# Patient Record
Sex: MALE | Race: ASIAN | NOT HISPANIC OR LATINO | Employment: OTHER | URBAN - METROPOLITAN AREA
[De-identification: names, ages, dates, MRNs, and addresses within clinical notes are randomized per-mention and may not be internally consistent; named-entity substitution may affect disease eponyms.]

---

## 2017-01-18 DIAGNOSIS — E78.2 MIXED HYPERLIPIDEMIA: ICD-10-CM

## 2017-01-18 DIAGNOSIS — I10 ESSENTIAL HYPERTENSION: ICD-10-CM

## 2017-01-18 RX ORDER — PRAVASTATIN SODIUM 10 MG/1
TABLET ORAL
Qty: 90 TABLET | Refills: 0 | Status: SHIPPED | OUTPATIENT
Start: 2017-01-18 | End: 2017-05-25 | Stop reason: SDUPTHER

## 2017-01-18 RX ORDER — AMLODIPINE BESYLATE 5 MG/1
TABLET ORAL
Qty: 90 TABLET | Refills: 0 | Status: SHIPPED | OUTPATIENT
Start: 2017-01-18 | End: 2017-05-25 | Stop reason: SDUPTHER

## 2017-02-03 DIAGNOSIS — I10 ESSENTIAL HYPERTENSION: ICD-10-CM

## 2017-02-03 DIAGNOSIS — E78.5 HYPERLIPIDEMIA, UNSPECIFIED HYPERLIPIDEMIA TYPE: ICD-10-CM

## 2017-02-03 RX ORDER — METOPROLOL SUCCINATE 100 MG/1
100 TABLET, EXTENDED RELEASE ORAL DAILY
Qty: 30 TABLET | Refills: 0 | Status: SHIPPED | OUTPATIENT
Start: 2017-02-03 | End: 2017-05-25 | Stop reason: SDUPTHER

## 2017-02-03 RX ORDER — OMEGA-3-ACID ETHYL ESTERS 1 G/1
2 CAPSULE, LIQUID FILLED ORAL 2 TIMES DAILY
Qty: 120 CAPSULE | Refills: 0 | Status: SHIPPED | OUTPATIENT
Start: 2017-02-03 | End: 2017-03-31 | Stop reason: SDUPTHER

## 2017-02-03 RX ORDER — FUROSEMIDE 20 MG/1
20 TABLET ORAL DAILY
Qty: 30 TABLET | Refills: 0 | Status: SHIPPED | OUTPATIENT
Start: 2017-02-03 | End: 2017-05-25 | Stop reason: SDUPTHER

## 2017-02-03 NOTE — TELEPHONE ENCOUNTER
----- Message from Bereket Sapp sent at 2/3/2017  9:54 AM CST -----  Contact: [facility]  Refill:    omega-3 acid ethyl esters (LOVAZA) 1 gram capsule  furosemide (LASIX) 20 MG tablet   metoprolol succinate (TOPROL-XL) 100 MG 24 hr tablet     Please send to [facility]

## 2017-03-22 ENCOUNTER — TELEPHONE (OUTPATIENT)
Dept: FAMILY MEDICINE | Facility: CLINIC | Age: 82
End: 2017-03-22

## 2017-03-22 DIAGNOSIS — E78.5 HYPERLIPIDEMIA, UNSPECIFIED HYPERLIPIDEMIA TYPE: ICD-10-CM

## 2017-03-22 NOTE — TELEPHONE ENCOUNTER
----- Message from Kerry Barkley sent at 3/22/2017  2:26 PM CDT -----  [facility] calling to request a refill omega-3 acid ethyl esters (LOVAZA) 1 gram capsule. Thank you!

## 2017-03-22 NOTE — TELEPHONE ENCOUNTER
Spoke with Ying at [facility]. Advised patient due in for office visit.     Refills for Lasix and Metoprolol approved by nephrologist. She will contact Nephrologist for approval of Lovaza

## 2017-03-31 DIAGNOSIS — E78.5 HYPERLIPIDEMIA, UNSPECIFIED HYPERLIPIDEMIA TYPE: ICD-10-CM

## 2017-03-31 RX ORDER — OMEGA-3-ACID ETHYL ESTERS 1 G/1
2 CAPSULE, LIQUID FILLED ORAL 2 TIMES DAILY
Qty: 360 CAPSULE | Refills: 3 | Status: SHIPPED | OUTPATIENT
Start: 2017-03-31 | End: 2017-12-21 | Stop reason: SDUPTHER

## 2017-03-31 NOTE — TELEPHONE ENCOUNTER
Spoke with Aleta with [facility] [phone] re: patient brought in empty medication bottle for Lovaza    She would like to know if patient needs to continue this medication and if so, can refill for 1 year be sent to [facility]

## 2017-03-31 NOTE — TELEPHONE ENCOUNTER
----- Message from Rubi Nicolas sent at 3/31/2017 10:32 AM CDT -----  Contact: self  [facility] requesting [facility] call at [phone] to discuss medication.    Thanks!

## 2017-05-25 ENCOUNTER — OFFICE VISIT (OUTPATIENT)
Dept: FAMILY MEDICINE | Facility: CLINIC | Age: 82
End: 2017-05-25
Payer: MEDICARE

## 2017-05-25 VITALS
DIASTOLIC BLOOD PRESSURE: 60 MMHG | RESPIRATION RATE: 17 BRPM | HEIGHT: 65 IN | HEART RATE: 62 BPM | WEIGHT: 149.5 LBS | SYSTOLIC BLOOD PRESSURE: 130 MMHG | TEMPERATURE: 98 F | BODY MASS INDEX: 24.91 KG/M2 | OXYGEN SATURATION: 96 %

## 2017-05-25 DIAGNOSIS — N18.6 ESRD (END STAGE RENAL DISEASE): Primary | ICD-10-CM

## 2017-05-25 DIAGNOSIS — Z99.2 END STAGE RENAL DISEASE ON DIALYSIS: ICD-10-CM

## 2017-05-25 DIAGNOSIS — N18.6 END STAGE RENAL DISEASE ON DIALYSIS: ICD-10-CM

## 2017-05-25 DIAGNOSIS — I10 ESSENTIAL HYPERTENSION: ICD-10-CM

## 2017-05-25 DIAGNOSIS — E78.2 MIXED HYPERLIPIDEMIA: ICD-10-CM

## 2017-05-25 PROCEDURE — 99999 PR PBB SHADOW E&M-EST. PATIENT-LVL III: CPT | Mod: PBBFAC,,, | Performed by: INTERNAL MEDICINE

## 2017-05-25 PROCEDURE — 1126F AMNT PAIN NOTED NONE PRSNT: CPT | Mod: S$GLB,,, | Performed by: INTERNAL MEDICINE

## 2017-05-25 PROCEDURE — 99214 OFFICE O/P EST MOD 30 MIN: CPT | Mod: S$GLB,,, | Performed by: INTERNAL MEDICINE

## 2017-05-25 PROCEDURE — 1159F MED LIST DOCD IN RCRD: CPT | Mod: S$GLB,,, | Performed by: INTERNAL MEDICINE

## 2017-05-25 PROCEDURE — 99499 UNLISTED E&M SERVICE: CPT | Mod: S$PBB,,, | Performed by: INTERNAL MEDICINE

## 2017-05-25 RX ORDER — FUROSEMIDE 20 MG/1
20 TABLET ORAL DAILY
Qty: 30 TABLET | Refills: 2 | Status: SHIPPED | OUTPATIENT
Start: 2017-05-25 | End: 2017-08-10 | Stop reason: SDUPTHER

## 2017-05-25 RX ORDER — PRAVASTATIN SODIUM 10 MG/1
TABLET ORAL
Qty: 90 TABLET | Refills: 1 | Status: SHIPPED | OUTPATIENT
Start: 2017-05-25 | End: 2017-10-12 | Stop reason: SDUPTHER

## 2017-05-25 RX ORDER — METOPROLOL SUCCINATE 100 MG/1
100 TABLET, EXTENDED RELEASE ORAL DAILY
Qty: 30 TABLET | Refills: 2 | Status: SHIPPED | OUTPATIENT
Start: 2017-05-25 | End: 2017-08-10 | Stop reason: SDUPTHER

## 2017-05-25 RX ORDER — AMLODIPINE BESYLATE 5 MG/1
TABLET ORAL
Qty: 90 TABLET | Refills: 1 | Status: SHIPPED | OUTPATIENT
Start: 2017-05-25 | End: 2017-10-12 | Stop reason: SDUPTHER

## 2017-05-25 NOTE — PROGRESS NOTES
Subjective:       Patient ID: Mandeep Willams is a 81 y.o. male.    Chief Complaint: Hypertension and Follow-up    He presents this granddaughter today for follow-up.  His only complaint is cramps in his lower legs after dialysis.  He has declined blood work today.  She reports that he lives alone and she will be leaving in 2 days.  He will need transportation to dialysis.      Review of Systems   Musculoskeletal: Positive for myalgias.       Objective:      Physical Exam   Constitutional: He is oriented to person, place, and time. He appears well-developed and well-nourished. No distress.   HENT:   Head: Normocephalic and atraumatic.   Right Ear: External ear normal.   Left Ear: External ear normal.   Eyes: Conjunctivae are normal. No scleral icterus.   Cardiovascular: Normal rate and regular rhythm.  Exam reveals no gallop and no friction rub.    Murmur heard.  Pulmonary/Chest: Effort normal and breath sounds normal. No respiratory distress. He has no wheezes. He has no rales.   Abdominal: Soft. Bowel sounds are normal. He exhibits no distension and no mass. There is no tenderness. There is no rebound and no guarding.   Musculoskeletal: He exhibits edema. He exhibits no tenderness.   trace   Neurological: He is alert and oriented to person, place, and time. No cranial nerve deficit.   Skin: Skin is warm and dry. No rash noted.   Psychiatric: He has a normal mood and affect.   Vitals reviewed.      Assessment:       1. ESRD (end stage renal disease)    2. End stage renal disease on dialysis    3. Essential hypertension    4. Mixed hyperlipidemia        Plan:       Mandeep was seen today for hypertension and follow-up.    Diagnoses and all orders for this visit:    ESRD (end stage renal disease)    End stage renal disease on dialysis - Pt lives alone and will need transportation to dialysis.  [facility] navigator has arranged transportation through July.  She has also referred the patient to social work given his living situation  and language barrier.    Essential hypertension - controlled  -     amlodipine (NORVASC) 5 MG tablet; TAKE 1 BY MOUTH DAILY  -     metoprolol succinate (TOPROL XL) 100 MG 24 hr tablet; Take 1 tablet (100 mg total) by mouth once daily.  -     furosemide (LASIX) 20 MG tablet; Take 1 tablet (20 mg total) by mouth once daily.    Mixed hyperlipidemia - He has refused labs today  -     pravastatin (PRAVACHOL) 10 MG tablet; TAKE 1 BY MOUTH DAILY       F/u 6 months and prn

## 2017-08-10 DIAGNOSIS — I10 ESSENTIAL HYPERTENSION: ICD-10-CM

## 2017-08-11 RX ORDER — FUROSEMIDE 20 MG/1
TABLET ORAL
Qty: 30 TABLET | Refills: 5 | Status: SHIPPED | OUTPATIENT
Start: 2017-08-11 | End: 2018-10-25 | Stop reason: SDUPTHER

## 2017-08-11 RX ORDER — METOPROLOL SUCCINATE 100 MG/1
TABLET, EXTENDED RELEASE ORAL
Qty: 30 TABLET | Refills: 5 | Status: SHIPPED | OUTPATIENT
Start: 2017-08-11 | End: 2018-02-08 | Stop reason: SDUPTHER

## 2017-10-12 DIAGNOSIS — E78.2 MIXED HYPERLIPIDEMIA: ICD-10-CM

## 2017-10-12 DIAGNOSIS — I10 ESSENTIAL HYPERTENSION: ICD-10-CM

## 2017-10-13 RX ORDER — AMLODIPINE BESYLATE 5 MG/1
TABLET ORAL
Qty: 90 TABLET | Refills: 0 | Status: SHIPPED | OUTPATIENT
Start: 2017-10-13 | End: 2018-10-30

## 2017-12-21 DIAGNOSIS — E78.5 HYPERLIPIDEMIA, UNSPECIFIED HYPERLIPIDEMIA TYPE: ICD-10-CM

## 2017-12-22 RX ORDER — OMEGA-3-ACID ETHYL ESTERS 1 G/1
CAPSULE, LIQUID FILLED ORAL
Qty: 360 CAPSULE | Refills: 2 | Status: SHIPPED | OUTPATIENT
Start: 2017-12-22 | End: 2019-03-19 | Stop reason: SDUPTHER

## 2018-02-08 DIAGNOSIS — I10 ESSENTIAL HYPERTENSION: ICD-10-CM

## 2018-02-08 RX ORDER — METOPROLOL SUCCINATE 100 MG/1
100 TABLET, EXTENDED RELEASE ORAL DAILY
Qty: 30 TABLET | Refills: 0 | Status: SHIPPED | OUTPATIENT
Start: 2018-02-08 | End: 2018-10-23 | Stop reason: SDUPTHER

## 2018-02-08 NOTE — TELEPHONE ENCOUNTER
Patient had not been seen in 7 months, should also schedule an appt. Will need a new PCP here as Dr. Bell no longer here

## 2018-03-06 DIAGNOSIS — I10 ESSENTIAL HYPERTENSION: ICD-10-CM

## 2018-03-06 DIAGNOSIS — E78.2 MIXED HYPERLIPIDEMIA: ICD-10-CM

## 2018-03-06 RX ORDER — PRAVASTATIN SODIUM 10 MG/1
TABLET ORAL
Qty: 90 TABLET | Refills: 0 | OUTPATIENT
Start: 2018-03-06

## 2018-03-06 RX ORDER — AMLODIPINE BESYLATE 5 MG/1
TABLET ORAL
Qty: 90 TABLET | Refills: 0 | OUTPATIENT
Start: 2018-03-06

## 2018-03-06 RX ORDER — FUROSEMIDE 20 MG/1
TABLET ORAL
Qty: 30 TABLET | Refills: 5 | OUTPATIENT
Start: 2018-03-06

## 2018-06-27 ENCOUNTER — TELEPHONE (OUTPATIENT)
Dept: FAMILY MEDICINE | Facility: CLINIC | Age: 83
End: 2018-06-27

## 2018-06-27 NOTE — TELEPHONE ENCOUNTER
Spoke with Connie had question re: DM status of pt. and was informed that no notes found to indicate pt has DM. She states she saw this on hospital records. No further concerns noted

## 2018-06-27 NOTE — TELEPHONE ENCOUNTER
----- Message from Nicholas Pandey sent at 6/27/2018 10:40 AM CDT -----  Contact: KATHIE-Connie  Called to verify if pt has diabetes.  Connie can be reached @ [phone]

## 2018-06-28 ENCOUNTER — TELEPHONE (OUTPATIENT)
Dept: FAMILY MEDICINE | Facility: CLINIC | Age: 83
End: 2018-06-28

## 2018-06-28 NOTE — TELEPHONE ENCOUNTER
----- Message from Shanika Boucher sent at 6/28/2018  1:06 PM CDT -----  Contact: Connie with [facility] / [phone]  Connie calling to speak to nurse again about pt's health. Please call to advise. Thank you.

## 2018-06-28 NOTE — TELEPHONE ENCOUNTER
Spoke with Connie home health nurse and she is concern and requesting clarifications on patient's medications and johnson care. Johnson was inserted on 6/20/18 in the hospital. She informed me that patient was discharged from [facility] with johnson cath but no order for care. On dose for Metoprolol patient has the 100mg and 50 mg titrate in the home. She said that since his most resent is the 50 mg; this is what you put out for patient to take. Also patient in on 2 new medications that is not listed on our medication profile: Fosamax 0.4mg and  Fevelamer 800 mg TID. Spoke with nurse and informed her that Dr Anderson has not seen this patient before and his last office visit was with Dr Bell 5/2017. Patient would have to be seen by provider to address these concerns. Patient has office visit with provider on 7/10/18.

## 2018-07-10 ENCOUNTER — LAB VISIT (OUTPATIENT)
Dept: LAB | Facility: HOSPITAL | Age: 83
End: 2018-07-10
Attending: FAMILY MEDICINE
Payer: MEDICARE

## 2018-07-10 ENCOUNTER — OFFICE VISIT (OUTPATIENT)
Dept: FAMILY MEDICINE | Facility: CLINIC | Age: 83
End: 2018-07-10
Payer: MEDICARE

## 2018-07-10 VITALS
RESPIRATION RATE: 20 BRPM | HEIGHT: 65 IN | OXYGEN SATURATION: 100 % | BODY MASS INDEX: 21.64 KG/M2 | SYSTOLIC BLOOD PRESSURE: 106 MMHG | DIASTOLIC BLOOD PRESSURE: 63 MMHG | HEART RATE: 80 BPM | WEIGHT: 129.88 LBS | TEMPERATURE: 98 F

## 2018-07-10 DIAGNOSIS — Z99.2 DEPENDENCE ON HEMODIALYSIS: ICD-10-CM

## 2018-07-10 DIAGNOSIS — I10 ESSENTIAL HYPERTENSION: ICD-10-CM

## 2018-07-10 DIAGNOSIS — Z74.09 IMPAIRED MOBILITY: ICD-10-CM

## 2018-07-10 DIAGNOSIS — N18.6 ESRD (END STAGE RENAL DISEASE): ICD-10-CM

## 2018-07-10 DIAGNOSIS — L85.3 XEROSIS OF SKIN: ICD-10-CM

## 2018-07-10 DIAGNOSIS — Z97.8 FOLEY CATHETER IN PLACE: ICD-10-CM

## 2018-07-10 DIAGNOSIS — N18.6 ESRD (END STAGE RENAL DISEASE): Primary | ICD-10-CM

## 2018-07-10 DIAGNOSIS — R01.1 CARDIAC MURMUR: ICD-10-CM

## 2018-07-10 LAB
ALBUMIN SERPL BCP-MCNC: 3.4 G/DL
ALP SERPL-CCNC: 123 U/L
ALT SERPL W/O P-5'-P-CCNC: 34 U/L
ANION GAP SERPL CALC-SCNC: 15 MMOL/L
AST SERPL-CCNC: 42 U/L
BASOPHILS # BLD AUTO: 0.01 K/UL
BASOPHILS NFR BLD: 0.3 %
BILIRUB SERPL-MCNC: 0.6 MG/DL
BUN SERPL-MCNC: 59 MG/DL
CALCIUM SERPL-MCNC: 9.4 MG/DL
CHLORIDE SERPL-SCNC: 96 MMOL/L
CO2 SERPL-SCNC: 26 MMOL/L
CREAT SERPL-MCNC: 5.7 MG/DL
DIFFERENTIAL METHOD: ABNORMAL
EOSINOPHIL # BLD AUTO: 0.1 K/UL
EOSINOPHIL NFR BLD: 3 %
ERYTHROCYTE [DISTWIDTH] IN BLOOD BY AUTOMATED COUNT: 18.7 %
EST. GFR  (AFRICAN AMERICAN): 10 ML/MIN/1.73 M^2
EST. GFR  (NON AFRICAN AMERICAN): 9 ML/MIN/1.73 M^2
GLUCOSE SERPL-MCNC: 97 MG/DL
HCT VFR BLD AUTO: 33.3 %
HGB BLD-MCNC: 10.9 G/DL
LYMPHOCYTES # BLD AUTO: 2.1 K/UL
LYMPHOCYTES NFR BLD: 52.9 %
MCH RBC QN AUTO: 31.4 PG
MCHC RBC AUTO-ENTMCNC: 32.7 G/DL
MCV RBC AUTO: 96 FL
MONOCYTES # BLD AUTO: 0.4 K/UL
MONOCYTES NFR BLD: 10.9 %
NEUTROPHILS # BLD AUTO: 1.3 K/UL
NEUTROPHILS NFR BLD: 32.9 %
PLATELET # BLD AUTO: 138 K/UL
PMV BLD AUTO: 9.8 FL
POTASSIUM SERPL-SCNC: 3.7 MMOL/L
PROT SERPL-MCNC: 8.8 G/DL
RBC # BLD AUTO: 3.47 M/UL
SODIUM SERPL-SCNC: 137 MMOL/L
WBC # BLD AUTO: 3.95 K/UL

## 2018-07-10 PROCEDURE — 82306 VITAMIN D 25 HYDROXY: CPT

## 2018-07-10 PROCEDURE — 80053 COMPREHEN METABOLIC PANEL: CPT

## 2018-07-10 PROCEDURE — 83970 ASSAY OF PARATHORMONE: CPT

## 2018-07-10 PROCEDURE — 99499 UNLISTED E&M SERVICE: CPT | Mod: S$GLB,,, | Performed by: FAMILY MEDICINE

## 2018-07-10 PROCEDURE — 99214 OFFICE O/P EST MOD 30 MIN: CPT | Mod: S$GLB,,, | Performed by: FAMILY MEDICINE

## 2018-07-10 PROCEDURE — 99999 PR PBB SHADOW E&M-EST. PATIENT-LVL III: CPT | Mod: PBBFAC,,, | Performed by: FAMILY MEDICINE

## 2018-07-10 PROCEDURE — 85025 COMPLETE CBC W/AUTO DIFF WBC: CPT

## 2018-07-10 PROCEDURE — 36415 COLL VENOUS BLD VENIPUNCTURE: CPT | Mod: PN

## 2018-07-10 RX ORDER — VALINE, LYSINE, HISTIDINE, ISOLEUCINE, LEUCINE, PHENYLALANINE, THREONINE, METHIONINE, TRYPTOPHAN, ALANINE, GLYCINE, ARGININE, PROLINE, GLUTAMIC ACID, SERINE, ASPARTIC ACID AND TYROSINE 1.44; 1.35; 1.18; 1.08; 1.08; 1; 980; 760; 320; 2.76; 2.06; 1.96; 1.34; 1.02; 1.02; 600; 5 G/100ML; G/100ML; G/100ML; G/100ML; G/100ML; G/100ML; MG/100ML; MG/100ML; MG/100ML; G/100ML; G/100ML; G/100ML; G/100ML; G/100ML; G/100ML; MG/100ML; MG/100ML
INJECTION, SOLUTION INTRAVENOUS
Status: ON HOLD | COMMUNITY
Start: 2018-06-22 | End: 2021-01-16 | Stop reason: HOSPADM

## 2018-07-10 RX ORDER — I.V. FAT EMULSION 20 G/100ML
EMULSION INTRAVENOUS
Status: ON HOLD | COMMUNITY
Start: 2018-07-06 | End: 2021-01-16 | Stop reason: HOSPADM

## 2018-07-10 RX ORDER — AMMONIUM LACTATE 12 G/100G
LOTION TOPICAL
Qty: 500 G | Refills: 5 | Status: SHIPPED | OUTPATIENT
Start: 2018-07-10 | End: 2018-07-10 | Stop reason: SDUPTHER

## 2018-07-10 RX ORDER — AMMONIUM LACTATE 12 G/100G
LOTION TOPICAL DAILY PRN
Qty: 500 G | Refills: 5 | Status: SHIPPED | OUTPATIENT
Start: 2018-07-10 | End: 2019-04-02 | Stop reason: SDUPTHER

## 2018-07-11 LAB
25(OH)D3+25(OH)D2 SERPL-MCNC: 39 NG/ML
PTH-INTACT SERPL-MCNC: 291.6 PG/ML

## 2018-07-11 NOTE — PROGRESS NOTES
Routine Office Visit    Patient Name: Mandeep Willams    : 1935  MRN: [id number]    Subjective:  Mandeep is a 82 y.o. male who presents today for:   Chief Complaint   Patient presents with    Hospital Follow Up     release from [facility]       82-year-old male with end-stage renal disease on dialysis 3 days a week, hypertension, and dyslipidemia comes in to establish care with a new primary care provider.  He states that he was recently discharged from [facility], because he could no longer afford staying there.  Neither he nor his son was present speak angulation and translation is done via  program, Martha.   Patient reports that he is taking all his medications as prescribed, however he does not know his medications and did not bring a list nor his medications with him.   The patient reports that he has a very difficult time ambulating.  He is currently using a walker that he borrowed.  The he would like a prescription for a walker and a wheelchair.  He would like both because he would like to use a walker while at home and a wheelchair when he goes out with his family.  Without the use of assistive devices, the patient is unable to ambulate.  The patient reports that he has a Matute.  It was ordered by his nephrologist.  The he states that it is changed once monthly.  He empties the bags himself daily.  He reports no current symptoms of a urinary tract infection.  The he denies any fevers or chills.  He denies any foul smell in the urine.    Past Medical History  Past Medical History:   Diagnosis Date    ESRD (end stage renal disease)     HTN (hypertension)     Hyperlipidemia        Past Surgical History  Past Surgical History:   Procedure Laterality Date    BACK SURGERY      DIALYSIS FISTULA CREATION  2012    left arm        Family History  Family History   Problem Relation Age of Onset    Diabetes Sister     Hypertension Sister     Arthritis Sister     Cancer Neg Hx     Heart  disease Neg Hx     Stroke Neg Hx     Amblyopia Neg Hx     Blindness Neg Hx     Cataracts Neg Hx     Glaucoma Neg Hx     Macular degeneration Neg Hx     Retinal detachment Neg Hx     Strabismus Neg Hx     Thyroid disease Neg Hx        Social History  Social History     Social History    Marital status:      Spouse name: N/A    Number of children: N/A    Years of education: N/A     Occupational History     [employer]     Social History Main Topics    Smoking status: Never Smoker    Smokeless tobacco: Not on file    Alcohol use No    Drug use: No    Sexual activity: Not Currently     Other Topics Concern    Not on file     Social History Narrative    No narrative on file       Current Medications  Current Outpatient Prescriptions on File Prior to Visit   Medication Sig Dispense Refill    LASIX 20 mg tablet TAKE 1 BY MOUTH DAILY 30 tablet 5    LOVAZA 1 gram capsule TAKE 2 BY MOUTH TWO TIMES ADAY 360 capsule 2    metoprolol succinate (TOPROL XL) 100 MG 24 hr tablet Take 1 tablet (100 mg total) by mouth once daily. 30 tablet 0    NE[facility]RO-[facility] RX 1- mg-mg-mcg tablet TAKE 1 BY MOUTH ONCE A DAY                            REPLACES VOL-CARE RX 90 tablet 0    NORVASC 5 mg tablet TAKE 1 BY MOUTH DAILY 90 tablet 0    polyethylene glycol (GLYCOLAX) 17 gram PwPk Take 17 g by mouth daily as needed. 30 each 2    PRAVACHOL 10 mg tablet TAKE 1 BY MOUTH DAILY 90 tablet 0    SENNOSIDES (SENOKOT ORAL) Take by mouth.       No current facility-administered medications on file prior to visit.        Allergies   Review of patient's allergies indicates:  No Known Allergies    Review of Systems   Constitutional: Positive for fatigue. Negative for chills and fever.   Respiratory: Negative for cough, shortness of breath and wheezing.    Cardiovascular: Negative for chest pain and palpitations.   Gastrointestinal: Negative for abdominal pain, blood in stool, constipation, diarrhea and  "nausea.   Musculoskeletal: Positive for arthralgias, back pain, gait problem and myalgias.   Skin: Negative for rash.   Neurological: Negative for seizures and headaches.   Hematological: Negative for adenopathy. Does not bruise/bleed easily.   Psychiatric/Behavioral: Negative for sleep disturbance.       /63 (BP Location: Right arm, Patient Position: Sitting, BP Method: Medium (Automatic))   Pulse 80   Temp 97.6 °F (36.4 °C) (Oral)   Resp 20   Ht 5' 5" (1.651 m)   Wt 58.9 kg (129 lb 13.6 oz)   SpO2 100%   BMI 21.61 kg/m²     Physical Exam   Constitutional: He appears well-developed and well-nourished. He is cooperative. No distress.   Patient using a rolling walker, which she states he borrowed from a friend.  Patient has Matute catheter bag attached to right leg   HENT:   Head: Normocephalic.   Right Ear: External ear normal.   Left Ear: External ear normal.   Nose: Nose normal.   Mouth/Throat: No oropharyngeal exudate.   Eyes: Conjunctivae and EOM are normal. Pupils are equal, round, and reactive to light.   Neck: Normal range of motion. Neck supple. No tracheal deviation present.   Cardiovascular: Normal rate, regular rhythm and intact distal pulses.    Murmur heard.  Pulses:       Radial pulses are 2+ on the right side, and 2+ on the left side.   Pulmonary/Chest: Effort normal and breath sounds normal. He has no wheezes. He has no rales.   Abdominal: Soft. Bowel sounds are normal. He exhibits no mass. There is no tenderness.   Musculoskeletal: He exhibits no edema.   Lymphadenopathy:     He has no cervical adenopathy.   Neurological: He is alert.   Skin: He is not diaphoretic.   Dry scaling in lower extremities.   Vitals reviewed.      Assessment/Plan:  Mandeep was seen today for hospital follow up.    Diagnoses and all orders for this visit:    ESRD (end stage renal disease)  -     Comprehensive metabolic panel; Future  -     CBC auto differential; Future  -     Vitamin D; Future  -     PTH, intact; " Future  Will check labs.  Management as per Nephrology.    Essential hypertension  Well controlled.  Patient to bring in all his medications next week.    Xerosis of skin  -     ammonium lactate (LAC-HYDRIN) 12 % lotion; Apply topically daily as needed for Dry Skin.  Prescribed Lac-Hydrin to help with dryness.    Impaired mobility  -     WALKER FOR HOME USE  -     WHEELCHAIR FOR HOME USE  Will request walker and wheelchair as requested by the patient.    Cardiac murmur  Patient declines further workup of this at present..    Dependence on hemodialysis  Management as per Nephrology    Matute catheter in place  No complications noted at present.    This office note has been dictated.

## 2018-07-11 NOTE — PROGRESS NOTES
Patient, Mandeep Willams (MRN #[id number]), presented with a recent Platelet count less than 150 K/uL consistent with the definition of thrombocytopenia (ICD10 - D69.6).    Platelets   Date Value Ref Range Status   07/10/2018 138 (L) 150 - 350 K/uL Final     The patient's thrombocytopenia was monitored, evaluated, addressed and/or treated. This addendum to the medical record is made on 07/10/2018.

## 2018-07-12 ENCOUNTER — TELEPHONE (OUTPATIENT)
Dept: FAMILY MEDICINE | Facility: CLINIC | Age: 83
End: 2018-07-12

## 2018-07-12 NOTE — TELEPHONE ENCOUNTER
----- Message from Kelsey Lou sent at 7/11/2018  2:58 PM CDT -----  Contact: Jacob Clemente calling to speak to a nurse regarding health. [phone]      NORVASC 5 mg tablet  NEPHRO-KAUSHIK RX 1- mg-mg-mcg tablet  pravastatin (PRAVACHOL) 10 MG tablet (Discontinued)

## 2018-07-13 ENCOUNTER — TELEPHONE (OUTPATIENT)
Dept: FAMILY MEDICINE | Facility: CLINIC | Age: 83
End: 2018-07-13

## 2018-07-13 NOTE — TELEPHONE ENCOUNTER
Chyna informed of below; states she did speak to teri this morning and they will get in touch with pt

## 2018-07-13 NOTE — TELEPHONE ENCOUNTER
Guy Mcclelland Jr., MD routed this conversation to Stas Tovar Staff    Guy Mcclelland Jr., MD          9:35 AM   Note      Please let  know that patient nor son new what medicines the patient was one when they came in and did not bring any the day of the visit.   As such I wouldn't know what dosages he is on. He was told to bring in his medications to his visit next week.  Also they stated that meds had been being managed by the nephrologist      Lizzy Dean LPN   to Guy Mcclelland Jr., MD           9:25 AM    Patient has been scheduled for follow up on 07/17,  had questions about attached medicines refills and/or continuance asking that you address at visit.     Lizzy

## 2018-07-13 NOTE — TELEPHONE ENCOUNTER
Please let  know that patient nor son new what medicines the patient was one when they came in and did not bring any the day of the visit.   As such I wouldn't know what dosages he is on. He was told to bring in his medications to his visit next week.  Also they stated that meds had been being managed by the nephrologist

## 2018-07-19 ENCOUNTER — OFFICE VISIT (OUTPATIENT)
Dept: FAMILY MEDICINE | Facility: CLINIC | Age: 83
End: 2018-07-19
Payer: MEDICARE

## 2018-07-19 VITALS
HEIGHT: 65 IN | BODY MASS INDEX: 21.34 KG/M2 | RESPIRATION RATE: 14 BRPM | TEMPERATURE: 98 F | DIASTOLIC BLOOD PRESSURE: 55 MMHG | SYSTOLIC BLOOD PRESSURE: 130 MMHG | WEIGHT: 128.06 LBS | HEART RATE: 72 BPM | OXYGEN SATURATION: 99 %

## 2018-07-19 DIAGNOSIS — E78.2 MIXED HYPERLIPIDEMIA: ICD-10-CM

## 2018-07-19 DIAGNOSIS — N18.6 ESRD (END STAGE RENAL DISEASE): ICD-10-CM

## 2018-07-19 DIAGNOSIS — Z74.09 IMPAIRED MOBILITY: ICD-10-CM

## 2018-07-19 DIAGNOSIS — N25.81 SECONDARY HYPERPARATHYROIDISM: ICD-10-CM

## 2018-07-19 DIAGNOSIS — I10 ESSENTIAL HYPERTENSION: Primary | ICD-10-CM

## 2018-07-19 PROCEDURE — 99214 OFFICE O/P EST MOD 30 MIN: CPT | Mod: S$GLB,,, | Performed by: FAMILY MEDICINE

## 2018-07-19 PROCEDURE — 99499 UNLISTED E&M SERVICE: CPT | Mod: S$GLB,,, | Performed by: FAMILY MEDICINE

## 2018-07-19 PROCEDURE — 99999 PR PBB SHADOW E&M-EST. PATIENT-LVL III: CPT | Mod: PBBFAC,,, | Performed by: FAMILY MEDICINE

## 2018-07-19 NOTE — PROGRESS NOTES
Routine Office Visit    Patient Name: Mandeep Willams    : 1935  MRN: [id number]    Subjective:  Mandeep is a 82 y.o. male who presents today for:   Chief Complaint   Patient presents with    chonic problems       82-year-old male comes in with his son for follow-up on hypertension, end-stage renal disease, dyslipidemia and gait abnormality.  They report no new concerns since the last visit.  They bring in all the patient's medications to confirm correct dosages and this was corrected in medical record.  The patient has not yet received his walker or wheelchair.   service was provided by Martha    Past Medical History  Past Medical History:   Diagnosis Date    ESRD (end stage renal disease)     HTN (hypertension)     Hyperlipidemia        Past Surgical History  Past Surgical History:   Procedure Laterality Date    BACK SURGERY      DIALYSIS FISTULA CREATION  2012    left arm        Family History  Family History   Problem Relation Age of Onset    Diabetes Sister     Hypertension Sister     Arthritis Sister     Cancer Neg Hx     Heart disease Neg Hx     Stroke Neg Hx     Amblyopia Neg Hx     Blindness Neg Hx     Cataracts Neg Hx     Glaucoma Neg Hx     Macular degeneration Neg Hx     Retinal detachment Neg Hx     Strabismus Neg Hx     Thyroid disease Neg Hx        Social History  Social History     Social History    Marital status:      Spouse name: N/A    Number of children: N/A    Years of education: N/A     Occupational History     [employer]     Social History Main Topics    Smoking status: Never Smoker    Smokeless tobacco: Not on file    Alcohol use No    Drug use: No    Sexual activity: Not Currently     Other Topics Concern    Not on file     Social History Narrative    No narrative on file       Current Medications  Current Outpatient Prescriptions on File Prior to Visit   Medication Sig Dispense Refill    LASIX 20 mg tablet TAKE 1 BY  "MOUTH DAILY 30 tablet 5    metoprolol succinate (TOPROL XL) 100 MG 24 hr tablet Take 1 tablet (100 mg total) by mouth once daily. 30 tablet 0    NEPHRO-KAUSHIK RX 1- mg-mg-mcg tablet TAKE 1 BY MOUTH ONCE A DAY                            REPLACES VOL-CARE RX 90 tablet 0    NORVASC 5 mg tablet TAKE 1 BY MOUTH DAILY 90 tablet 0    PRAVACHOL 10 mg tablet TAKE 1 BY MOUTH DAILY 90 tablet 0    ammonium lactate (LAC-HYDRIN) 12 % lotion Apply topically daily as needed for Dry Skin. 500 g 5    INTRALIPID 20 % infusion       LOVAZA 1 gram capsule TAKE 2 BY MOUTH TWO TIMES ADAY 360 capsule 2    polyethylene glycol (GLYCOLAX) 17 gram PwPk Take 17 g by mouth daily as needed. 30 each 2    PROSOL 20 % SolP       SENNOSIDES (SENOKOT ORAL) Take by mouth.       No current facility-administered medications on file prior to visit.        Allergies   Review of patient's allergies indicates:  No Known Allergies    Review of Systems   Constitutional: Positive for fatigue. Negative for chills and fever.   Respiratory: Negative for cough, shortness of breath and wheezing.    Cardiovascular: Negative for chest pain and palpitations.   Gastrointestinal: Negative for abdominal pain, blood in stool, constipation, diarrhea and nausea.   Genitourinary: Negative for dysuria.   Musculoskeletal: Positive for arthralgias, back pain, gait problem and myalgias.   Skin: Negative for rash.   Neurological: Negative for seizures and headaches.   Hematological: Negative for adenopathy. Does not bruise/bleed easily.   Psychiatric/Behavioral: Negative for sleep disturbance.       BP (!) 130/55 (BP Location: Right arm, Patient Position: Sitting, BP Method: Medium (Automatic))   Pulse 72   Temp 97.7 °F (36.5 °C) (Oral)   Resp 14   Ht 5' 5" (1.651 m)   Wt 58.1 kg (128 lb 1.4 oz)   SpO2 99%   BMI 21.31 kg/m²     Physical Exam   Constitutional: He appears well-developed and well-nourished. He is cooperative. No distress.   Patient using a " rolling walker, which she states he borrowed from a friend.  Patient has Matute catheter bag attached to right leg   HENT:   Head: Normocephalic.   Right Ear: External ear normal.   Left Ear: External ear normal.   Nose: Nose normal.   Mouth/Throat: No oropharyngeal exudate.   Eyes: Conjunctivae and EOM are normal. Pupils are equal, round, and reactive to light.   Neck: Normal range of motion. Neck supple. No tracheal deviation present.   Cardiovascular: Normal rate, regular rhythm and intact distal pulses.    Murmur heard.  Pulses:       Radial pulses are 2+ on the right side, and 2+ on the left side.   Pulmonary/Chest: Effort normal and breath sounds normal. He has no wheezes. He has no rales.   Abdominal: Soft. Bowel sounds are normal. He exhibits no mass. There is no tenderness.   Musculoskeletal: He exhibits no edema.   Lymphadenopathy:     He has no cervical adenopathy.   Neurological: He is alert.   Skin: He is not diaphoretic.   Dry scaling in lower extremities.   Vitals reviewed.      Assessment/Plan:  Mandeep was seen today for chonic problems.    Diagnoses and all orders for this visit:    Essential hypertension  The patient is to continue Toprol  mg daily and Norvasc 5 mg daily.    Mixed hyperlipidemia  Continue pravastatin 10 mg daily and fish oil.    ESRD (end stage renal disease)  Continue dialysis on Mondays Wednesdays and Fridays as per Nephrology.    Secondary hyperparathyroidism  Continue to monitor PTH and vitamin-D.    Impaired mobility  Patient's son given the number to [email or web address] to call to arrange for delivery of medical equipment        This office note has been dictated.  This dictation has been generated using M-Modal Fluency Direct dictation; some phonetic errors may occur.

## 2018-10-22 ENCOUNTER — HOSPITAL ENCOUNTER (OUTPATIENT)
Facility: HOSPITAL | Age: 83
Discharge: HOME | End: 2018-10-23
Attending: EMERGENCY MEDICINE | Admitting: HOSPITALIST
Payer: MEDICARE

## 2018-10-22 ENCOUNTER — OFFICE VISIT (OUTPATIENT)
Dept: FAMILY MEDICINE | Facility: CLINIC | Age: 83
End: 2018-10-22
Payer: MEDICARE

## 2018-10-22 VITALS
BODY MASS INDEX: 24.91 KG/M2 | SYSTOLIC BLOOD PRESSURE: 128 MMHG | HEART RATE: 65 BPM | TEMPERATURE: 98 F | HEIGHT: 65 IN | RESPIRATION RATE: 16 BRPM | DIASTOLIC BLOOD PRESSURE: 62 MMHG | OXYGEN SATURATION: 96 % | WEIGHT: 149.5 LBS

## 2018-10-22 DIAGNOSIS — J90 PLEURAL EFFUSION: ICD-10-CM

## 2018-10-22 DIAGNOSIS — J18.9 PNEUMONIA OF RIGHT LOWER LOBE DUE TO INFECTIOUS ORGANISM: ICD-10-CM

## 2018-10-22 DIAGNOSIS — N18.6 END STAGE RENAL DISEASE ON DIALYSIS: ICD-10-CM

## 2018-10-22 DIAGNOSIS — J18.9 PNEUMONIA: Primary | ICD-10-CM

## 2018-10-22 DIAGNOSIS — I10 ESSENTIAL HYPERTENSION: ICD-10-CM

## 2018-10-22 DIAGNOSIS — R06.02 SOB (SHORTNESS OF BREATH): ICD-10-CM

## 2018-10-22 DIAGNOSIS — Z99.2 END STAGE RENAL DISEASE ON DIALYSIS: ICD-10-CM

## 2018-10-22 DIAGNOSIS — J90 PLEURAL EFFUSION, RIGHT: ICD-10-CM

## 2018-10-22 DIAGNOSIS — J98.11 COMPRESSIVE ATELECTASIS: ICD-10-CM

## 2018-10-22 DIAGNOSIS — R06.02 SHORTNESS OF BREATH: Primary | ICD-10-CM

## 2018-10-22 DIAGNOSIS — N18.6 ESRD (END STAGE RENAL DISEASE): ICD-10-CM

## 2018-10-22 DIAGNOSIS — E78.2 MIXED HYPERLIPIDEMIA: ICD-10-CM

## 2018-10-22 LAB
ALBUMIN SERPL BCP-MCNC: 3.4 G/DL
ALP SERPL-CCNC: 129 U/L
ALT SERPL W/O P-5'-P-CCNC: 53 U/L
ANION GAP SERPL CALC-SCNC: 11 MMOL/L
AST SERPL-CCNC: 41 U/L
BASOPHILS # BLD AUTO: 0.02 K/UL
BASOPHILS NFR BLD: 0.3 %
BILIRUB SERPL-MCNC: 1 MG/DL
BUN SERPL-MCNC: 56 MG/DL
CALCIUM SERPL-MCNC: 8.5 MG/DL
CHLORIDE SERPL-SCNC: 95 MMOL/L
CO2 SERPL-SCNC: 33 MMOL/L
CREAT SERPL-MCNC: 4.7 MG/DL
DIFFERENTIAL METHOD: ABNORMAL
EOSINOPHIL # BLD AUTO: 0.1 K/UL
EOSINOPHIL NFR BLD: 1.5 %
ERYTHROCYTE [DISTWIDTH] IN BLOOD BY AUTOMATED COUNT: 15.6 %
EST. GFR  (AFRICAN AMERICAN): 13 ML/MIN/1.73 M^2
EST. GFR  (NON AFRICAN AMERICAN): 11 ML/MIN/1.73 M^2
FLUAV AG SPEC QL IA: NEGATIVE
FLUBV AG SPEC QL IA: NEGATIVE
GLUCOSE SERPL-MCNC: 102 MG/DL
HCT VFR BLD AUTO: 31.8 %
HGB BLD-MCNC: 11 G/DL
LACTATE SERPL-SCNC: 1 MMOL/L
LACTATE SERPL-SCNC: 1.2 MMOL/L
LYMPHOCYTES # BLD AUTO: 1.7 K/UL
LYMPHOCYTES NFR BLD: 27.6 %
MAGNESIUM SERPL-MCNC: 2.2 MG/DL
MCH RBC QN AUTO: 32.8 PG
MCHC RBC AUTO-ENTMCNC: 34.6 G/DL
MCV RBC AUTO: 95 FL
MONOCYTES # BLD AUTO: 0.5 K/UL
MONOCYTES NFR BLD: 8 %
NEUTROPHILS # BLD AUTO: 3.8 K/UL
NEUTROPHILS NFR BLD: 62.6 %
PLATELET # BLD AUTO: 132 K/UL
PMV BLD AUTO: 10.2 FL
POTASSIUM SERPL-SCNC: 3.5 MMOL/L
PROT SERPL-MCNC: 8.1 G/DL
RBC # BLD AUTO: 3.35 M/UL
SODIUM SERPL-SCNC: 139 MMOL/L
SPECIMEN SOURCE: NORMAL
TROPONIN I SERPL DL<=0.01 NG/ML-MCNC: 0.04 NG/ML
WBC # BLD AUTO: 6.13 K/UL

## 2018-10-22 PROCEDURE — 96365 THER/PROPH/DIAG IV INF INIT: CPT

## 2018-10-22 PROCEDURE — 1100F PTFALLS ASSESS-DOCD GE2>/YR: CPT | Mod: CPTII,,, | Performed by: FAMILY MEDICINE

## 2018-10-22 PROCEDURE — 84484 ASSAY OF TROPONIN QUANT: CPT

## 2018-10-22 PROCEDURE — 87040 BLOOD CULTURE FOR BACTERIA: CPT

## 2018-10-22 PROCEDURE — 36415 COLL VENOUS BLD VENIPUNCTURE: CPT

## 2018-10-22 PROCEDURE — 99285 EMERGENCY DEPT VISIT HI MDM: CPT | Mod: 25,27

## 2018-10-22 PROCEDURE — 83735 ASSAY OF MAGNESIUM: CPT

## 2018-10-22 PROCEDURE — G0378 HOSPITAL OBSERVATION PER HR: HCPCS

## 2018-10-22 PROCEDURE — 3288F FALL RISK ASSESSMENT DOCD: CPT | Mod: CPTII,,, | Performed by: FAMILY MEDICINE

## 2018-10-22 PROCEDURE — 93010 ELECTROCARDIOGRAM REPORT: CPT | Mod: ,,, | Performed by: INTERNAL MEDICINE

## 2018-10-22 PROCEDURE — 83605 ASSAY OF LACTIC ACID: CPT | Mod: 91

## 2018-10-22 PROCEDURE — 93005 ELECTROCARDIOGRAM TRACING: CPT

## 2018-10-22 PROCEDURE — 25000003 PHARM REV CODE 250: Performed by: EMERGENCY MEDICINE

## 2018-10-22 PROCEDURE — 99213 OFFICE O/P EST LOW 20 MIN: CPT | Mod: PBBFAC,25,PN | Performed by: FAMILY MEDICINE

## 2018-10-22 PROCEDURE — 63600175 PHARM REV CODE 636 W HCPCS: Performed by: EMERGENCY MEDICINE

## 2018-10-22 PROCEDURE — 99215 OFFICE O/P EST HI 40 MIN: CPT | Mod: S$PBB,,, | Performed by: FAMILY MEDICINE

## 2018-10-22 PROCEDURE — 87400 INFLUENZA A/B EACH AG IA: CPT | Mod: 59

## 2018-10-22 PROCEDURE — 85025 COMPLETE CBC W/AUTO DIFF WBC: CPT

## 2018-10-22 PROCEDURE — 96375 TX/PRO/DX INJ NEW DRUG ADDON: CPT

## 2018-10-22 PROCEDURE — 99999 PR PBB SHADOW E&M-EST. PATIENT-LVL III: CPT | Mod: PBBFAC,,, | Performed by: FAMILY MEDICINE

## 2018-10-22 PROCEDURE — 94640 AIRWAY INHALATION TREATMENT: CPT | Mod: PBBFAC,PN

## 2018-10-22 PROCEDURE — 80053 COMPREHEN METABOLIC PANEL: CPT

## 2018-10-22 RX ORDER — ALBUTEROL SULFATE 0.83 MG/ML
2.5 SOLUTION RESPIRATORY (INHALATION)
Status: COMPLETED | OUTPATIENT
Start: 2018-10-22 | End: 2018-10-22

## 2018-10-22 RX ADMIN — SODIUM CHLORIDE 1974 ML: 0.9 INJECTION, SOLUTION INTRAVENOUS at 07:10

## 2018-10-22 RX ADMIN — CEFTRIAXONE 1 G: 1 INJECTION, SOLUTION INTRAVENOUS at 08:10

## 2018-10-22 RX ADMIN — AZITHROMYCIN MONOHYDRATE 500 MG: 500 INJECTION, POWDER, LYOPHILIZED, FOR SOLUTION INTRAVENOUS at 09:10

## 2018-10-22 RX ADMIN — ALBUTEROL SULFATE 2.5 MG: 2.5 SOLUTION RESPIRATORY (INHALATION) at 05:10

## 2018-10-22 NOTE — ED PROVIDER NOTES
Encounter Date: 10/22/2018  ------------------------------------------------------------------------------------------------------------------  Cruz VAZQUEZ, have reviewed the SORT/triage note.    HISTORY:  Chief Complaint   Patient presents with    Pneumonia     Sent to the ed for pneumonia from physician office.  SOB last night.  No pain        HPI: 82 y.o. male, with a PMHx of hypertension, end stage renal disease and hyperlipidemia, presents to the ED accompanied by his niece c/o shortness of breath since last night. Pt reports that he has also been experiencing a non-productive cough as his niece notes that the shortness of breath improves when sitting up. Niece reports that pt visited his PCP this morning for the symptoms and was found to have fluid in his lung via x-ray. She notes that pt's PCP advised that he come into the ED for further evaluation. Niece additionally notes that pt received dialysis this morning without any issues. Pt denies fever, chills, appetite change and any pain. No other associated symptoms.      Past Medical History:   Diagnosis Date    ESRD (end stage renal disease)     HTN (hypertension)     Hyperlipidemia      Past Surgical History:   Procedure Laterality Date    BACK SURGERY  2005    DIALYSIS FISTULA CREATION  4/2012    left arm    JOINT REPLACEMENT Right     hip     Social History     Tobacco Use    Smoking status: Never Smoker    Smokeless tobacco: Never Used   Substance Use Topics    Alcohol use: No    Drug use: No     Family History   Problem Relation Age of Onset    Diabetes Sister     Hypertension Sister     Arthritis Sister     Cancer Neg Hx     Heart disease Neg Hx     Stroke Neg Hx     Amblyopia Neg Hx     Blindness Neg Hx     Cataracts Neg Hx     Glaucoma Neg Hx     Macular degeneration Neg Hx     Retinal detachment Neg Hx     Strabismus Neg Hx     Thyroid disease Neg Hx      Review of patient's allergies indicates:  No Known  "Allergies    Review of Systems as per HPI  Constitutional: Negative for activity change, appetite change, chills, diaphoresis, fatigue and fever.   Respiratory: Positive for shortness of breath and non-productive cough. Negative for apnea, choking, chest tightness and wheezing.    Gastrointestinal: Negative for abdominal distention, constipation, diarrhea and nausea.   Genitourinary: Negative for dysuria, flank pain, frequency and hematuria.   Skin: Negative for color change, pallor, rash and wound.   Neurological: Negative for dizziness, light-headedness, numbness and headaches.   Psychiatric/Behavioral: Negative for agitation, behavioral problems, confusion, decreased concentration and dysphoric mood.     All other systems reviewed and are negative.    PHYSICAL EXAM:  ED Triage Vitals [10/22/18 7088]   Enc Vitals Group      BP (!) 159/70      Pulse 72      Resp 19      Temp 97.8 °F (36.6 °C)      Temp src Oral      SpO2       Weight 145 lb      Height 5' 7"      Head Circumference       Peak Flow       Pain Score       Pain Loc       Pain Edu?       Excl. in GC?        Vital signs and nursing assessment noted:  Elevated blood pressure    GEN:   NAD, A & Ox3, atraumatic, well appearing, nontoxic appearing  HEENT:  PERRLA, EOMI, dry mucus membranes, nl conjunctiva, no scleral icterus, no nystagmus, no nodes/nodules, soft, supple, FROM, no trachial deviation, nexus negative  CV:   RRR no r/g, 2+ radial pulses, <2sec cap refill, no obvious JVD, positive murmur heard  RESP:  Decreased breath sounds in the bases right greater than left, no w/r/r, equal and bilateral chest rise, no respiratory distress  ABD:   soft, Nontender, Nondistended, +BS, no guarding/rebound  BACK:  FROM, no midline tenderness, no paraspinal tenderness  :   Deferred  EXT:   positive thrill in left AV fistula, FROM, MILLER x 4, no edema, no calf tenderness, no swelling,  no bony tenderness, no warmth or redness, no crepitus, no obvious " deformity  LYMPH:  no gross adenopathy  NEURO:  CN II-XII grossly intact, no obvious motor/sensory deficit, no tremor  PSYCH:   no SI/HI, no anxiety, nl mood/affect, nl judgement/thought process  SKIN:  Warm, dry, intact, no rashes/lesions or masses, nl color, no pallor,  poor skin turgor    Labs Reviewed   CBC W/ AUTO DIFFERENTIAL - Abnormal; Notable for the following components:       Result Value    RBC 3.35 (*)     Hemoglobin 11.0 (*)     Hematocrit 31.8 (*)     MCH 32.8 (*)     RDW 15.6 (*)     Platelets 132 (*)     All other components within normal limits   COMPREHENSIVE METABOLIC PANEL - Abnormal; Notable for the following components:    CO2 33 (*)     BUN, Bld 56 (*)     Creatinine 4.7 (*)     Calcium 8.5 (*)     Albumin 3.4 (*)     AST 41 (*)     ALT 53 (*)     eGFR if  13 (*)     eGFR if non  11 (*)     All other components within normal limits   TROPONIN I - Abnormal; Notable for the following components:    Troponin I 0.035 (*)     All other components within normal limits   CULTURE, BLOOD   CULTURE, BLOOD   LACTIC ACID, PLASMA   MAGNESIUM   INFLUENZA A AND B ANTIGEN     MEDICAL DECISION MAKIN y.o. male, with a PMHx of hypertension, end stage renal disease and hyperlipidemia, presents to the ED accompanied by his niece c/o shortness of breath since last night. Exam shows a positive thrill in left AV fistula, dry mucus membranes, poor skin turgor and a murmur.  Old records checked/reviewed in EMR.  Summarized as notated in ED course.     Shortness of breath differential includes but not exclusive to Pulmonary embolism, asthma/COPD exacerbation, deconditioning, myocardial infarction, pneumonia, CHF, pneumothorax.    Treatment plan includes history, physical exam, cardiac monitoring, labs, imaging studies, and supportive care.     Medical tests, labs and imaging studies reviewed and independently interpreted:       ED Course as of Oct 23 0009   Mon Oct 22, 2018   2373  Old records reviewed: Seen in office today and given albuterol with no improvement.  CXR showed evidence of effusion +/- PNA.  Told to come to ED for further evaluation of CP and SOB.  ed antibiotic, and I advised him that I would not as he needs to be seen in the emergency room today for further evaluation.  [NO]   1924 Right-sided pleural effusion X-Ray Chest AP Portable [NO]   1925 Unremarkable WBC: 6.13 [NO]   1925 Relatively unremarkable Hemoglobin: (!) 11.0 [NO]   1925 Thrombocytopenia Platelets: (!) 132 [NO]   1925 I have ordered and independently interpreted EKG without the assistance of a cardiologist.  Time:  6:06 p.m.  No STEMI  NSR with HR 74  nl intervals  Incomplete right bundle branch block, left ventricular hypertrophy, prolonged QT, right axis deviation, No ectopy EKG 12-lead [NO]   1934 Elevation can occur secondary to renal disease Troponin I: (!) 0.035 [NO]   1935 End-stage renal disease Creatinine: (!) 4.7 [NO]   1935 eGFR if : (!) 13 [NO]   1935 Unremarkable Lactate, Micah: 1.2 [NO]   1942 unremarkable Influenza A Ag, EIA: Negative [NO]   1942 unremarkable Influenza B Ag, EIA: Negative [NO]   1946 Sepsis Reassessment:    Vital signs reviewed, equal and bilateral chest rise, 2+ radial pulses, less than 3 sec cap refill, no pallor,  Intervention:  Fluid challenge (IVF stopped secondary to ESRD), assessment of oxygenation    [NO]      ED Course User Index  [NO] Cruz Schwab MD     Sx improved after treatment:   Medications   pneumoc 13-payal conj-dip cr(PF) 0.5 mL (not administered)   influenza (FLUZONE HIGH-DOSE) vaccine 0.5 mL (not administered)   sodium chloride 0.9% bolus 1,974 mL (1,974 mLs Intravenous New Bag 10/22/18 1904)   cefTRIAXone (ROCEPHIN) 1 g in dextrose 5 % 50 mL IVPB (0 g Intravenous Stopped 10/22/18 2121)   azithromycin 500 mg in dextrose 5 % 250 mL IVPB (ready to mix system) (500 mg Intravenous New Bag 10/22/18 2130)    Discontinued IV fluids before the  completion of the near 2 L.  Patient is tolerating p.o. and ambulating with steady gait.      Oxygen saturation noted to be at 90%.   Interpreted as hypoxia by emergency medicine physician.  Interventions supplemental oxygen.    The results of physical exam findings were reviewed with the patient and family. This discussion included but not exclusive to the risk to the patient due to their underlying pathology, the testing that was required to make the diagnosis, and the treatment administered.  Pt and family agree with assessment, disposition and treatment plan.  All questions asked and answered to the satisfaction of the patient and family.     Patient and family is amenable to admission.    Plan of care requiring inpatient hospitalization:  Consultation by IR, IV antibiotics, cardiac monitoring, pulmonary toilet.  Further plan per inpatient team.     CLINICAL IMPRESSION:  1. Pleural effusion    2. SOB (shortness of breath)    3. Pneumonia of right lower lobe due to infectious organism    4. Pneumonia      Disposition:  Admitted to telemetry under stable conditions.    ATTESTATION:  SCRIBE #1 NOTE: I, Daksha Durbin, am scribing for, and in the presence of,  Cruz Schwab MD. I have scribed the following portions of the note - Other sections scribed: HPI, ROS, PE and MDM.       Scribe Attestation:   Scribe #1: I performed the above scribed service and the documentation accurately describes the services I performed. I attest to the accuracy of the note.    Attending Attestation:           Physician Attestation for Scribe:  Physician Attestation Statement for Scribe #1: I, Cruz Schwab MD, reviewed documentation, as scribed by Daksha Durbin in my presence, and it is both accurate and complete.                      Cruz Schwab MD  10/23/18 0012

## 2018-10-22 NOTE — PROGRESS NOTES
Routine Office Visit    Patient Name: Mandeep Willams    : 1935  MRN: [id number]    Subjective:  Mandeep is a 82 y.o. male who presents today for:   Chief Complaint   Patient presents with    Cough     worsens @ night    Shortness of Breath     1 day onset    Wheezing     worsens @ night       82-year-old male comes in with a friend who provides Chinese translation.  The patient reports that last night all of a sudden he started having shortness of breath with a cough.  He states that at the same time he started having some chest heaviness.  He refused to go to the hospital.  The patient is an end-stage renal disease patient and went to his regular dialysis today.  Afterwards, he continued to have some shortness of breath, and decided to come in today.  He states that he feels better now than he did last night the still has some level of shortness of breath and chest heaviness just not as bad.  The patient's friend says that the patient does not like to go to the hospital and is concerned that he may be stained that he feels better in order to avoid going to the hospital.  He does not like to go to the hospital because last time he went she was kept for over a month.    Past Medical History  Past Medical History:   Diagnosis Date    ESRD (end stage renal disease)     HTN (hypertension)     Hyperlipidemia        Past Surgical History  Past Surgical History:   Procedure Laterality Date    BACK SURGERY      DIALYSIS FISTULA CREATION  2012    left arm        Family History  Family History   Problem Relation Age of Onset    Diabetes Sister     Hypertension Sister     Arthritis Sister     Cancer Neg Hx     Heart disease Neg Hx     Stroke Neg Hx     Amblyopia Neg Hx     Blindness Neg Hx     Cataracts Neg Hx     Glaucoma Neg Hx     Macular degeneration Neg Hx     Retinal detachment Neg Hx     Strabismus Neg Hx     Thyroid disease Neg Hx        Social History  Social History     Socioeconomic  History    Marital status:      Spouse name: Not on file    Number of children: Not on file    Years of education: Not on file    Highest education level: Not on file   Social Needs    Financial resource strain: Not on file    Food insecurity - worry: Not on file    Food insecurity - inability: Not on file    Transportation needs - medical: Not on file    Transportation needs - non-medical: Not on file   Occupational History     Employer: [employer]   Tobacco Use    Smoking status: Never Smoker   Substance and Sexual Activity    Alcohol use: No    Drug use: No    Sexual activity: Not Currently   Other Topics Concern    Not on file   Social History Narrative    Not on file       Current Medications  Current Outpatient Medications on File Prior to Visit   Medication Sig Dispense Refill    ammonium lactate (LAC-HYDRIN) 12 % lotion Apply topically daily as needed for Dry Skin. 500 g 5    INTRALIPID 20 % infusion       LASIX 20 mg tablet TAKE 1 BY MOUTH DAILY 30 tablet 5    LOVAZA 1 gram capsule TAKE 2 BY MOUTH TWO TIMES ADAY 360 capsule 2    NEPHRO-KAUSHIK RX 1- mg-mg-mcg tablet TAKE 1 BY MOUTH ONCE A DAY                            REPLACES VOL-CARE RX 90 tablet 0    NORVASC 5 mg tablet TAKE 1 BY MOUTH DAILY 90 tablet 0    polyethylene glycol (GLYCOLAX) 17 gram PwPk Take 17 g by mouth daily as needed. 30 each 2    PRAVACHOL 10 mg tablet TAKE 1 BY MOUTH DAILY 90 tablet 0    PROSOL 20 % SolP       SENNOSIDES (SENOKOT ORAL) Take by mouth.      metoprolol succinate (TOPROL XL) 100 MG 24 hr tablet Take 1 tablet (100 mg total) by mouth once daily. 30 tablet 0     No current facility-administered medications on file prior to visit.        Allergies   Review of patient's allergies indicates:  No Known Allergies    Review of Systems   Constitutional: Negative for chills and fever.   HENT: Negative for congestion, rhinorrhea, sinus pressure and sinus pain.    Respiratory:  "Positive for cough, chest tightness and shortness of breath. Negative for wheezing.    Cardiovascular: Negative for chest pain (but does have some intermittent heaviness) and palpitations.   Gastrointestinal: Negative for abdominal pain.   Genitourinary: Negative for dysuria.       /62 (BP Location: Right arm, Patient Position: Sitting, BP Method: Medium (Manual))   Pulse 65   Temp 97.7 °F (36.5 °C) (Oral)   Resp 16   Ht 5' 5" (1.651 m)   Wt 67.8 kg (149 lb 7.6 oz)   SpO2 96%   BMI 24.87 kg/m²     Physical Exam   Constitutional: He is cooperative. No distress.   HENT:   Head: Normocephalic and atraumatic.   Nose: No mucosal edema or rhinorrhea.   Mouth/Throat: Oropharynx is clear and moist.   Eyes: Conjunctivae, EOM and lids are normal.   Neck: Trachea normal and normal range of motion. Neck supple. Carotid bruit is present. No thyroid mass present.   Cardiovascular: Normal rate and regular rhythm.   Murmur heard.  Pulmonary/Chest: No accessory muscle usage. No bradypnea. No respiratory distress. He has decreased breath sounds in the right middle field and the right lower field. He has no wheezes. He has no rhonchi.   Patient received a nebulizer albuterol treatment after his chest x-ray, and afterwards lung fields were re- auscultated.  There was continued decreased sounds on the right middle and right lower fields   Neurological: He is alert.   Vitals reviewed.      Assessment/Plan:  Mandeep was seen today for cough, shortness of breath and wheezing.    Diagnoses and all orders for this visit:    Shortness of breath  -     X-Ray Chest PA And Lateral; Future  -     albuterol nebulizer solution 2.5 mg    Pleural effusion, right    Compressive atelectasis     Patient did not improve with albuterol, and subsequently his chest x-ray report came back.  Given the report, discussed with the patient importance of emergent evaluation because the pleural effusion and possible need to have it drained.  He agrees to " be seen in the emergency room and will go by private vehicle with his friend.  They declined ambulance transport.  Importance of being seen today in the emergency room discussed.  The patient 1 point did ask affect just reddened antibiotic, and I advised him that I would not as he needs to be seen in the emergency room today for further evaluation.  Given the chest tightness, may possibly need further workup of to rule out cardiac etiology.  Emergency room was called.      This office note has been dictated.  This dictation has been generated using M-Modal Fluency Direct dictation; some phonetic errors may occur.

## 2018-10-23 VITALS
HEIGHT: 67 IN | DIASTOLIC BLOOD PRESSURE: 63 MMHG | BODY MASS INDEX: 22.91 KG/M2 | WEIGHT: 145.94 LBS | TEMPERATURE: 98 F | SYSTOLIC BLOOD PRESSURE: 130 MMHG | OXYGEN SATURATION: 95 % | RESPIRATION RATE: 18 BRPM | HEART RATE: 65 BPM

## 2018-10-23 DIAGNOSIS — I10 ESSENTIAL HYPERTENSION: ICD-10-CM

## 2018-10-23 LAB
CHOLEST SERPL-MCNC: 144 MG/DL
CHOLEST/HDLC SERPL: 3 {RATIO}
HDLC SERPL-MCNC: 48 MG/DL
HDLC SERPL: 33.3 %
INR PPP: 1.1
LDLC SERPL CALC-MCNC: 85.4 MG/DL
NONHDLC SERPL-MCNC: 96 MG/DL
PROTHROMBIN TIME: 11.5 SEC
TRIGL SERPL-MCNC: 53 MG/DL

## 2018-10-23 PROCEDURE — G0378 HOSPITAL OBSERVATION PER HR: HCPCS

## 2018-10-23 PROCEDURE — 80061 LIPID PANEL: CPT

## 2018-10-23 PROCEDURE — 85610 PROTHROMBIN TIME: CPT

## 2018-10-23 PROCEDURE — 25000003 PHARM REV CODE 250: Performed by: NURSE PRACTITIONER

## 2018-10-23 PROCEDURE — 36415 COLL VENOUS BLD VENIPUNCTURE: CPT

## 2018-10-23 PROCEDURE — 63600175 PHARM REV CODE 636 W HCPCS: Performed by: NURSE PRACTITIONER

## 2018-10-23 PROCEDURE — 96375 TX/PRO/DX INJ NEW DRUG ADDON: CPT | Performed by: EMERGENCY MEDICINE

## 2018-10-23 RX ORDER — PREDNISONE 20 MG/1
40 TABLET ORAL DAILY
Qty: 20 TABLET | Refills: 0 | Status: SHIPPED | OUTPATIENT
Start: 2018-10-23 | End: 2018-11-02

## 2018-10-23 RX ORDER — METOPROLOL SUCCINATE 100 MG/1
100 TABLET, EXTENDED RELEASE ORAL DAILY
Qty: 30 TABLET | Refills: 0 | Status: SHIPPED | OUTPATIENT
Start: 2018-10-23 | End: 2018-11-23 | Stop reason: SDUPTHER

## 2018-10-23 RX ORDER — FUROSEMIDE 10 MG/ML
60 INJECTION INTRAMUSCULAR; INTRAVENOUS ONCE
Status: COMPLETED | OUTPATIENT
Start: 2018-10-23 | End: 2018-10-23

## 2018-10-23 RX ORDER — HEPARIN SODIUM 5000 [USP'U]/ML
5000 INJECTION, SOLUTION INTRAVENOUS; SUBCUTANEOUS EVERY 8 HOURS
Status: DISCONTINUED | OUTPATIENT
Start: 2018-10-23 | End: 2018-10-23 | Stop reason: HOSPADM

## 2018-10-23 RX ORDER — LEVOFLOXACIN 250 MG/1
500 TABLET ORAL DAILY
Qty: 8 TABLET | Refills: 0 | Status: SHIPPED | OUTPATIENT
Start: 2018-10-23 | End: 2018-10-27

## 2018-10-23 RX ORDER — MOXIFLOXACIN HYDROCHLORIDE 400 MG/1
400 TABLET ORAL DAILY
Status: DISCONTINUED | OUTPATIENT
Start: 2018-10-23 | End: 2018-10-23 | Stop reason: HOSPADM

## 2018-10-23 RX ADMIN — METHYLPREDNISOLONE SODIUM SUCCINATE 80 MG: 40 INJECTION, POWDER, FOR SOLUTION INTRAMUSCULAR; INTRAVENOUS at 10:10

## 2018-10-23 RX ADMIN — FUROSEMIDE 60 MG: 10 INJECTION, SOLUTION INTRAMUSCULAR; INTRAVENOUS at 10:10

## 2018-10-23 RX ADMIN — MOXIFLOXACIN HYDROCHLORIDE 400 MG: 400 TABLET, FILM COATED ORAL at 10:10

## 2018-10-23 NOTE — ASSESSMENT & PLAN NOTE
Dialysis patient - renal disease largely contributory to CXR findings- negative fever, chills, or white count

## 2018-10-23 NOTE — PLAN OF CARE
10/23/18 1203   Discharge Assessment   Assessment Type Discharge Planning Assessment   Patient with active discharge order prior to completion of DC needs assessment.

## 2018-10-23 NOTE — TELEPHONE ENCOUNTER
----- Message from Kelsey Lou sent at 10/23/2018 11:22 AM CDT -----  Contact: [facility]  Refill : metoprolol succinate (TOPROL XL) 100 MG 24 hr tablet ()        [facility] [address] [address]  Phone: [phone] Fax: [phone]

## 2018-10-23 NOTE — H&P
[facility] Hospital Medicine  History & Physical    Patient Name: Mandeep Willams  MRN: [id number]  Admission Date: 10/22/2018  Attending Physician: Nataliya Rhodes MD   Primary Care Provider: Guy Mcclelland Jr, MD         Patient information was obtained from patient and ER records.     Subjective:     Principal Problem:Pneumonia    Chief Complaint:   Chief Complaint   Patient presents with    Pneumonia     Sent to the ed for pneumonia from physician office.  SOB last night.  No pain         HPI: Mandeep Willams is a 82 y.o. male with PMHx including ESRD-MWF, HTN, HLD. Patient presented from PCP after presenting for non-productive cough, and progressively worsening SOB; was found in ED to have grossly stable R pleural effusion with associated compressive atelectasis/consolidation in the R lower lung zone. Placed in observation for pneumonia and given one dose azithro and rocephn in ED and consulted to IR. Patient is an ESRD patient and cannot say if he has had sick contacts. He tells me that he has had a cold-like symptoms for 2-3 days with non-productive cough and intermittent orthopnea. He denies fever, chills, recent long trips or trauma, numbness or tingling in either extremity. He does still make urine. He has not missed any dialysis sessions and has completed all of the sessions when on dialysis.    -Uses [facility]  -Denies exposure to chemicals or asbestos  -remote history of smoking - quit over 20 years ago    During physical assessment the patient's sat remained about 92-93% at rest on room air. He did not appear distressed, he is breathing comfortabley. He does have intermittent non-productive cough -- vitals satble, afebrile without white count.     Past Medical History:   Diagnosis Date    ESRD (end stage renal disease)     HTN (hypertension)     Hyperlipidemia        Past Surgical History:   Procedure Laterality Date    BACK SURGERY  2005    DIALYSIS FISTULA CREATION  4/2012    left  arm    JOINT REPLACEMENT Right     hip       Review of patient's allergies indicates:  No Known Allergies    No current facility-administered medications on file prior to encounter.      Current Outpatient Medications on File Prior to Encounter   Medication Sig    LASIX 20 mg tablet TAKE 1 BY MOUTH DAILY    LOVAZA 1 gram capsule TAKE 2 BY MOUTH TWO TIMES ADANICANOR    NEPHRO-KAUSHIK RX 1- mg-mg-mcg tablet TAKE 1 BY MOUTH ONCE A DAY                            REPLACES VOL-CARE RX    NORVASC 5 mg tablet TAKE 1 BY MOUTH DAILY    PRAVACHOL 10 mg tablet TAKE 1 BY MOUTH DAILY    ammonium lactate (LAC-HYDRIN) 12 % lotion Apply topically daily as needed for Dry Skin.    INTRALIPID 20 % infusion     metoprolol succinate (TOPROL XL) 100 MG 24 hr tablet Take 1 tablet (100 mg total) by mouth once daily.    polyethylene glycol (GLYCOLAX) 17 gram PwPk Take 17 g by mouth daily as needed.    PROSOL 20 % SolP     SENNOSIDES (SENOKOT ORAL) Take by mouth.     Family History     Problem Relation (Age of Onset)    Arthritis Sister    Diabetes Sister    Hypertension Sister        Tobacco Use    Smoking status: Never Smoker    Smokeless tobacco: Never Used   Substance and Sexual Activity    Alcohol use: No    Drug use: No    Sexual activity: Not Currently     Review of Systems   Constitutional: Negative for appetite change, chills, diaphoresis and fever.   HENT: Negative for congestion, hearing loss, sore throat, tinnitus and trouble swallowing.    Eyes: Negative for photophobia, discharge, itching and visual disturbance.   Respiratory: Positive for cough and shortness of breath. Negative for apnea, wheezing and stridor.    Cardiovascular: Negative for chest pain, palpitations and leg swelling.   Gastrointestinal: Negative for abdominal distention, abdominal pain, blood in stool, constipation, diarrhea and nausea.   Endocrine: Negative for polydipsia, polyphagia and polyuria.   Genitourinary: Negative for difficulty  urinating, dysuria, flank pain and frequency.   Musculoskeletal: Negative for arthralgias, joint swelling and neck stiffness.   Skin: Negative for color change, rash and wound.   Neurological: Negative for dizziness, tremors, seizures, light-headedness, numbness and headaches.   Hematological: Negative for adenopathy.   Psychiatric/Behavioral: Negative for hallucinations and self-injury.     Objective:     Vital Signs (Most Recent):  Temp: 98.3 °F (36.8 °C) (10/23/18 0737)  Pulse: 68 (10/23/18 0737)  Resp: 18 (10/23/18 0737)  BP: 136/60 (10/23/18 0737)  SpO2: (!) 90 % (10/23/18 0737) Vital Signs (24h Range):  Temp:  [97.7 °F (36.5 °C)-98.3 °F (36.8 °C)] 98.3 °F (36.8 °C)  Pulse:  [65-82] 68  Resp:  [16-19] 18  SpO2:  [90 %-96 %] 90 %  BP: (128-169)/(60-73) 136/60     Weight: 66.2 kg (145 lb 15.1 oz)  Body mass index is 22.86 kg/m².    Physical Exam   Constitutional: He is oriented to person, place, and time. He appears well-developed and well-nourished. He is cooperative.   HENT:   Head: Normocephalic and atraumatic.   Eyes: Conjunctivae, EOM and lids are normal. Pupils are equal, round, and reactive to light.   Neck: Full passive range of motion without pain. Neck supple. No JVD present. No edema present. No thyroid mass present.   Cardiovascular: S1 normal, S2 normal and intact distal pulses.   No murmur heard.  Abdominal: Soft. Bowel sounds are normal. He exhibits no distension and no abdominal bruit. There is no splenomegaly or hepatomegaly. There is no tenderness. There is no CVA tenderness.   Musculoskeletal: Normal range of motion.   Lymphadenopathy:     He has no cervical adenopathy.     He has no axillary adenopathy.   Neurological: He is alert and oriented to person, place, and time. He has normal reflexes. He displays no tremor. He displays no seizure activity.   Skin: Skin is warm, dry and intact.   Psychiatric: He has a normal mood and affect. His speech is normal. Thought content normal. Cognition and  memory are normal.         CRANIAL NERVES     CN III, IV, VI   Pupils are equal, round, and reactive to light.  Extraocular motions are normal.        Significant Labs:   CBC:   Recent Labs   Lab 10/22/18  1849   WBC 6.13   HGB 11.0*   HCT 31.8*   *     CMP:   Recent Labs   Lab 10/22/18  1849      K 3.5   CL 95   CO2 33*      BUN 56*   CREATININE 4.7*   CALCIUM 8.5*   PROT 8.1   ALBUMIN 3.4*   BILITOT 1.0   ALKPHOS 129   AST 41*   ALT 53*   ANIONGAP 11   EGFRNONAA 11*       Troponin:   Recent Labs   Lab 10/22/18  1849   TROPONINI 0.035*     Significant Imaging:   Imaging Results          X-Ray Chest AP Portable (Final result)  Result time 10/22/18 19:05:22    Final result by Benson Cano MD (10/22/18 19:05:22)                 Impression:      1. Grossly stable right pleural effusion with associated compressive atelectasis/consolidation of the right lower lung zone.  There may be superimposed interstitial edema.      Electronically signed by: Benson Cano MD  Date:    10/22/2018  Time:    19:05             Narrative:    EXAMINATION:  XR CHEST AP PORTABLE    CLINICAL HISTORY:  Sepsis;    TECHNIQUE:  Single frontal view of the chest was performed.    COMPARISON:  10/22/2018    FINDINGS:  The cardiomediastinal silhouette is prominent, similar to the previous exam..  There is a right pleural effusion, grossly similar to the previous exam..  The trachea is midline.  The lungs are symmetrically expanded bilaterally with coarse interstitial attenuation and right basilar subsegmental atelectasis or consolidation..  There is no pneumothorax.  The osseous structures are remarkable for degenerative changes..                                  Assessment/Plan:     * Pneumonia    Will cover patient for CAP given his co-morbidities and exposure in dialysis. Thoracentesis explained to the patient in great detail with his niece in the room - via Martha and the patient refuses throacentesis. He tells me he  prefers ABX. I advised the patient that he will be discharged today with ABX.   -Lasix X 1 dose IV 40 mg  -1 Dose IV steroid today then steroid burst for discharge X 4 days  -start Moxifloxacin oral CAP (comorbidities ESRD with exposure; Hx cig smoking; age)       End stage renal disease on dialysis    Dialysis patient - renal disease largely contributory to CXR findings- negative fever, chills, or white count       Hyperlipidemia    On Pravachol at home continue and obtain lipid panel     HTN (hypertension)    Vitals stable on home medications - continue this regimen and monitor; adjust as warranted         VTE Risk Mitigation (From admission, onward)        Ordered     heparin (porcine) injection 5,000 Units  Every 8 hours      10/23/18 1027             JOHANN Blanco  Department of Hospital Medicine   [facility]

## 2018-10-23 NOTE — PLAN OF CARE
10/23/18 1204   Final Note   Assessment Type Final Discharge Note   Anticipated Discharge Disposition Home   What phone number can be called within the next 1-3 days to see how you are doing after discharge? ([phone])   Hospital Follow Up  Appt(s) scheduled? Yes   Discharge plans and expectations educations in teach back method with documentation complete? Yes   Right Care Referral Info   Post Acute Recommendation No Care   Follow up appointment scheduled for pt with PCP.  All information (Date, time, location and contact info) placed in AVS and on DC sheet.  Information given and explained to pt. Pt also instructed to call 24-48 hours prior to scheduled time if rescheduling needed.  Patient instructed to bring all meds currently taking in their original bottles along with insurance card and picture ID to all appointments.     NurseJuno notified that pt ok to DC from a CM standpoint.

## 2018-10-23 NOTE — PLAN OF CARE
Problem: Patient Care Overview  Goal: Plan of Care Review   10/22/18 9542   Coping/Psychosocial   Plan Of Care Reviewed With patient   Pt remained free of falls during current shift. Denied pain and did not receive any prn pain medications. Pt was SOB during exertion and monitoring oxygen level. IR consulted later in the AM. Plan of care and fall precautions reviewed with pt and verbalized understanding. Bed locked, lowered, SR up x2 and call light placed within reach.

## 2018-10-23 NOTE — ASSESSMENT & PLAN NOTE
Will cover patient for CAP given his co-morbidities and exposure in dialysis. Thoracentesis explained to the patient in great detail with his niece in the room - via Martha and the patient refuses throacentesis. He tells me he prefers ABX. I advised the patient that he will be discharged today with ABX.   -Lasix X 1 dose IV 40 mg  -1 Dose IV steroid today then steroid burst for discharge X 4 days  -start Moxifloxacin oral CAP (comorbidities ESRD with exposure; Hx cig smoking; age)

## 2018-10-23 NOTE — DISCHARGE SUMMARY
[facility] Hospital Medicine  Discharge Summary      Patient Name: Mandeep Willams  MRN: [id number]  Admission Date: 10/22/2018  Hospital Length of Stay: 0 days  Discharge Date and Time:  10/23/2018 10:39 AM  Attending Physician: Nataliya Rhodes MD   Discharging Provider: JOHANN Blanco  Primary Care Provider: Guy Mcclelland Jr, MD      HPI:   Mandeep Willams is a 82 y.o. male with PMHx including ESRD-MWF, HTN, HLD. Patient presented from PCP after presenting for non-productive cough, and progressively worsening SOB; was found in ED to have grossly stable R pleural effusion with associated compressive atelectasis/consolidation in the R lower lung zone. Placed in observation for pneumonia and given one dose azithro and rocephn in ED and consulted to IR. Patient is an ESRD patient and cannot say if he has had sick contacts. He tells me that he has had a cold-like symptoms for 2-3 days with non-productive cough and intermittent orthopnea. He denies fever, chills, recent long trips or trauma, numbness or tingling in either extremity. He does still make urine. He has not missed any dialysis sessions and has completed all of the sessions when on dialysis.    -Uses [facility]  -Denies exposure to chemicals or asbestos  -remote history of smoking - quit over 20 years ago    During physical assessment the patient's sat remained about 92-93% at rest on room air. He did not appear distressed, he is breathing comfortabley. He does have intermittent non-productive cough -- vitals satble, afebrile without white count.     * No surgery found *      Hospital Course:   Patient placed in observation for PNA - noted to have stable CXR findings of R pleural effusion with associated compressive atelectasis/consolidation in the R lower lung zone. During physical assessment the patient's sat remained about 92-93% at rest on room air. He did not appear distressed, he is breathing comfortabley. Noted intermittent  non-productive cough -- vitals satble, afebrile without white count. Thoracentesis  offered and explained to the patient in great detail with his niece in the room - via Martha and the patient refused throacentesis. Stated prefer ABX treatment. Responded well to Lasix X 1 dose (patient still urinates); IV steroid X 1 dose, and ABX. Will discharge to home with ABX and 4D oral prednision. He is comfortable appearing at the bedside. Can follow up with PCP and dialysis at discharge.             Consults:   Consults (From admission, onward)        Status Ordering Provider     IP consult to case management  Once     Provider:  (Not yet assigned)    Acknowledged RADHA ESPINOZA          No new Assessment & Plan notes have been filed under this hospital service since the last note was generated.  Service: Hospital Medicine    Final Active Diagnoses:    Diagnosis Date Noted POA    PRINCIPAL PROBLEM:  Pneumonia [J18.9] 10/22/2018 Yes    End stage renal disease on dialysis [N18.6, Z99.2] 12/14/2013 Not Applicable    HTN (hypertension) [I10]  Yes    Hyperlipidemia [E78.5]  Yes      Problems Resolved During this Admission:       Discharged Condition: stable    Disposition: Home or Self Care    Follow Up:    Patient Instructions:      Activity as tolerated       Significant Diagnostic Studies: Labs:   CMP   Recent Labs   Lab 10/22/18  1849      K 3.5   CL 95   CO2 33*      BUN 56*   CREATININE 4.7*   CALCIUM 8.5*   PROT 8.1   ALBUMIN 3.4*   BILITOT 1.0   ALKPHOS 129   AST 41*   ALT 53*   ANIONGAP 11   ESTGFRAFRICA 13*   EGFRNONAA 11*   , CBC   Recent Labs   Lab 10/22/18  1849   WBC 6.13   HGB 11.0*   HCT 31.8*   *   , INR   Lab Results   Component Value Date    INR 1.1 10/23/2018   , Lipid Panel   Lab Results   Component Value Date    CHOL 144 10/23/2018    HDL 48 10/23/2018    LDLCALC 85.4 10/23/2018    TRIG 53 10/23/2018    CHOLHDL 33.3 10/23/2018   , Troponin   Recent Labs   Lab 10/22/18  1849    TROPONINI 0.035*    and A1C: No results for input(s): HGBA1C in the last 4320 hours.    Pending Diagnostic Studies:     None         Medications:  Reconciled Home Medications:      Medication List      START taking these medications    levoFLOXacin 250 MG tablet  Commonly known as:  LEVAQUIN  Take 2 tablets (500 mg total) by mouth once daily. for 4 days     predniSONE 20 MG tablet  Commonly known as:  DELTASONE  Take 2 tablets (40 mg total) by mouth once daily. for 10 days        CONTINUE taking these medications    ammonium lactate 12 % lotion  Commonly known as:  LAC-HYDRIN  Apply topically daily as needed for Dry Skin.     INTRALIPID 20 % infusion  Generic drug:  fat emulsion 20%     LASIX 20 MG tablet  Generic drug:  furosemide  TAKE 1 BY MOUTH DAILY     LOVAZA 1 gram capsule  Generic drug:  omega-3 acid ethyl esters  TAKE 2 BY MOUTH TWO TIMES ADAY     metoprolol succinate 100 MG 24 hr tablet  Commonly known as:  TOPROL XL  Take 1 tablet (100 mg total) by mouth once daily.     NEPHRO-KAUSHIK RX 1- mg-mg-mcg Tab  Generic drug:  vit b cmplx 3-fa-vit c-biotin 1- mg-mg-mcg  TAKE 1 BY MOUTH ONCE A DAY                            REPLACES VOL-CARE RX     NORVASC 5 MG tablet  Generic drug:  amLODIPine  TAKE 1 BY MOUTH DAILY     polyethylene glycol 17 gram Pwpk  Commonly known as:  GLYCOLAX  Take 17 g by mouth daily as needed.     PRAVACHOL 10 MG tablet  Generic drug:  pravastatin  TAKE 1 BY MOUTH DAILY     PROSOL 20 % Solp  Generic drug:  parenteral amino acid 20% no.1     SENOKOT ORAL  Take by mouth.            Indwelling Lines/Drains at time of discharge:   Lines/Drains/Airways          None          Time spent on the discharge of patient: 40 minutes  Patient was seen and examined on the date of discharge and determined to be suitable for discharge.         ELISSA Hernandez, FNP-C  Hospitalist - Department of Hospital Medicine  [address], [place] [address]  Office  [phone]; Pager [phone]

## 2018-10-23 NOTE — SUBJECTIVE & OBJECTIVE
Past Medical History:   Diagnosis Date    ESRD (end stage renal disease)     HTN (hypertension)     Hyperlipidemia        Past Surgical History:   Procedure Laterality Date    BACK SURGERY  2005    DIALYSIS FISTULA CREATION  4/2012    left arm    JOINT REPLACEMENT Right     hip       Review of patient's allergies indicates:  No Known Allergies    No current facility-administered medications on file prior to encounter.      Current Outpatient Medications on File Prior to Encounter   Medication Sig    LASIX 20 mg tablet TAKE 1 BY MOUTH DAILY    LOVAZA 1 gram capsule TAKE 2 BY MOUTH TWO TIMES ADAY    NEPHRO-KAUSHIK RX 1- mg-mg-mcg tablet TAKE 1 BY MOUTH ONCE A DAY                            REPLACES VOL-CARE RX    NORVASC 5 mg tablet TAKE 1 BY MOUTH DAILY    PRAVACHOL 10 mg tablet TAKE 1 BY MOUTH DAILY    ammonium lactate (LAC-HYDRIN) 12 % lotion Apply topically daily as needed for Dry Skin.    INTRALIPID 20 % infusion     metoprolol succinate (TOPROL XL) 100 MG 24 hr tablet Take 1 tablet (100 mg total) by mouth once daily.    polyethylene glycol (GLYCOLAX) 17 gram PwPk Take 17 g by mouth daily as needed.    PROSOL 20 % SolP     SENNOSIDES (SENOKOT ORAL) Take by mouth.     Family History     Problem Relation (Age of Onset)    Arthritis Sister    Diabetes Sister    Hypertension Sister        Tobacco Use    Smoking status: Never Smoker    Smokeless tobacco: Never Used   Substance and Sexual Activity    Alcohol use: No    Drug use: No    Sexual activity: Not Currently     Review of Systems   Constitutional: Negative for appetite change, chills, diaphoresis and fever.   HENT: Negative for congestion, hearing loss, sore throat, tinnitus and trouble swallowing.    Eyes: Negative for photophobia, discharge, itching and visual disturbance.   Respiratory: Positive for cough and shortness of breath. Negative for apnea, wheezing and stridor.    Cardiovascular: Negative for chest pain, palpitations and leg  swelling.   Gastrointestinal: Negative for abdominal distention, abdominal pain, blood in stool, constipation, diarrhea and nausea.   Endocrine: Negative for polydipsia, polyphagia and polyuria.   Genitourinary: Negative for difficulty urinating, dysuria, flank pain and frequency.   Musculoskeletal: Negative for arthralgias, joint swelling and neck stiffness.   Skin: Negative for color change, rash and wound.   Neurological: Negative for dizziness, tremors, seizures, light-headedness, numbness and headaches.   Hematological: Negative for adenopathy.   Psychiatric/Behavioral: Negative for hallucinations and self-injury.     Objective:     Vital Signs (Most Recent):  Temp: 98.3 °F (36.8 °C) (10/23/18 0737)  Pulse: 68 (10/23/18 0737)  Resp: 18 (10/23/18 0737)  BP: 136/60 (10/23/18 0737)  SpO2: (!) 90 % (10/23/18 0737) Vital Signs (24h Range):  Temp:  [97.7 °F (36.5 °C)-98.3 °F (36.8 °C)] 98.3 °F (36.8 °C)  Pulse:  [65-82] 68  Resp:  [16-19] 18  SpO2:  [90 %-96 %] 90 %  BP: (128-169)/(60-73) 136/60     Weight: 66.2 kg (145 lb 15.1 oz)  Body mass index is 22.86 kg/m².    Physical Exam   Constitutional: He is oriented to person, place, and time. He appears well-developed and well-nourished. He is cooperative.   HENT:   Head: Normocephalic and atraumatic.   Eyes: Conjunctivae, EOM and lids are normal. Pupils are equal, round, and reactive to light.   Neck: Full passive range of motion without pain. Neck supple. No JVD present. No edema present. No thyroid mass present.   Cardiovascular: S1 normal, S2 normal and intact distal pulses.   No murmur heard.  Abdominal: Soft. Bowel sounds are normal. He exhibits no distension and no abdominal bruit. There is no splenomegaly or hepatomegaly. There is no tenderness. There is no CVA tenderness.   Musculoskeletal: Normal range of motion.   Lymphadenopathy:     He has no cervical adenopathy.     He has no axillary adenopathy.   Neurological: He is alert and oriented to person, place,  and time. He has normal reflexes. He displays no tremor. He displays no seizure activity.   Skin: Skin is warm, dry and intact.   Psychiatric: He has a normal mood and affect. His speech is normal. Thought content normal. Cognition and memory are normal.         CRANIAL NERVES     CN III, IV, VI   Pupils are equal, round, and reactive to light.  Extraocular motions are normal.        Significant Labs:   CBC:   Recent Labs   Lab 10/22/18  1849   WBC 6.13   HGB 11.0*   HCT 31.8*   *     CMP:   Recent Labs   Lab 10/22/18  1849      K 3.5   CL 95   CO2 33*      BUN 56*   CREATININE 4.7*   CALCIUM 8.5*   PROT 8.1   ALBUMIN 3.4*   BILITOT 1.0   ALKPHOS 129   AST 41*   ALT 53*   ANIONGAP 11   EGFRNONAA 11*       Troponin:   Recent Labs   Lab 10/22/18  1849   TROPONINI 0.035*     Significant Imaging:   Imaging Results          X-Ray Chest AP Portable (Final result)  Result time 10/22/18 19:05:22    Final result by Benson Cano MD (10/22/18 19:05:22)                 Impression:      1. Grossly stable right pleural effusion with associated compressive atelectasis/consolidation of the right lower lung zone.  There may be superimposed interstitial edema.      Electronically signed by: Benson Cano MD  Date:    10/22/2018  Time:    19:05             Narrative:    EXAMINATION:  XR CHEST AP PORTABLE    CLINICAL HISTORY:  Sepsis;    TECHNIQUE:  Single frontal view of the chest was performed.    COMPARISON:  10/22/2018    FINDINGS:  The cardiomediastinal silhouette is prominent, similar to the previous exam..  There is a right pleural effusion, grossly similar to the previous exam..  The trachea is midline.  The lungs are symmetrically expanded bilaterally with coarse interstitial attenuation and right basilar subsegmental atelectasis or consolidation..  There is no pneumothorax.  The osseous structures are remarkable for degenerative changes..

## 2018-10-23 NOTE — HPI
Mandeep Willams is a 82 y.o. male with PMHx including ESRD-MWF, HTN, HLD. Patient presented from PCP after presenting for non-productive cough, and progressively worsening SOB; was found in ED to have grossly stable R pleural effusion with associated compressive atelectasis/consolidation in the R lower lung zone. Placed in observation for pneumonia and given one dose azithro and rocephn in ED and consulted to IR. Patient is an ESRD patient and cannot say if he has had sick contacts. He tells me that he has had a cold-like symptoms for 2-3 days with non-productive cough and intermittent orthopnea. He denies fever, chills, recent long trips or trauma, numbness or tingling in either extremity. He does still make urine. He has not missed any dialysis sessions and has completed all of the sessions when on dialysis.    -Uses [facility]  -Denies exposure to chemicals or asbestos  -remote history of smoking - quit over 20 years ago    During physical assessment the patient's sat remained about 92-93% at rest on room air. He did not appear distressed, he is breathing comfortabley. He does have intermittent non-productive cough -- vitals satble, afebrile without white count.

## 2018-10-23 NOTE — NURSING
Pt received from ED assisted by transport via stretcher. Assisted to bed and bed locked, lowered, SR up x3. Vitals obtained and documented. Tele monitor initiated. Pt is AAO x4, however pt only speaks Mandarin. Resp are even, unlabored, diminished on room air, but gets ROMAN w. Pt is presenting SR on tele monitor. Abd is contour, active x4 quads and reports having last BM on 10/22. Skin is clean, dry intact, however pt has bruising to BUE. 20g PIV to left AC and has 2 ns boluses perfusing. AV fistula to left upper arm with positive bruit and thrill. Pt denied pain at current time and is in NAD. Assessment completed and documented. See doc flow sheet. Plan of care reviewed with pt and pt verbalized understanding. Call light placed within reach. Bed alarm activated and audible. Will continue to monitor pt closely.

## 2018-10-23 NOTE — HOSPITAL COURSE
Patient placed in observation for PNA - noted to have stable CXR findings of R pleural effusion with associated compressive atelectasis/consolidation in the R lower lung zone. During physical assessment the patient's sat remained about 92-93% at rest on room air. He did not appear distressed, he is breathing comfortabley. Noted intermittent non-productive cough -- vitals satble, afebrile without white count. Thoracentesis offered and explained to the patient in great detail with his niece in the room - via Martha and the patient refused throacentesis. Stated prefer ABX treatment. Responded well to Lasix X 1 dose (patient still urinates); IV steroid X 1 dose, and ABX. Will discharge to home with ABX and 4D oral prednision. He is comfortable appearing at the bedside. Can follow up with PCP and dialysis at discharge.

## 2018-10-25 DIAGNOSIS — I10 ESSENTIAL HYPERTENSION: ICD-10-CM

## 2018-10-25 RX ORDER — FUROSEMIDE 20 MG/1
TABLET ORAL
Qty: 30 TABLET | Refills: 0 | Status: SHIPPED | OUTPATIENT
Start: 2018-10-25 | End: 2019-01-22 | Stop reason: SDUPTHER

## 2018-10-25 NOTE — TELEPHONE ENCOUNTER
----- Message from Chasidy Ragsdale sent at 10/25/2018 10:45 AM CDT -----  Contact: [facility] pharmacy [phone]  Pt needs a refill on LASIX 20 mg tablet and NEPHRO-KAUSHIK RX 1- mg-mg-mcg tablet    .  [facility]- [place], [place] - [address] [place] [id number]  Phone: [phone] Fax: [phone]

## 2018-10-27 LAB
BACTERIA BLD CULT: NORMAL
BACTERIA BLD CULT: NORMAL

## 2018-10-30 ENCOUNTER — OFFICE VISIT (OUTPATIENT)
Dept: FAMILY MEDICINE | Facility: CLINIC | Age: 83
End: 2018-10-30
Payer: MEDICARE

## 2018-10-30 VITALS
DIASTOLIC BLOOD PRESSURE: 66 MMHG | TEMPERATURE: 98 F | HEART RATE: 61 BPM | RESPIRATION RATE: 18 BRPM | HEIGHT: 67 IN | OXYGEN SATURATION: 97 % | BODY MASS INDEX: 22.98 KG/M2 | SYSTOLIC BLOOD PRESSURE: 110 MMHG | WEIGHT: 146.38 LBS

## 2018-10-30 DIAGNOSIS — J98.11 COMPRESSIVE ATELECTASIS: ICD-10-CM

## 2018-10-30 DIAGNOSIS — R60.0 LOWER EXTREMITY EDEMA: ICD-10-CM

## 2018-10-30 DIAGNOSIS — J90 PLEURAL EFFUSION, RIGHT: Primary | ICD-10-CM

## 2018-10-30 PROCEDURE — 99999 PR PBB SHADOW E&M-EST. PATIENT-LVL IV: CPT | Mod: PBBFAC,,, | Performed by: FAMILY MEDICINE

## 2018-10-30 PROCEDURE — 99214 OFFICE O/P EST MOD 30 MIN: CPT | Mod: PBBFAC,25,PN | Performed by: FAMILY MEDICINE

## 2018-10-30 PROCEDURE — 99214 OFFICE O/P EST MOD 30 MIN: CPT | Mod: S$PBB,,, | Performed by: FAMILY MEDICINE

## 2018-10-30 PROCEDURE — 1101F PT FALLS ASSESS-DOCD LE1/YR: CPT | Mod: CPTII,,, | Performed by: FAMILY MEDICINE

## 2018-10-30 RX ORDER — LEVOFLOXACIN 250 MG/1
250 TABLET ORAL DAILY
Status: ON HOLD | COMMUNITY
End: 2021-01-16 | Stop reason: HOSPADM

## 2018-11-01 NOTE — PROGRESS NOTES
Routine Office Visit    Patient Name: Mandeep Willams    : 1935  MRN: [id number]    Subjective:  Mandeep is a 82 y.o. male who presents today for:   Chief Complaint   Patient presents with    Hospital Follow Up     shortness of breath       82-year-old male presents with his great niece for hospital follow-up for pleural effusion.  He was seen by me on 10/22/2018 and sent to the hospital for this, for which she was admitted and was recommended to have a a pleural tap.  However the patient had declined and was discharged home with a prescription for azithromycin.  He was subsequently hospitalized at [place] for the same thing again a few days later (10/26?) because he had shortness of breath.  He had an extra dialysis done while at [place], and was discharged home with a prescription for Levaquin and prednisone.  He reports that he is doing well at present.  The patient is an end-stage renal dialysis patient and receives dialysis on  and  in the morning.  On questioning today, the patient reports that he gets swelling in his legs on and off.  He denies needing to use pillows for sleep.  He does report fatigue with walking a few blocks.  He states he does use stairs so he does not know if he can make it up a flight of stairs.    Translation is provided by his great niece, and he declines  service.    Past Medical History  Past Medical History:   Diagnosis Date    ESRD (end stage renal disease)     HTN (hypertension)     Hyperlipidemia        Past Surgical History  Past Surgical History:   Procedure Laterality Date    BACK SURGERY      DIALYSIS FISTULA CREATION  2012    left arm    JOINT REPLACEMENT Right     hip        Family History  Family History   Problem Relation Age of Onset    Diabetes Sister     Hypertension Sister     Arthritis Sister     Cancer Neg Hx     Heart disease Neg Hx     Stroke Neg Hx     Amblyopia Neg Hx     Blindness Neg Hx      Cataracts Neg Hx     Glaucoma Neg Hx     Macular degeneration Neg Hx     Retinal detachment Neg Hx     Strabismus Neg Hx     Thyroid disease Neg Hx        Social History  Social History     Socioeconomic History    Marital status:      Spouse name: Not on file    Number of children: Not on file    Years of education: Not on file    Highest education level: Not on file   Social Needs    Financial resource strain: Not on file    Food insecurity - worry: Not on file    Food insecurity - inability: Not on file    Transportation needs - medical: Not on file    Transportation needs - non-medical: Not on file   Occupational History     Employer: [employer]   Tobacco Use    Smoking status: Never Smoker    Smokeless tobacco: Never Used   Substance and Sexual Activity    Alcohol use: No    Drug use: No    Sexual activity: Not Currently   Other Topics Concern    Not on file   Social History Narrative    Not on file       Current Medications  Current Outpatient Medications on File Prior to Visit   Medication Sig Dispense Refill    furosemide (LASIX) 20 MG tablet TAKE 1 BY MOUTH DAILY 30 tablet 0    INTRALIPID 20 % infusion       levoFLOXacin (LEVAQUIN) 250 MG tablet Take 250 mg by mouth once daily.      LOVAZA 1 gram capsule TAKE 2 BY MOUTH TWO TIMES ADAY 360 capsule 2    metoprolol succinate (TOPROL XL) 100 MG 24 hr tablet Take 1 tablet (100 mg total) by mouth once daily. 30 tablet 0    predniSONE (DELTASONE) 20 MG tablet Take 2 tablets (40 mg total) by mouth once daily. for 10 days 20 tablet 0    ammonium lactate (LAC-HYDRIN) 12 % lotion Apply topically daily as needed for Dry Skin. 500 g 5    PRAVACHOL 10 mg tablet TAKE 1 BY MOUTH DAILY 90 tablet 0    PROSOL 20 % SolP       SENNOSIDES (SENOKOT ORAL) Take by mouth.      vit b cmplx 3-fa-vit c-biotin 1- mg-mg-mcg (NEPHRO-KAUSHIK RX) 1- mg-mg-mcg Tab TAKE 1 BY MOUTH ONCE A DAY                            REPLACES  "[facility]CARE RX 90 tablet 0     No current facility-administered medications on file prior to visit.        Allergies   Review of patient's allergies indicates:  No Known Allergies    Review of Systems   Constitutional: Positive for fatigue. Negative for chills and fever.   HENT: Negative for congestion, rhinorrhea and sneezing.    Respiratory: Negative for cough, shortness of breath and wheezing.    Cardiovascular: Negative for chest pain and palpitations.   Gastrointestinal: Negative for abdominal pain.   Genitourinary: Negative for dysuria.       /66 (BP Location: Left arm, Patient Position: Sitting, BP Method: Small (Manual))   Pulse 61   Temp 98 °F (36.7 °C) (Oral)   Resp 18   Ht 5' 7" (1.702 m)   Wt 66.4 kg (146 lb 6.2 oz)   SpO2 97%   BMI 22.93 kg/m²     Physical Exam   Constitutional: He appears well-developed. No distress.   The patient was walked around in the office with a pulse oximetry for 5 min.  During this time his oxygen saturation did not drop below 96% and his pulse did not go beyond 87 beats per minute   HENT:   Head: Normocephalic and atraumatic.   Nose: Nose normal.   Mouth/Throat: Oropharynx is clear and moist.   Eyes: EOM are normal. Pupils are equal, round, and reactive to light.   Neck: Normal range of motion. Neck supple.   Cardiovascular: Normal rate and regular rhythm.   Pulmonary/Chest: Effort normal and breath sounds normal. He has no wheezes.   Abdominal: Soft. Bowel sounds are normal.   Vitals reviewed.      Assessment/Plan:  Mandeep was seen today for hospital follow up.    Diagnoses and all orders for this visit:    Pleural effusion, right  -     X-Ray Chest PA And Lateral; Future  -     Ambulatory referral to Pulmonology  -     Ambulatory referral to Cardiology    Compressive atelectasis  -     X-Ray Chest PA And Lateral; Future  -     Ambulatory referral to Pulmonology    Lower extremity edema  -     Ambulatory referral to Cardiology      Chest x-ray was repeated and shows " no improvement in pleural effusion.  The patient is symptomatically better.  Although the effusion may be related to his kidney status, and possible pneumonia for which she was treated, I did advise the patient to have a cardiology consult to rule out any cardiac component.  Also, will refer the patient for pulmonary consult.  Patient to continue dialysis to help with the effusion.    This office note has been dictated.  This dictation has been generated using M-Modal Fluency Direct dictation; some phonetic errors may occur.

## 2018-11-23 DIAGNOSIS — I10 ESSENTIAL HYPERTENSION: ICD-10-CM

## 2018-11-26 RX ORDER — METOPROLOL SUCCINATE 100 MG/1
100 TABLET, EXTENDED RELEASE ORAL DAILY
Qty: 30 TABLET | Refills: 0 | Status: SHIPPED | OUTPATIENT
Start: 2018-11-26 | End: 2018-12-28 | Stop reason: SDUPTHER

## 2018-11-27 ENCOUNTER — OFFICE VISIT (OUTPATIENT)
Dept: CARDIOLOGY | Facility: CLINIC | Age: 83
End: 2018-11-27
Payer: MEDICARE

## 2018-11-27 VITALS
BODY MASS INDEX: 22.15 KG/M2 | DIASTOLIC BLOOD PRESSURE: 69 MMHG | WEIGHT: 141.13 LBS | OXYGEN SATURATION: 98 % | HEIGHT: 67 IN | SYSTOLIC BLOOD PRESSURE: 140 MMHG | HEART RATE: 56 BPM

## 2018-11-27 DIAGNOSIS — R07.89 CHEST PAIN, ATYPICAL: ICD-10-CM

## 2018-11-27 DIAGNOSIS — I50.43 ACUTE ON CHRONIC COMBINED SYSTOLIC AND DIASTOLIC CONGESTIVE HEART FAILURE: ICD-10-CM

## 2018-11-27 DIAGNOSIS — E78.2 MIXED HYPERLIPIDEMIA: ICD-10-CM

## 2018-11-27 DIAGNOSIS — N18.6 ESRD (END STAGE RENAL DISEASE): Primary | ICD-10-CM

## 2018-11-27 DIAGNOSIS — I10 ESSENTIAL HYPERTENSION: ICD-10-CM

## 2018-11-27 PROCEDURE — 99999 PR PBB SHADOW E&M-EST. PATIENT-LVL III: CPT | Mod: PBBFAC,,, | Performed by: INTERNAL MEDICINE

## 2018-11-27 PROCEDURE — 1101F PT FALLS ASSESS-DOCD LE1/YR: CPT | Mod: CPTII,S$GLB,, | Performed by: INTERNAL MEDICINE

## 2018-11-27 PROCEDURE — 99204 OFFICE O/P NEW MOD 45 MIN: CPT | Mod: S$GLB,,, | Performed by: INTERNAL MEDICINE

## 2018-11-27 PROCEDURE — 99499 UNLISTED E&M SERVICE: CPT | Mod: S$GLB,,, | Performed by: INTERNAL MEDICINE

## 2018-11-27 NOTE — PROGRESS NOTES
Subjective:    Patient ID:  Mandeep Willams is a 83 y.o. male who presents for evaluation of Shortness of Breath      HPI     Admitted 10/22/18  Mandeep Willams is a 82 y.o. male with PMHx including ESRD-MWF, HTN, HLD. Patient presented from PCP after presenting for non-productive cough, and progressively worsening SOB; was found in ED to have grossly stable R pleural effusion with associated compressive atelectasis/consolidation in the R lower lung zone. Placed in observation for pneumonia and given one dose azithro and rocephn in ED and consulted to IR. Patient is an ESRD patient and cannot say if he has had sick contacts. He tells me that he has had a cold-like symptoms for 2-3 days with non-productive cough and intermittent orthopnea. He denies fever, chills, recent long trips or trauma, numbness or tingling in either extremity. He does still make urine. He has not missed any dialysis sessions and has completed all of the sessions when on dialysis.     -Uses [facility]  -Denies exposure to chemicals or asbestos  -remote history of smoking - quit over 20 years ago     During physical assessment the patient's sat remained about 92-93% at rest on room air. He did not appear distressed, he is breathing comfortabley. He does have intermittent non-productive cough -- vitals satble, afebrile without white count.      Patient placed in observation for PNA - noted to have stable CXR findings of R pleural effusion with associated compressive atelectasis/consolidation in the R lower lung zone. During physical assessment the patient's sat remained about 92-93% at rest on room air. He did not appear distressed, he is breathing comfortabley. Noted intermittent non-productive cough -- vitals satble, afebrile without white count. Thoracentesis  offered and explained to the patient in great detail with his niece in the room - via Martha and the patient refused throacentesis. Stated prefer ABX treatment. Responded well to Lasix X 1 dose  (patient still urinates); IV steroid X 1 dose, and ABX. Will discharge to home with ABX and 4D oral prednision. He is comfortable appearing at the bedside. Can follow up with PCP and dialysis at discharge.    Saw  Mcclelland 10/30/18  82-year-old male presents with his great niece for hospital follow-up for pleural effusion.  He was seen by me on 10/22/2018 and sent to the hospital for this, for which she was admitted and was recommended to have a a pleural tap.  However the patient had declined and was discharged home with a prescription for azithromycin.  He was subsequently hospitalized at [place] for the same thing again a few days later (10/26?) because he had shortness of breath.  He had an extra dialysis done while at [place], and was discharged home with a prescription for Levaquin and prednisone.  He reports that he is doing well at present.  The patient is an end-stage renal dialysis patient and receives dialysis on Mondays Wednesdays and Fridays in the morning.  On questioning today, the patient reports that he gets swelling in his legs on and off.  He denies needing to use pillows for sleep.  He does report fatigue with walking a few blocks.  He states he does use stairs so he does not know if he can make it up a flight of stairs.    Chest x-ray was repeated and shows no improvement in pleural effusion.  The patient is symptomatically better.  Although the effusion may be related to his kidney status, and possible pneumonia for which she was treated, I did advise the patient to have a cardiology consult to rule out any cardiac component.  Also, will refer the patient for pulmonary consult.  Patient to continue dialysis to help with the effusion.    Denies prior CAD or CHF  EKG 10/22/18 NSR IRBBB, LVH  Occasional chest tightness          Review of Systems   Constitution: Negative for decreased appetite.   HENT: Negative for ear discharge.    Eyes: Negative for blurred vision.   Endocrine:  Negative for polyphagia.   Skin: Negative for nail changes.   Neurological: Negative for aphonia.   Psychiatric/Behavioral: Negative for hallucinations.        Objective:    Physical Exam   Constitutional: He is oriented to person, place, and time. He appears well-developed and well-nourished.   HENT:   Head: Normocephalic and atraumatic.   Eyes: Conjunctivae are normal. Pupils are equal, round, and reactive to light.   Neck: Normal range of motion. Neck supple.   Cardiovascular: Normal rate, normal heart sounds and intact distal pulses.   Pulmonary/Chest: Effort normal. He has decreased breath sounds in the right middle field and the right lower field.   Abdominal: Soft. Bowel sounds are normal.   Musculoskeletal: Normal range of motion.   Neurological: He is alert and oriented to person, place, and time.   Skin: Skin is warm and dry.         Assessment:       1. ESRD (end stage renal disease)    2. Mixed hyperlipidemia    3. Essential hypertension    4. Acute on chronic combined systolic and diastolic congestive heart failure    5. Chest pain, atypical         Plan:       Echo and lexiscan myoview for possible new onset CHF  Agree with pulmonary evaluation for thoracentesis

## 2018-11-27 NOTE — LETTER
November 27, 2018      Guy Mcclelland Jr., MD  [address] [place] [address]           [facility] [place] - Cardiology  [address] [facility] [address]  [facility] [address]  Phone: [phone]          Patient: Mandeep Willams   MR Number: [id number]   YOB: 1935   Date of Visit: 11/27/2018       Dear Dr. Guy Mcclelland Jr.:    Thank you for referring Mandeep Willams to me for evaluation. Attached you will find relevant portions of my assessment and plan of care.    If you have questions, please do not hesitate to call me. I look forward to following Mandeep Willams along with you.    Sincerely,    Ayo Orellana MD    Enclosure  CC:  No Recipients    If you would like to receive this communication electronically, please contact [email or web address] or [phone] to request more information on [employer] Link access.    For providers and/or their staff who would like to refer a patient to [facility], please contact us through our one-stop-shop provider referral line, [facility] , at [phone].    If you feel you have received this communication in error or would no longer like to receive these types of communications, please e-mail [email or web address]

## 2018-12-04 ENCOUNTER — HOSPITAL ENCOUNTER (OUTPATIENT)
Dept: RADIOLOGY | Facility: HOSPITAL | Age: 83
Discharge: HOME | End: 2018-12-04
Attending: INTERNAL MEDICINE
Payer: MEDICARE

## 2018-12-04 ENCOUNTER — INITIAL CONSULT (OUTPATIENT)
Dept: PULMONOLOGY | Facility: CLINIC | Age: 83
End: 2018-12-04
Payer: MEDICARE

## 2018-12-04 VITALS
HEIGHT: 67 IN | DIASTOLIC BLOOD PRESSURE: 61 MMHG | TEMPERATURE: 98 F | OXYGEN SATURATION: 99 % | BODY MASS INDEX: 22.31 KG/M2 | SYSTOLIC BLOOD PRESSURE: 117 MMHG | WEIGHT: 142.13 LBS | HEART RATE: 56 BPM

## 2018-12-04 DIAGNOSIS — J90 PLEURAL EFFUSION: ICD-10-CM

## 2018-12-04 DIAGNOSIS — N18.6 ESRD (END STAGE RENAL DISEASE): ICD-10-CM

## 2018-12-04 PROCEDURE — 99204 OFFICE O/P NEW MOD 45 MIN: CPT | Mod: S$GLB,,, | Performed by: INTERNAL MEDICINE

## 2018-12-04 PROCEDURE — 1101F PT FALLS ASSESS-DOCD LE1/YR: CPT | Mod: CPTII,S$GLB,, | Performed by: INTERNAL MEDICINE

## 2018-12-04 PROCEDURE — 71046 X-RAY EXAM CHEST 2 VIEWS: CPT | Mod: 26,,, | Performed by: RADIOLOGY

## 2018-12-04 PROCEDURE — 71046 X-RAY EXAM CHEST 2 VIEWS: CPT | Mod: TC,FY

## 2018-12-04 NOTE — PROGRESS NOTES
Mandeep Willams  was seen as a new patient at the request  Guy Mcclelland Jr., MD for the evaluation of  Pleural effusion.    CHIEF COMPLAINT:  Pleural Effusion      HISTORY OF PRESENT ILLNESS: Mandeep Willams is a 83 y.o. male  has a past medical history of ESRD (end stage renal disease), HTN (hypertension), and Hyperlipidemia.  Patient presented to ED on 10/22/18 for sob.  CXR with right effusion.  Patient is here with his niece, Kathi.  Per niece, dyspnea improve with HD and patient declined thoracentesis.  Currently, patient is back to baseline.  No fever/chill.  No chest pain.  No orthopnea.  Patient is walker dependent.      +intermittent cough.  No jones with ambulation.  Still live independently at home alone.  H/o smoking 10 pack years.  Quit in his 30s.  Patient reside at NH after hip surgery.  Loss some weight while in nursing home but gained back weight since.      PAST MEDICAL HISTORY:    Active Ambulatory Problems     Diagnosis Date Noted    HTN (hypertension)     ESRD (end stage renal disease)     Hyperlipidemia     Transient vision disturbance, left 12/14/2013    End stage renal disease on dialysis 12/14/2013    Pneumonia 10/22/2018    Acute on chronic combined systolic and diastolic congestive heart failure 11/27/2018    Chest pain, atypical 11/27/2018    Pleural effusion 12/04/2018     Resolved Ambulatory Problems     Diagnosis Date Noted    No Resolved Ambulatory Problems     Past Medical History:   Diagnosis Date    ESRD (end stage renal disease)     HTN (hypertension)     Hyperlipidemia                 PAST SURGICAL HISTORY:    Past Surgical History:   Procedure Laterality Date    BACK SURGERY  2005    DIALYSIS FISTULA CREATION  4/2012    left arm    JOINT REPLACEMENT Right     hip         FAMILY HISTORY:                Family History   Problem Relation Age of Onset    Diabetes Sister     Hypertension Sister     Arthritis Sister     Cancer Neg Hx     Heart disease Neg Hx     Stroke Neg Hx      Amblyopia Neg Hx     Blindness Neg Hx     Cataracts Neg Hx     Glaucoma Neg Hx     Macular degeneration Neg Hx     Retinal detachment Neg Hx     Strabismus Neg Hx     Thyroid disease Neg Hx        SOCIAL HISTORY:          Tobacco:   Social History     Tobacco Use   Smoking Status Former Smoker    Packs/day: 1.00    Years: 10.00    Pack years: 10.00    Last attempt to quit: 1971    Years since quittin.0   Smokeless Tobacco Never Used     alcohol use:    Social History     Substance and Sexual Activity   Alcohol Use No               Occupation:      ALLERGIES:  Review of patient's allergies indicates:  No Known Allergies    CURRENT MEDICATIONS:    Current Outpatient Medications   Medication Sig Dispense Refill    ammonium lactate (LAC-HYDRIN) 12 % lotion Apply topically daily as needed for Dry Skin. 500 g 5    furosemide (LASIX) 20 MG tablet TAKE 1 BY MOUTH DAILY 30 tablet 0    INTRALIPID 20 % infusion       levoFLOXacin (LEVAQUIN) 250 MG tablet Take 250 mg by mouth once daily.      LOVAZA 1 gram capsule TAKE 2 BY MOUTH TWO TIMES ADAY 360 capsule 2    metoprolol succinate (TOPROL XL) 100 MG 24 hr tablet Take 1 tablet (100 mg total) by mouth once daily. 30 tablet 0    PRAVACHOL 10 mg tablet TAKE 1 BY MOUTH DAILY 90 tablet 0    PROSOL 20 % SolP       SENNOSIDES (SENOKOT ORAL) Take by mouth.      vit b cmplx 3-fa-vit c-biotin 1- mg-mg-mcg (NEPHRO-KAUSHIK RX) 1- mg-mg-mcg Tab TAKE 1 BY MOUTH ONCE A DAY                            REPLACES VOL-CARE RX 90 tablet 0     No current facility-administered medications for this visit.                   REVIEW OF SYSTEMS:     Pulmonary related symptoms as per HPI.  Gen:  no weight loss, no fever, no night sweat  HEENT:  no visual changes, no sore throat, no hearing loss  CV:  No chest pain, no orthopnea, no PND  GI:  no melena, no hematochezia, no diarhea, no constipation.  :  no dysuria, no hematuria, no hesistancy,  "no dribbling  Neuro:  no syncope, no vertigo, no tinitus  Psych:  No homocide or suicide ideation; no depression.  Endocrine:  No heat or cold intolerance.  Sleep:  No snoring; no witnessed apnea.  Otherwise, a balance of systems reviewed is negative.          PHYSICAL EXAM:  Vitals:    12/04/18 1043   BP: 117/61   Pulse: (!) 56   Temp: 97.5 °F (36.4 °C)   TempSrc: Oral   SpO2: 99%   Weight: 64.5 kg (142 lb 1.6 oz)   Height: 5' 7" (1.702 m)   PainSc: 0-No pain     Body mass index is 22.26 kg/m².     GENERAL:  well develop; no apparent distress  HEENT:  no nasal congestion; no discharge noted; class 3 modified mallampatti.   NECK:  supple; no palpable masses.  CARDIO: regular rate and rhythm  PULM:  clear to auscultation bilaterally; no intercostals retractions; no accessory muscle usage   ABDOMEN:  soft nontender/nondistended.  +bowel sound  EXTREMITIES no cce  NEURO:  CN II-XII intact.  5/5 motor in all extremities.  sensation grossly intact   to light touch.  PSYCH:  normal affect.  Alert and oriented x 4    LABS  Pulmonary Functions Testing Results: none  ABG none  CXR:  10/30/18 moderate right effusion  CT CHEST:  none    ASSESSMENT/PLAN  Problem List Items Addressed This Visit     ESRD (end stage renal disease)    Overview     HD M-W-F.  Currently follow by Dr. rCoss.  Will need more aggressive ultrafiltration.           Pleural effusion    Overview     Chronic since 2012.  Bigger per recent cxr.  May relate to fluid overload.  Persistent effusion with elevated bnp.  Recommend more aggressive ultrafiltration.  Await echo result.         Relevant Orders    X-Ray Chest PA And Lateral (Completed)    Brain natriuretic peptide (Completed)            Patient will No Follow-up on file. with md.    CC: Send copy of this note to Guy Mcclelland Jr., MD and Jaylyn Cross MD.  "

## 2018-12-04 NOTE — LETTER
December 6, 2018      Guy Mcclelland Jr., MD  [address] [facility] [address]           [facility]monology  [address] [facility] [address]  [facility] [address]  Phone: [phone]  Fax: [phone]          Patient: Mandeep Willams   MR Number: [id number]   YOB: 1935   Date of Visit: 12/4/2018       Dear Dr. Guy Mcclelland Jr.:    Thank you for referring Mandeep Willams to me for evaluation. Attached you will find relevant portions of my assessment and plan of care.    If you have questions, please do not hesitate to call me. I look forward to following Mandeep Willams along with you.    Sincerely,    Thomas Arnold MD    Enclosure  CC:  No Recipients    If you would like to receive this communication electronically, please contact [email or web address] or [phone] to request more information on [employer] Link access.    For providers and/or their staff who would like to refer a patient to [facility], please contact us through our one-stop-shop provider referral line, [facility], at [phone].    If you feel you have received this communication in error or would no longer like to receive these types of communications, please e-mail [email or web address]

## 2018-12-11 ENCOUNTER — HOSPITAL ENCOUNTER (OUTPATIENT)
Dept: RADIOLOGY | Facility: HOSPITAL | Age: 83
Discharge: HOME | End: 2018-12-11
Attending: INTERNAL MEDICINE
Payer: MEDICARE

## 2018-12-11 ENCOUNTER — HOSPITAL ENCOUNTER (OUTPATIENT)
Dept: CARDIOLOGY | Facility: HOSPITAL | Age: 83
Discharge: HOME | End: 2018-12-11
Attending: INTERNAL MEDICINE
Payer: MEDICARE

## 2018-12-11 VITALS — HEIGHT: 67 IN | WEIGHT: 142 LBS | BODY MASS INDEX: 22.29 KG/M2 | HEART RATE: 65 BPM

## 2018-12-11 DIAGNOSIS — R07.89 CHEST PAIN, ATYPICAL: ICD-10-CM

## 2018-12-11 DIAGNOSIS — I50.43 ACUTE ON CHRONIC COMBINED SYSTOLIC AND DIASTOLIC CONGESTIVE HEART FAILURE: ICD-10-CM

## 2018-12-11 DIAGNOSIS — N18.6 ESRD (END STAGE RENAL DISEASE): ICD-10-CM

## 2018-12-11 DIAGNOSIS — I10 ESSENTIAL HYPERTENSION: ICD-10-CM

## 2018-12-11 DIAGNOSIS — E78.2 MIXED HYPERLIPIDEMIA: ICD-10-CM

## 2018-12-11 LAB
AORTIC ROOT ANNULUS: 3.27 CM
AORTIC VALVE CUSP SEPERATION: 1.34 CM
ASCENDING AORTA: 3.2 CM
AV INDEX (PROSTH): 0.32
AV MEAN GRADIENT: 18.63 MMHG
AV PEAK GRADIENT: 28.73 MMHG
AV VALVE AREA: 1.25 CM2
BSA FOR ECHO PROCEDURE: 1.74 M2
CV ECHO LV RWT: 0.38 CM
CV STRESS BASE HR: 57
DIASTOLIC BLOOD PRESSURE: 50
DOP CALC AO PEAK VEL: 2.68 M/S
DOP CALC AO VTI: 79.83 CM
DOP CALC LVOT AREA: 3.94 CM2
DOP CALC LVOT DIAMETER: 2.24 CM
DOP CALC LVOT STROKE VOLUME: 99.93 CM3
DOP CALCLVOT PEAK VEL VTI: 25.37 CM
E WAVE DECELERATION TIME: 475.62 MSEC
E/A RATIO: 0.95
E/E' RATIO: 19.5
ECHO LV POSTERIOR WALL: 0.95 CM (ref 0.6–1.1)
FRACTIONAL SHORTENING: 28 % (ref 28–44)
INTERVENTRICULAR SEPTUM: 0.9 CM (ref 0.6–1.1)
IVRT: 0.09 MSEC
LA MAJOR: 6.19 CM
LA MINOR: 5.97 CM
LA WIDTH: 4.47 CM
LEFT ATRIUM SIZE: 3.65 CM
LEFT ATRIUM VOLUME INDEX: 48.2 ML/M2
LEFT ATRIUM VOLUME: 84.29 CM3
LEFT INTERNAL DIMENSION IN SYSTOLE: 3.56 CM (ref 2.1–4)
LEFT VENTRICLE DIASTOLIC VOLUME INDEX: 65.94 ML/M2
LEFT VENTRICLE DIASTOLIC VOLUME: 115.27 ML
LEFT VENTRICLE MASS INDEX: 92.3 G/M2
LEFT VENTRICLE SYSTOLIC VOLUME INDEX: 30.3 ML/M2
LEFT VENTRICLE SYSTOLIC VOLUME: 52.91 ML
LEFT VENTRICULAR INTERNAL DIMENSION IN DIASTOLE: 4.95 CM (ref 3.5–6)
LEFT VENTRICULAR MASS: 161.3 G
LV LATERAL E/E' RATIO: 16.71
LV SEPTAL E/E' RATIO: 23.4
MV PEAK A VEL: 1.23 M/S
MV PEAK E VEL: 1.17 M/S
NUC REST DIASTOLIC VOLUME INDEX: 107
NUC REST EJECTION FRACTION: 51
NUC REST SYSTOLIC VOLUME INDEX: 53
OHS CV CPX 85 PERCENT MAX PREDICTED HEART RATE MALE: 117
OHS CV CPX MAX PREDICTED HEART RATE: 138
OHS CV CPX PATIENT IS FEMALE: 0
OHS CV CPX PATIENT IS MALE: 1
OHS CV CPX PEAK DIASTOLIC BLOOD PRESSURE: 46 MMHG
OHS CV CPX PEAK HEAR RATE: 73
OHS CV CPX PEAK RATE PRESSURE PRODUCT: 8395
OHS CV CPX PEAK SYSTOLIC BLOOD PRESSURE: 115
OHS CV CPX PERCENT MAX PREDICTED HEART RATE ACHIEVED: 53
OHS CV CPX RATE PRESSURE PRODUCT PRESENTING: 7296
PISA TR MAX VEL: 2.34 M/S
PULM VEIN S/D RATIO: 0.95
PV PEAK D VEL: 0.59 M/S
PV PEAK S VEL: 0.56 M/S
PV PEAK VELOCITY: 1.25 CM/S
RA MAJOR: 5.66 CM
RA PRESSURE: 8 MMHG
RA WIDTH: 4.39 CM
RIGHT VENTRICULAR END-DIASTOLIC DIMENSION: 4.47 CM
RV TISSUE DOPPLER FREE WALL SYSTOLIC VELOCITY 1 (APICAL 4 CHAMBER VIEW): 15.37 M/S
SINUS: 3.23 CM
STJ: 2.8 CM
SYSTOLIC BLOOD PRESSURE: 128
TDI LATERAL: 0.07
TDI SEPTAL: 0.05
TDI: 0.06
TR MAX PG: 21.9 MMHG
TRICUSPID ANNULAR PLANE SYSTOLIC EXCURSION: 3.47 CM
TV REST PULMONARY ARTERY PRESSURE: 29.9 MMHG

## 2018-12-11 PROCEDURE — 93016 CV STRESS TEST SUPVJ ONLY: CPT | Mod: ,,, | Performed by: INTERNAL MEDICINE

## 2018-12-11 PROCEDURE — 93306 TTE W/DOPPLER COMPLETE: CPT

## 2018-12-11 PROCEDURE — 93017 CV STRESS TEST TRACING ONLY: CPT

## 2018-12-11 PROCEDURE — 93306 TTE W/DOPPLER COMPLETE: CPT | Mod: 26,,, | Performed by: INTERNAL MEDICINE

## 2018-12-11 PROCEDURE — 78452 HT MUSCLE IMAGE SPECT MULT: CPT

## 2018-12-11 PROCEDURE — 63600175 PHARM REV CODE 636 W HCPCS

## 2018-12-11 PROCEDURE — 78452 HT MUSCLE IMAGE SPECT MULT: CPT | Mod: 26,,, | Performed by: INTERNAL MEDICINE

## 2018-12-11 PROCEDURE — 93018 CV STRESS TEST I&R ONLY: CPT | Mod: ,,, | Performed by: INTERNAL MEDICINE

## 2018-12-11 RX ORDER — REGADENOSON 0.08 MG/ML
INJECTION, SOLUTION INTRAVENOUS
Status: COMPLETED
Start: 2018-12-11 | End: 2018-12-11

## 2018-12-11 RX ORDER — ONDANSETRON 2 MG/ML
INJECTION INTRAMUSCULAR; INTRAVENOUS
Status: COMPLETED
Start: 2018-12-11 | End: 2018-12-11

## 2018-12-11 RX ADMIN — ONDANSETRON: 2 INJECTION, SOLUTION INTRAMUSCULAR; INTRAVENOUS at 09:12

## 2018-12-11 RX ADMIN — REGADENOSON: 0.08 INJECTION, SOLUTION INTRAVENOUS at 09:12

## 2018-12-18 ENCOUNTER — OFFICE VISIT (OUTPATIENT)
Dept: CARDIOLOGY | Facility: CLINIC | Age: 83
End: 2018-12-18
Payer: MEDICARE

## 2018-12-18 VITALS
BODY MASS INDEX: 22.29 KG/M2 | DIASTOLIC BLOOD PRESSURE: 65 MMHG | HEART RATE: 65 BPM | OXYGEN SATURATION: 100 % | SYSTOLIC BLOOD PRESSURE: 157 MMHG | WEIGHT: 142 LBS | HEIGHT: 67 IN

## 2018-12-18 DIAGNOSIS — N18.6 ESRD (END STAGE RENAL DISEASE): ICD-10-CM

## 2018-12-18 DIAGNOSIS — I50.43 ACUTE ON CHRONIC COMBINED SYSTOLIC AND DIASTOLIC CONGESTIVE HEART FAILURE: ICD-10-CM

## 2018-12-18 DIAGNOSIS — R07.89 CHEST PAIN, ATYPICAL: ICD-10-CM

## 2018-12-18 DIAGNOSIS — E78.2 MIXED HYPERLIPIDEMIA: ICD-10-CM

## 2018-12-18 DIAGNOSIS — I10 ESSENTIAL HYPERTENSION: Primary | ICD-10-CM

## 2018-12-18 PROCEDURE — 99999 PR PBB SHADOW E&M-EST. PATIENT-LVL III: CPT | Mod: PBBFAC,,, | Performed by: INTERNAL MEDICINE

## 2018-12-18 PROCEDURE — 1101F PT FALLS ASSESS-DOCD LE1/YR: CPT | Mod: CPTII,S$GLB,, | Performed by: INTERNAL MEDICINE

## 2018-12-18 PROCEDURE — 99213 OFFICE O/P EST LOW 20 MIN: CPT | Mod: S$GLB,,, | Performed by: INTERNAL MEDICINE

## 2018-12-18 RX ORDER — IRBESARTAN 75 MG/1
75 TABLET ORAL NIGHTLY
Qty: 90 TABLET | Refills: 3 | Status: SHIPPED | OUTPATIENT
Start: 2018-12-18 | End: 2019-03-19 | Stop reason: SDUPTHER

## 2018-12-18 NOTE — PROGRESS NOTES
Subjective:    Patient ID:  Mandeep Willams is a 83 y.o. male who presents for follow-up of Results      HPI     Admitted 10/22/18  Mandeep Willams is a 82 y.o. male with PMHx including ESRD-MWF, HTN, HLD. Patient presented from PCP after presenting for non-productive cough, and progressively worsening SOB; was found in ED to have grossly stable R pleural effusion with associated compressive atelectasis/consolidation in the R lower lung zone. Placed in observation for pneumonia and given one dose azithro and rocephn in ED and consulted to IR. Patient is an ESRD patient and cannot say if he has had sick contacts. He tells me that he has had a cold-like symptoms for 2-3 days with non-productive cough and intermittent orthopnea. He denies fever, chills, recent long trips or trauma, numbness or tingling in either extremity. He does still make urine. He has not missed any dialysis sessions and has completed all of the sessions when on dialysis.     -Uses [facility]  -Denies exposure to chemicals or asbestos  -remote history of smoking - quit over 20 years ago     During physical assessment the patient's sat remained about 92-93% at rest on room air. He did not appear distressed, he is breathing comfortabley. He does have intermittent non-productive cough -- vitals satble, afebrile without white count.      Patient placed in observation for PNA - noted to have stable CXR findings of R pleural effusion with associated compressive atelectasis/consolidation in the R lower lung zone. During physical assessment the patient's sat remained about 92-93% at rest on room air. He did not appear distressed, he is breathing comfortabley. Noted intermittent non-productive cough -- vitals satble, afebrile without white count. Thoracentesis  offered and explained to the patient in great detail with his niece in the room - via Martha and the patient refused throacentesis. Stated prefer ABX treatment. Responded well to Lasix X 1 dose (patient still  urinates); IV steroid X 1 dose, and ABX. Will discharge to home with ABX and 4D oral prednision. He is comfortable appearing at the bedside. Can follow up with PCP and dialysis at discharge.     Saw Dr Mcclelland 10/30/18  82-year-old male presents with his great niece for hospital follow-up for pleural effusion.  He was seen by me on 10/22/2018 and sent to the hospital for this, for which she was admitted and was recommended to have a a pleural tap.  However the patient had declined and was discharged home with a prescription for azithromycin.  He was subsequently hospitalized at [facility] for the same thing again a few days later (10/26?) because he had shortness of breath.  He had an extra dialysis done while at [facility], and was discharged home with a prescription for Levaquin and prednisone.  He reports that he is doing well at present.  The patient is an end-stage renal dialysis patient and receives dialysis on Mondays Wednesdays and Fridays in the morning.  On questioning today, the patient reports that he gets swelling in his legs on and off.  He denies needing to use pillows for sleep.  He does report fatigue with walking a few blocks.  He states he does use stairs so he does not know if he can make it up a flight of stairs.     Chest x-ray was repeated and shows no improvement in pleural effusion.  The patient is symptomatically better.  Although the effusion may be related to his kidney status, and possible pneumonia for which she was treated, I did advise the patient to have a cardiology consult to rule out any cardiac component.  Also, will refer the patient for pulmonary consult.  Patient to continue dialysis to help with the effusion.    Saw Pulmonary - Dr Arnold 12/4/18   Pleural effusion     Overview       Chronic since 2012.  Bigger per recent cxr.  May relate to fluid overload.  Persistent effusion with elevated bnp.  Recommend more aggressive ultrafiltration.  Await echo result.            11/27/18 Denies prior CAD or CHF  EKG 10/22/18 NSR IRBBB, LVH  Occasional chest tightness    Echo 12/11/18  · Normal left ventricular systolic function. The estimated ejection fraction is 65%  · No wall motion abnormalities.  · Grade II (moderate) left ventricular diastolic dysfunction consistent with pseudonormalization.  · Mild aortic regurgitation.  · Moderate aortic valve stenosis.  · Aortic valve area is 1.25 cm2; peak velocity is 2.68 m/s; mean gradient is 18.63 mmHg.  · Mild mitral regurgitation.  · Trace tricuspid regurgitation.  · The estimated PA systolic pressure is 29.90 mm Hg    Stress test 12/11/18  · Normal Kayleigh MPI  · The perfusion scan is free of evidence from myocardial ischemia or injury.  · There is a mild intensity fixed defect in the inferobasilar wall of the left ventricle secondary to diaphragm attenuation.  · An ejection fraction of 51 % at rest  · LV cavity size at rest is normal.  · Resting wall motion is physiologic.    Less SOB  Denies CP    Review of Systems   Constitution: Negative for decreased appetite.   HENT: Negative for ear discharge.    Eyes: Negative for blurred vision.   Endocrine: Negative for polyphagia.   Skin: Negative for nail changes.   Neurological: Negative for aphonia.   Psychiatric/Behavioral: Negative for hallucinations.        Objective:    Physical Exam   Constitutional: He is oriented to person, place, and time. He appears well-developed and well-nourished.   HENT:   Head: Normocephalic and atraumatic.   Eyes: Conjunctivae are normal. Pupils are equal, round, and reactive to light.   Neck: Normal range of motion. Neck supple.   Cardiovascular: Normal rate and intact distal pulses.   Murmur heard.   Harsh midsystolic murmur is present with a grade of 2/6 at the upper right sternal border radiating to the neck.  Pulmonary/Chest: Effort normal. He has decreased breath sounds in the right middle field and the right lower field.   Abdominal: Soft. Bowel sounds  are normal.   Musculoskeletal: Normal range of motion.   Neurological: He is alert and oriented to person, place, and time.   Skin: Skin is warm and dry.         Assessment:       1. Essential hypertension    2. Mixed hyperlipidemia    3. Acute on chronic combined systolic and diastolic congestive heart failure    4. ESRD (end stage renal disease)    5. Chest pain, atypical         Plan:       Add irbesartan 75 qd  Possible component of diastolic CHF  OV 3 months

## 2018-12-28 DIAGNOSIS — I10 ESSENTIAL HYPERTENSION: ICD-10-CM

## 2018-12-28 RX ORDER — METOPROLOL SUCCINATE 100 MG/1
100 TABLET, EXTENDED RELEASE ORAL DAILY
Qty: 90 TABLET | Refills: 0 | Status: SHIPPED | OUTPATIENT
Start: 2018-12-28 | End: 2019-03-19 | Stop reason: SDUPTHER

## 2018-12-28 NOTE — TELEPHONE ENCOUNTER
----- Message from Yesi Tyler sent at 2018 10:48 AM CST -----  Contact: Nephew  Is calling to get a refill on medication metoprolol succinate (TOPROL XL) 100 MG 24 hr tablet (). Please call at [phone]      [facility] [place] - [address] [address]  Phone: [phone] Fax: [phone]

## 2019-01-22 DIAGNOSIS — I10 ESSENTIAL HYPERTENSION: ICD-10-CM

## 2019-01-22 RX ORDER — FUROSEMIDE 20 MG/1
TABLET ORAL
Qty: 30 TABLET | Refills: 0 | Status: SHIPPED | OUTPATIENT
Start: 2019-01-22 | End: 2019-03-19 | Stop reason: SDUPTHER

## 2019-01-22 NOTE — TELEPHONE ENCOUNTER
----- Message from Kelsey Lou sent at 1/22/2019 10:06 AM CST -----  Contact: Self   Refill : furosemide (LASIX) 20 MG tablet      [facility] [place] [address] [address]  Phone: [phone] Fax: [phone]

## 2019-03-19 ENCOUNTER — OFFICE VISIT (OUTPATIENT)
Dept: CARDIOLOGY | Facility: CLINIC | Age: 84
End: 2019-03-19
Payer: MEDICARE

## 2019-03-19 VITALS
SYSTOLIC BLOOD PRESSURE: 133 MMHG | WEIGHT: 145.5 LBS | HEART RATE: 64 BPM | OXYGEN SATURATION: 100 % | HEIGHT: 67 IN | BODY MASS INDEX: 22.84 KG/M2 | DIASTOLIC BLOOD PRESSURE: 61 MMHG

## 2019-03-19 DIAGNOSIS — E78.2 MIXED HYPERLIPIDEMIA: ICD-10-CM

## 2019-03-19 DIAGNOSIS — E78.5 HYPERLIPIDEMIA, UNSPECIFIED HYPERLIPIDEMIA TYPE: ICD-10-CM

## 2019-03-19 DIAGNOSIS — I10 HTN (HYPERTENSION): ICD-10-CM

## 2019-03-19 DIAGNOSIS — R07.89 CHEST PAIN, ATYPICAL: ICD-10-CM

## 2019-03-19 DIAGNOSIS — I50.43 ACUTE ON CHRONIC COMBINED SYSTOLIC AND DIASTOLIC CONGESTIVE HEART FAILURE: ICD-10-CM

## 2019-03-19 DIAGNOSIS — N18.6 ESRD (END STAGE RENAL DISEASE): ICD-10-CM

## 2019-03-19 DIAGNOSIS — I10 ESSENTIAL HYPERTENSION: Primary | ICD-10-CM

## 2019-03-19 PROCEDURE — 1101F PT FALLS ASSESS-DOCD LE1/YR: CPT | Mod: CPTII,S$GLB,, | Performed by: INTERNAL MEDICINE

## 2019-03-19 PROCEDURE — 99214 OFFICE O/P EST MOD 30 MIN: CPT | Mod: S$GLB,,, | Performed by: INTERNAL MEDICINE

## 2019-03-19 PROCEDURE — 1101F PR PT FALLS ASSESS DOC 0-1 FALLS W/OUT INJ PAST YR: ICD-10-PCS | Mod: CPTII,S$GLB,, | Performed by: INTERNAL MEDICINE

## 2019-03-19 PROCEDURE — 99499 RISK ADDL DX/OHS AUDIT: ICD-10-PCS | Mod: S$GLB,,, | Performed by: INTERNAL MEDICINE

## 2019-03-19 PROCEDURE — 93000 EKG 12-LEAD: ICD-10-PCS | Mod: S$GLB,,, | Performed by: INTERNAL MEDICINE

## 2019-03-19 PROCEDURE — 93000 ELECTROCARDIOGRAM COMPLETE: CPT | Mod: S$GLB,,, | Performed by: INTERNAL MEDICINE

## 2019-03-19 PROCEDURE — 99999 PR PBB SHADOW E&M-EST. PATIENT-LVL III: ICD-10-PCS | Mod: PBBFAC,,, | Performed by: INTERNAL MEDICINE

## 2019-03-19 PROCEDURE — 99214 PR OFFICE/OUTPT VISIT, EST, LEVL IV, 30-39 MIN: ICD-10-PCS | Mod: S$GLB,,, | Performed by: INTERNAL MEDICINE

## 2019-03-19 PROCEDURE — 99499 UNLISTED E&M SERVICE: CPT | Mod: S$GLB,,, | Performed by: INTERNAL MEDICINE

## 2019-03-19 PROCEDURE — 99999 PR PBB SHADOW E&M-EST. PATIENT-LVL III: CPT | Mod: PBBFAC,,, | Performed by: INTERNAL MEDICINE

## 2019-03-19 RX ORDER — METOPROLOL SUCCINATE 100 MG/1
100 TABLET, EXTENDED RELEASE ORAL DAILY
Qty: 90 TABLET | Refills: 3 | Status: SHIPPED | OUTPATIENT
Start: 2019-03-19 | End: 2019-04-02 | Stop reason: SDUPTHER

## 2019-03-19 RX ORDER — PRAVASTATIN SODIUM 10 MG/1
10 TABLET ORAL DAILY
Qty: 90 TABLET | Refills: 3 | Status: SHIPPED | OUTPATIENT
Start: 2019-03-19 | End: 2019-04-02 | Stop reason: SDUPTHER

## 2019-03-19 RX ORDER — AMLODIPINE BESYLATE 5 MG/1
5 TABLET ORAL DAILY
Qty: 90 TABLET | Refills: 3 | Status: SHIPPED | OUTPATIENT
Start: 2019-03-19 | End: 2019-04-02 | Stop reason: SDUPTHER

## 2019-03-19 RX ORDER — IRBESARTAN 75 MG/1
75 TABLET ORAL NIGHTLY
Qty: 90 TABLET | Refills: 3 | Status: SHIPPED | OUTPATIENT
Start: 2019-03-19 | End: 2020-11-16

## 2019-03-19 RX ORDER — OMEGA-3-ACID ETHYL ESTERS 1 G/1
2 CAPSULE, LIQUID FILLED ORAL 2 TIMES DAILY
Qty: 360 CAPSULE | Refills: 3 | Status: SHIPPED | OUTPATIENT
Start: 2019-03-19 | End: 2019-04-02 | Stop reason: SDUPTHER

## 2019-03-19 RX ORDER — FUROSEMIDE 20 MG/1
TABLET ORAL
Qty: 90 TABLET | Refills: 3 | Status: SHIPPED | OUTPATIENT
Start: 2019-03-19 | End: 2019-04-02 | Stop reason: SDUPTHER

## 2019-03-19 NOTE — PROGRESS NOTES
Subjective:    Patient ID:  Mandeep Willams is a 83 y.o. male who presents for follow-up of Hypertension      HPI     Admitted 10/22/18  Mandeep Willams is a 82 y.o. male with PMHx including ESRD-MWF, HTN, HLD. Patient presented from PCP after presenting for non-productive cough, and progressively worsening SOB; was found in ED to have grossly stable R pleural effusion with associated compressive atelectasis/consolidation in the R lower lung zone. Placed in observation for pneumonia and given one dose azithro and rocephn in ED and consulted to IR. Patient is an ESRD patient and cannot say if he has had sick contacts. He tells me that he has had a cold-like symptoms for 2-3 days with non-productive cough and intermittent orthopnea. He denies fever, chills, recent long trips or trauma, numbness or tingling in either extremity. He does still make urine. He has not missed any dialysis sessions and has completed all of the sessions when on dialysis.     -Uses [facility]  -Denies exposure to chemicals or asbestos  -remote history of smoking - quit over 20 years ago     During physical assessment the patient's sat remained about 92-93% at rest on room air. He did not appear distressed, he is breathing comfortabley. He does have intermittent non-productive cough -- vitals satble, afebrile without white count.      Patient placed in observation for PNA - noted to have stable CXR findings of R pleural effusion with associated compressive atelectasis/consolidation in the R lower lung zone. During physical assessment the patient's sat remained about 92-93% at rest on room air. He did not appear distressed, he is breathing comfortabley. Noted intermittent non-productive cough -- vitals satble, afebrile without white count. Thoracentesis  offered and explained to the patient in great detail with his niece in the room - via Martha and the patient refused throacentesis. Stated prefer ABX treatment. Responded well to Lasix X 1 dose (patient still  urinates); IV steroid X 1 dose, and ABX. Will discharge to home with ABX and 4D oral prednision. He is comfortable appearing at the bedside. Can follow up with PCP and dialysis at discharge.     Saw Dr Mcclelland 10/30/18  82-year-old male presents with his great niece for hospital follow-up for pleural effusion.  He was seen by me on 10/22/2018 and sent to the hospital for this, for which she was admitted and was recommended to have a a pleural tap.  However the patient had declined and was discharged home with a prescription for azithromycin.  He was subsequently hospitalized at [facility] for the same thing again a few days later (10/26?) because he had shortness of breath.  He had an extra dialysis done while at [facility], and was discharged home with a prescription for Levaquin and prednisone.  He reports that he is doing well at present.  The patient is an end-stage renal dialysis patient and receives dialysis on Mondays Wednesdays and Fridays in the morning.  On questioning today, the patient reports that he gets swelling in his legs on and off.  He denies needing to use pillows for sleep.  He does report fatigue with walking a few blocks.  He states he does use stairs so he does not know if he can make it up a flight of stairs.     Chest x-ray was repeated and shows no improvement in pleural effusion.  The patient is symptomatically better.  Although the effusion may be related to his kidney status, and possible pneumonia for which she was treated, I did advise the patient to have a cardiology consult to rule out any cardiac component.  Also, will refer the patient for pulmonary consult.  Patient to continue dialysis to help with the effusion.     Saw Pulmonary - Dr Arnold 12/4/18         Pleural effusion      Overview        Chronic since 2012.  Bigger per recent cxr.  May relate to fluid overload.  Persistent effusion with elevated bnp.  Recommend more aggressive ultrafiltration.  Await echo result.             11/27/18 Denies prior CAD or CHF  EKG 10/22/18 NSR IRBBB, LVH  Occasional chest tightness     Echo 12/11/18  · Normal left ventricular systolic function. The estimated ejection fraction is 65%  · No wall motion abnormalities.  · Grade II (moderate) left ventricular diastolic dysfunction consistent with pseudonormalization.  · Mild aortic regurgitation.  · Moderate aortic valve stenosis.  · Aortic valve area is 1.25 cm2; peak velocity is 2.68 m/s; mean gradient is 18.63 mmHg.  · Mild mitral regurgitation.  · Trace tricuspid regurgitation.  · The estimated PA systolic pressure is 29.90 mm Hg     Stress test 12/11/18  · Normal Kayleigh MPI  · The perfusion scan is free of evidence from myocardial ischemia or injury.  · There is a mild intensity fixed defect in the inferobasilar wall of the left ventricle secondary to diaphragm attenuation.  · An ejection fraction of 51 % at rest  · LV cavity size at rest is normal.  · Resting wall motion is physiologic.    Denies CP or SOB  EKG NSR ok        Review of Systems   Constitution: Negative for decreased appetite.   HENT: Negative for ear discharge.    Eyes: Negative for blurred vision.   Respiratory: Negative for hemoptysis.    Endocrine: Negative for polyphagia.   Hematologic/Lymphatic: Negative for adenopathy.   Skin: Negative for color change.   Musculoskeletal: Negative for joint swelling.   Genitourinary: Negative for bladder incontinence.   Neurological: Negative for brief paralysis.   Psychiatric/Behavioral: Negative for hallucinations.   Allergic/Immunologic: Negative for hives.        Objective:    Physical Exam   Constitutional: He is oriented to person, place, and time. He appears well-developed and well-nourished.   HENT:   Head: Normocephalic and atraumatic.   Eyes: Conjunctivae are normal. Pupils are equal, round, and reactive to light.   Neck: Normal range of motion. Neck supple.   Cardiovascular: Normal rate and intact distal pulses.   Murmur heard.    Harsh midsystolic murmur is present with a grade of 2/6 at the upper right sternal border radiating to the neck.  Pulmonary/Chest: Effort normal. He has decreased breath sounds in the right middle field and the right lower field.   Abdominal: Soft. Bowel sounds are normal.   Musculoskeletal: Normal range of motion.   Neurological: He is alert and oriented to person, place, and time.   Skin: Skin is warm and dry.         Assessment:       1. Essential hypertension    2. Acute on chronic combined systolic and diastolic congestive heart failure    3. Mixed hyperlipidemia    4. ESRD (end stage renal disease)    5. Chest pain, atypical         Plan:       Cardiac stable  OV 6 months  Repeat echo at the end of the year

## 2019-03-19 NOTE — PROGRESS NOTES
Patient Mandeep Willams, MRN [id number], was dependent on dialysis (ICD10 Z99.2) at the time of this visit on 3/19/19. This addendum is made to the medical record on 03/19/2019.

## 2019-04-02 ENCOUNTER — OFFICE VISIT (OUTPATIENT)
Dept: FAMILY MEDICINE | Facility: CLINIC | Age: 84
End: 2019-04-02
Payer: MEDICARE

## 2019-04-02 VITALS
OXYGEN SATURATION: 99 % | RESPIRATION RATE: 19 BRPM | BODY MASS INDEX: 22.73 KG/M2 | SYSTOLIC BLOOD PRESSURE: 127 MMHG | HEART RATE: 67 BPM | WEIGHT: 144.81 LBS | TEMPERATURE: 98 F | HEIGHT: 67 IN | DIASTOLIC BLOOD PRESSURE: 57 MMHG

## 2019-04-02 DIAGNOSIS — I10 ESSENTIAL HYPERTENSION: ICD-10-CM

## 2019-04-02 DIAGNOSIS — N18.6 END STAGE RENAL DISEASE ON DIALYSIS: Primary | ICD-10-CM

## 2019-04-02 DIAGNOSIS — Z99.2 END STAGE RENAL DISEASE ON DIALYSIS: Primary | ICD-10-CM

## 2019-04-02 DIAGNOSIS — L85.3 XEROSIS OF SKIN: ICD-10-CM

## 2019-04-02 DIAGNOSIS — E78.2 MIXED HYPERLIPIDEMIA: ICD-10-CM

## 2019-04-02 PROCEDURE — 99999 PR PBB SHADOW E&M-EST. PATIENT-LVL IV: ICD-10-PCS | Mod: PBBFAC,,, | Performed by: FAMILY MEDICINE

## 2019-04-02 PROCEDURE — 99499 RISK ADDL DX/OHS AUDIT: ICD-10-PCS | Mod: S$GLB,,, | Performed by: FAMILY MEDICINE

## 2019-04-02 PROCEDURE — 99214 OFFICE O/P EST MOD 30 MIN: CPT | Mod: S$GLB,,, | Performed by: FAMILY MEDICINE

## 2019-04-02 PROCEDURE — 1101F PR PT FALLS ASSESS DOC 0-1 FALLS W/OUT INJ PAST YR: ICD-10-PCS | Mod: CPTII,S$GLB,, | Performed by: FAMILY MEDICINE

## 2019-04-02 PROCEDURE — 99999 PR PBB SHADOW E&M-EST. PATIENT-LVL IV: CPT | Mod: PBBFAC,,, | Performed by: FAMILY MEDICINE

## 2019-04-02 PROCEDURE — 99214 PR OFFICE/OUTPT VISIT, EST, LEVL IV, 30-39 MIN: ICD-10-PCS | Mod: S$GLB,,, | Performed by: FAMILY MEDICINE

## 2019-04-02 PROCEDURE — 99499 UNLISTED E&M SERVICE: CPT | Mod: S$GLB,,, | Performed by: FAMILY MEDICINE

## 2019-04-02 PROCEDURE — 1101F PT FALLS ASSESS-DOCD LE1/YR: CPT | Mod: CPTII,S$GLB,, | Performed by: FAMILY MEDICINE

## 2019-04-02 RX ORDER — METOPROLOL SUCCINATE 100 MG/1
100 TABLET, EXTENDED RELEASE ORAL DAILY
Qty: 90 TABLET | Refills: 3 | Status: SHIPPED | OUTPATIENT
Start: 2019-04-02 | End: 2020-04-28

## 2019-04-02 RX ORDER — AMMONIUM LACTATE 12 G/100G
LOTION TOPICAL DAILY PRN
Qty: 500 G | Refills: 5 | Status: ON HOLD | OUTPATIENT
Start: 2019-04-02 | End: 2022-01-01 | Stop reason: CLARIF

## 2019-04-02 RX ORDER — OMEGA-3-ACID ETHYL ESTERS 1 G/1
2 CAPSULE, LIQUID FILLED ORAL 2 TIMES DAILY
Qty: 360 CAPSULE | Refills: 3 | Status: SHIPPED | OUTPATIENT
Start: 2019-04-02 | End: 2022-11-19

## 2019-04-02 RX ORDER — KETOCONAZOLE 20 MG/ML
SHAMPOO, SUSPENSION TOPICAL
Qty: 120 ML | Refills: 2 | Status: ON HOLD | OUTPATIENT
Start: 2019-04-04 | End: 2022-01-01 | Stop reason: CLARIF

## 2019-04-02 RX ORDER — PRAVASTATIN SODIUM 10 MG/1
10 TABLET ORAL DAILY
Qty: 90 TABLET | Refills: 3 | Status: SHIPPED | OUTPATIENT
Start: 2019-04-02 | End: 2020-04-14

## 2019-04-02 RX ORDER — FUROSEMIDE 20 MG/1
TABLET ORAL
Qty: 90 TABLET | Refills: 3 | Status: SHIPPED | OUTPATIENT
Start: 2019-04-02 | End: 2020-04-14

## 2019-04-02 RX ORDER — AMLODIPINE BESYLATE 5 MG/1
5 TABLET ORAL DAILY
Qty: 90 TABLET | Refills: 3 | Status: SHIPPED | OUTPATIENT
Start: 2019-04-02 | End: 2020-04-28

## 2019-04-03 NOTE — PROGRESS NOTES
Routine Office Visit    Patient Name: Mandeep Willams    : 1935  MRN: [id number]    Subjective:  Mandeep is a 83 y.o. male who presents today for:   Chief Complaint   Patient presents with    Hypertension     discuss health    Itchy Eye     scalp, shoulders (b/l) &  back of neck     83-year-old male with hypertension, hyperlipidemia, congestive heart failure, and dyslipidemia comes in for routine follow-up on these.  He is here with his great niece.  The translation is done via [employer] service.  He reports no concerns with any of his medications and would like a refill on them.  He resists saw his cardiologist and told that everything was stable and to follow up in 6 months.  He goes to dialysis on , , and  in the morning.  He reports no concerns with dialysis.  The patient reports that the previous cream prescribed for his itchiness has helped some but he still gets the itchiness often.  He states that currently it is mostly in his scalp, but it comes down to his neck, shoulders and upper chest.  He states that he also noticed a rash in this area recently.  It is not present now.    Past Medical History  Past Medical History:   Diagnosis Date    ESRD (end stage renal disease)     HTN (hypertension)     Hyperlipidemia        Past Surgical History  Past Surgical History:   Procedure Laterality Date    BACK SURGERY      DIALYSIS FISTULA CREATION  2012    left arm    JOINT REPLACEMENT Right     hip        Family History  Family History   Problem Relation Age of Onset    Diabetes Sister     Hypertension Sister     Arthritis Sister     Cancer Neg Hx     Heart disease Neg Hx     Stroke Neg Hx     Amblyopia Neg Hx     Blindness Neg Hx     Cataracts Neg Hx     Glaucoma Neg Hx     Macular degeneration Neg Hx     Retinal detachment Neg Hx     Strabismus Neg Hx     Thyroid disease Neg Hx        Social History  Social History     Socioeconomic History    Marital  status:      Spouse name: Not on file    Number of children: Not on file    Years of education: Not on file    Highest education level: Not on file   Occupational History     Employer: [employer]   Social Needs    Financial resource strain: Not on file    Food insecurity:     Worry: Not on file     Inability: Not on file    Transportation needs:     Medical: Not on file     Non-medical: Not on file   Tobacco Use    Smoking status: Former Smoker     Packs/day: 1.00     Years: 10.00     Pack years: 10.00     Last attempt to quit: 1971     Years since quittin.3    Smokeless tobacco: Never Used   Substance and Sexual Activity    Alcohol use: No    Drug use: No    Sexual activity: Not Currently     Partners: Female     Comment:    Lifestyle    Physical activity:     Days per week: Not on file     Minutes per session: Not on file    Stress: Not on file   Relationships    Social connections:     Talks on phone: Not on file     Gets together: Not on file     Attends Scientologist service: Not on file     Active member of club or organization: Not on file     Attends meetings of clubs or organizations: Not on file     Relationship status: Not on file   Other Topics Concern    Not on file   Social History Narrative    Not on file       Current Medications  Current Outpatient Medications on File Prior to Visit   Medication Sig Dispense Refill    [DISCONTINUED] amLODIPine (NORVASC) 5 MG tablet Take 1 tablet (5 mg total) by mouth once daily. 90 tablet 3    [DISCONTINUED] furosemide (LASIX) 20 MG tablet TAKE 1 BY MOUTH DAILY 90 tablet 3    [DISCONTINUED] metoprolol succinate (TOPROL XL) 100 MG 24 hr tablet Take 1 tablet (100 mg total) by mouth once daily. 90 tablet 3    [DISCONTINUED] omega-3 acid ethyl esters (LOVAZA) 1 gram capsule Take 2 capsules (2 g total) by mouth 2 (two) times daily. 360 capsule 3    [DISCONTINUED] pravastatin (PRAVACHOL) 10 MG tablet Take 1  "tablet (10 mg total) by mouth once daily. 90 tablet 3    [DISCONTINUED] vit b cmplx 3-fa-vit c-biotin 1- mg-mg-mcg (NEPHRO-KAUSHIK RX) 1- mg-mg-mcg Tab TAKE 1 BY MOUTH ONCE A DAY                            REPLACES VOL-CARE RX 90 tablet 0    INTRALIPID 20 % infusion       irbesartan (AVAPRO) 75 MG tablet Take 1 tablet (75 mg total) by mouth every evening. 90 tablet 3    levoFLOXacin (LEVAQUIN) 250 MG tablet Take 250 mg by mouth once daily.      PROSOL 20 % SolP       SENNOSIDES (SENOKOT ORAL) Take by mouth.      [DISCONTINUED] ammonium lactate (LAC-HYDRIN) 12 % lotion Apply topically daily as needed for Dry Skin. 500 g 5     No current facility-administered medications on file prior to visit.        Allergies   Review of patient's allergies indicates:  No Known Allergies    Review of Systems   Constitutional: Negative for chills, fatigue and fever.   Respiratory: Negative for cough, shortness of breath and wheezing.    Cardiovascular: Negative for chest pain and palpitations.   Gastrointestinal: Negative for abdominal pain, blood in stool, constipation, diarrhea and nausea.   Musculoskeletal: Positive for arthralgias, back pain, gait problem and myalgias.   Skin: Negative for rash.   Neurological: Negative for seizures and headaches.   Hematological: Negative for adenopathy. Does not bruise/bleed easily.   Psychiatric/Behavioral: Negative for sleep disturbance.     BP (!) 127/57 (BP Location: Right arm, Patient Position: Sitting, BP Method: Small (Automatic)) Comment (BP Location): pt requested  Pulse 67   Temp 98.3 °F (36.8 °C) (Oral)   Resp 19   Ht 5' 7" (1.702 m)   Wt 65.7 kg (144 lb 13.5 oz)   SpO2 99%   BMI 22.69 kg/m²     Physical Exam   Constitutional: He appears well-developed and well-nourished. He is cooperative. No distress.   Patient using a rolling walker   HENT:   Head: Normocephalic.   Right Ear: External ear normal.   Left Ear: External ear normal.   Nose: Nose normal. "   Mouth/Throat: No oropharyngeal exudate.   Eyes: Pupils are equal, round, and reactive to light. Conjunctivae and EOM are normal.   Neck: Normal range of motion. Neck supple. No tracheal deviation present.   Cardiovascular: Normal rate, regular rhythm and intact distal pulses.   Murmur heard.  Pulses:       Radial pulses are 2+ on the right side, and 2+ on the left side.   Pulmonary/Chest: Effort normal and breath sounds normal. He has no wheezes. He has no rales.   Abdominal: Soft. Bowel sounds are normal. He exhibits no mass. There is no tenderness.   Musculoskeletal: He exhibits no edema.   Lymphadenopathy:     He has no cervical adenopathy.   Neurological: He is alert.   Skin: He is not diaphoretic.   No rash noted on scalp, upper chest, neck, or shoulders.  The scalp does show some scaling.   Vitals reviewed.        Assessment/Plan:  Mandeep was seen today for hypertension and itchy eye.    Diagnoses and all orders for this visit:    End stage renal disease on dialysis  -     vit b cmplx 3-fa-vit c-biotin 1- mg-mg-mcg (NEPHRO-KAUSHIK RX) 1- mg-mg-mcg Tab; TAKE 1 BY MOUTH ONCE A DAY                            REPLACES VOL-CARE RX  -     CBC auto differential; Future  Continue current treatment plan by Nephrology.    Essential hypertension  -     amLODIPine (NORVASC) 5 MG tablet; Take 1 tablet (5 mg total) by mouth once daily.  -     metoprolol succinate (TOPROL XL) 100 MG 24 hr tablet; Take 1 tablet (100 mg total) by mouth once daily.  -     furosemide (LASIX) 20 MG tablet; TAKE 1 BY MOUTH DAILY  -     Comprehensive metabolic panel; Future  Continue current antihypertensive medication.    Mixed hyperlipidemia  -     omega-3 acid ethyl esters (LOVAZA) 1 gram capsule; Take 2 capsules (2 g total) by mouth 2 (two) times daily.  -     pravastatin (PRAVACHOL) 10 MG tablet; Take 1 tablet (10 mg total) by mouth once daily.  -     Lipid panel; Future  Continue pravastatin and omega-3  .    Xerosis of skin  -      ammonium lactate (LAC-HYDRIN) 12 % lotion; Apply topically daily as needed for Dry Skin.  -     ketoconazole (NIZORAL) 2 % shampoo; Apply topically twice a week. To itchy area, lather, leave in 5 minutes then rinse  -     Ambulatory referral to Dermatology  Will do a short course of a Nizoral shampoo over area he is reporting concern presently.  He was educated on how to do this.  He had no questions on how to use the shampoo.  If there is no improvement in the next few weeks, patient is to follow up with Dermatology.    Prior to terminating [facility] video phone call, the patient was asked if he had any other questions or concerns, and he stated no.    This office note has been dictated.  This dictation has been generated using M-Modal Fluency Direct dictation; some phonetic errors may occur.

## 2019-04-03 NOTE — PROGRESS NOTES
Patient, Mandeep Willams (MRN #[id number]), presented with a recent Platelet count less than 150 K/uL consistent with the definition of thrombocytopenia (ICD10 - D69.6).    Platelets   Date Value Ref Range Status   10/22/2018 132 (L) 150 - 350 K/uL Final     The patient's thrombocytopenia was monitored, evaluated, addressed and/or treated. This addendum to the medical record is made on 04/03/2019.

## 2019-09-19 ENCOUNTER — PATIENT OUTREACH (OUTPATIENT)
Dept: ADMINISTRATIVE | Facility: OTHER | Age: 84
End: 2019-09-19

## 2019-09-24 ENCOUNTER — OFFICE VISIT (OUTPATIENT)
Dept: FAMILY MEDICINE | Facility: CLINIC | Age: 84
End: 2019-09-24
Payer: MEDICARE

## 2019-09-24 VITALS
SYSTOLIC BLOOD PRESSURE: 112 MMHG | OXYGEN SATURATION: 96 % | HEIGHT: 67 IN | RESPIRATION RATE: 16 BRPM | WEIGHT: 141.56 LBS | BODY MASS INDEX: 22.22 KG/M2 | DIASTOLIC BLOOD PRESSURE: 62 MMHG | HEART RATE: 68 BPM | TEMPERATURE: 99 F

## 2019-09-24 DIAGNOSIS — K02.9 DENTAL CARIES: ICD-10-CM

## 2019-09-24 DIAGNOSIS — R22.0 SWELLING OF GUMS: Primary | ICD-10-CM

## 2019-09-24 PROCEDURE — 99999 PR PBB SHADOW E&M-EST. PATIENT-LVL III: CPT | Mod: PBBFAC,,, | Performed by: FAMILY MEDICINE

## 2019-09-24 PROCEDURE — 99999 PR PBB SHADOW E&M-EST. PATIENT-LVL III: ICD-10-PCS | Mod: PBBFAC,,, | Performed by: FAMILY MEDICINE

## 2019-09-24 PROCEDURE — 99214 OFFICE O/P EST MOD 30 MIN: CPT | Mod: S$GLB,,, | Performed by: FAMILY MEDICINE

## 2019-09-24 PROCEDURE — 1101F PT FALLS ASSESS-DOCD LE1/YR: CPT | Mod: CPTII,S$GLB,, | Performed by: FAMILY MEDICINE

## 2019-09-24 PROCEDURE — 99214 PR OFFICE/OUTPT VISIT, EST, LEVL IV, 30-39 MIN: ICD-10-PCS | Mod: S$GLB,,, | Performed by: FAMILY MEDICINE

## 2019-09-24 PROCEDURE — 1101F PR PT FALLS ASSESS DOC 0-1 FALLS W/OUT INJ PAST YR: ICD-10-PCS | Mod: CPTII,S$GLB,, | Performed by: FAMILY MEDICINE

## 2019-09-24 RX ORDER — AMOXICILLIN AND CLAVULANATE POTASSIUM 875; 125 MG/1; MG/1
1 TABLET, FILM COATED ORAL 2 TIMES DAILY
Qty: 14 TABLET | Refills: 0 | Status: SHIPPED | OUTPATIENT
Start: 2019-09-24 | End: 2019-09-24

## 2019-09-24 RX ORDER — CLINDAMYCIN HYDROCHLORIDE 300 MG/1
300 CAPSULE ORAL 3 TIMES DAILY
Qty: 21 CAPSULE | Refills: 0 | Status: SHIPPED | OUTPATIENT
Start: 2019-09-24 | End: 2019-10-01

## 2019-09-24 RX ORDER — CALCIUM ACETATE 667 MG/1
CAPSULE ORAL
Refills: 3 | COMMUNITY
Start: 2019-08-19 | End: 2019-09-24 | Stop reason: ALTCHOICE

## 2019-09-24 RX ORDER — FERRIC CITRATE 210 MG/1
TABLET, COATED ORAL
Refills: 3 | Status: ON HOLD | COMMUNITY
Start: 2019-08-27 | End: 2022-01-01 | Stop reason: CLARIF

## 2019-09-24 NOTE — PROGRESS NOTES
Routine Office Visit    Patient Name: Mandeep Willams    : 1935  MRN: [id number]    Subjective:  Mandeep is a 83 y.o. male who presents today for:   Chief Complaint   Patient presents with    Oral Swelling     83-year-old male comes in with complaint of swelling gums and pain when chews.  Translation is done via [employer]. He reports that this has been going for several weeks.  He denies any recent dental work.  He reports the last time he saw the dentist was earlier this year on January.  He reports no open sores in the mouth.  He reports no bleeding.  He does report that he feels that his dentures do not fit well.  He wears both upper and lower dentures.  He denies any weight loss.    Past Medical History  Past Medical History:   Diagnosis Date    ESRD (end stage renal disease)     HTN (hypertension)     Hyperlipidemia        Past Surgical History  Past Surgical History:   Procedure Laterality Date    BACK SURGERY      DIALYSIS FISTULA CREATION  2012    left arm    JOINT REPLACEMENT Right     hip        Family History  Family History   Problem Relation Age of Onset    Diabetes Sister     Hypertension Sister     Arthritis Sister     Cancer Neg Hx     Heart disease Neg Hx     Stroke Neg Hx     Amblyopia Neg Hx     Blindness Neg Hx     Cataracts Neg Hx     Glaucoma Neg Hx     Macular degeneration Neg Hx     Retinal detachment Neg Hx     Strabismus Neg Hx     Thyroid disease Neg Hx        Social History  Social History     Socioeconomic History    Marital status:      Spouse name: Not on file    Number of children: Not on file    Years of education: Not on file    Highest education level: Not on file   Occupational History     Employer: [employer]   Social Needs    Financial resource strain: Not on file    Food insecurity:     Worry: Not on file     Inability: Not on file    Transportation needs:     Medical: Not on file     Non-medical: Not on file   Tobacco Use     Smoking status: Former Smoker     Packs/day: 1.00     Years: 10.00     Pack years: 10.00     Last attempt to quit: 1971     Years since quittin.8    Smokeless tobacco: Never Used   Substance and Sexual Activity    Alcohol use: No    Drug use: No    Sexual activity: Not Currently     Partners: Female     Comment:    Lifestyle    Physical activity:     Days per week: Not on file     Minutes per session: Not on file    Stress: Not on file   Relationships    Social connections:     Talks on phone: Not on file     Gets together: Not on file     Attends Amish service: Not on file     Active member of club or organization: Not on file     Attends meetings of clubs or organizations: Not on file     Relationship status: Not on file   Other Topics Concern    Not on file   Social History Narrative    Not on file       Current Medications  Current Outpatient Medications on File Prior to Visit   Medication Sig Dispense Refill    amLODIPine (NORVASC) 5 MG tablet Take 1 tablet (5 mg total) by mouth once daily. 90 tablet 3    AURYXIA 210 mg iron Tab TAKE ONE TABLET BEFORE MEALS THREE TIMES DAILY  3    furosemide (LASIX) 20 MG tablet TAKE 1 BY MOUTH DAILY 90 tablet 3    metoprolol succinate (TOPROL XL) 100 MG 24 hr tablet Take 1 tablet (100 mg total) by mouth once daily. 90 tablet 3    omega-3 acid ethyl esters (LOVAZA) 1 gram capsule Take 2 capsules (2 g total) by mouth 2 (two) times daily. 360 capsule 3    pravastatin (PRAVACHOL) 10 MG tablet Take 1 tablet (10 mg total) by mouth once daily. 90 tablet 3    vit b cmplx 3-fa-vit c-biotin 1- mg-mg-mcg (NEPHRO-KAUSHIK RX) 1- mg-mg-mcg Tab TAKE 1 BY MOUTH ONCE A DAY                            REPLACES VOL-CARE RX 90 tablet 3    ammonium lactate (LAC-HYDRIN) 12 % lotion Apply topically daily as needed for Dry Skin. (Patient not taking: Reported on 2019) 500 g 5    INTRALIPID 20 % infusion       irbesartan (AVAPRO) 75 MG tablet  "Take 1 tablet (75 mg total) by mouth every evening. 90 tablet 3    ketoconazole (NIZORAL) 2 % shampoo Apply topically twice a week. To itchy area, lather, leave in 5 minutes then rinse 120 mL 2    levoFLOXacin (LEVAQUIN) 250 MG tablet Take 250 mg by mouth once daily.      PROSOL 20 % SolP       SENNOSIDES (SENOKOT ORAL) Take by mouth.      [DISCONTINUED] calcium acetate (PHOSLO) 667 mg capsule TAKE 1 CAPSULE BY MOUTH BEFORE MEALS THREE TIMES DAILY  3     No current facility-administered medications on file prior to visit.        Allergies   Review of patient's allergies indicates:  No Known Allergies    Review of Systems   Constitutional: Negative for unexpected weight change.   HENT: Negative for congestion, rhinorrhea, sinus pressure, sore throat, trouble swallowing and voice change.    Respiratory: Negative for shortness of breath.    Cardiovascular: Negative for chest pain.   Gastrointestinal: Negative for abdominal pain and diarrhea.     /62 (BP Location: Left arm, Patient Position: Sitting, BP Method: Small (Manual))   Pulse 68   Temp 98.6 °F (37 °C) (Oral)   Resp 16   Ht 5' 7" (1.702 m)   Wt 64.2 kg (141 lb 8.6 oz)   SpO2 96%   BMI 22.17 kg/m²     Physical Exam   Constitutional: No distress.   HENT:   Head: Normocephalic and atraumatic.   Patient wears both upper and lower dentures.  He has no teeth on the lower arch.  And the upper arch the left molar has many caries, and root appears exposed.  Comes in both upper and a lower do appear swollen although distinct abscess noted   Eyes: Pupils are equal, round, and reactive to light. EOM are normal.   Neck: Normal range of motion. Neck supple.         Assessment/Plan:  Mandeep was seen today for oral swelling.    Diagnoses and all orders for this visit:    Swelling of gums  -     Discontinue: amoxicillin-clavulanate 875-125mg (AUGMENTIN) 875-125 mg per tablet; Take 1 tablet by mouth 2 (two) times daily. for 7 days  -     clindamycin (CLEOCIN) 300 " MG capsule; Take 1 capsule (300 mg total) by mouth 3 (three) times daily. for 7 days  Patient strongly encouraged to make an appoint with his dentist.  Given the swelling of the gums discussed using an antibiotic for seven days.  At this point the patient did family member present, reports that the patient has been taken amoxicillin twice a day for over 10 days.  And this has not helped.  At this point prescribed Augmentin.  After the patient have, and noticed that his renal function is end-stage and a call the phone number on record and spoke to a family member of his.  Her reported the patient should not fill out the prescription given for Augmentin and I would change it to another prescription which is five his kidney function.  She was able to explain back to stop the Augmentin fill and instead will fill clindamycin at [facility].    Dental caries  Patient doing appointment with dentist.              -Guy Mcclelland Jr., MD, AAHIVS          This office note has been dictated.  This dictation has been generated using M-Modal Fluency Direct dictation; some phonetic errors may occur.

## 2019-12-03 ENCOUNTER — OFFICE VISIT (OUTPATIENT)
Dept: FAMILY MEDICINE | Facility: CLINIC | Age: 84
End: 2019-12-03
Payer: MEDICARE

## 2019-12-03 VITALS
OXYGEN SATURATION: 99 % | TEMPERATURE: 98 F | DIASTOLIC BLOOD PRESSURE: 58 MMHG | HEART RATE: 64 BPM | RESPIRATION RATE: 19 BRPM | SYSTOLIC BLOOD PRESSURE: 112 MMHG

## 2019-12-03 DIAGNOSIS — N18.6 END STAGE RENAL DISEASE ON DIALYSIS: ICD-10-CM

## 2019-12-03 DIAGNOSIS — Z99.2 END STAGE RENAL DISEASE ON DIALYSIS: ICD-10-CM

## 2019-12-03 DIAGNOSIS — E78.2 MIXED HYPERLIPIDEMIA: ICD-10-CM

## 2019-12-03 DIAGNOSIS — I10 ESSENTIAL HYPERTENSION: ICD-10-CM

## 2019-12-03 DIAGNOSIS — K02.9 DENTAL CARIES: ICD-10-CM

## 2019-12-03 DIAGNOSIS — R22.0 SWELLING OF GUMS: Primary | ICD-10-CM

## 2019-12-03 PROCEDURE — 1125F AMNT PAIN NOTED PAIN PRSNT: CPT | Mod: S$GLB,,, | Performed by: FAMILY MEDICINE

## 2019-12-03 PROCEDURE — 1125F PR PAIN SEVERITY QUANTIFIED, PAIN PRESENT: ICD-10-PCS | Mod: S$GLB,,, | Performed by: FAMILY MEDICINE

## 2019-12-03 PROCEDURE — 99214 OFFICE O/P EST MOD 30 MIN: CPT | Mod: S$GLB,,, | Performed by: FAMILY MEDICINE

## 2019-12-03 PROCEDURE — 99999 PR PBB SHADOW E&M-EST. PATIENT-LVL III: ICD-10-PCS | Mod: PBBFAC,,, | Performed by: FAMILY MEDICINE

## 2019-12-03 PROCEDURE — 1159F PR MEDICATION LIST DOCUMENTED IN MEDICAL RECORD: ICD-10-PCS | Mod: S$GLB,,, | Performed by: FAMILY MEDICINE

## 2019-12-03 PROCEDURE — 1159F MED LIST DOCD IN RCRD: CPT | Mod: S$GLB,,, | Performed by: FAMILY MEDICINE

## 2019-12-03 PROCEDURE — 1101F PR PT FALLS ASSESS DOC 0-1 FALLS W/OUT INJ PAST YR: ICD-10-PCS | Mod: CPTII,S$GLB,, | Performed by: FAMILY MEDICINE

## 2019-12-03 PROCEDURE — 1101F PT FALLS ASSESS-DOCD LE1/YR: CPT | Mod: CPTII,S$GLB,, | Performed by: FAMILY MEDICINE

## 2019-12-03 PROCEDURE — 99999 PR PBB SHADOW E&M-EST. PATIENT-LVL III: CPT | Mod: PBBFAC,,, | Performed by: FAMILY MEDICINE

## 2019-12-03 PROCEDURE — 99214 PR OFFICE/OUTPT VISIT, EST, LEVL IV, 30-39 MIN: ICD-10-PCS | Mod: S$GLB,,, | Performed by: FAMILY MEDICINE

## 2019-12-03 RX ORDER — CLINDAMYCIN HYDROCHLORIDE 300 MG/1
300 CAPSULE ORAL 3 TIMES DAILY
Qty: 21 CAPSULE | Refills: 0 | Status: SHIPPED | OUTPATIENT
Start: 2019-12-03 | End: 2019-12-10

## 2019-12-09 NOTE — PROGRESS NOTES
Routine Office Visit    Patient Name: Mandeep Willams    : 1935  MRN: [id number]    Subjective:  Mandeep is a 84 y.o. male who presents today for:   Chief Complaint   Patient presents with    Oral Swelling     possible gum infection w/t food getting trapped in dentures at least once every two weeks       84-year-old male with end-stage renal disease on dialysis,  hypertension, and dyslipidemia, comes in a complaining of a gum swelling.  He had this in September as well.  He was prescribed clindamycin at the time and states that it worked well.  The he wants prescription again.  At that time he had been advised to see a dentist.  The he did not.  He insists that he does not want to see a dentist.  He states that he does not see a point because he does have he most of his teeth and he wears dentures.  He states that few teeth he has, he does not feel that they have any problems.    Past Medical History  Past Medical History:   Diagnosis Date    ESRD (end stage renal disease)     HTN (hypertension)     Hyperlipidemia        Past Surgical History  Past Surgical History:   Procedure Laterality Date    BACK SURGERY      DIALYSIS FISTULA CREATION  2012    left arm    JOINT REPLACEMENT Right     hip        Family History  Family History   Problem Relation Age of Onset    Diabetes Sister     Hypertension Sister     Arthritis Sister     Cancer Neg Hx     Heart disease Neg Hx     Stroke Neg Hx     Amblyopia Neg Hx     Blindness Neg Hx     Cataracts Neg Hx     Glaucoma Neg Hx     Macular degeneration Neg Hx     Retinal detachment Neg Hx     Strabismus Neg Hx     Thyroid disease Neg Hx        Social History  Social History     Socioeconomic History    Marital status:      Spouse name: Not on file    Number of children: Not on file    Years of education: Not on file    Highest education level: Not on file   Occupational History     Employer: [employer]   Social Needs     Financial resource strain: Not on file    Food insecurity:     Worry: Not on file     Inability: Not on file    Transportation needs:     Medical: Not on file     Non-medical: Not on file   Tobacco Use    Smoking status: Former Smoker     Packs/day: 1.00     Years: 10.00     Pack years: 10.00     Last attempt to quit: 1971     Years since quittin.0    Smokeless tobacco: Never Used   Substance and Sexual Activity    Alcohol use: No    Drug use: No    Sexual activity: Not Currently     Partners: Female     Comment:    Lifestyle    Physical activity:     Days per week: Not on file     Minutes per session: Not on file    Stress: Not on file   Relationships    Social connections:     Talks on phone: Not on file     Gets together: Not on file     Attends Rastafari service: Not on file     Active member of club or organization: Not on file     Attends meetings of clubs or organizations: Not on file     Relationship status: Not on file   Other Topics Concern    Not on file   Social History Narrative    Not on file       Current Medications  Current Outpatient Medications on File Prior to Visit   Medication Sig Dispense Refill    amLODIPine (NORVASC) 5 MG tablet Take 1 tablet (5 mg total) by mouth once daily. 90 tablet 3    ammonium lactate (LAC-HYDRIN) 12 % lotion Apply topically daily as needed for Dry Skin. (Patient not taking: Reported on 2019) 500 g 5    AURYXIA 210 mg iron Tab TAKE ONE TABLET BEFORE MEALS THREE TIMES DAILY  3    furosemide (LASIX) 20 MG tablet TAKE 1 BY MOUTH DAILY 90 tablet 3    INTRALIPID 20 % infusion       irbesartan (AVAPRO) 75 MG tablet Take 1 tablet (75 mg total) by mouth every evening. 90 tablet 3    ketoconazole (NIZORAL) 2 % shampoo Apply topically twice a week. To itchy area, lather, leave in 5 minutes then rinse 120 mL 2    levoFLOXacin (LEVAQUIN) 250 MG tablet Take 250 mg by mouth once daily.      metoprolol succinate (TOPROL XL) 100 MG 24 hr  tablet Take 1 tablet (100 mg total) by mouth once daily. 90 tablet 3    omega-3 acid ethyl esters (LOVAZA) 1 gram capsule Take 2 capsules (2 g total) by mouth 2 (two) times daily. 360 capsule 3    pravastatin (PRAVACHOL) 10 MG tablet Take 1 tablet (10 mg total) by mouth once daily. 90 tablet 3    PROSOL 20 % SolP       SENNOSIDES (SENOKOT ORAL) Take by mouth.      vit b cmplx 3-fa-vit c-biotin 1- mg-mg-mcg (NEPHRO-KAUSHIK RX) 1- mg-mg-mcg Tab TAKE 1 BY MOUTH ONCE A DAY                            REPLACES VOL-CARE RX 90 tablet 3     No current facility-administered medications on file prior to visit.        Allergies   Review of patient's allergies indicates:  No Known Allergies    Review of Systems   Constitutional: Negative for unexpected weight change.   HENT: Negative for congestion, mouth sores, rhinorrhea, sinus pressure, sore throat, trouble swallowing and voice change.    Respiratory: Negative for shortness of breath.    Cardiovascular: Negative for chest pain.   Gastrointestinal: Negative for abdominal pain and diarrhea.       BP (!) 112/58 (BP Location: Left arm, Patient Position: Sitting, BP Method: Medium (Manual))   Pulse 64   Temp 98.1 °F (36.7 °C) (Oral)   Resp 19   SpO2 99%     Physical Exam   Constitutional: No distress.   HENT:   Head: Normocephalic and atraumatic.   Patient wears both upper and lower dentures.  He has no teeth on the lower arch.  And the upper arch the left molar has many caries, and root appears exposed.  Comes in both upper and a lower do appear swollen although distinct abscess noted   Eyes: Pupils are equal, round, and reactive to light. EOM are normal.   Neck: Normal range of motion. Neck supple.         Assessment/Plan:  Mandeep was seen today for oral swelling. Translation via [facility]    Diagnoses and all orders for this visit:    Swelling of gums  -     clindamycin (CLEOCIN) 300 MG capsule; Take 1 capsule (300 mg total) by mouth 3 (three) times daily. for 7  days  She he will represcribe clindamycin.  Discussed with patient that I strongly encouraged him to see a dentist.  I explained that his overall health extremely important was overall health and that he has multiple dental caries.    Dental caries  -     clindamycin (CLEOCIN) 300 MG capsule; Take 1 capsule (300 mg total) by mouth 3 (three) times daily. for 7 days  As above    Essential hypertension  -     Comprehensive metabolic panel; Future  He continue current regimen    End stage renal disease on dialysis  -     Comprehensive metabolic panel; Future  -     CBC auto differential; Future  Management as per nephrology    Mixed hyperlipidemia  -     Comprehensive metabolic panel; Future  -     Lipid panel; Future  Continue statin              -Guy Mcclelland Jr., MD, AAHIVS          This office note has been dictated.  This dictation has been generated using M-Modal Fluency Direct dictation; some phonetic errors may occur.

## 2019-12-12 ENCOUNTER — TELEPHONE (OUTPATIENT)
Dept: FAMILY MEDICINE | Facility: CLINIC | Age: 84
End: 2019-12-12

## 2019-12-12 NOTE — TELEPHONE ENCOUNTER
----- Message from Pauly Valverde sent at 12/12/2019 10:15 AM CST -----  Contact: Self   Type: Patient Call Back    Who called: Self   What is the request in detail:  Asking to speak with the office in ref to upcoming appt   Can the clinic reply by [facility]?  Call   Would the patient rather a call back or a response via [facility]?  Call   Best call back number: [phone]    Additional Information

## 2019-12-12 NOTE — TELEPHONE ENCOUNTER
Patient advised to go to the ED as dark stools can be a sign of internal bleeding. Patient's family insist on coming to the clinic after being advised to go to the ED. No further questions at this time.

## 2019-12-14 ENCOUNTER — HOSPITAL ENCOUNTER (EMERGENCY)
Facility: HOSPITAL | Age: 84
Discharge: HOME | End: 2019-12-14
Attending: EMERGENCY MEDICINE
Payer: MEDICARE

## 2019-12-14 VITALS
BODY MASS INDEX: 20.4 KG/M2 | TEMPERATURE: 99 F | DIASTOLIC BLOOD PRESSURE: 56 MMHG | OXYGEN SATURATION: 100 % | HEART RATE: 60 BPM | SYSTOLIC BLOOD PRESSURE: 116 MMHG | WEIGHT: 130 LBS | HEIGHT: 67 IN | RESPIRATION RATE: 17 BRPM

## 2019-12-14 DIAGNOSIS — R19.5 DARK STOOLS: Primary | ICD-10-CM

## 2019-12-14 LAB
ABO + RH BLD: NORMAL
ALBUMIN SERPL BCP-MCNC: 3.5 G/DL (ref 3.5–5.2)
ALP SERPL-CCNC: 94 U/L (ref 55–135)
ALT SERPL W/O P-5'-P-CCNC: 21 U/L (ref 10–44)
ANION GAP SERPL CALC-SCNC: 10 MMOL/L (ref 8–16)
APTT BLDCRRT: 26 SEC (ref 21–32)
AST SERPL-CCNC: 28 U/L (ref 10–40)
BASOPHILS # BLD AUTO: 0.03 K/UL (ref 0–0.2)
BASOPHILS NFR BLD: 0.7 % (ref 0–1.9)
BILIRUB SERPL-MCNC: 0.7 MG/DL (ref 0.1–1)
BLD GP AB SCN CELLS X3 SERPL QL: NORMAL
BUN SERPL-MCNC: 38 MG/DL (ref 8–23)
CALCIUM SERPL-MCNC: 8.2 MG/DL (ref 8.7–10.5)
CHLORIDE SERPL-SCNC: 95 MMOL/L (ref 95–110)
CO2 SERPL-SCNC: 33 MMOL/L (ref 23–29)
CREAT SERPL-MCNC: 7 MG/DL (ref 0.5–1.4)
DIFFERENTIAL METHOD: ABNORMAL
EOSINOPHIL # BLD AUTO: 0.1 K/UL (ref 0–0.5)
EOSINOPHIL NFR BLD: 2.9 % (ref 0–8)
ERYTHROCYTE [DISTWIDTH] IN BLOOD BY AUTOMATED COUNT: 14.9 % (ref 11.5–14.5)
EST. GFR  (AFRICAN AMERICAN): 8 ML/MIN/1.73 M^2
EST. GFR  (NON AFRICAN AMERICAN): 7 ML/MIN/1.73 M^2
GLUCOSE SERPL-MCNC: 111 MG/DL (ref 70–110)
HCT VFR BLD AUTO: 32.2 % (ref 40–54)
HGB BLD-MCNC: 10.7 G/DL (ref 14–18)
IMM GRANULOCYTES # BLD AUTO: 0.02 K/UL (ref 0–0.04)
IMM GRANULOCYTES NFR BLD AUTO: 0.4 % (ref 0–0.5)
INR PPP: 1 (ref 0.8–1.2)
LIPASE SERPL-CCNC: 93 U/L (ref 4–60)
LYMPHOCYTES # BLD AUTO: 1.9 K/UL (ref 1–4.8)
LYMPHOCYTES NFR BLD: 41.1 % (ref 18–48)
MCH RBC QN AUTO: 33.4 PG (ref 27–31)
MCHC RBC AUTO-ENTMCNC: 33.2 G/DL (ref 32–36)
MCV RBC AUTO: 101 FL (ref 82–98)
MONOCYTES # BLD AUTO: 0.5 K/UL (ref 0.3–1)
MONOCYTES NFR BLD: 12 % (ref 4–15)
NEUTROPHILS # BLD AUTO: 1.9 K/UL (ref 1.8–7.7)
NEUTROPHILS NFR BLD: 42.9 % (ref 38–73)
NRBC BLD-RTO: 0 /100 WBC
PLATELET # BLD AUTO: 121 K/UL (ref 150–350)
PMV BLD AUTO: 10.1 FL (ref 9.2–12.9)
POTASSIUM SERPL-SCNC: 4.3 MMOL/L (ref 3.5–5.1)
PROT SERPL-MCNC: 7.4 G/DL (ref 6–8.4)
PROTHROMBIN TIME: 10.7 SEC (ref 9–12.5)
RBC # BLD AUTO: 3.2 M/UL (ref 4.6–6.2)
SODIUM SERPL-SCNC: 138 MMOL/L (ref 136–145)
WBC # BLD AUTO: 4.5 K/UL (ref 3.9–12.7)

## 2019-12-14 PROCEDURE — 85610 PROTHROMBIN TIME: CPT

## 2019-12-14 PROCEDURE — 80053 COMPREHEN METABOLIC PANEL: CPT

## 2019-12-14 PROCEDURE — 96374 THER/PROPH/DIAG INJ IV PUSH: CPT

## 2019-12-14 PROCEDURE — 83690 ASSAY OF LIPASE: CPT

## 2019-12-14 PROCEDURE — 85730 THROMBOPLASTIN TIME PARTIAL: CPT

## 2019-12-14 PROCEDURE — 63600175 PHARM REV CODE 636 W HCPCS: Performed by: EMERGENCY MEDICINE

## 2019-12-14 PROCEDURE — 99284 EMERGENCY DEPT VISIT MOD MDM: CPT | Mod: 25

## 2019-12-14 PROCEDURE — C9113 INJ PANTOPRAZOLE SODIUM, VIA: HCPCS | Performed by: EMERGENCY MEDICINE

## 2019-12-14 PROCEDURE — 85025 COMPLETE CBC W/AUTO DIFF WBC: CPT

## 2019-12-14 PROCEDURE — 86901 BLOOD TYPING SEROLOGIC RH(D): CPT

## 2019-12-14 RX ORDER — PANTOPRAZOLE SODIUM 40 MG/10ML
80 INJECTION, POWDER, LYOPHILIZED, FOR SOLUTION INTRAVENOUS
Status: COMPLETED | OUTPATIENT
Start: 2019-12-14 | End: 2019-12-14

## 2019-12-14 RX ADMIN — PANTOPRAZOLE SODIUM 80 MG: 40 INJECTION, POWDER, FOR SOLUTION INTRAVENOUS at 11:12

## 2019-12-14 NOTE — DISCHARGE INSTRUCTIONS
Emergency department testing shows that your blood counts are consistent with their baseline.  The exact cause of the dark color to your stools is not clear at this time.  Emergency department testing does not suggest that there is a large amount of blood in your stool.  It is very important that you schedule close follow-up to be evaluated by a gastroenterologist as well as your primary physician within the next 3-5 days.  Return to the emergency department for diarrhea, red blood in the bowel movements, lightheadedness, dizziness, chest pain, palpitations, shortness of breath or any new, worsening or concerning symptoms.    Thank you for coming to our Emergency Department today. It is important to remember that some problems are difficult to diagnose and may not be found during your first visit. Be sure to follow up with your primary care doctor and review any labs/imaging that was performed with them. If you do not have a primary care doctor, you may contact the one listed on your discharge paperwork or you may also call the [facility] Appointment Desk at [phone] to schedule an appointment with one.     All medications may potentially have side effects and it is impossible to predict which medications may give you side effects. If you feel that you are having a negative effect of any medication you should immediately stop taking them and seek medical attention.    Return to the ER with any questions/concerns, new/concerning symptoms, worsening or failure to improve. Do not drive or make any important decisions for 24 hours if you have received any pain medications, sedatives or mood altering drugs during your ER visit.

## 2019-12-14 NOTE — ED TRIAGE NOTES
Pt  Arrives to er via personal vehicle with nephew pt aaox4 no distress states x4 wk having black stools. Pt speaks chinese,

## 2019-12-14 NOTE — ED PROVIDER NOTES
"Encounter Date: 2019    SCRIBE #1 NOTE: I, Vikki Dodge , am scribing for, and in the presence of,  Juan Hart MD . I have scribed the entire note. Other sections scribed: HPI ROS PE MDM.       History     Chief Complaint   Patient presents with    Rectal Bleeding     black tarry stool x4 weeks. family reports pt denies weakness or dizziness or any symptoms outside of black stool, therefore, "this is the first time we are getting it checked out." pt denies weakness.     This is a 84 y.o. Male with a PMHx of HTN, End stage renal disease and hyperlipidemia who presents to the ED with his nephew complaining of dark stool started 4 weeks ago. The patient nephew report he used a chinese herbs for improvement. Denies fever, chills, abdominal pain, chest or pain. The pt nephew reports he goes to dialysis on Monday, Wednesday's and Friday's. He states that the pt went to dialysis yesterday.         Review of patient's allergies indicates:  No Known Allergies  Past Medical History:   Diagnosis Date    ESRD (end stage renal disease)     HTN (hypertension)     Hyperlipidemia      Past Surgical History:   Procedure Laterality Date    BACK SURGERY      DIALYSIS FISTULA CREATION  2012    left arm    JOINT REPLACEMENT Right     hip     Family History   Problem Relation Age of Onset    Diabetes Sister     Hypertension Sister     Arthritis Sister     Cancer Neg Hx     Heart disease Neg Hx     Stroke Neg Hx     Amblyopia Neg Hx     Blindness Neg Hx     Cataracts Neg Hx     Glaucoma Neg Hx     Macular degeneration Neg Hx     Retinal detachment Neg Hx     Strabismus Neg Hx     Thyroid disease Neg Hx      Social History     Tobacco Use    Smoking status: Former Smoker     Packs/day: 1.00     Years: 10.00     Pack years: 10.00     Last attempt to quit: 1971     Years since quittin.0    Smokeless tobacco: Never Used   Substance Use Topics    Alcohol use: No    Drug use: No     Review of " Systems   Constitutional: Negative for chills and fever.   HENT: Negative for rhinorrhea.    Respiratory: Negative for cough.    Cardiovascular: Negative for chest pain.   Gastrointestinal: Negative for abdominal pain, nausea and vomiting.   Genitourinary: Negative for frequency.        (+) dark stool    Musculoskeletal: Negative for back pain.   Skin: Negative for pallor.   Neurological: Negative for headaches.   Psychiatric/Behavioral: Negative for confusion.       Physical Exam     Initial Vitals [12/14/19 1038]   BP Pulse Resp Temp SpO2   (!) 113/53 60 18 97.8 °F (36.6 °C) 99 %      MAP       --         Physical Exam    Nursing note and vitals reviewed.  Constitutional: He is not diaphoretic. No distress.   HENT:   Head: Normocephalic and atraumatic.   Mouth/Throat: Oropharynx is clear and moist.   Eyes: Conjunctivae and EOM are normal. Pupils are equal, round, and reactive to light. No scleral icterus.   Neck: Normal range of motion. Neck supple. No JVD present.   Cardiovascular: Normal rate, regular rhythm and intact distal pulses.   Pulmonary/Chest: Breath sounds normal. No stridor. No respiratory distress.   Abdominal: Soft. Bowel sounds are normal. He exhibits no distension. There is no tenderness.   Genitourinary: Rectal exam shows guaiac negative stool. Guaiac negative stool.   Genitourinary Comments: Firm, dark stool   no melena  No hematochezia   Musculoskeletal: Normal range of motion. He exhibits no edema or tenderness.   Right upper extremity AV fistula with palpable thrill   Neurological: He is alert. He has normal strength. No cranial nerve deficit or sensory deficit. GCS score is 15. GCS eye subscore is 4. GCS verbal subscore is 5. GCS motor subscore is 6.   Skin: Skin is warm and dry. No rash noted.   Psychiatric: He has a normal mood and affect.         ED Course   Procedures  Labs Reviewed   CBC W/ AUTO DIFFERENTIAL - Abnormal; Notable for the following components:       Result Value    RBC  3.20 (*)     Hemoglobin 10.7 (*)     Hematocrit 32.2 (*)     Mean Corpuscular Volume 101 (*)     Mean Corpuscular Hemoglobin 33.4 (*)     RDW 14.9 (*)     Platelets 121 (*)     All other components within normal limits   COMPREHENSIVE METABOLIC PANEL - Abnormal; Notable for the following components:    CO2 33 (*)     Glucose 111 (*)     BUN, Bld 38 (*)     Creatinine 7.0 (*)     Calcium 8.2 (*)     eGFR if  8 (*)     eGFR if non  7 (*)     All other components within normal limits   LIPASE - Abnormal; Notable for the following components:    Lipase 93 (*)     All other components within normal limits   PROTIME-INR   APTT   TYPE & SCREEN          Imaging Results    None          Medical Decision Making:   History:   Old Medical Records: I decided to obtain old medical records.  Old Records Summarized: records from previous admission(s).       <> Summary of Records: Recent evaluation at outside hospital for similar symptoms.  Initial Assessment:   84-year-old male with history of end-stage renal disease on dialysis presents for evaluation of dark stools for the past month.  He denies abdominal pain, lightheadedness or dizziness.  Vital signs are reassuring.  He does not have focal abdominal tenderness. Will obtain lab testing to further evaluate  Differential Diagnosis:   Includes but not limited to:  GI bleed, medication side effects and constipation   Clinical Tests:   Lab Tests: Ordered and Reviewed  ED Management:  Patient has firm dark stool that is guaiac negative. He has a benign abdominal exam without focal tenderness. He denies abdominal pain, nausea or vomiting. Labs with hemoglobin comparable to prior accounting for fluid shift associated with dialysis.  Patient has thrombocytopenia of 121.  Coagulation profile is within acceptable limits.    Consult: I have spoken with Dr. Florian from the GI service regarding the patient's presentation and study results.  At this time, the  recommendation is patient does not appear to require emergent admission for inpatient endoscopy.  Patient appears stable for discharge to follow up in GI clinic as an outpatient.    Findings and plan of care discussed with patient and family member and they expressed understanding and agreement with treatment plan. counseled on supportive care, appropriate medication usage, concerning symptoms for which to return to ER and the importance of follow up. Understanding and agreement with treatment plan was expressed.   This chart was completed using dictation software, as a result there may be some transcription errors.             Scribe Attestation:   Scribe #1: I performed the above scribed service and the documentation accurately describes the services I performed. I attest to the accuracy of the note.                          Clinical Impression:       ICD-10-CM ICD-9-CM   1. Dark stools R19.5 792.1         Disposition:   Disposition: Discharged  Condition: Stable     I, Juan Headley , personally performed the services described in this documentation. All medical record entries made by the scribe were at my direction and in my presence.  I have reviewed the chart and agree that the record reflects my personal performance and is accurate and complete                   Juan Headley MD  12/14/19 7730

## 2019-12-14 NOTE — ED TRIAGE NOTES
The patient presents to the ED via personal transportation with nephew. The patient reports dark stools x 1 months. Denies abdominal pain, diarrhea, vomiting, fevers, chills. Denies sob, lightheadedness, generalized weakness, or chest pain. Denies GI history. Patient reports that he has been taking traditional chinese medication for constipation.

## 2019-12-16 ENCOUNTER — PATIENT OUTREACH (OUTPATIENT)
Dept: ADMINISTRATIVE | Facility: OTHER | Age: 84
End: 2019-12-16

## 2019-12-17 ENCOUNTER — OFFICE VISIT (OUTPATIENT)
Dept: GASTROENTEROLOGY | Facility: CLINIC | Age: 84
End: 2019-12-17
Payer: MEDICARE

## 2019-12-17 VITALS — BODY MASS INDEX: 21.93 KG/M2 | HEIGHT: 67 IN | WEIGHT: 139.75 LBS

## 2019-12-17 DIAGNOSIS — K59.00 CONSTIPATION, UNSPECIFIED CONSTIPATION TYPE: ICD-10-CM

## 2019-12-17 DIAGNOSIS — R19.5 DARK STOOLS: Primary | ICD-10-CM

## 2019-12-17 PROCEDURE — 99999 PR PBB SHADOW E&M-EST. PATIENT-LVL III: ICD-10-PCS | Mod: PBBFAC,,, | Performed by: INTERNAL MEDICINE

## 2019-12-17 PROCEDURE — 99204 OFFICE O/P NEW MOD 45 MIN: CPT | Mod: S$GLB,,, | Performed by: INTERNAL MEDICINE

## 2019-12-17 PROCEDURE — 99204 PR OFFICE/OUTPT VISIT, NEW, LEVL IV, 45-59 MIN: ICD-10-PCS | Mod: S$GLB,,, | Performed by: INTERNAL MEDICINE

## 2019-12-17 PROCEDURE — 99999 PR PBB SHADOW E&M-EST. PATIENT-LVL III: CPT | Mod: PBBFAC,,, | Performed by: INTERNAL MEDICINE

## 2019-12-17 NOTE — PROGRESS NOTES
GASTROENTEROLOGY CLINIC NOTE    Reason for visit: The primary encounter diagnosis was Dark stools. A diagnosis of Constipation, unspecified constipation type was also pertinent to this visit.  Referring provider/PCP: Guy Mcclelland Jr, MD    HPI:  Mandeep Willams is a 84 y.o. male here today for dark stools and constipation. New patient eval  Hx of ESRD on dialysis.   Accompanied by son, who assists with history. Also used [employer] for translation services.    S had approximately 1 month of dark stools.  He also has constipation over this time period.  The stools are hard to pass.  They are firm and dark.  They went to the ER because they were concerned there may be blood in the stool.  ER evaluation showed guaiac-negative stool.  There is no overt melena or hematochezia. He is taking a over-the-counter supplement, I suspected is bisacodyl but not sure, to help with his bowel movements and when he takes that he has a bowel movement every single day.  There is no abdominal pain there is no nausea there is no vomiting.  There is no radiating factors.    Prior Endoscopy:  EGD: / Colon:  About 20 years ago, unsure of results.    (Portions of this note were dictated using voice recognition software and may contain dictation related errors in spelling/grammar/syntax not found on text review)    Review of Systems   Constitutional: Negative for fever and weight loss.   HENT: Negative for nosebleeds and sore throat.    Eyes: Negative for double vision and photophobia.   Respiratory: Negative for cough and shortness of breath.    Cardiovascular: Negative for chest pain and leg swelling.   Gastrointestinal: Negative for abdominal pain, nausea and vomiting.        Dark stool   Genitourinary: Negative for dysuria and hematuria.   Musculoskeletal: Negative for joint pain and neck pain.   Skin: Negative for itching and rash.   Neurological: Negative for dizziness and headaches.   Psychiatric/Behavioral: Negative for hallucinations.  The patient does not have insomnia.        Past Medical History: has a past medical history of ESRD (end stage renal disease), HTN (hypertension), and Hyperlipidemia.    Past Surgical History: has a past surgical history that includes Dialysis fistula creation (4/2012); Back surgery (2005); and Joint replacement (Right).    Family History:family history includes Arthritis in his sister; Diabetes in his sister; Hypertension in his sister.    Allergies: Review of patient's allergies indicates:  No Known Allergies    Social History: reports that he quit smoking about 48 years ago. He has a 10.00 pack-year smoking history. He has never used smokeless tobacco. He reports that he does not drink alcohol or use drugs.    Home medications:   Current Outpatient Medications on File Prior to Visit   Medication Sig Dispense Refill    amLODIPine (NORVASC) 5 MG tablet Take 1 tablet (5 mg total) by mouth once daily. 90 tablet 3    ammonium lactate (LAC-HYDRIN) 12 % lotion Apply topically daily as needed for Dry Skin. 500 g 5    AURYXIA 210 mg iron Tab TAKE ONE TABLET BEFORE MEALS THREE TIMES DAILY  3    calcium acetate 667 mg (169 mg calcium)/5 mL Soln Take by mouth.      furosemide (LASIX) 20 MG tablet TAKE 1 BY MOUTH DAILY 90 tablet 3    INTRALIPID 20 % infusion       irbesartan (AVAPRO) 75 MG tablet Take 1 tablet (75 mg total) by mouth every evening. 90 tablet 3    ketoconazole (NIZORAL) 2 % shampoo Apply topically twice a week. To itchy area, lather, leave in 5 minutes then rinse 120 mL 2    levoFLOXacin (LEVAQUIN) 250 MG tablet Take 250 mg by mouth once daily.      multivitamin with minerals tablet Take 1 tablet by mouth once daily.      NON FORMULARY MEDICATION       omega-3 acid ethyl esters (LOVAZA) 1 gram capsule Take 2 capsules (2 g total) by mouth 2 (two) times daily. 360 capsule 3    pravastatin (PRAVACHOL) 10 MG tablet Take 1 tablet (10 mg total) by mouth once daily. 90 tablet 3    PROSOL 20 % SolP        "SENNOSIDES (SENOKOT ORAL) Take by mouth.      vit b cmplx 3-fa-vit c-biotin 1- mg-mg-mcg (NEPHRO-KAUSHIK RX) 1- mg-mg-mcg Tab TAKE 1 BY MOUTH ONCE A DAY                            REPLACES VOL-CARE RX 90 tablet 3    metoprolol succinate (TOPROL XL) 100 MG 24 hr tablet Take 1 tablet (100 mg total) by mouth once daily. 90 tablet 3     No current facility-administered medications on file prior to visit.        Vital signs:  Ht 5' 7" (1.702 m)   Wt 63.4 kg (139 lb 12.4 oz)   BMI 21.89 kg/m²     Physical Exam   Constitutional: He appears well-nourished. No distress.   Eyes: Conjunctivae are normal. No scleral icterus.   Cardiovascular: Normal rate and intact distal pulses.   Abdominal: Bowel sounds are normal. He exhibits no distension and no mass.   Psychiatric: He has a normal mood and affect. His behavior is normal.   Vitals reviewed.      Routine labs:  Lab Results   Component Value Date    WBC 4.50 12/14/2019    HGB 10.7 (L) 12/14/2019    HCT 32.2 (L) 12/14/2019     (H) 12/14/2019     (L) 12/14/2019     Lab Results   Component Value Date    INR 1.0 12/14/2019     No results found for: IRON, FERRITIN, TIBC, FESATURATED  Lab Results   Component Value Date     12/14/2019    K 4.3 12/14/2019    CL 95 12/14/2019    CO2 33 (H) 12/14/2019    BUN 38 (H) 12/14/2019    CREATININE 7.0 (H) 12/14/2019     Lab Results   Component Value Date    ALBUMIN 3.5 12/14/2019    ALT 21 12/14/2019    AST 28 12/14/2019    ALKPHOS 94 12/14/2019    BILITOT 0.7 12/14/2019     No results found for: GLUCOSE    I have reviewed prior labs, imaging, notes from last month      Assessment:  1. Dark stools    2. Constipation, unspecified constipation type      I had a long discussion about the potential etiologies as well as the potential workup for dark stools.  The stools were guaiac negative in the emergency room.  Emergency room physician documentation shows that they were dark however did not appear as melenic or " with blood.    Plan:  Orders Placed This Encounter    Occult blood x 1, stool    CBC auto differential     Ultimately offered the workup of an EGD and colonoscopy to insure there is no sources of bleeding.  The son wishes to defer at this time given the invasive nature these procedures and given the patient's age.  Thus I offered an alternative is to repeat blood counts over 2 week periods for at least a month as well as repeated FOBT for another 2 occasions to ensure there is no blood in the stool.  If the occult stool turns positive or if the blood counts drop significantly, then we will need to proceed with upper and lower endoscopy.    Advised continue current OTC cconstipation medications as given good benefit.  Advised increased fiber intake with  benefiber to soften stool.    RTC prn based on labs/ stool tests    A total of 45 minutes were spent face-to-face with the patient during this encounter and over half of that time was spent on counseling and coordination of care.     Kota Kessler MD  [facility] [place]

## 2019-12-17 NOTE — LETTER
December 17, 2019      Juan Headley MD  [address] [place] [address]           [facility]  [address], [address]  [facility] [address]  Phone: [phone]          Patient: Mandeep Willams   MR Number: [id number]   YOB: 1935   Date of Visit: 12/17/2019       Dear Dr. Juan Headley:    Thank you for referring Mandeep Willams to me for evaluation. Attached you will find relevant portions of my assessment and plan of care.    If you have questions, please do not hesitate to call me. I look forward to following Mandeep Willams along with you.    Sincerely,    Kota Kessler MD    Enclosure  CC:  Guy Mcclelland Jr., MD    If you would like to receive this communication electronically, please contact [email or web address] or [phone] to request more information on [employer] access.    For providers and/or their staff who would like to refer a patient to [facility], please contact us through our one-stop-shop provider referral line, [facility], at [phone].    If you feel you have received this communication in error or would no longer like to receive these types of communications, please e-mail [email or web address]

## 2019-12-31 ENCOUNTER — LAB VISIT (OUTPATIENT)
Dept: LAB | Facility: HOSPITAL | Age: 84
End: 2019-12-31
Attending: INTERNAL MEDICINE
Payer: MEDICARE

## 2019-12-31 DIAGNOSIS — R19.5 DARK STOOLS: ICD-10-CM

## 2019-12-31 DIAGNOSIS — K59.00 CONSTIPATION, UNSPECIFIED CONSTIPATION TYPE: ICD-10-CM

## 2019-12-31 LAB
BASOPHILS # BLD AUTO: 0.03 K/UL (ref 0–0.2)
BASOPHILS NFR BLD: 0.7 % (ref 0–1.9)
DIFFERENTIAL METHOD: ABNORMAL
EOSINOPHIL # BLD AUTO: 0.2 K/UL (ref 0–0.5)
EOSINOPHIL NFR BLD: 4.1 % (ref 0–8)
ERYTHROCYTE [DISTWIDTH] IN BLOOD BY AUTOMATED COUNT: 14.8 % (ref 11.5–14.5)
HCT VFR BLD AUTO: 34.5 % (ref 40–54)
HGB BLD-MCNC: 11.3 G/DL (ref 14–18)
IMM GRANULOCYTES # BLD AUTO: 0.01 K/UL (ref 0–0.04)
IMM GRANULOCYTES NFR BLD AUTO: 0.2 % (ref 0–0.5)
LYMPHOCYTES # BLD AUTO: 1.8 K/UL (ref 1–4.8)
LYMPHOCYTES NFR BLD: 40.7 % (ref 18–48)
MCH RBC QN AUTO: 32.8 PG (ref 27–31)
MCHC RBC AUTO-ENTMCNC: 32.8 G/DL (ref 32–36)
MCV RBC AUTO: 100 FL (ref 82–98)
MONOCYTES # BLD AUTO: 0.6 K/UL (ref 0.3–1)
MONOCYTES NFR BLD: 12.6 % (ref 4–15)
NEUTROPHILS # BLD AUTO: 1.8 K/UL (ref 1.8–7.7)
NEUTROPHILS NFR BLD: 41.7 % (ref 38–73)
NRBC BLD-RTO: 0 /100 WBC
PLATELET # BLD AUTO: 133 K/UL (ref 150–350)
PMV BLD AUTO: 9.5 FL (ref 9.2–12.9)
RBC # BLD AUTO: 3.44 M/UL (ref 4.6–6.2)
WBC # BLD AUTO: 4.37 K/UL (ref 3.9–12.7)

## 2019-12-31 PROCEDURE — 36415 COLL VENOUS BLD VENIPUNCTURE: CPT

## 2019-12-31 PROCEDURE — 85025 COMPLETE CBC W/AUTO DIFF WBC: CPT

## 2020-01-16 ENCOUNTER — LAB VISIT (OUTPATIENT)
Dept: LAB | Facility: HOSPITAL | Age: 85
End: 2020-01-16
Attending: INTERNAL MEDICINE
Payer: MEDICARE

## 2020-01-16 DIAGNOSIS — R19.5 DARK STOOLS: ICD-10-CM

## 2020-01-16 DIAGNOSIS — K59.00 CONSTIPATION, UNSPECIFIED CONSTIPATION TYPE: ICD-10-CM

## 2020-01-16 LAB
BASOPHILS # BLD AUTO: 0.04 K/UL (ref 0–0.2)
BASOPHILS NFR BLD: 1 % (ref 0–1.9)
DIFFERENTIAL METHOD: ABNORMAL
EOSINOPHIL # BLD AUTO: 0.2 K/UL (ref 0–0.5)
EOSINOPHIL NFR BLD: 5.5 % (ref 0–8)
ERYTHROCYTE [DISTWIDTH] IN BLOOD BY AUTOMATED COUNT: 14.5 % (ref 11.5–14.5)
HCT VFR BLD AUTO: 33.8 % (ref 40–54)
HGB BLD-MCNC: 10.9 G/DL (ref 14–18)
IMM GRANULOCYTES # BLD AUTO: 0.01 K/UL (ref 0–0.04)
IMM GRANULOCYTES NFR BLD AUTO: 0.2 % (ref 0–0.5)
LYMPHOCYTES # BLD AUTO: 1.3 K/UL (ref 1–4.8)
LYMPHOCYTES NFR BLD: 33 % (ref 18–48)
MCH RBC QN AUTO: 33.2 PG (ref 27–31)
MCHC RBC AUTO-ENTMCNC: 32.2 G/DL (ref 32–36)
MCV RBC AUTO: 103 FL (ref 82–98)
MONOCYTES # BLD AUTO: 0.5 K/UL (ref 0.3–1)
MONOCYTES NFR BLD: 12.9 % (ref 4–15)
NEUTROPHILS # BLD AUTO: 1.9 K/UL (ref 1.8–7.7)
NEUTROPHILS NFR BLD: 47.4 % (ref 38–73)
NRBC BLD-RTO: 0 /100 WBC
OB PNL STL: NEGATIVE
PLATELET # BLD AUTO: 132 K/UL (ref 150–350)
PMV BLD AUTO: 10.3 FL (ref 9.2–12.9)
RBC # BLD AUTO: 3.28 M/UL (ref 4.6–6.2)
WBC # BLD AUTO: 4.03 K/UL (ref 3.9–12.7)

## 2020-01-16 PROCEDURE — 85025 COMPLETE CBC W/AUTO DIFF WBC: CPT

## 2020-01-16 PROCEDURE — 82272 OCCULT BLD FECES 1-3 TESTS: CPT

## 2020-01-16 PROCEDURE — 36415 COLL VENOUS BLD VENIPUNCTURE: CPT

## 2020-04-14 DIAGNOSIS — E78.2 MIXED HYPERLIPIDEMIA: ICD-10-CM

## 2020-04-14 DIAGNOSIS — N18.6 END STAGE RENAL DISEASE ON DIALYSIS: ICD-10-CM

## 2020-04-14 DIAGNOSIS — I10 ESSENTIAL HYPERTENSION: ICD-10-CM

## 2020-04-14 DIAGNOSIS — Z99.2 END STAGE RENAL DISEASE ON DIALYSIS: ICD-10-CM

## 2020-04-14 RX ORDER — FUROSEMIDE 20 MG/1
TABLET ORAL
Qty: 90 TABLET | Refills: 3 | Status: SHIPPED | OUTPATIENT
Start: 2020-04-14 | End: 2021-03-16

## 2020-04-14 RX ORDER — PRAVASTATIN SODIUM 10 MG/1
TABLET ORAL
Qty: 90 TABLET | Refills: 3 | Status: SHIPPED | OUTPATIENT
Start: 2020-04-14 | End: 2021-04-19

## 2020-04-28 DIAGNOSIS — I10 ESSENTIAL HYPERTENSION: ICD-10-CM

## 2020-04-28 RX ORDER — METOPROLOL SUCCINATE 100 MG/1
TABLET, EXTENDED RELEASE ORAL
Qty: 90 TABLET | Refills: 3 | Status: SHIPPED | OUTPATIENT
Start: 2020-04-28 | End: 2021-11-17 | Stop reason: SDUPTHER

## 2020-04-28 RX ORDER — AMLODIPINE BESYLATE 5 MG/1
TABLET ORAL
Qty: 90 TABLET | Refills: 3 | Status: SHIPPED | OUTPATIENT
Start: 2020-04-28 | End: 2021-06-22

## 2020-11-25 ENCOUNTER — OFFICE VISIT (OUTPATIENT)
Dept: FAMILY MEDICINE | Facility: CLINIC | Age: 85
End: 2020-11-25
Payer: MEDICARE

## 2020-11-25 VITALS
OXYGEN SATURATION: 97 % | TEMPERATURE: 98 F | WEIGHT: 136.69 LBS | HEIGHT: 67 IN | RESPIRATION RATE: 18 BRPM | BODY MASS INDEX: 21.45 KG/M2 | DIASTOLIC BLOOD PRESSURE: 60 MMHG | SYSTOLIC BLOOD PRESSURE: 112 MMHG | HEART RATE: 65 BPM

## 2020-11-25 DIAGNOSIS — R06.02 SHORTNESS OF BREATH: Primary | ICD-10-CM

## 2020-11-25 PROCEDURE — 1101F PT FALLS ASSESS-DOCD LE1/YR: CPT | Mod: CPTII,S$GLB,, | Performed by: FAMILY MEDICINE

## 2020-11-25 PROCEDURE — 99999 PR PBB SHADOW E&M-EST. PATIENT-LVL V: ICD-10-PCS | Mod: PBBFAC,,, | Performed by: FAMILY MEDICINE

## 2020-11-25 PROCEDURE — 1159F MED LIST DOCD IN RCRD: CPT | Mod: S$GLB,,, | Performed by: FAMILY MEDICINE

## 2020-11-25 PROCEDURE — 94640 AIRWAY INHALATION TREATMENT: CPT | Mod: S$GLB,,, | Performed by: FAMILY MEDICINE

## 2020-11-25 PROCEDURE — 1126F PR PAIN SEVERITY QUANTIFIED, NO PAIN PRESENT: ICD-10-PCS | Mod: S$GLB,,, | Performed by: FAMILY MEDICINE

## 2020-11-25 PROCEDURE — 1126F AMNT PAIN NOTED NONE PRSNT: CPT | Mod: S$GLB,,, | Performed by: FAMILY MEDICINE

## 2020-11-25 PROCEDURE — U0003 INFECTIOUS AGENT DETECTION BY NUCLEIC ACID (DNA OR RNA); SEVERE ACUTE RESPIRATORY SYNDROME CORONAVIRUS 2 (SARS-COV-2) (CORONAVIRUS DISEASE [COVID-19]), AMPLIFIED PROBE TECHNIQUE, MAKING USE OF HIGH THROUGHPUT TECHNOLOGIES AS DESCRIBED BY CMS-2020-01-R: HCPCS

## 2020-11-25 PROCEDURE — 3288F FALL RISK ASSESSMENT DOCD: CPT | Mod: CPTII,S$GLB,, | Performed by: FAMILY MEDICINE

## 2020-11-25 PROCEDURE — 94640 PR INHAL RX, AIRWAY OBST/DX SPUTUM INDUCT: ICD-10-PCS | Mod: S$GLB,,, | Performed by: FAMILY MEDICINE

## 2020-11-25 PROCEDURE — 1101F PR PT FALLS ASSESS DOC 0-1 FALLS W/OUT INJ PAST YR: ICD-10-PCS | Mod: CPTII,S$GLB,, | Performed by: FAMILY MEDICINE

## 2020-11-25 PROCEDURE — 1159F PR MEDICATION LIST DOCUMENTED IN MEDICAL RECORD: ICD-10-PCS | Mod: S$GLB,,, | Performed by: FAMILY MEDICINE

## 2020-11-25 PROCEDURE — 99999 PR PBB SHADOW E&M-EST. PATIENT-LVL V: CPT | Mod: PBBFAC,,, | Performed by: FAMILY MEDICINE

## 2020-11-25 PROCEDURE — 99213 PR OFFICE/OUTPT VISIT, EST, LEVL III, 20-29 MIN: ICD-10-PCS | Mod: 25,S$GLB,, | Performed by: FAMILY MEDICINE

## 2020-11-25 PROCEDURE — 99213 OFFICE O/P EST LOW 20 MIN: CPT | Mod: 25,S$GLB,, | Performed by: FAMILY MEDICINE

## 2020-11-25 PROCEDURE — 3288F PR FALLS RISK ASSESSMENT DOCUMENTED: ICD-10-PCS | Mod: CPTII,S$GLB,, | Performed by: FAMILY MEDICINE

## 2020-11-25 RX ORDER — IPRATROPIUM BROMIDE AND ALBUTEROL SULFATE 2.5; .5 MG/3ML; MG/3ML
3 SOLUTION RESPIRATORY (INHALATION)
Status: COMPLETED | OUTPATIENT
Start: 2020-11-25 | End: 2020-11-25

## 2020-11-25 RX ORDER — ALBUTEROL SULFATE 90 UG/1
2 AEROSOL, METERED RESPIRATORY (INHALATION) EVERY 4 HOURS PRN
Qty: 8 G | Refills: 5 | Status: SHIPPED | OUTPATIENT
Start: 2020-11-25 | End: 2020-12-03 | Stop reason: SDUPTHER

## 2020-11-25 RX ADMIN — IPRATROPIUM BROMIDE AND ALBUTEROL SULFATE 3 ML: 2.5; .5 SOLUTION RESPIRATORY (INHALATION) at 04:11

## 2020-11-25 NOTE — PROGRESS NOTES
Pt name and  verified. Allergies reviewed. Administered Duoneb to pt in clinic . Pt tolerated well.

## 2020-11-27 ENCOUNTER — TELEPHONE (OUTPATIENT)
Dept: FAMILY MEDICINE | Facility: CLINIC | Age: 85
End: 2020-11-27

## 2020-11-27 LAB — SARS-COV-2 RNA RESP QL NAA+PROBE: NOT DETECTED

## 2020-11-27 NOTE — TELEPHONE ENCOUNTER
----- Message from Guy Mcclelland Jr., MD sent at 11/27/2020 11:55 AM CST -----  Negative COVID test

## 2020-12-03 ENCOUNTER — OFFICE VISIT (OUTPATIENT)
Dept: FAMILY MEDICINE | Facility: CLINIC | Age: 85
End: 2020-12-03
Payer: MEDICARE

## 2020-12-03 VITALS
SYSTOLIC BLOOD PRESSURE: 170 MMHG | OXYGEN SATURATION: 99 % | HEART RATE: 66 BPM | DIASTOLIC BLOOD PRESSURE: 71 MMHG | BODY MASS INDEX: 21.45 KG/M2 | HEIGHT: 67 IN | RESPIRATION RATE: 18 BRPM | TEMPERATURE: 98 F | WEIGHT: 136.69 LBS

## 2020-12-03 DIAGNOSIS — Z99.2 END STAGE RENAL DISEASE ON DIALYSIS: ICD-10-CM

## 2020-12-03 DIAGNOSIS — J90 PLEURAL EFFUSION: ICD-10-CM

## 2020-12-03 DIAGNOSIS — R07.89 CHEST PRESSURE: Primary | ICD-10-CM

## 2020-12-03 DIAGNOSIS — N25.81 HYPERPARATHYROIDISM, SECONDARY RENAL: ICD-10-CM

## 2020-12-03 DIAGNOSIS — I10 ESSENTIAL HYPERTENSION: ICD-10-CM

## 2020-12-03 DIAGNOSIS — R06.02 SHORTNESS OF BREATH: ICD-10-CM

## 2020-12-03 DIAGNOSIS — D69.6 THROMBOCYTOPENIA: ICD-10-CM

## 2020-12-03 DIAGNOSIS — N18.6 END STAGE RENAL DISEASE ON DIALYSIS: ICD-10-CM

## 2020-12-03 PROBLEM — I50.43 ACUTE ON CHRONIC COMBINED SYSTOLIC AND DIASTOLIC CONGESTIVE HEART FAILURE: Status: RESOLVED | Noted: 2018-11-27 | Resolved: 2020-12-03

## 2020-12-03 PROBLEM — J18.9 PNEUMONIA: Status: RESOLVED | Noted: 2018-10-22 | Resolved: 2020-12-03

## 2020-12-03 PROCEDURE — 1126F PR PAIN SEVERITY QUANTIFIED, NO PAIN PRESENT: ICD-10-PCS | Mod: S$GLB,,, | Performed by: FAMILY MEDICINE

## 2020-12-03 PROCEDURE — 1159F MED LIST DOCD IN RCRD: CPT | Mod: S$GLB,,, | Performed by: FAMILY MEDICINE

## 2020-12-03 PROCEDURE — 99999 PR PBB SHADOW E&M-EST. PATIENT-LVL V: CPT | Mod: PBBFAC,,, | Performed by: FAMILY MEDICINE

## 2020-12-03 PROCEDURE — 93010 ELECTROCARDIOGRAM REPORT: CPT | Mod: S$GLB,,, | Performed by: INTERNAL MEDICINE

## 2020-12-03 PROCEDURE — 99499 UNLISTED E&M SERVICE: CPT | Mod: S$GLB,,, | Performed by: FAMILY MEDICINE

## 2020-12-03 PROCEDURE — 1159F PR MEDICATION LIST DOCUMENTED IN MEDICAL RECORD: ICD-10-PCS | Mod: S$GLB,,, | Performed by: FAMILY MEDICINE

## 2020-12-03 PROCEDURE — 99214 OFFICE O/P EST MOD 30 MIN: CPT | Mod: S$GLB,,, | Performed by: FAMILY MEDICINE

## 2020-12-03 PROCEDURE — 1101F PT FALLS ASSESS-DOCD LE1/YR: CPT | Mod: CPTII,S$GLB,, | Performed by: FAMILY MEDICINE

## 2020-12-03 PROCEDURE — 1101F PR PT FALLS ASSESS DOC 0-1 FALLS W/OUT INJ PAST YR: ICD-10-PCS | Mod: CPTII,S$GLB,, | Performed by: FAMILY MEDICINE

## 2020-12-03 PROCEDURE — 99214 PR OFFICE/OUTPT VISIT, EST, LEVL IV, 30-39 MIN: ICD-10-PCS | Mod: S$GLB,,, | Performed by: FAMILY MEDICINE

## 2020-12-03 PROCEDURE — 99499 RISK ADDL DX/OHS AUDIT: ICD-10-PCS | Mod: S$GLB,,, | Performed by: FAMILY MEDICINE

## 2020-12-03 PROCEDURE — 93005 ELECTROCARDIOGRAM TRACING: CPT | Mod: S$GLB,,, | Performed by: FAMILY MEDICINE

## 2020-12-03 PROCEDURE — 3288F FALL RISK ASSESSMENT DOCD: CPT | Mod: CPTII,S$GLB,, | Performed by: FAMILY MEDICINE

## 2020-12-03 PROCEDURE — 3288F PR FALLS RISK ASSESSMENT DOCUMENTED: ICD-10-PCS | Mod: CPTII,S$GLB,, | Performed by: FAMILY MEDICINE

## 2020-12-03 PROCEDURE — 93005 EKG 12-LEAD: ICD-10-PCS | Mod: S$GLB,,, | Performed by: FAMILY MEDICINE

## 2020-12-03 PROCEDURE — 99999 PR PBB SHADOW E&M-EST. PATIENT-LVL V: ICD-10-PCS | Mod: PBBFAC,,, | Performed by: FAMILY MEDICINE

## 2020-12-03 PROCEDURE — 1126F AMNT PAIN NOTED NONE PRSNT: CPT | Mod: S$GLB,,, | Performed by: FAMILY MEDICINE

## 2020-12-03 PROCEDURE — 93010 EKG 12-LEAD: ICD-10-PCS | Mod: S$GLB,,, | Performed by: INTERNAL MEDICINE

## 2020-12-03 RX ORDER — ALBUTEROL SULFATE 90 UG/1
2 AEROSOL, METERED RESPIRATORY (INHALATION) EVERY 4 HOURS PRN
Qty: 8 G | Refills: 5 | Status: SHIPPED | OUTPATIENT
Start: 2020-12-03 | End: 2022-01-01 | Stop reason: SDUPTHER

## 2020-12-05 ENCOUNTER — HOSPITAL ENCOUNTER (EMERGENCY)
Facility: HOSPITAL | Age: 85
Discharge: HOME | End: 2020-12-05
Attending: STUDENT IN AN ORGANIZED HEALTH CARE EDUCATION/TRAINING PROGRAM
Payer: MEDICARE

## 2020-12-05 VITALS
WEIGHT: 136.69 LBS | OXYGEN SATURATION: 96 % | SYSTOLIC BLOOD PRESSURE: 175 MMHG | TEMPERATURE: 98 F | HEART RATE: 65 BPM | DIASTOLIC BLOOD PRESSURE: 77 MMHG | BODY MASS INDEX: 21.45 KG/M2 | RESPIRATION RATE: 22 BRPM | HEIGHT: 67 IN

## 2020-12-05 DIAGNOSIS — R06.02 SHORTNESS OF BREATH: ICD-10-CM

## 2020-12-05 DIAGNOSIS — J90 PLEURAL EFFUSION: Primary | ICD-10-CM

## 2020-12-05 DIAGNOSIS — Z99.2 END STAGE RENAL DISEASE ON DIALYSIS: ICD-10-CM

## 2020-12-05 DIAGNOSIS — N18.6 END STAGE RENAL DISEASE ON DIALYSIS: ICD-10-CM

## 2020-12-05 LAB
ALBUMIN SERPL BCP-MCNC: 3.4 G/DL (ref 3.5–5.2)
ALP SERPL-CCNC: 79 U/L (ref 55–135)
ALT SERPL W/O P-5'-P-CCNC: 13 U/L (ref 10–44)
ANION GAP SERPL CALC-SCNC: 15 MMOL/L (ref 8–16)
AST SERPL-CCNC: 16 U/L (ref 10–40)
BASOPHILS # BLD AUTO: 0.03 K/UL (ref 0–0.2)
BASOPHILS NFR BLD: 0.7 % (ref 0–1.9)
BILIRUB SERPL-MCNC: 0.9 MG/DL (ref 0.1–1)
BNP SERPL-MCNC: 2282 PG/ML (ref 0–99)
BUN SERPL-MCNC: 24 MG/DL (ref 8–23)
CALCIUM SERPL-MCNC: 9.2 MG/DL (ref 8.7–10.5)
CHLORIDE SERPL-SCNC: 97 MMOL/L (ref 95–110)
CO2 SERPL-SCNC: 30 MMOL/L (ref 23–29)
CREAT SERPL-MCNC: 5 MG/DL (ref 0.5–1.4)
CTP QC/QA: YES
DIFFERENTIAL METHOD: ABNORMAL
EOSINOPHIL # BLD AUTO: 0.1 K/UL (ref 0–0.5)
EOSINOPHIL NFR BLD: 2.7 % (ref 0–8)
ERYTHROCYTE [DISTWIDTH] IN BLOOD BY AUTOMATED COUNT: 14.9 % (ref 11.5–14.5)
EST. GFR  (AFRICAN AMERICAN): 11 ML/MIN/1.73 M^2
EST. GFR  (NON AFRICAN AMERICAN): 10 ML/MIN/1.73 M^2
GLUCOSE SERPL-MCNC: 124 MG/DL (ref 70–110)
HCT VFR BLD AUTO: 31 % (ref 40–54)
HGB BLD-MCNC: 10.4 G/DL (ref 14–18)
IMM GRANULOCYTES # BLD AUTO: 0.02 K/UL (ref 0–0.04)
IMM GRANULOCYTES NFR BLD AUTO: 0.5 % (ref 0–0.5)
INR PPP: 1 (ref 0.8–1.2)
LYMPHOCYTES # BLD AUTO: 0.8 K/UL (ref 1–4.8)
LYMPHOCYTES NFR BLD: 18.3 % (ref 18–48)
MCH RBC QN AUTO: 32.3 PG (ref 27–31)
MCHC RBC AUTO-ENTMCNC: 33.5 G/DL (ref 32–36)
MCV RBC AUTO: 96 FL (ref 82–98)
MONOCYTES # BLD AUTO: 0.4 K/UL (ref 0.3–1)
MONOCYTES NFR BLD: 8.4 % (ref 4–15)
NEUTROPHILS # BLD AUTO: 3.1 K/UL (ref 1.8–7.7)
NEUTROPHILS NFR BLD: 69.4 % (ref 38–73)
NRBC BLD-RTO: 0 /100 WBC
PLATELET # BLD AUTO: 172 K/UL (ref 150–350)
PMV BLD AUTO: 10.3 FL (ref 9.2–12.9)
POTASSIUM SERPL-SCNC: 3.3 MMOL/L (ref 3.5–5.1)
PROT SERPL-MCNC: 7.6 G/DL (ref 6–8.4)
PROTHROMBIN TIME: 11.1 SEC (ref 9–12.5)
RBC # BLD AUTO: 3.22 M/UL (ref 4.6–6.2)
SARS-COV-2 RDRP RESP QL NAA+PROBE: NEGATIVE
SODIUM SERPL-SCNC: 142 MMOL/L (ref 136–145)
TROPONIN I SERPL DL<=0.01 NG/ML-MCNC: 0.04 NG/ML (ref 0–0.03)
WBC # BLD AUTO: 4.43 K/UL (ref 3.9–12.7)

## 2020-12-05 PROCEDURE — 93005 ELECTROCARDIOGRAM TRACING: CPT

## 2020-12-05 PROCEDURE — 84484 ASSAY OF TROPONIN QUANT: CPT

## 2020-12-05 PROCEDURE — 25000242 PHARM REV CODE 250 ALT 637 W/ HCPCS: Performed by: STUDENT IN AN ORGANIZED HEALTH CARE EDUCATION/TRAINING PROGRAM

## 2020-12-05 PROCEDURE — 83880 ASSAY OF NATRIURETIC PEPTIDE: CPT

## 2020-12-05 PROCEDURE — 85610 PROTHROMBIN TIME: CPT

## 2020-12-05 PROCEDURE — 93010 ELECTROCARDIOGRAM REPORT: CPT | Mod: 76,,, | Performed by: INTERNAL MEDICINE

## 2020-12-05 PROCEDURE — 93010 ELECTROCARDIOGRAM REPORT: CPT | Mod: ,,, | Performed by: INTERNAL MEDICINE

## 2020-12-05 PROCEDURE — 94640 AIRWAY INHALATION TREATMENT: CPT

## 2020-12-05 PROCEDURE — 80053 COMPREHEN METABOLIC PANEL: CPT

## 2020-12-05 PROCEDURE — 85025 COMPLETE CBC W/AUTO DIFF WBC: CPT

## 2020-12-05 PROCEDURE — U0002 COVID-19 LAB TEST NON-CDC: HCPCS | Performed by: STUDENT IN AN ORGANIZED HEALTH CARE EDUCATION/TRAINING PROGRAM

## 2020-12-05 PROCEDURE — 93010 EKG 12-LEAD: ICD-10-PCS | Mod: 76,,, | Performed by: INTERNAL MEDICINE

## 2020-12-05 PROCEDURE — 99285 EMERGENCY DEPT VISIT HI MDM: CPT | Mod: 25

## 2020-12-05 RX ORDER — AMOXICILLIN AND CLAVULANATE POTASSIUM 875; 125 MG/1; MG/1
1 TABLET, FILM COATED ORAL 2 TIMES DAILY
Qty: 10 TABLET | Refills: 0 | Status: SHIPPED | OUTPATIENT
Start: 2020-12-05 | End: 2020-12-10

## 2020-12-05 RX ORDER — IPRATROPIUM BROMIDE AND ALBUTEROL SULFATE 2.5; .5 MG/3ML; MG/3ML
3 SOLUTION RESPIRATORY (INHALATION)
Status: COMPLETED | OUTPATIENT
Start: 2020-12-05 | End: 2020-12-05

## 2020-12-05 RX ADMIN — IPRATROPIUM BROMIDE AND ALBUTEROL SULFATE 3 ML: .5; 3 SOLUTION RESPIRATORY (INHALATION) at 02:12

## 2020-12-05 NOTE — DISCHARGE INSTRUCTIONS
??????????????????????????????????????????????  Rúgu? nín de zhèngzhuàng, xi?ngtòng, h?x? jícù, wúf? b?ochí d? ester?píng de t?yè, wúf? páiniào, h?nd?o huò qít? zhèngzhuàng, q?ng f?nhuí jízh?n shì.    ??????????????????????X?????????????????????????????????????????2021?2???????????????  Q?ng zàixià zh?u j?nkuài y? nín de ch?jí b?ojiàn y?sh?ng jìnxíng g?n jìn. Xi?ngbù X xiàn ji?nchá f?xiàn fèi ester?zh?ng, x?ngqí y? tòux? k?néng huì g?ishàn. Q?ng gàozh? tòux? zh?ngx?n j?nti?n de f?ngwèn zh?ng f?xiàn fèi ester?zh?ng. Q?ng zài 2021 nián 2 yuè b?ochí xi?nqián ?npái de fèi k? suíf?ng yùyu?.

## 2020-12-05 NOTE — ED NOTES
Physician at bedside.  Mandarin  provided via [facility]. Patient arrived with complaint of feeling like his breathing is not as smooth as usual and reports his heart feels like there is a stone in it.  Patient acknowledge a feeling of pressure.  Symptom onset was approx two days ago following dialysis.

## 2020-12-05 NOTE — ED PROVIDER NOTES
"Encounter Date: 12/5/2020    SCRIBE #1 NOTE: I, Edenilson Kathleen, am scribing for, and in the presence of,  Fior Bravo DO. I have scribed the following portions of the note - Other sections scribed: HPI, ROS, PE.       History     Chief Complaint   Patient presents with    Shortness of Breath     Pt c/o SOB x "a couple of weeks". Denies chest pain, dizziness, N/V. Pt on dialysis M/W/F, access in L arm     This is an 85 y.o. male with a PMHx of ESRD, HTN, and HLD who presents to the ED c/o SOB. Per EMS, SOB has been present a couple weeks however pt states it has been a couple days. He reports a heaviness in his chest that feels as if there is a rock in his chest. Pt says his breathing "does not feel smooth" and that it is difficult to breath through his nose. He notes that his Sx usually resolve after a walk and the last occurrence of Sx was a week ago. The patient's son in law, states the PCP did CXR and reported fluid in lungs but notes that he does not believe this is the cause of Sx because fluid buildup has been present for years. PCP referred to a Pulmonologist but appt is not for 2 months. Associated Sx include pedal edema (began after he started dialysis), and weakness. Attempted Tx for SOB with Rx inhaler but no improvement. Notes slight improvement of SOB using "Chinese herbs". He denies N/V, hematuria, dysuria,  fever, chills, abd pain, cough, congestion, sore throat, rhinorrhea, back pain, neck pain, and productive cough. Denies current tobacco, alcohol, and drug use. No known allergies. Denies sick exposure.     The history is provided by the patient and a relative. A  was used ([id number] - Interpretor ID).     Review of patient's allergies indicates:  No Known Allergies  Past Medical History:   Diagnosis Date    ESRD (end stage renal disease)     HTN (hypertension)     Hyperlipidemia      Past Surgical History:   Procedure Laterality Date    BACK SURGERY  2005    " DIALYSIS FISTULA CREATION  2012    left arm    JOINT REPLACEMENT Right     hip     Family History   Problem Relation Age of Onset    Diabetes Sister     Hypertension Sister     Arthritis Sister     Cancer Neg Hx     Heart disease Neg Hx     Stroke Neg Hx     Amblyopia Neg Hx     Blindness Neg Hx     Cataracts Neg Hx     Glaucoma Neg Hx     Macular degeneration Neg Hx     Retinal detachment Neg Hx     Strabismus Neg Hx     Thyroid disease Neg Hx      Social History     Tobacco Use    Smoking status: Former Smoker     Packs/day: 1.00     Years: 10.00     Pack years: 10.00     Quit date: 1971     Years since quittin.0    Smokeless tobacco: Never Used   Substance Use Topics    Alcohol use: No    Drug use: No     Review of Systems   Constitutional: Negative for appetite change, chills and fever.   HENT: Negative for congestion, drooling, facial swelling and sore throat.    Eyes: Negative for redness and visual disturbance.   Respiratory: Positive for shortness of breath. Negative for cough.    Cardiovascular: Positive for leg swelling (+bilat pedal edema). Negative for chest pain.   Gastrointestinal: Negative for abdominal pain, nausea and vomiting.   Genitourinary: Negative for dysuria and hematuria.   Musculoskeletal: Negative for back pain and neck pain.   Skin: Negative for rash.   Neurological: Positive for weakness. Negative for seizures, syncope, light-headedness and headaches.   Psychiatric/Behavioral: Negative for confusion.       Physical Exam     Initial Vitals [20 0857]   BP Pulse Resp Temp SpO2   (!) 157/67 70 18 97.8 °F (36.6 °C) 99 %      MAP       --         Physical Exam    Nursing note and vitals reviewed.  Constitutional: He appears well-developed and well-nourished.   Elderly male     HENT:   Head: Normocephalic and atraumatic.   Right Ear: External ear normal.   Left Ear: External ear normal.   Mouth/Throat: Oropharynx is clear and moist.   Eyes: EOM are normal.  Pupils are equal, round, and reactive to light. No scleral icterus.   Neck: Normal range of motion and full passive range of motion without pain. Neck supple. No thyromegaly present. No JVD present.   Cardiovascular: Normal rate, regular rhythm and intact distal pulses. Exam reveals no gallop and no friction rub.    Murmur (Systolic murmer) heard.  Pulmonary/Chest: Breath sounds normal. No respiratory distress. He has no wheezes.   Crackles in R lung base   Abdominal: Soft. He exhibits no distension. There is no abdominal tenderness.   Musculoskeletal: Normal range of motion. No tenderness.      Comments: Trace edema in bilateral lower extremities. AV fistula in L upper extremity with good bruit and thrill   Neurological: He is alert and oriented to person, place, and time. He has normal strength. GCS score is 15. GCS eye subscore is 4. GCS verbal subscore is 5. GCS motor subscore is 6.   Skin: Skin is warm and dry. Capillary refill takes less than 2 seconds. No rash noted. No erythema.   Psychiatric: He has a normal mood and affect. Thought content normal.         ED Course   Procedures  Labs Reviewed   CBC W/ AUTO DIFFERENTIAL - Abnormal; Notable for the following components:       Result Value    RBC 3.22 (*)     Hemoglobin 10.4 (*)     Hematocrit 31.0 (*)     MCH 32.3 (*)     RDW 14.9 (*)     Lymph # 0.8 (*)     All other components within normal limits   COMPREHENSIVE METABOLIC PANEL - Abnormal; Notable for the following components:    Potassium 3.3 (*)     CO2 30 (*)     Glucose 124 (*)     BUN 24 (*)     Creatinine 5.0 (*)     Albumin 3.4 (*)     eGFR if  11 (*)     eGFR if non  10 (*)     All other components within normal limits   TROPONIN I - Abnormal; Notable for the following components:    Troponin I 0.038 (*)     All other components within normal limits   B-TYPE NATRIURETIC PEPTIDE - Abnormal; Notable for the following components:    BNP 2,282 (*)     All other  components within normal limits   PROTIME-INR   SARS-COV-2 RDRP GENE     EKG Readings: (Independently Interpreted)   Normal sinus rhythm with a rate of 71 beats per minute, right were axis with incomplete right bundle-branch block no acute ST segment changes.  Rightward axis and incomplete right bundle branch block new compared to previous EKG from December 3, 2020.     ECG Results          EKG 12-lead (In process)  Result time 12/05/20 11:01:05    In process by Interface, Lab In [facility] (12/05/20 11:01:05)                 Narrative:    Test Reason : R06.02,    Vent. Rate : 077 BPM     Atrial Rate : 077 BPM     P-R Int : 158 ms          QRS Dur : 106 ms      QT Int : 412 ms       P-R-T Axes : 070 068 042 degrees     QTc Int : 466 ms    Normal sinus rhythm  Normal ECG  When compared with ECG of 05-DEC-2020 08:50,  Significant changes have occurred    Referred By: System System           Confirmed By:                              EKG 12-lead (Shortness of Breath) Age > 50 (In process)  Result time 12/05/20 11:00:53    In process by Interface, Lab In [facility] (12/05/20 11:00:53)                 Narrative:    Test Reason : R06.02,    Vent. Rate : 071 BPM     Atrial Rate : 071 BPM     P-R Int : 160 ms          QRS Dur : 102 ms      QT Int : 434 ms       P-R-T Axes : 052 109 012 degrees     QTc Int : 471 ms    Normal sinus rhythm  Rightward axis  Incomplete right bundle branch block  Nonspecific ST and T wave abnormality  Abnormal ECG  When compared with ECG of 03-DEC-2020 15:03,  Significant changes have occurred    Referred By:             Confirmed By:                             Imaging Results          X-Ray Chest AP Portable (Final result)  Result time 12/05/20 10:31:21    Final result by Miguel Angel Wisdom MD (12/05/20 10:31:21)                 Impression:      As above      Electronically signed by: Miguel Angel Wisdom MD  Date:    12/05/2020  Time:    10:31             Narrative:    EXAMINATION:  XR CHEST AP  PORTABLE    CLINICAL HISTORY:  CHF;    TECHNIQUE:  Single frontal view of the chest was performed.    COMPARISON:  11/25/2020    FINDINGS:  Interval development of interstitial edema with worsening right basilar opacity suggestive of pleural effusion and adjacent compressive atelectasis.  Cardiomediastinal silhouette remains enlarged.  Bones appear intact.  EKG leads overlie chest obscuring detail.  Continued follow-up can be performed.                                 Medical Decision Making:   History:   Old Medical Records: I decided to obtain old medical records.  ED Management:   [facility]  This is an emergent evaluation of a 85 y.o. male with a PMHx of ESRD, HTN, and HLD who presents with SOB.  Initial vitals in the emergency department with a pressure of 157/67 with remainder of vitals unremarkable.  Physical exam notable for elderly male, who appears nontoxic. Heart RRR with a systolic murmur.  No gallops or rubs.  AV fistula to the left upper extremity with good bruit and thrill.  Trace edema bilaterally lower extremities.  Remainder of exam benign. DDx includes but is not limited to worsening pleural effusion, volume overload, ACS, arrhythmia, pneumoniae, viral illness versus COVID.  Cardiac workup obtained including a COVID screen.  Workup reveals a stable normocytic anemia with hemoglobin of 10.4 and hematocrit of 31, mild hypokalemia with a potassium of 3.3, creatinine 5 BUN 24 with bicarb of 30 which is consistent with patient's history of end-stage renal disease.  Stable elevated troponin at 0.038, which is grossly unchanged when compared to previous troponin from 2018.  BNP also elevated however consistent with previous labs from December 3, 2020.  Chest x-ray show interstitial edema with worsening right basilar opacity consistent with pleural effusion.  Case discussed with Dr. Stahl, pulmonology given patient's history and presentation.  He recommend patient needing no acute intervention at this time.   States patient's symptoms likely secondary to pulmonary edema and recommended patient have regularly scheduled dialysis on Monday to review remove fluid.  He also recommend patient be discharged with a 5 day course of Augmentin given present opacity on chest x-ray.  Patient requesting DuoNeb treatment prior to discharge stating that he feels like they have helped him in the past.  DuoNeb treatment ordered.  On reassessment, patient states his symptoms are improved.  Patient discharged with Augmentin, PCP follow-up and ED return precautions.  Patient and family aware and expresses understanding of plan.    Fior Bravo D.O  EMERGENCY MEDICINE  2:13 PM 12/05/2020              Scribe Attestation:   Scribe #1: I performed the above scribed service and the documentation accurately describes the services I performed. I attest to the accuracy of the note.                      Clinical Impression:       ICD-10-CM ICD-9-CM   1. Pleural effusion  J90 511.9   2. Shortness of breath  R06.02 786.05   3. End stage renal disease on dialysis  N18.6 585.6    Z99.2 V45.11                          ED Disposition Condition    Discharge Stable        ED Prescriptions     None        Follow-up Information    None                       Scribe attestation: I, Fior Bravo DO, personally performed the services described in this documentation. All medical record entries made by the scribe were at my direction and in my presence. I have reviewed the chart and agree that the record reflects my personal performance and is accurate and complete.               Fior Bravo DO  12/06/20 1522

## 2020-12-07 ENCOUNTER — PATIENT OUTREACH (OUTPATIENT)
Dept: ADMINISTRATIVE | Facility: OTHER | Age: 85
End: 2020-12-07

## 2020-12-07 ENCOUNTER — OFFICE VISIT (OUTPATIENT)
Dept: FAMILY MEDICINE | Facility: CLINIC | Age: 85
End: 2020-12-07
Payer: MEDICARE

## 2020-12-07 VITALS
BODY MASS INDEX: 21.31 KG/M2 | WEIGHT: 135.81 LBS | HEIGHT: 67 IN | OXYGEN SATURATION: 96 % | DIASTOLIC BLOOD PRESSURE: 62 MMHG | TEMPERATURE: 98 F | SYSTOLIC BLOOD PRESSURE: 120 MMHG | HEART RATE: 69 BPM

## 2020-12-07 DIAGNOSIS — J90 PLEURAL EFFUSION: Primary | ICD-10-CM

## 2020-12-07 DIAGNOSIS — I10 ESSENTIAL HYPERTENSION: ICD-10-CM

## 2020-12-07 DIAGNOSIS — R06.02 SHORTNESS OF BREATH: ICD-10-CM

## 2020-12-07 DIAGNOSIS — Z99.2 END STAGE RENAL DISEASE ON DIALYSIS: ICD-10-CM

## 2020-12-07 DIAGNOSIS — I50.9 CONGESTIVE HEART FAILURE, UNSPECIFIED HF CHRONICITY, UNSPECIFIED HEART FAILURE TYPE: ICD-10-CM

## 2020-12-07 DIAGNOSIS — N18.6 END STAGE RENAL DISEASE ON DIALYSIS: ICD-10-CM

## 2020-12-07 PROCEDURE — 96372 THER/PROPH/DIAG INJ SC/IM: CPT | Mod: S$GLB,,, | Performed by: FAMILY MEDICINE

## 2020-12-07 PROCEDURE — 3288F PR FALLS RISK ASSESSMENT DOCUMENTED: ICD-10-PCS | Mod: CPTII,S$GLB,, | Performed by: FAMILY MEDICINE

## 2020-12-07 PROCEDURE — 1159F PR MEDICATION LIST DOCUMENTED IN MEDICAL RECORD: ICD-10-PCS | Mod: S$GLB,,, | Performed by: FAMILY MEDICINE

## 2020-12-07 PROCEDURE — 99499 RISK ADDL DX/OHS AUDIT: ICD-10-PCS | Mod: S$GLB,,, | Performed by: FAMILY MEDICINE

## 2020-12-07 PROCEDURE — 99999 PR PBB SHADOW E&M-EST. PATIENT-LVL III: CPT | Mod: PBBFAC,,, | Performed by: FAMILY MEDICINE

## 2020-12-07 PROCEDURE — 99214 OFFICE O/P EST MOD 30 MIN: CPT | Mod: 25,S$GLB,, | Performed by: FAMILY MEDICINE

## 2020-12-07 PROCEDURE — 1101F PT FALLS ASSESS-DOCD LE1/YR: CPT | Mod: CPTII,S$GLB,, | Performed by: FAMILY MEDICINE

## 2020-12-07 PROCEDURE — 1126F AMNT PAIN NOTED NONE PRSNT: CPT | Mod: S$GLB,,, | Performed by: FAMILY MEDICINE

## 2020-12-07 PROCEDURE — 1159F MED LIST DOCD IN RCRD: CPT | Mod: S$GLB,,, | Performed by: FAMILY MEDICINE

## 2020-12-07 PROCEDURE — 3288F FALL RISK ASSESSMENT DOCD: CPT | Mod: CPTII,S$GLB,, | Performed by: FAMILY MEDICINE

## 2020-12-07 PROCEDURE — 99999 PR PBB SHADOW E&M-EST. PATIENT-LVL III: ICD-10-PCS | Mod: PBBFAC,,, | Performed by: FAMILY MEDICINE

## 2020-12-07 PROCEDURE — 99499 UNLISTED E&M SERVICE: CPT | Mod: S$GLB,,, | Performed by: FAMILY MEDICINE

## 2020-12-07 PROCEDURE — 99214 PR OFFICE/OUTPT VISIT, EST, LEVL IV, 30-39 MIN: ICD-10-PCS | Mod: 25,S$GLB,, | Performed by: FAMILY MEDICINE

## 2020-12-07 PROCEDURE — 96372 PR INJECTION,THERAP/PROPH/DIAG2ST, IM OR SUBCUT: ICD-10-PCS | Mod: S$GLB,,, | Performed by: FAMILY MEDICINE

## 2020-12-07 PROCEDURE — 1101F PR PT FALLS ASSESS DOC 0-1 FALLS W/OUT INJ PAST YR: ICD-10-PCS | Mod: CPTII,S$GLB,, | Performed by: FAMILY MEDICINE

## 2020-12-07 PROCEDURE — 1126F PR PAIN SEVERITY QUANTIFIED, NO PAIN PRESENT: ICD-10-PCS | Mod: S$GLB,,, | Performed by: FAMILY MEDICINE

## 2020-12-07 RX ORDER — IPRATROPIUM BROMIDE AND ALBUTEROL SULFATE 2.5; .5 MG/3ML; MG/3ML
3 SOLUTION RESPIRATORY (INHALATION) EVERY 6 HOURS PRN
Qty: 1 BOX | Refills: 0 | Status: SHIPPED | OUTPATIENT
Start: 2020-12-07 | End: 2021-10-01

## 2020-12-07 RX ORDER — FUROSEMIDE 10 MG/ML
40 INJECTION INTRAMUSCULAR; INTRAVENOUS
Status: COMPLETED | OUTPATIENT
Start: 2020-12-07 | End: 2020-12-07

## 2020-12-07 RX ADMIN — FUROSEMIDE 40 MG: 10 INJECTION INTRAMUSCULAR; INTRAVENOUS at 03:12

## 2020-12-08 ENCOUNTER — TELEPHONE (OUTPATIENT)
Dept: FAMILY MEDICINE | Facility: CLINIC | Age: 85
End: 2020-12-08

## 2020-12-08 ENCOUNTER — OFFICE VISIT (OUTPATIENT)
Dept: CARDIOLOGY | Facility: CLINIC | Age: 85
End: 2020-12-08
Payer: MEDICARE

## 2020-12-08 VITALS
OXYGEN SATURATION: 94 % | DIASTOLIC BLOOD PRESSURE: 64 MMHG | BODY MASS INDEX: 21.31 KG/M2 | SYSTOLIC BLOOD PRESSURE: 143 MMHG | WEIGHT: 135.81 LBS | HEART RATE: 73 BPM | HEIGHT: 67 IN

## 2020-12-08 DIAGNOSIS — J90 PLEURAL EFFUSION: Primary | ICD-10-CM

## 2020-12-08 DIAGNOSIS — I10 ESSENTIAL HYPERTENSION: ICD-10-CM

## 2020-12-08 DIAGNOSIS — N18.6 ESRD (END STAGE RENAL DISEASE): ICD-10-CM

## 2020-12-08 DIAGNOSIS — I35.0 AORTIC VALVE STENOSIS, ETIOLOGY OF CARDIAC VALVE DISEASE UNSPECIFIED: ICD-10-CM

## 2020-12-08 DIAGNOSIS — I50.33 ACUTE ON CHRONIC DIASTOLIC (CONGESTIVE) HEART FAILURE: ICD-10-CM

## 2020-12-08 DIAGNOSIS — E78.2 MIXED HYPERLIPIDEMIA: ICD-10-CM

## 2020-12-08 DIAGNOSIS — R06.02 SHORTNESS OF BREATH: ICD-10-CM

## 2020-12-08 PROCEDURE — 99999 PR PBB SHADOW E&M-EST. PATIENT-LVL V: CPT | Mod: PBBFAC,,, | Performed by: INTERNAL MEDICINE

## 2020-12-08 PROCEDURE — 99214 OFFICE O/P EST MOD 30 MIN: CPT | Mod: S$GLB,,, | Performed by: INTERNAL MEDICINE

## 2020-12-08 PROCEDURE — 99999 PR PBB SHADOW E&M-EST. PATIENT-LVL V: ICD-10-PCS | Mod: PBBFAC,,, | Performed by: INTERNAL MEDICINE

## 2020-12-08 PROCEDURE — 3288F FALL RISK ASSESSMENT DOCD: CPT | Mod: CPTII,S$GLB,, | Performed by: INTERNAL MEDICINE

## 2020-12-08 PROCEDURE — 1101F PT FALLS ASSESS-DOCD LE1/YR: CPT | Mod: CPTII,S$GLB,, | Performed by: INTERNAL MEDICINE

## 2020-12-08 PROCEDURE — 1126F AMNT PAIN NOTED NONE PRSNT: CPT | Mod: S$GLB,,, | Performed by: INTERNAL MEDICINE

## 2020-12-08 PROCEDURE — 1159F PR MEDICATION LIST DOCUMENTED IN MEDICAL RECORD: ICD-10-PCS | Mod: S$GLB,,, | Performed by: INTERNAL MEDICINE

## 2020-12-08 PROCEDURE — 3288F PR FALLS RISK ASSESSMENT DOCUMENTED: ICD-10-PCS | Mod: CPTII,S$GLB,, | Performed by: INTERNAL MEDICINE

## 2020-12-08 PROCEDURE — 1159F MED LIST DOCD IN RCRD: CPT | Mod: S$GLB,,, | Performed by: INTERNAL MEDICINE

## 2020-12-08 PROCEDURE — 1126F PR PAIN SEVERITY QUANTIFIED, NO PAIN PRESENT: ICD-10-PCS | Mod: S$GLB,,, | Performed by: INTERNAL MEDICINE

## 2020-12-08 PROCEDURE — 1101F PR PT FALLS ASSESS DOC 0-1 FALLS W/OUT INJ PAST YR: ICD-10-PCS | Mod: CPTII,S$GLB,, | Performed by: INTERNAL MEDICINE

## 2020-12-08 PROCEDURE — 99214 PR OFFICE/OUTPT VISIT, EST, LEVL IV, 30-39 MIN: ICD-10-PCS | Mod: S$GLB,,, | Performed by: INTERNAL MEDICINE

## 2020-12-08 RX ORDER — POTASSIUM CHLORIDE 20 MEQ/1
20 TABLET, EXTENDED RELEASE ORAL DAILY
Qty: 7 TABLET | Refills: 0 | Status: SHIPPED | OUTPATIENT
Start: 2020-12-07 | End: 2020-12-14

## 2020-12-08 RX ORDER — FUROSEMIDE 20 MG/1
20 TABLET ORAL 2 TIMES DAILY
Qty: 14 TABLET | Refills: 0 | Status: SHIPPED | OUTPATIENT
Start: 2020-12-07 | End: 2020-12-14

## 2020-12-08 NOTE — PROGRESS NOTES
CARDIOLOGY CLINIC VISIT        HISTORY OF PRESENT ILLNESS:     Mandeep Willams presents for evaluation of pleural effusion/edema.  Seen by Dr. Mcclelland on 12/07/2020.  Shortness of breath over the past few days.  On visit to emergency room x-ray showed worsening interstitial edema with right basilar opacity.  Started on antibiotics by Pulmonary.  Given nebulizers by his primary care.  Dr. Orellana patient.  Seen 03/19/2019.  Hypertension.  Hyperlipidemia.  End-stage renal disease.  History of pleural effusion.  Echocardiogram from 2018 showed normal left ventricular systolic function.  Moderate aortic stenosis.  Borderline elevation in pulmonary pressure.  Negative nuclear stress in 2018.    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 If you have any questions, ask your nurse or doctor.            CONTINUE taking these medications    albuterol 90 mcg/actuation inhaler  Commonly known as: PROAIR HFA  Inhale 2 puffs into the lungs every 4 (four) hours as needed for Wheezing or Shortness of Breath. Rescue     albuterol-ipratropium 2.5 mg-0.5 mg/3 mL nebulizer solution  Commonly known as: DUO-NEB  Take 3 mLs by nebulization every 6 (six) hours as needed for Wheezing. Rescue     amLODIPine 5 MG tablet  Commonly known as: NORVASC  TAKE ONE TABLET BY MOUTH ONCE DAILY FOR BLOOD PRESSURE     ammonium lactate 12 % lotion  Commonly known as: LAC-HYDRIN  Apply topically daily as needed  for Dry Skin.     amoxicillin-clavulanate 875-125mg 875-125 mg per tablet  Commonly known as: AUGMENTIN  Take 1 tablet by mouth 2 (two) times daily. for 5 days     AURYXIA 210 mg iron Tab  Generic drug: ferric citrate     calcium acetate(phosphat bind) 667 mg (169 mg calcium)/5 mL Soln     * furosemide 20 MG tablet  Commonly known as: LASIX  TAKE ONE TABLET BY MOUTH ONCE DAILY     * furosemide 20 MG tablet  Commonly known as: LASIX  Take 1 tablet (20 mg total) by mouth 2 (two) times a day. for 7 days     INTRALIPID 20 % infusion  Generic drug: fat emulsion 20%     irbesartan 75 MG tablet  Commonly known as: AVAPRO  TAKE ONE TABLET BY MOUTH ONCE DAILY IN THE EVENING     ketoconazole 2 % shampoo  Commonly known as: NIZORAL  Apply topically twice a week. To itchy area, lather, leave in 5 minutes then rinse     levoFLOXacin 250 MG tablet  Commonly known as: LEVAQUIN     metoprolol succinate 100 MG 24 hr tablet  Commonly known as: TOPROL-XL  TAKE ONE TABLET BY MOUTH ONCE DAILY     multivitamin with minerals tablet     NON FORMULARY MEDICATION     omega-3 acid ethyl esters 1 gram capsule  Commonly known as: LOVAZA  Take 2 capsules (2 g total) by mouth 2 (two) times daily.     potassium chloride SA 20 MEQ tablet  Commonly known as: K-DUR,KLOR-CON  Take 1 tablet (20 mEq total) by mouth once daily. for 7 days     pravastatin 10 MG tablet  Commonly known as: PRAVACHOL  TAKE ONE TABLET BY MOUTH ONCE DAILY FOR cholesterol     PROSOL 20 % Solp  Generic drug: parenteral amino acid 20% no.1     SENOKOT ORAL     vit b cmplx 3-fa-vit c-biotin 1- mg-mg-mcg 1- mg-mg-mcg Tab  Commonly known as: ELIE-KAUSHIK RX  TAKE ONE TABLET BY MOUTH ONCE DAILY         * This list has 2 medication(s) that are the same as other medications prescribed for you. Read the directions carefully, and ask your doctor or other care provider to review them with you.                SOCIAL HISTORY:     Social History     Socioeconomic History    Marital  status:      Spouse name: Not on file    Number of children: Not on file    Years of education: Not on file    Highest education level: Not on file   Occupational History     Employer: [employer]   Social Needs    Financial resource strain: Not on file    Food insecurity     Worry: Not on file     Inability: Not on file    Transportation needs     Medical: Not on file     Non-medical: Not on file   Tobacco Use    Smoking status: Former Smoker     Packs/day: 1.00     Years: 10.00     Pack years: 10.00     Quit date: 1971     Years since quittin.0    Smokeless tobacco: Never Used   Substance and Sexual Activity    Alcohol use: No    Drug use: No    Sexual activity: Not Currently     Partners: Female     Comment:    Lifestyle    Physical activity     Days per week: Not on file     Minutes per session: Not on file    Stress: Not on file   Relationships    Social connections     Talks on phone: Not on file     Gets together: Not on file     Attends [employer] service: Not on file     Active member of club or organization: Not on file     Attends meetings of clubs or organizations: Not on file     Relationship status: Not on file   Other Topics Concern    Not on file   Social History Narrative    Not on file       FAMILY HISTORY:     Family History   Problem Relation Age of Onset    Diabetes Sister     Hypertension Sister     Arthritis Sister     Cancer Neg Hx     Heart disease Neg Hx     Stroke Neg Hx     Amblyopia Neg Hx     Blindness Neg Hx     Cataracts Neg Hx     Glaucoma Neg Hx     Macular degeneration Neg Hx     Retinal detachment Neg Hx     Strabismus Neg Hx     Thyroid disease Neg Hx        REVIEW OF SYSTEMS:   Review of Systems   Respiratory: Positive for shortness of breath. Negative for sputum production and wheezing.    Cardiovascular: Negative for chest pain, palpitations, orthopnea, claudication, leg swelling and PND.    Gastrointestinal: Negative for abdominal pain, heartburn, nausea and vomiting.   Neurological: Negative for dizziness, speech change, focal weakness, loss of consciousness, weakness and headaches.       PHYSICAL EXAM:   There were no vitals filed for this visit. There is no height or weight on file to calculate BMI.            Physical Exam  Vitals signs reviewed.   Constitutional:       General: He is not in acute distress.     Appearance: He is not diaphoretic.   Neck:      Vascular: No carotid bruit or JVD.   Cardiovascular:      Rate and Rhythm: Normal rate and regular rhythm.      Pulses: Normal pulses.      Heart sounds: Murmur present. Crescendo  decrescendo  systolic murmur present with a grade of 3/6.   Pulmonary:      Effort: Pulmonary effort is normal.      Breath sounds: Examination of the right-lower field reveals decreased breath sounds. Decreased breath sounds present.   Abdominal:      General: Bowel sounds are normal.      Palpations: Abdomen is soft.      Tenderness: There is no abdominal tenderness.   Musculoskeletal:      Right lower leg: No edema.      Left lower leg: No edema.   Neurological:      Mental Status: He is alert.         DATA:   EKG: (personally reviewed tracing)  12/05/2020-normal sinus rhythm  Laboratory:  CBC:  Recent Labs   Lab 01/16/20  1007 12/03/20  1517 12/05/20  0936   WBC 4.03 5.19 4.43   Hemoglobin 10.9 L 10.1 L 10.4 L   Hematocrit 33.8 L 30.6 L 31.0 L   Platelets 132 L 176 172       CHEMISTRIES:  Recent Labs   Lab 10/22/18  1849  12/14/19  1128 12/03/20  1517 12/05/20  0936   Glucose 102   < > 111 H 98 124 H   Sodium 139   < > 138 144 142   Potassium 3.5   < > 4.3 3.7 3.3 L   BUN 56 H   < > 38 H 29 H 24 H   Creatinine 4.7 H   < > 7.0 H 5.7 H 5.0 H   eGFR if  13 A   < > 8 A 10 A 11 A   eGFR if non  11 A   < > 7 A 8 A 10 A   Calcium 8.5 L   < > 8.2 L 9.1 9.2   Magnesium 2.2  --   --   --   --     < > = values in this interval not  displayed.       CARDIAC BIOMARKERS:  Recent Labs   Lab 10/22/18  1849 12/05/20  0936   Troponin I 0.035 H 0.038 H       COAGS:  Recent Labs   Lab 10/23/18  0745 12/14/19  1128 12/05/20  0936   INR 1.1 1.0 1.0       LIPIDS/LFTS:  Recent Labs   Lab 10/23/18  0745 12/03/19  1120 12/14/19  1128 12/03/20  1517 12/05/20  0936   Cholesterol 144 184  --   --   --    Triglycerides 53 149  --   --   --    HDL 48 53  --   --   --    LDL Cholesterol 85.4 101.2  --   --   --    Non-HDL Cholesterol 96 131  --   --   --    AST  --  20 28 19 16   ALT  --  19 21 17 13       Cardiovascular Testing:    Echo 12/11/18  · Normal left ventricular systolic function. The estimated ejection fraction is 65%  · No wall motion abnormalities.  · Grade II (moderate) left ventricular diastolic dysfunction consistent with pseudonormalization.  · Mild aortic regurgitation.  · Moderate aortic valve stenosis.  · Aortic valve area is 1.25 cm2; peak velocity is 2.68 m/s; mean gradient is 18.63 mmHg.  · Mild mitral regurgitation.  · Trace tricuspid regurgitation.  · The estimated PA systolic pressure is 29.90 mm Hg     Stress test 12/11/18  · Normal Kayleigh MPI  · The perfusion scan is free of evidence from myocardial ischemia or injury.  · There is a mild intensity fixed defect in the inferobasilar wall of the left ventricle secondary to diaphragm attenuation.  · An ejection fraction of 51 % at rest  · LV cavity size at rest is normal.  · Resting wall motion is physiologic.    ASSESSMENT:     1.  Shortness of breath:  Multifactorial.  Hypertension.  End-stage renal disease.  Pleural effusion.  Pulmonary edema.  Valvular heart disease.  2.  Pleural effusion  3.  Pulmonary edema  4.  End-stage renal disease  5.  Grade 2 diastolic dysfunction  6.  Valvular heart disease:  Moderate aortic stenosis, mild aortic regurgitation, mitral regurgitation  7.  Hypertension  8.  Hyperlipidemia    PLAN:     1.  Shortness of breath:  CT scan of chest to better evaluate  effusion.  2.  Pleural effusion:  As above  3.  Valvular heart disease:  Follow-up echocardiogram  4.  Hypertension:  Continue current management  5.  Hyperlipidemia:  Continue current management  6.  Return to clinic 1 month.      Lupillo Marrero MD, MPH, FACC, [facility]

## 2020-12-08 NOTE — PROGRESS NOTES
Subjective:       Patient ID: Mandeep Willams is a 85 y.o. male.    Chief Complaint: Fatigue, Shortness of Breath, and Chest Pain    HPI   85 year old male with ESRD, CHF, HTN, comes in for follow up on shortness of breath. He had a negative COVID exam. He reports taking the albuterol prescribed which makes him feel better temporarily but then symptoms return Reports fatigue. No wheezing. No fever. States with activity he gets chest pressure. No measuring weights. No GI symptoms. He is on dialysis but does urinate. Medications are prepared by great nephew, not present today. Another family member is with him.  Translation is done via [place].     Review of Systems   Constitutional: Positive for activity change, appetite change and fatigue. Negative for diaphoresis, fever and unexpected weight change.   HENT: Negative for ear pain, rhinorrhea and sore throat.    Respiratory: Positive for shortness of breath. Negative for wheezing.    Cardiovascular: Positive for chest pain and leg swelling. Negative for palpitations.   Gastrointestinal: Negative for abdominal pain and vomiting.   Integumentary:  Negative for rash.   Neurological: Negative for headaches.         Objective:      Physical Exam  Constitutional:       Appearance: He is ill-appearing. He is not toxic-appearing.   HENT:      Nose: Nose normal.   Eyes:      Extraocular Movements: Extraocular movements intact.      Pupils: Pupils are equal, round, and reactive to light.   Cardiovascular:      Rate and Rhythm: Normal rate.      Heart sounds: Murmur present.   Pulmonary:      Effort: Pulmonary effort is normal. No respiratory distress.      Breath sounds: Wheezing present. No rales.   Abdominal:      General: Abdomen is flat. Bowel sounds are normal.   Neurological:      Mental Status: He is alert.         Narrative & Impression     EXAMINATION:  XR CHEST PA AND LATERAL     CLINICAL HISTORY:  Shortness of breath     TECHNIQUE:  PA and lateral views of the chest were  performed.     COMPARISON:  12/14/2018     FINDINGS:  There is right pleural effusion with component of pleural thickening and/or scarring not excluded.  It is indeterminate if this is secondary to a chronic process given presence in 2018 versus re-accumulation.  Further evaluation as warranted.  Remainder of the lungs are clear without large airspace opacity or lobar consolidation.  No gross pneumothorax.  Cardiomediastinal silhouette is similar.     Impression:     As above.        Electronically signed by: Casey Croft  Date:                                            11/25/2020  Time:                                           16:43       Assessment:       1. Chest pressure    2. Shortness of breath    3. End stage renal disease on dialysis    4. Hyperparathyroidism, secondary renal    5. Thrombocytopenia    6. Pleural effusion    7. Essential hypertension        Plan:       Mandeep was seen today for fatigue, shortness of breath and chest pain.    Diagnoses and all orders for this visit:    Chest pressure  -     Comprehensive Metabolic Panel; Future  -     IN OFFICE EKG 12-LEAD (to [place])  -     BNP; Future    Shortness of breath  -     Comprehensive Metabolic Panel; Future  -     IN OFFICE EKG 12-LEAD (to [place])  -     Ambulatory referral/consult to Pulmonology; Future  -     BNP; Future  -     albuterol (PROAIR HFA) 90 mcg/actuation inhaler; Inhale 2 puffs into the lungs every 4 (four) hours as needed for Wheezing or Shortness of Breath. Rescue    End stage renal disease on dialysis  -     Comprehensive Metabolic Panel; Future  -     CBC Auto Differential; Future  -     Vitamin D; Future    Hyperparathyroidism, secondary renal  -     PTH, intact; Future  -     Vitamin D; Future    Thrombocytopenia  -     CBC Auto Differential; Future    Pleural effusion  -     Ambulatory referral/consult to Pulmonology; Future    Essential hypertension      -EKG done in office shows no acute concerns.  -Will get BNP for  evaluation of CHF.   -Referral to pulmonary.  -Patient also advised on cardio referral but he refused this for now stating he will see pulmonary first.  -His routine labs were ordered.   -Will need to check his BP in next 2-4 weeks.  -ED precautions reviewed.

## 2020-12-08 NOTE — TELEPHONE ENCOUNTER
----- Message from Guy Mcclelland Jr., MD sent at 12/6/2020  6:21 PM CST -----  Please let patient know very important he schedule an appointment with cardiology as heart failure number is elevated

## 2020-12-08 NOTE — LETTER
December 8, 2020      Guy Mcclelalnd Jr., MD  [address] [facility] [address]           [facility] [facility]  [address]  [facility] [address]  Phone: [phone]          Patient: Mandeep Willams   MR Number: [id number]   YOB: 1935   Date of Visit: 12/8/2020       Dear Dr. Guy Mcclelland Jr.:    Thank you for referring Mandeep Willams to me for evaluation. Attached you will find relevant portions of my assessment and plan of care.    If you have questions, please do not hesitate to call me. I look forward to following Mandeep Willams along with you.    Sincerely,    Lupillo Marrero MD    Enclosure  CC:  No Recipients    If you would like to receive this communication electronically, please contact [email or web address] or [phone] to request more information on [employer] Link access.    For providers and/or their staff who would like to refer a patient to [facility], please contact us through our one-stop-shop provider referral line, [facility] , at [phone].    If you feel you have received this communication in error or would no longer like to receive these types of communications, please e-mail [email or web address]

## 2020-12-08 NOTE — PROGRESS NOTES
"Subjective:       Patient ID: Mandeep Willams is a 85 y.o. male.    Chief Complaint: pulmonary edema    HPI   85 year old male with ESRD, on dialysis, history of CHF, and a pleural effusion comes in for follow up on shortness of breath since going to the ED on 12/5/2020. He reports still feeling very tired. He gets short of breath on exertion. He reports no fever. He reports no chills. He does not monitor his weight. His medications are prepared by his great nephew, who is a pharmacist. He is present with another family member today who came in for last 2 visits.   Xray done in the ED showed worsening interstitial edema with worsening right basilar opacity. Pulmonary was consulted in the ED and suggested starting antibiotic. He has an appointment with pulmonary in 2 weeks.   Patient again states that he was taking a chinese herb that helped with his symptoms but he does not know what it was. He has run out and that is when his symptoms started.     Patient is requesting Nebulizer quit as this made him feel better.    Translation is done by [facility]    Review of Systems   Constitutional: Positive for activity change, appetite change and fatigue. Negative for diaphoresis and fever.   Respiratory: Positive for shortness of breath. Negative for wheezing.    Cardiovascular: Positive for leg swelling. Negative for palpitations.   Gastrointestinal: Negative for change in bowel habit, constipation, diarrhea, nausea, vomiting and change in bowel habit.   Genitourinary: Negative for difficulty urinating, dysuria and hematuria.        Still makes urine even though on dialysis         Objective:     /62 (BP Location: Right arm, Patient Position: Sitting, BP Method: Medium (Manual))   Pulse 69   Temp 97.7 °F (36.5 °C) (Oral)   Ht 5' 7" (1.702 m)   Wt 61.6 kg (135 lb 12.9 oz)   SpO2 96%   BMI 21.27 kg/m²     Physical Exam  Constitutional:       Appearance: He is ill-appearing. He is not toxic-appearing or diaphoretic. "   HENT:      Head: Normocephalic and atraumatic.      Nose: Nose normal.      Mouth/Throat:      Mouth: Mucous membranes are moist.   Eyes:      Extraocular Movements: Extraocular movements intact.   Neck:      Musculoskeletal: Normal range of motion.   Cardiovascular:      Rate and Rhythm: Normal rate.      Heart sounds: Murmur present.   Pulmonary:      Effort: Pulmonary effort is normal. No respiratory distress.      Breath sounds: Rales present. No wheezing or rhonchi.   Abdominal:      General: Abdomen is flat. Bowel sounds are normal. There is no distension.      Tenderness: There is no abdominal tenderness.   Neurological:      Mental Status: He is alert.         Admission on 12/05/2020, Discharged on 12/05/2020   Component Date Value Ref Range Status    WBC 12/05/2020 4.43  3.90 - 12.70 K/uL Final    RBC 12/05/2020 3.22* 4.60 - 6.20 M/uL Final    Hemoglobin 12/05/2020 10.4* 14.0 - 18.0 g/dL Final    Hematocrit 12/05/2020 31.0* 40.0 - 54.0 % Final    MCV 12/05/2020 96  82 - 98 fL Final    MCH 12/05/2020 32.3* 27.0 - 31.0 pg Final    MCHC 12/05/2020 33.5  32.0 - 36.0 g/dL Final    RDW 12/05/2020 14.9* 11.5 - 14.5 % Final    Platelets 12/05/2020 172  150 - 350 K/uL Final    MPV 12/05/2020 10.3  9.2 - 12.9 fL Final    Immature Granulocytes 12/05/2020 0.5  0.0 - 0.5 % Final    Gran # (ANC) 12/05/2020 3.1  1.8 - 7.7 K/uL Final    Immature Grans (Abs) 12/05/2020 0.02  0.00 - 0.04 K/uL Final    Comment: Mild elevation in immature granulocytes is non specific and   can be seen in a variety of conditions including stress response,   acute inflammation, trauma and pregnancy. Correlation with other   laboratory and clinical findings is essential.      Lymph # 12/05/2020 0.8* 1.0 - 4.8 K/uL Final    Mono # 12/05/2020 0.4  0.3 - 1.0 K/uL Final    Eos # 12/05/2020 0.1  0.0 - 0.5 K/uL Final    Baso # 12/05/2020 0.03  0.00 - 0.20 K/uL Final    nRBC 12/05/2020 0  0 /100 WBC Final    Gran % 12/05/2020 69.4   38.0 - 73.0 % Final    Lymph % 12/05/2020 18.3  18.0 - 48.0 % Final    Mono % 12/05/2020 8.4  4.0 - 15.0 % Final    Eosinophil % 12/05/2020 2.7  0.0 - 8.0 % Final    Basophil % 12/05/2020 0.7  0.0 - 1.9 % Final    Differential Method 12/05/2020 Automated   Final    Sodium 12/05/2020 142  136 - 145 mmol/L Final    Potassium 12/05/2020 3.3* 3.5 - 5.1 mmol/L Final    Chloride 12/05/2020 97  95 - 110 mmol/L Final    CO2 12/05/2020 30* 23 - 29 mmol/L Final    Glucose 12/05/2020 124* 70 - 110 mg/dL Final    BUN 12/05/2020 24* 8 - 23 mg/dL Final    Creatinine 12/05/2020 5.0* 0.5 - 1.4 mg/dL Final    Calcium 12/05/2020 9.2  8.7 - 10.5 mg/dL Final    Total Protein 12/05/2020 7.6  6.0 - 8.4 g/dL Final    Albumin 12/05/2020 3.4* 3.5 - 5.2 g/dL Final    Total Bilirubin 12/05/2020 0.9  0.1 - 1.0 mg/dL Final    Comment: For infants and newborns, interpretation of results should be based  on gestational age, weight and in agreement with clinical  observations.  Premature Infant recommended reference ranges:  Up to 24 hours.............<8.0 mg/dL  Up to 48 hours............<12.0 mg/dL  3-5 days..................<15.0 mg/dL  6-29 days.................<15.0 mg/dL      Alkaline Phosphatase 12/05/2020 79  55 - 135 U/L Final    AST 12/05/2020 16  10 - 40 U/L Final    ALT 12/05/2020 13  10 - 44 U/L Final    Anion Gap 12/05/2020 15  8 - 16 mmol/L Final    eGFR if African American 12/05/2020 11* >60 mL/min/1.73 m^2 Final    eGFR if non African American 12/05/2020 10* >60 mL/min/1.73 m^2 Final    Comment: Calculation used to obtain the estimated glomerular filtration  rate (eGFR) is the CKD-EPI equation.       Troponin I 12/05/2020 0.038* 0.000 - 0.026 ng/mL Final    Comment: The reference interval for Troponin I represents the 99th percentile   cutoff   for our facility and is consistent with 3rd generation assay   performance.      Prothrombin Time 12/05/2020 11.1  9.0 - 12.5 sec Final    INR 12/05/2020 1.0  0.8 -  1.2 Final    Comment: Coumadin Therapy:  2.0 - 3.0 for INR for all indicators except mechanical heart valves  and antiphospholipid syndromes which should use 2.5 - 3.5.      POC Rapid COVID 12/05/2020 Negative  Negative Final     Acceptable 12/05/2020 Yes   Final    BNP 12/05/2020 2,282* 0 - 99 pg/mL Final    Values of less than 100 pg/ml are consistent with non-CHF populations.   Lab Visit on 12/03/2020   Component Date Value Ref Range Status    Sodium 12/03/2020 144  136 - 145 mmol/L Final    Potassium 12/03/2020 3.7  3.5 - 5.1 mmol/L Final    Chloride 12/03/2020 99  95 - 110 mmol/L Final    CO2 12/03/2020 33* 23 - 29 mmol/L Final    Glucose 12/03/2020 98  70 - 110 mg/dL Final    BUN 12/03/2020 29* 8 - 23 mg/dL Final    Creatinine 12/03/2020 5.7* 0.5 - 1.4 mg/dL Final    Calcium 12/03/2020 9.1  8.7 - 10.5 mg/dL Final    Total Protein 12/03/2020 7.8  6.0 - 8.4 g/dL Final    Albumin 12/03/2020 3.5  3.5 - 5.2 g/dL Final    Total Bilirubin 12/03/2020 0.6  0.1 - 1.0 mg/dL Final    Comment: For infants and newborns, interpretation of results should be based  on gestational age, weight and in agreement with clinical  observations.  Premature Infant recommended reference ranges:  Up to 24 hours.............<8.0 mg/dL  Up to 48 hours............<12.0 mg/dL  3-5 days..................<15.0 mg/dL  6-29 days.................<15.0 mg/dL      Alkaline Phosphatase 12/03/2020 89  55 - 135 U/L Final    AST 12/03/2020 19  10 - 40 U/L Final    ALT 12/03/2020 17  10 - 44 U/L Final    Anion Gap 12/03/2020 12  8 - 16 mmol/L Final    eGFR if African American 12/03/2020 10* >60 mL/min/1.73 m^2 Final    eGFR if non African American 12/03/2020 8* >60 mL/min/1.73 m^2 Final    Comment: Calculation used to obtain the estimated glomerular filtration  rate (eGFR) is the CKD-EPI equation.       WBC 12/03/2020 5.19  3.90 - 12.70 K/uL Final    RBC 12/03/2020 3.14* 4.60 - 6.20 M/uL Final    Hemoglobin  12/03/2020 10.1* 14.0 - 18.0 g/dL Final    Hematocrit 12/03/2020 30.6* 40.0 - 54.0 % Final    MCV 12/03/2020 98  82 - 98 fL Final    MCH 12/03/2020 32.2* 27.0 - 31.0 pg Final    MCHC 12/03/2020 33.0  32.0 - 36.0 g/dL Final    RDW 12/03/2020 14.9* 11.5 - 14.5 % Final    Platelets 12/03/2020 176  150 - 350 K/uL Final    MPV 12/03/2020 10.3  9.2 - 12.9 fL Final    Immature Granulocytes 12/03/2020 0.4  0.0 - 0.5 % Final    Gran # (ANC) 12/03/2020 2.8  1.8 - 7.7 K/uL Final    Immature Grans (Abs) 12/03/2020 0.02  0.00 - 0.04 K/uL Final    Comment: Mild elevation in immature granulocytes is non specific and   can be seen in a variety of conditions including stress response,   acute inflammation, trauma and pregnancy. Correlation with other   laboratory and clinical findings is essential.      Lymph # 12/03/2020 1.6  1.0 - 4.8 K/uL Final    Mono # 12/03/2020 0.5  0.3 - 1.0 K/uL Final    Eos # 12/03/2020 0.2  0.0 - 0.5 K/uL Final    Baso # 12/03/2020 0.04  0.00 - 0.20 K/uL Final    nRBC 12/03/2020 0  0 /100 WBC Final    Gran % 12/03/2020 54.4  38.0 - 73.0 % Final    Lymph % 12/03/2020 30.6  18.0 - 48.0 % Final    Mono % 12/03/2020 9.8  4.0 - 15.0 % Final    Eosinophil % 12/03/2020 4.0  0.0 - 8.0 % Final    Basophil % 12/03/2020 0.8  0.0 - 1.9 % Final    Differential Method 12/03/2020 Automated   Final    PTH, Intact 12/03/2020 324.2* 9.0 - 77.0 pg/mL Final    Vit D, 25-Hydroxy 12/03/2020 55  30 - 96 ng/mL Final    Comment: Vitamin D deficiency.........<10 ng/mL                              Vitamin D insufficiency......10-29 ng/mL       Vitamin D sufficiency........> or equal to 30 ng/mL  Vitamin D toxicity............>100 ng/mL      BNP 12/03/2020 2,993* 0 - 99 pg/mL Final    Values of less than 100 pg/ml are consistent with non-CHF populations.   Office Visit on 11/25/2020   Component Date Value Ref Range Status    SARS-CoV2 (COVID-19) Qualitative P* 11/25/2020 Not Detected  Not Detected Final     Comment: This test utilizes a real-time reverse transcription  polymerase chain reaction procedure to amplify and   detect the SARS-CoV-2 RdRp and N genes.    The analytical sensitivity (limit of detection) of   this assay is 100 copies/mL.   A Detected result is considered positive for COVID-19.  This patient is considered infected with the   SARS-CoV-2 virus and is presumed to be contagious.    A Not Detected result means that SARS-CoV-2 RNA is not  present above the limit of detection. It does not rule  out the possibility of COVID-19 and should not be the  sole basis for treatment decisions.  If COVID-19 is   strongly suspected based on clinical and exposure   history,re-testing should be considered.    This test is only for use under Food and Drug   Administration s Emergency Use Authorization (EUA).   Commercial reagents are provided by [employer].  Performance characteristics of the EUA have been   independently verified by [facility]   Department of Pathology and L                           aboratory Medicine.           Assessment:       1. Pleural effusion    2. Congestive heart failure, unspecified HF chronicity, unspecified heart failure type    3. Shortness of breath    4. End stage renal disease on dialysis        Plan:       Mandeep was seen today for pulmonary edema.    Diagnoses and all orders for this visit:    Pleural effusion  -     Ambulatory referral/consult to Cardiology; Future  -     NEBULIZER FOR HOME USE  -     NEBULIZER KIT (SUPPLIES) FOR HOME USE  -     albuterol-ipratropium (DUO-NEB) 2.5 mg-0.5 mg/3 mL nebulizer solution; Take 3 mLs by nebulization every 6 (six) hours as needed for Wheezing. Rescue    Congestive heart failure, unspecified HF chronicity, unspecified heart failure type  -     furosemide injection 40 mg  -     Ambulatory referral/consult to Cardiology; Future  -     NEBULIZER FOR HOME USE  -     NEBULIZER KIT (SUPPLIES) FOR HOME USE  -      albuterol-ipratropium (DUO-NEB) 2.5 mg-0.5 mg/3 mL nebulizer solution; Take 3 mLs by nebulization every 6 (six) hours as needed for Wheezing. Rescue    Shortness of breath  -     NEBULIZER FOR HOME USE  -     NEBULIZER KIT (SUPPLIES) FOR HOME USE  -     albuterol-ipratropium (DUO-NEB) 2.5 mg-0.5 mg/3 mL nebulizer solution; Take 3 mLs by nebulization every 6 (six) hours as needed for Wheezing. Rescue    End stage renal disease on dialysis    Given increased BNP, pateint given IM Lasix in office.  Will increase Lasix to BID for the next week and add Potassium while on it.  Will schedule cardio follow up for evaluation and message sent to referral team to see if we can get him into pulmonology sooner.

## 2020-12-08 NOTE — PROGRESS NOTES
"Subjective:       Patient ID: Mandeep Willams is a 85 y.o. male.    Chief Complaint: Shortness of Breath    Shortness of Breath  This is a new problem. The current episode started in the past 7 days. The problem occurs constantly. The problem has been gradually worsening. Associated symptoms include rhinorrhea and a sore throat. Pertinent negatives include no abdominal pain, claudication, coryza, ear pain, fever, headaches, hemoptysis, PND, rash, syncope, vomiting or wheezing. The symptoms are aggravated by any activity. He has tried nothing for the symptoms. His past medical history is significant for chronic lung disease (pulmonary effusion last year) and a heart failure. There is no history of a recent surgery.   Patient insists that his symptoms are because he ran out of a chinese herb he was taking. Does not know what herb. States that while he was taking it he was fine.    Review of Systems   Constitutional: Negative for fever.   HENT: Positive for rhinorrhea and sore throat. Negative for ear pain.    Respiratory: Positive for shortness of breath. Negative for hemoptysis and wheezing.    Cardiovascular: Negative for claudication, syncope and PND.   Gastrointestinal: Negative for abdominal pain and vomiting.   Integumentary:  Negative for rash.   Neurological: Negative for headaches.         Objective:     /60 (BP Location: Left arm, Patient Position: Sitting, BP Method: Medium (Manual))   Pulse 65   Temp 97.5 °F (36.4 °C) (Oral)   Resp 18   Ht 5' 7" (1.702 m)   Wt 62 kg (136 lb 11 oz)   SpO2 97%   BMI 21.41 kg/m²     Physical Exam  Constitutional:       Appearance: He is ill-appearing. He is not toxic-appearing.   HENT:      Nose: Nose normal.      Mouth/Throat:      Mouth: Mucous membranes are dry.   Eyes:      Extraocular Movements: Extraocular movements intact.      Pupils: Pupils are equal, round, and reactive to light.   Cardiovascular:      Rate and Rhythm: Normal rate.      Heart sounds: Murmur " present.   Pulmonary:      Effort: Pulmonary effort is normal. No respiratory distress.      Breath sounds: Wheezing present. No rales.   Abdominal:      General: Abdomen is flat. Bowel sounds are normal.   Neurological:      Mental Status: He is alert.         Assessment:       1. Shortness of breath        Plan:       Mandeep was seen today for shortness of breath.    Diagnoses and all orders for this visit:    Shortness of breath  -     X-Ray Chest PA And Lateral; Future  -     albuterol-ipratropium 2.5 mg-0.5 mg/3 mL nebulizer solution 3 mL  -     COVID-19 Routine Screening  -     Discontinue: albuterol (PROAIR HFA) 90 mcg/actuation inhaler; Inhale 2 puffs into the lungs every 4 (four) hours as needed for Wheezing or Shortness of Breath. Rescue    Given symptoms, COVID-19 test done.  Check CXR and patient given a nebulizer treatment in the office after which he felt better.  Continue albuterol at home.  Return PRN

## 2020-12-11 ENCOUNTER — TELEPHONE (OUTPATIENT)
Dept: FAMILY MEDICINE | Facility: CLINIC | Age: 85
End: 2020-12-11

## 2020-12-11 NOTE — TELEPHONE ENCOUNTER
----- Message from Milagros Reyes sent at 12/11/2020 11:36 AM CST -----  Contact: Self [phone]  Type: Patient Call Back    Who called:Self    What is the request in detail:Pt is calling in regards last visit, has questions about medication    Can the clinic reply by [facility]? Call back    Would the patient rather a call back or a response via [facility]? Call back    Best call back number: [phone]            Thank You

## 2020-12-15 ENCOUNTER — TELEPHONE (OUTPATIENT)
Dept: PULMONOLOGY | Facility: CLINIC | Age: 85
End: 2020-12-15

## 2020-12-15 NOTE — TELEPHONE ENCOUNTER
Message sent to Beth Patel NP.              ----- Message from Hector Fierro sent at 12/15/2020 12:39 PM CST -----  Regarding: self  Type: Patient Call Back    Who called: Self    What is the request in detail: pt has been  short of breath, fatigue, nebulizer not helping,pt is unable to lay down he has to be elevated. Please call to discuss    Can the clinic reply by [facility]? no    Would the patient rather a call back or a response via [facility]? Call     Best call back number: [phone]

## 2020-12-15 NOTE — TELEPHONE ENCOUNTER
Told friend that is taking care of him to bring him to the ED.                ----- Message from Beth Patel NP sent at 12/15/2020  2:51 PM CST -----  Regarding: RE: self  Recommend pt go to ER.   ----- Message -----  From: Jose Mckee MA  Sent: 12/15/2020   1:28 PM CST  To: Beth Patel NP  Subject: FW: self                                           ----- Message -----  From: Hector Fierro  Sent: 12/15/2020  12:39 PM CST  To: Jorge Campos Staff  Subject: self                                             Type: Patient Call Back    Who called: Self    What is the request in detail: pt has been  short of breath, fatigue, nebulizer not helping,pt is unable to lay down he has to be elevated. Please call to discuss    Can the clinic reply by [facility]? no    Would the patient rather a call back or a response via [facility]? Call     Best call back number: [phone]

## 2021-01-07 ENCOUNTER — HOSPITAL ENCOUNTER (OUTPATIENT)
Dept: RADIOLOGY | Facility: HOSPITAL | Age: 86
Discharge: HOME | DRG: 199 | End: 2021-01-07
Attending: INTERNAL MEDICINE
Payer: MEDICARE

## 2021-01-07 ENCOUNTER — HOSPITAL ENCOUNTER (INPATIENT)
Facility: HOSPITAL | Age: 86
LOS: 9 days | Discharge: HOME | DRG: 199 | End: 2021-01-16
Attending: EMERGENCY MEDICINE | Admitting: EMERGENCY MEDICINE
Payer: MEDICARE

## 2021-01-07 ENCOUNTER — HOSPITAL ENCOUNTER (OUTPATIENT)
Dept: CARDIOLOGY | Facility: HOSPITAL | Age: 86
Discharge: HOME | DRG: 199 | End: 2021-01-07
Attending: INTERNAL MEDICINE
Payer: MEDICARE

## 2021-01-07 VITALS
BODY MASS INDEX: 21.19 KG/M2 | HEIGHT: 67 IN | SYSTOLIC BLOOD PRESSURE: 143 MMHG | DIASTOLIC BLOOD PRESSURE: 64 MMHG | WEIGHT: 135 LBS | HEART RATE: 64 BPM

## 2021-01-07 DIAGNOSIS — J90 PLEURAL EFFUSION ON RIGHT: ICD-10-CM

## 2021-01-07 DIAGNOSIS — Z96.89 CHEST TUBE IN PLACE: ICD-10-CM

## 2021-01-07 DIAGNOSIS — J90 PLEURAL EFFUSION: ICD-10-CM

## 2021-01-07 DIAGNOSIS — R39.89 SUSPECTED UTI: ICD-10-CM

## 2021-01-07 DIAGNOSIS — R07.9 CHEST PAIN: ICD-10-CM

## 2021-01-07 DIAGNOSIS — E78.5 HYPERLIPIDEMIA: ICD-10-CM

## 2021-01-07 DIAGNOSIS — I10 HTN (HYPERTENSION): ICD-10-CM

## 2021-01-07 DIAGNOSIS — J93.9 PNEUMOTHORAX: ICD-10-CM

## 2021-01-07 DIAGNOSIS — N18.6 ESRD (END STAGE RENAL DISEASE): ICD-10-CM

## 2021-01-07 DIAGNOSIS — D63.8 ANEMIA OF CHRONIC DISORDER: Chronic | ICD-10-CM

## 2021-01-07 DIAGNOSIS — R06.02 SHORTNESS OF BREATH: ICD-10-CM

## 2021-01-07 DIAGNOSIS — F17.200 TOBACCO USE DISORDER: ICD-10-CM

## 2021-01-07 DIAGNOSIS — K59.00 CONSTIPATION: ICD-10-CM

## 2021-01-07 DIAGNOSIS — D62 ACUTE BLOOD LOSS ANEMIA: ICD-10-CM

## 2021-01-07 DIAGNOSIS — I10 HYPERTENSION, UNSPECIFIED TYPE: ICD-10-CM

## 2021-01-07 DIAGNOSIS — J93.9 PNEUMOTHORAX, UNSPECIFIED TYPE: Primary | ICD-10-CM

## 2021-01-07 DIAGNOSIS — I35.0 AORTIC VALVE STENOSIS, ETIOLOGY OF CARDIAC VALVE DISEASE UNSPECIFIED: ICD-10-CM

## 2021-01-07 DIAGNOSIS — R33.9 URINARY RETENTION: ICD-10-CM

## 2021-01-07 LAB
ALBUMIN SERPL BCP-MCNC: 3.4 G/DL (ref 3.5–5.2)
ALP SERPL-CCNC: 87 U/L (ref 55–135)
ALT SERPL W/O P-5'-P-CCNC: 11 U/L (ref 10–44)
ANION GAP SERPL CALC-SCNC: 10 MMOL/L (ref 8–16)
AORTIC ROOT ANNULUS: 3.29 CM
AORTIC VALVE CUSP SEPERATION: 1.14 CM
APTT BLDCRRT: 27.7 SEC (ref 21–32)
AST SERPL-CCNC: 14 U/L (ref 10–40)
AV INDEX (PROSTH): 0.23
AV MEAN GRADIENT: 38 MMHG
AV PEAK GRADIENT: 64 MMHG
AV REGURGITATION PRESSURE HALF TIME: 302 MS
AV VALVE AREA: 0.76 CM2
AV VELOCITY RATIO: 0.25
BASOPHILS # BLD AUTO: 0.04 K/UL (ref 0–0.2)
BASOPHILS NFR BLD: 0.7 % (ref 0–1.9)
BILIRUB SERPL-MCNC: 0.6 MG/DL (ref 0.1–1)
BNP SERPL-MCNC: 2195 PG/ML (ref 0–99)
BSA FOR ECHO PROCEDURE: 1.7 M2
BUN SERPL-MCNC: 44 MG/DL (ref 8–23)
CALCIUM SERPL-MCNC: 8.6 MG/DL (ref 8.7–10.5)
CHLORIDE SERPL-SCNC: 98 MMOL/L (ref 95–110)
CO2 SERPL-SCNC: 30 MMOL/L (ref 23–29)
CREAT SERPL-MCNC: 6.3 MG/DL (ref 0.5–1.4)
CTP QC/QA: YES
CV ECHO LV RWT: 0.43 CM
DIFFERENTIAL METHOD: ABNORMAL
DOP CALC AO PEAK VEL: 4 M/S
DOP CALC AO VTI: 111.62 CM
DOP CALC LVOT AREA: 3.3 CM2
DOP CALC LVOT DIAMETER: 2.06 CM
DOP CALC LVOT PEAK VEL: 1.01 M/S
DOP CALC LVOT STROKE VOLUME: 85.05 CM3
DOP CALCLVOT PEAK VEL VTI: 25.53 CM
E WAVE DECELERATION TIME: 128.62 MSEC
E/A RATIO: 1.46
ECHO LV POSTERIOR WALL: 1.07 CM (ref 0.6–1.1)
EOSINOPHIL # BLD AUTO: 0.2 K/UL (ref 0–0.5)
EOSINOPHIL NFR BLD: 3.4 % (ref 0–8)
ERYTHROCYTE [DISTWIDTH] IN BLOOD BY AUTOMATED COUNT: 15.6 % (ref 11.5–14.5)
EST. GFR  (AFRICAN AMERICAN): 9 ML/MIN/1.73 M^2
EST. GFR  (NON AFRICAN AMERICAN): 7 ML/MIN/1.73 M^2
FRACTIONAL SHORTENING: 37 % (ref 28–44)
GLUCOSE SERPL-MCNC: 185 MG/DL (ref 70–110)
HCT VFR BLD AUTO: 30.9 % (ref 40–54)
HGB BLD-MCNC: 9.8 G/DL (ref 14–18)
IMM GRANULOCYTES # BLD AUTO: 0.02 K/UL (ref 0–0.04)
IMM GRANULOCYTES NFR BLD AUTO: 0.3 % (ref 0–0.5)
INR PPP: 1 (ref 0.8–1.2)
INTERVENTRICULAR SEPTUM: 1.07 CM (ref 0.6–1.1)
IVRT: 66.9 MSEC
LA MAJOR: 6.59 CM
LA MINOR: 5.87 CM
LA WIDTH: 4.62 CM
LEFT ATRIUM SIZE: 4.16 CM
LEFT ATRIUM VOLUME INDEX: 59.3 ML/M2
LEFT ATRIUM VOLUME: 101.44 CM3
LEFT INTERNAL DIMENSION IN SYSTOLE: 3.14 CM (ref 2.1–4)
LEFT VENTRICLE DIASTOLIC VOLUME INDEX: 67.97 ML/M2
LEFT VENTRICLE DIASTOLIC VOLUME: 116.29 ML
LEFT VENTRICLE MASS INDEX: 115 G/M2
LEFT VENTRICLE SYSTOLIC VOLUME INDEX: 22.9 ML/M2
LEFT VENTRICLE SYSTOLIC VOLUME: 39.19 ML
LEFT VENTRICULAR INTERNAL DIMENSION IN DIASTOLE: 4.96 CM (ref 3.5–6)
LEFT VENTRICULAR MASS: 196.86 G
LYMPHOCYTES # BLD AUTO: 1.1 K/UL (ref 1–4.8)
LYMPHOCYTES NFR BLD: 19.3 % (ref 18–48)
MCH RBC QN AUTO: 31.9 PG (ref 27–31)
MCHC RBC AUTO-ENTMCNC: 31.7 G/DL (ref 32–36)
MCV RBC AUTO: 101 FL (ref 82–98)
MONOCYTES # BLD AUTO: 0.5 K/UL (ref 0.3–1)
MONOCYTES NFR BLD: 9 % (ref 4–15)
MV PEAK A VEL: 0.89 M/S
MV PEAK E VEL: 1.3 M/S
NEUTROPHILS # BLD AUTO: 4 K/UL (ref 1.8–7.7)
NEUTROPHILS NFR BLD: 67.3 % (ref 38–73)
NRBC BLD-RTO: 0 /100 WBC
PISA TR MAX VEL: 3.42 M/S
PLATELET # BLD AUTO: 152 K/UL (ref 150–350)
PMV BLD AUTO: 10.6 FL (ref 9.2–12.9)
POTASSIUM SERPL-SCNC: 4.2 MMOL/L (ref 3.5–5.1)
PROT SERPL-MCNC: 7.8 G/DL (ref 6–8.4)
PROTHROMBIN TIME: 11.1 SEC (ref 9–12.5)
PULM VEIN S/D RATIO: 0.63
PV PEAK D VEL: 0.96 M/S
PV PEAK S VEL: 0.6 M/S
PV PEAK VELOCITY: 1.13 CM/S
RA MAJOR: 5.4 CM
RA PRESSURE: 8 MMHG
RA WIDTH: 4.32 CM
RBC # BLD AUTO: 3.07 M/UL (ref 4.6–6.2)
RIGHT VENTRICULAR END-DIASTOLIC DIMENSION: 3.49 CM
RV TISSUE DOPPLER FREE WALL SYSTOLIC VELOCITY 1 (APICAL 4 CHAMBER VIEW): 15.19 CM/S
SARS-COV-2 RDRP RESP QL NAA+PROBE: NEGATIVE
SINUS: 3.26 CM
SODIUM SERPL-SCNC: 138 MMOL/L (ref 136–145)
STJ: 3.1 CM
TR MAX PG: 47 MMHG
TRICUSPID ANNULAR PLANE SYSTOLIC EXCURSION: 3.12 CM
TROPONIN I SERPL DL<=0.01 NG/ML-MCNC: 0.02 NG/ML (ref 0–0.03)
TV REST PULMONARY ARTERY PRESSURE: 55 MMHG
WBC # BLD AUTO: 5.92 K/UL (ref 3.9–12.7)

## 2021-01-07 PROCEDURE — 85610 PROTHROMBIN TIME: CPT

## 2021-01-07 PROCEDURE — 71250 CT THORAX DX C-: CPT | Mod: TC

## 2021-01-07 PROCEDURE — 82042 OTHER SOURCE ALBUMIN QUAN EA: CPT

## 2021-01-07 PROCEDURE — 85025 COMPLETE CBC W/AUTO DIFF WBC: CPT

## 2021-01-07 PROCEDURE — 32556 INSERT CATH PLEURA W/O IMAGE: CPT | Mod: RT

## 2021-01-07 PROCEDURE — 93010 ELECTROCARDIOGRAM REPORT: CPT | Mod: ,,, | Performed by: INTERNAL MEDICINE

## 2021-01-07 PROCEDURE — 71250 CT THORAX DX C-: CPT | Mod: 26,,, | Performed by: RADIOLOGY

## 2021-01-07 PROCEDURE — 93005 ELECTROCARDIOGRAM TRACING: CPT

## 2021-01-07 PROCEDURE — 87070 CULTURE OTHR SPECIMN AEROBIC: CPT

## 2021-01-07 PROCEDURE — 82945 GLUCOSE OTHER FLUID: CPT

## 2021-01-07 PROCEDURE — 93306 TTE W/DOPPLER COMPLETE: CPT | Mod: 26,,, | Performed by: INTERNAL MEDICINE

## 2021-01-07 PROCEDURE — 71250 CT CHEST WITHOUT CONTRAST: ICD-10-PCS | Mod: 26,,, | Performed by: RADIOLOGY

## 2021-01-07 PROCEDURE — U0002 COVID-19 LAB TEST NON-CDC: HCPCS | Performed by: EMERGENCY MEDICINE

## 2021-01-07 PROCEDURE — 80053 COMPREHEN METABOLIC PANEL: CPT

## 2021-01-07 PROCEDURE — 84484 ASSAY OF TROPONIN QUANT: CPT

## 2021-01-07 PROCEDURE — 84157 ASSAY OF PROTEIN OTHER: CPT

## 2021-01-07 PROCEDURE — 85730 THROMBOPLASTIN TIME PARTIAL: CPT

## 2021-01-07 PROCEDURE — 99291 CRITICAL CARE FIRST HOUR: CPT | Mod: 25

## 2021-01-07 PROCEDURE — 83615 LACTATE (LD) (LDH) ENZYME: CPT

## 2021-01-07 PROCEDURE — 93306 ECHO (CUPID ONLY): ICD-10-PCS | Mod: 26,,, | Performed by: INTERNAL MEDICINE

## 2021-01-07 PROCEDURE — 87116 MYCOBACTERIA CULTURE: CPT

## 2021-01-07 PROCEDURE — 83880 ASSAY OF NATRIURETIC PEPTIDE: CPT

## 2021-01-07 PROCEDURE — 82150 ASSAY OF AMYLASE: CPT

## 2021-01-07 PROCEDURE — 25000003 PHARM REV CODE 250: Performed by: EMERGENCY MEDICINE

## 2021-01-07 PROCEDURE — 87206 SMEAR FLUORESCENT/ACID STAI: CPT

## 2021-01-07 PROCEDURE — 12000002 HC ACUTE/MED SURGE SEMI-PRIVATE ROOM

## 2021-01-07 PROCEDURE — 87205 SMEAR GRAM STAIN: CPT

## 2021-01-07 PROCEDURE — 93306 TTE W/DOPPLER COMPLETE: CPT

## 2021-01-07 PROCEDURE — 93010 EKG 12-LEAD: ICD-10-PCS | Mod: ,,, | Performed by: INTERNAL MEDICINE

## 2021-01-07 PROCEDURE — 80100014 HC HEMODIALYSIS 1:1

## 2021-01-07 RX ORDER — LIDOCAINE HYDROCHLORIDE 10 MG/ML
10 INJECTION INFILTRATION; PERINEURAL
Status: COMPLETED | OUTPATIENT
Start: 2021-01-07 | End: 2021-01-07

## 2021-01-07 RX ORDER — LIDOCAINE HYDROCHLORIDE 10 MG/ML
INJECTION INFILTRATION; PERINEURAL
Status: DISPENSED
Start: 2021-01-07 | End: 2021-01-08

## 2021-01-07 RX ADMIN — LIDOCAINE HYDROCHLORIDE 10 ML: 10 INJECTION, SOLUTION INFILTRATION; PERINEURAL at 07:01

## 2021-01-08 PROBLEM — F17.200 TOBACCO USE DISORDER: Status: ACTIVE | Noted: 2021-01-08

## 2021-01-08 PROBLEM — J90 PLEURAL EFFUSION ON RIGHT: Status: ACTIVE | Noted: 2021-01-08

## 2021-01-08 LAB
ALBUMIN FLD-MCNC: 2 G/DL
AMYLASE, BODY FLUID: 86 U/L
BODY FLUID SOURCE AMYLASE: NORMAL
BODY FLUID SOURCE, LDH: NORMAL
GLUCOSE FLD-MCNC: 98 MG/DL
LDH FLD L TO P-CCNC: 262 U/L
PHOSPHATE SERPL-MCNC: 4.3 MG/DL (ref 2.7–4.5)
POCT GLUCOSE: 119 MG/DL (ref 70–110)
PROT FLD-MCNC: 3.6 G/DL
SPECIMEN SOURCE: NORMAL
TROPONIN I SERPL DL<=0.01 NG/ML-MCNC: 0.02 NG/ML (ref 0–0.03)

## 2021-01-08 PROCEDURE — 84484 ASSAY OF TROPONIN QUANT: CPT

## 2021-01-08 PROCEDURE — 20000000 HC ICU ROOM

## 2021-01-08 PROCEDURE — 25000003 PHARM REV CODE 250: Performed by: HOSPITALIST

## 2021-01-08 PROCEDURE — 25000242 PHARM REV CODE 250 ALT 637 W/ HCPCS: Performed by: HOSPITALIST

## 2021-01-08 PROCEDURE — 27000221 HC OXYGEN, UP TO 24 HOURS

## 2021-01-08 PROCEDURE — 94761 N-INVAS EAR/PLS OXIMETRY MLT: CPT

## 2021-01-08 PROCEDURE — 25000003 PHARM REV CODE 250: Performed by: INTERNAL MEDICINE

## 2021-01-08 PROCEDURE — 80100016 HC MAINTENANCE HEMODIALYSIS

## 2021-01-08 PROCEDURE — 99223 1ST HOSP IP/OBS HIGH 75: CPT | Mod: ,,, | Performed by: INTERNAL MEDICINE

## 2021-01-08 PROCEDURE — 94640 AIRWAY INHALATION TREATMENT: CPT

## 2021-01-08 PROCEDURE — 99223 PR INITIAL HOSPITAL CARE,LEVL III: ICD-10-PCS | Mod: ,,, | Performed by: INTERNAL MEDICINE

## 2021-01-08 PROCEDURE — 84100 ASSAY OF PHOSPHORUS: CPT

## 2021-01-08 RX ORDER — MUPIROCIN 20 MG/G
OINTMENT TOPICAL 2 TIMES DAILY
Status: DISPENSED | OUTPATIENT
Start: 2021-01-08 | End: 2021-01-13

## 2021-01-08 RX ORDER — LANOLIN ALCOHOL/MO/W.PET/CERES
800 CREAM (GRAM) TOPICAL
Status: DISCONTINUED | OUTPATIENT
Start: 2021-01-08 | End: 2021-01-08

## 2021-01-08 RX ORDER — SODIUM,POTASSIUM PHOSPHATES 280-250MG
2 POWDER IN PACKET (EA) ORAL
Status: DISCONTINUED | OUTPATIENT
Start: 2021-01-08 | End: 2021-01-08

## 2021-01-08 RX ORDER — PRAVASTATIN SODIUM 10 MG/1
10 TABLET ORAL DAILY
Status: DISCONTINUED | OUTPATIENT
Start: 2021-01-08 | End: 2021-01-16 | Stop reason: HOSPADM

## 2021-01-08 RX ORDER — SODIUM CHLORIDE 0.9 % (FLUSH) 0.9 %
10 SYRINGE (ML) INJECTION
Status: DISCONTINUED | OUTPATIENT
Start: 2021-01-08 | End: 2021-01-16 | Stop reason: HOSPADM

## 2021-01-08 RX ORDER — IBUPROFEN 200 MG
16 TABLET ORAL
Status: DISCONTINUED | OUTPATIENT
Start: 2021-01-08 | End: 2021-01-08

## 2021-01-08 RX ORDER — GLUCAGON 1 MG
1 KIT INJECTION
Status: DISCONTINUED | OUTPATIENT
Start: 2021-01-08 | End: 2021-01-08

## 2021-01-08 RX ORDER — ONDANSETRON 2 MG/ML
4 INJECTION INTRAMUSCULAR; INTRAVENOUS EVERY 8 HOURS PRN
Status: DISCONTINUED | OUTPATIENT
Start: 2021-01-08 | End: 2021-01-16 | Stop reason: HOSPADM

## 2021-01-08 RX ORDER — OXYCODONE HYDROCHLORIDE 5 MG/1
5 TABLET ORAL EVERY 6 HOURS PRN
Status: DISCONTINUED | OUTPATIENT
Start: 2021-01-08 | End: 2021-01-16 | Stop reason: HOSPADM

## 2021-01-08 RX ORDER — METOPROLOL SUCCINATE 50 MG/1
100 TABLET, EXTENDED RELEASE ORAL DAILY
Status: DISCONTINUED | OUTPATIENT
Start: 2021-01-08 | End: 2021-01-16 | Stop reason: HOSPADM

## 2021-01-08 RX ORDER — BISACODYL 10 MG
10 SUPPOSITORY, RECTAL RECTAL DAILY PRN
Status: DISCONTINUED | OUTPATIENT
Start: 2021-01-08 | End: 2021-01-16 | Stop reason: HOSPADM

## 2021-01-08 RX ORDER — SODIUM CHLORIDE 9 MG/ML
INJECTION, SOLUTION INTRAVENOUS
Status: DISCONTINUED | OUTPATIENT
Start: 2021-01-08 | End: 2021-01-14

## 2021-01-08 RX ORDER — AMLODIPINE BESYLATE 5 MG/1
5 TABLET ORAL DAILY
Status: DISCONTINUED | OUTPATIENT
Start: 2021-01-08 | End: 2021-01-16 | Stop reason: HOSPADM

## 2021-01-08 RX ORDER — INSULIN ASPART 100 [IU]/ML
0-5 INJECTION, SOLUTION INTRAVENOUS; SUBCUTANEOUS
Status: DISCONTINUED | OUTPATIENT
Start: 2021-01-08 | End: 2021-01-08

## 2021-01-08 RX ORDER — FUROSEMIDE 10 MG/ML
40 INJECTION INTRAMUSCULAR; INTRAVENOUS DAILY
Status: DISCONTINUED | OUTPATIENT
Start: 2021-01-08 | End: 2021-01-16 | Stop reason: HOSPADM

## 2021-01-08 RX ORDER — MORPHINE SULFATE 4 MG/ML
4 INJECTION, SOLUTION INTRAMUSCULAR; INTRAVENOUS EVERY 4 HOURS PRN
Status: DISCONTINUED | OUTPATIENT
Start: 2021-01-08 | End: 2021-01-16 | Stop reason: HOSPADM

## 2021-01-08 RX ORDER — IBUPROFEN 200 MG
24 TABLET ORAL
Status: DISCONTINUED | OUTPATIENT
Start: 2021-01-08 | End: 2021-01-08

## 2021-01-08 RX ORDER — IRBESARTAN 75 MG/1
75 TABLET ORAL NIGHTLY
Status: DISCONTINUED | OUTPATIENT
Start: 2021-01-08 | End: 2021-01-14

## 2021-01-08 RX ORDER — IPRATROPIUM BROMIDE AND ALBUTEROL SULFATE 2.5; .5 MG/3ML; MG/3ML
3 SOLUTION RESPIRATORY (INHALATION)
Status: DISCONTINUED | OUTPATIENT
Start: 2021-01-08 | End: 2021-01-16 | Stop reason: HOSPADM

## 2021-01-08 RX ORDER — ACETAMINOPHEN 325 MG/1
650 TABLET ORAL EVERY 4 HOURS PRN
Status: DISCONTINUED | OUTPATIENT
Start: 2021-01-08 | End: 2021-01-16 | Stop reason: HOSPADM

## 2021-01-08 RX ORDER — AMOXICILLIN 250 MG
1 CAPSULE ORAL 2 TIMES DAILY
Status: DISCONTINUED | OUTPATIENT
Start: 2021-01-08 | End: 2021-01-16 | Stop reason: HOSPADM

## 2021-01-08 RX ADMIN — MUPIROCIN: 20 OINTMENT TOPICAL at 09:01

## 2021-01-08 RX ADMIN — PRAVASTATIN SODIUM 10 MG: 10 TABLET ORAL at 09:01

## 2021-01-08 RX ADMIN — METOPROLOL SUCCINATE 100 MG: 50 TABLET, FILM COATED, EXTENDED RELEASE ORAL at 09:01

## 2021-01-08 RX ADMIN — DOCUSATE SODIUM 50 MG AND SENNOSIDES 8.6 MG 1 TABLET: 8.6; 5 TABLET, FILM COATED ORAL at 09:01

## 2021-01-08 RX ADMIN — IPRATROPIUM BROMIDE AND ALBUTEROL SULFATE 3 ML: .5; 3 SOLUTION RESPIRATORY (INHALATION) at 11:01

## 2021-01-08 RX ADMIN — AMLODIPINE BESYLATE 5 MG: 5 TABLET ORAL at 09:01

## 2021-01-09 PROBLEM — D63.8 ANEMIA OF CHRONIC DISORDER: Chronic | Status: ACTIVE | Noted: 2021-01-09

## 2021-01-09 LAB
25(OH)D3+25(OH)D2 SERPL-MCNC: 47 NG/ML (ref 30–96)
ALBUMIN SERPL BCP-MCNC: 2.7 G/DL (ref 3.5–5.2)
ANION GAP SERPL CALC-SCNC: 9 MMOL/L (ref 8–16)
BASOPHILS # BLD AUTO: 0.02 K/UL (ref 0–0.2)
BASOPHILS NFR BLD: 0.3 % (ref 0–1.9)
BUN SERPL-MCNC: 21 MG/DL (ref 8–23)
CALCIUM SERPL-MCNC: 7.5 MG/DL (ref 8.7–10.5)
CHLORIDE SERPL-SCNC: 101 MMOL/L (ref 95–110)
CO2 SERPL-SCNC: 26 MMOL/L (ref 23–29)
CREAT SERPL-MCNC: 4 MG/DL (ref 0.5–1.4)
DIFFERENTIAL METHOD: ABNORMAL
EOSINOPHIL # BLD AUTO: 0.2 K/UL (ref 0–0.5)
EOSINOPHIL NFR BLD: 2.6 % (ref 0–8)
ERYTHROCYTE [DISTWIDTH] IN BLOOD BY AUTOMATED COUNT: 15.4 % (ref 11.5–14.5)
EST. GFR  (AFRICAN AMERICAN): 15 ML/MIN/1.73 M^2
EST. GFR  (NON AFRICAN AMERICAN): 13 ML/MIN/1.73 M^2
FERRITIN SERPL-MCNC: 826 NG/ML (ref 20–300)
GLUCOSE SERPL-MCNC: 136 MG/DL (ref 70–110)
HCT VFR BLD AUTO: 20.4 % (ref 40–54)
HCT VFR BLD AUTO: 20.4 % (ref 40–54)
HGB BLD-MCNC: 6.9 G/DL (ref 14–18)
HGB BLD-MCNC: 7 G/DL (ref 14–18)
HGB BLD-MCNC: 7.2 G/DL (ref 14–18)
IMM GRANULOCYTES # BLD AUTO: 0.01 K/UL (ref 0–0.04)
IMM GRANULOCYTES NFR BLD AUTO: 0.2 % (ref 0–0.5)
IRON SERPL-MCNC: 29 UG/DL (ref 45–160)
LYMPHOCYTES # BLD AUTO: 0.9 K/UL (ref 1–4.8)
LYMPHOCYTES NFR BLD: 15.4 % (ref 18–48)
MAGNESIUM SERPL-MCNC: 2.1 MG/DL (ref 1.6–2.6)
MCH RBC QN AUTO: 32.4 PG (ref 27–31)
MCHC RBC AUTO-ENTMCNC: 33.8 G/DL (ref 32–36)
MCV RBC AUTO: 96 FL (ref 82–98)
MONOCYTES # BLD AUTO: 0.5 K/UL (ref 0.3–1)
MONOCYTES NFR BLD: 8.9 % (ref 4–15)
NEUTROPHILS # BLD AUTO: 4.2 K/UL (ref 1.8–7.7)
NEUTROPHILS NFR BLD: 72.6 % (ref 38–73)
NRBC BLD-RTO: 0 /100 WBC
PHOSPHATE SERPL-MCNC: 3 MG/DL (ref 2.7–4.5)
PLATELET # BLD AUTO: 124 K/UL (ref 150–350)
PMV BLD AUTO: 11 FL (ref 9.2–12.9)
POTASSIUM SERPL-SCNC: 3.8 MMOL/L (ref 3.5–5.1)
PTH-INTACT SERPL-MCNC: 439.4 PG/ML (ref 9–77)
RBC # BLD AUTO: 2.13 M/UL (ref 4.6–6.2)
SATURATED IRON: 14 % (ref 20–50)
SODIUM SERPL-SCNC: 136 MMOL/L (ref 136–145)
TOTAL IRON BINDING CAPACITY: 201 UG/DL (ref 250–450)
TRANSFERRIN SERPL-MCNC: 136 MG/DL (ref 200–375)
WBC # BLD AUTO: 5.72 K/UL (ref 3.9–12.7)

## 2021-01-09 PROCEDURE — 94640 AIRWAY INHALATION TREATMENT: CPT

## 2021-01-09 PROCEDURE — 80069 RENAL FUNCTION PANEL: CPT

## 2021-01-09 PROCEDURE — 20000000 HC ICU ROOM

## 2021-01-09 PROCEDURE — 87340 HEPATITIS B SURFACE AG IA: CPT

## 2021-01-09 PROCEDURE — 82728 ASSAY OF FERRITIN: CPT

## 2021-01-09 PROCEDURE — 36415 COLL VENOUS BLD VENIPUNCTURE: CPT

## 2021-01-09 PROCEDURE — 27000221 HC OXYGEN, UP TO 24 HOURS

## 2021-01-09 PROCEDURE — 63600175 PHARM REV CODE 636 W HCPCS: Performed by: NURSE PRACTITIONER

## 2021-01-09 PROCEDURE — 85014 HEMATOCRIT: CPT | Mod: 91

## 2021-01-09 PROCEDURE — 25000003 PHARM REV CODE 250: Performed by: HOSPITALIST

## 2021-01-09 PROCEDURE — 85018 HEMOGLOBIN: CPT

## 2021-01-09 PROCEDURE — 86706 HEP B SURFACE ANTIBODY: CPT

## 2021-01-09 PROCEDURE — 25000242 PHARM REV CODE 250 ALT 637 W/ HCPCS: Performed by: HOSPITALIST

## 2021-01-09 PROCEDURE — 83735 ASSAY OF MAGNESIUM: CPT

## 2021-01-09 PROCEDURE — 85025 COMPLETE CBC W/AUTO DIFF WBC: CPT

## 2021-01-09 PROCEDURE — 83540 ASSAY OF IRON: CPT

## 2021-01-09 PROCEDURE — 25000003 PHARM REV CODE 250: Performed by: INTERNAL MEDICINE

## 2021-01-09 PROCEDURE — 82306 VITAMIN D 25 HYDROXY: CPT

## 2021-01-09 PROCEDURE — 94761 N-INVAS EAR/PLS OXIMETRY MLT: CPT

## 2021-01-09 PROCEDURE — 80100016 HC MAINTENANCE HEMODIALYSIS

## 2021-01-09 PROCEDURE — 25000003 PHARM REV CODE 250: Performed by: NURSE PRACTITIONER

## 2021-01-09 PROCEDURE — 83970 ASSAY OF PARATHORMONE: CPT

## 2021-01-09 RX ADMIN — METOPROLOL SUCCINATE 100 MG: 50 TABLET, FILM COATED, EXTENDED RELEASE ORAL at 10:01

## 2021-01-09 RX ADMIN — IRBESARTAN 75 MG: 75 TABLET, FILM COATED ORAL at 12:01

## 2021-01-09 RX ADMIN — IPRATROPIUM BROMIDE AND ALBUTEROL SULFATE 3 ML: .5; 3 SOLUTION RESPIRATORY (INHALATION) at 07:01

## 2021-01-09 RX ADMIN — DOCUSATE SODIUM 50 MG AND SENNOSIDES 8.6 MG 1 TABLET: 8.6; 5 TABLET, FILM COATED ORAL at 10:01

## 2021-01-09 RX ADMIN — IRBESARTAN 75 MG: 75 TABLET, FILM COATED ORAL at 08:01

## 2021-01-09 RX ADMIN — DOCUSATE SODIUM 50 MG AND SENNOSIDES 8.6 MG 1 TABLET: 8.6; 5 TABLET, FILM COATED ORAL at 08:01

## 2021-01-09 RX ADMIN — MUPIROCIN: 20 OINTMENT TOPICAL at 10:01

## 2021-01-09 RX ADMIN — MUPIROCIN: 20 OINTMENT TOPICAL at 08:01

## 2021-01-09 RX ADMIN — FUROSEMIDE 40 MG: 10 INJECTION, SOLUTION INTRAVENOUS at 10:01

## 2021-01-09 RX ADMIN — NEPHROCAP 1 CAPSULE: 1 CAP ORAL at 10:01

## 2021-01-09 RX ADMIN — AMLODIPINE BESYLATE 5 MG: 5 TABLET ORAL at 10:01

## 2021-01-09 RX ADMIN — PRAVASTATIN SODIUM 10 MG: 10 TABLET ORAL at 10:01

## 2021-01-09 RX ADMIN — IPRATROPIUM BROMIDE AND ALBUTEROL SULFATE 3 ML: .5; 3 SOLUTION RESPIRATORY (INHALATION) at 01:01

## 2021-01-09 RX ADMIN — DOCUSATE SODIUM 50 MG AND SENNOSIDES 8.6 MG 1 TABLET: 8.6; 5 TABLET, FILM COATED ORAL at 12:01

## 2021-01-10 LAB
ALBUMIN SERPL BCP-MCNC: 2.8 G/DL (ref 3.5–5.2)
ANION GAP SERPL CALC-SCNC: 14 MMOL/L (ref 8–16)
BASOPHILS # BLD AUTO: 0.04 K/UL (ref 0–0.2)
BASOPHILS NFR BLD: 0.6 % (ref 0–1.9)
BUN SERPL-MCNC: 35 MG/DL (ref 8–23)
CALCIUM SERPL-MCNC: 7.7 MG/DL (ref 8.7–10.5)
CHLORIDE SERPL-SCNC: 103 MMOL/L (ref 95–110)
CO2 SERPL-SCNC: 22 MMOL/L (ref 23–29)
CREAT SERPL-MCNC: 6.1 MG/DL (ref 0.5–1.4)
DIFFERENTIAL METHOD: ABNORMAL
EOSINOPHIL # BLD AUTO: 0.3 K/UL (ref 0–0.5)
EOSINOPHIL NFR BLD: 3.7 % (ref 0–8)
ERYTHROCYTE [DISTWIDTH] IN BLOOD BY AUTOMATED COUNT: 15.7 % (ref 11.5–14.5)
EST. GFR  (AFRICAN AMERICAN): 9 ML/MIN/1.73 M^2
EST. GFR  (NON AFRICAN AMERICAN): 8 ML/MIN/1.73 M^2
GLUCOSE SERPL-MCNC: 101 MG/DL (ref 70–110)
HCT VFR BLD AUTO: 20.1 % (ref 40–54)
HCT VFR BLD AUTO: 21.3 % (ref 40–54)
HCT VFR BLD AUTO: 21.8 % (ref 40–54)
HGB BLD-MCNC: 6.7 G/DL (ref 14–18)
HGB BLD-MCNC: 7.1 G/DL (ref 14–18)
HGB BLD-MCNC: 7.1 G/DL (ref 14–18)
IMM GRANULOCYTES # BLD AUTO: 0.03 K/UL (ref 0–0.04)
IMM GRANULOCYTES NFR BLD AUTO: 0.4 % (ref 0–0.5)
LYMPHOCYTES # BLD AUTO: 1.1 K/UL (ref 1–4.8)
LYMPHOCYTES NFR BLD: 16.6 % (ref 18–48)
MAGNESIUM SERPL-MCNC: 2.3 MG/DL (ref 1.6–2.6)
MCH RBC QN AUTO: 32.1 PG (ref 27–31)
MCHC RBC AUTO-ENTMCNC: 32.6 G/DL (ref 32–36)
MCV RBC AUTO: 99 FL (ref 82–98)
MONOCYTES # BLD AUTO: 0.7 K/UL (ref 0.3–1)
MONOCYTES NFR BLD: 11.1 % (ref 4–15)
NEUTROPHILS # BLD AUTO: 4.5 K/UL (ref 1.8–7.7)
NEUTROPHILS NFR BLD: 67.6 % (ref 38–73)
NRBC BLD-RTO: 0 /100 WBC
PHOSPHATE SERPL-MCNC: 5.3 MG/DL (ref 2.7–4.5)
PLATELET # BLD AUTO: 143 K/UL (ref 150–350)
PMV BLD AUTO: 10.5 FL (ref 9.2–12.9)
POTASSIUM SERPL-SCNC: 4.4 MMOL/L (ref 3.5–5.1)
RBC # BLD AUTO: 2.21 M/UL (ref 4.6–6.2)
SODIUM SERPL-SCNC: 139 MMOL/L (ref 136–145)
WBC # BLD AUTO: 6.68 K/UL (ref 3.9–12.7)

## 2021-01-10 PROCEDURE — 25000003 PHARM REV CODE 250: Performed by: HOSPITALIST

## 2021-01-10 PROCEDURE — 85014 HEMATOCRIT: CPT

## 2021-01-10 PROCEDURE — 85025 COMPLETE CBC W/AUTO DIFF WBC: CPT

## 2021-01-10 PROCEDURE — 20000000 HC ICU ROOM

## 2021-01-10 PROCEDURE — 85018 HEMOGLOBIN: CPT

## 2021-01-10 PROCEDURE — 36415 COLL VENOUS BLD VENIPUNCTURE: CPT

## 2021-01-10 PROCEDURE — 25000242 PHARM REV CODE 250 ALT 637 W/ HCPCS: Performed by: HOSPITALIST

## 2021-01-10 PROCEDURE — 94640 AIRWAY INHALATION TREATMENT: CPT

## 2021-01-10 PROCEDURE — 63600175 PHARM REV CODE 636 W HCPCS: Performed by: NURSE PRACTITIONER

## 2021-01-10 PROCEDURE — 25000003 PHARM REV CODE 250: Performed by: NURSE PRACTITIONER

## 2021-01-10 PROCEDURE — 83735 ASSAY OF MAGNESIUM: CPT

## 2021-01-10 PROCEDURE — 25000003 PHARM REV CODE 250: Performed by: INTERNAL MEDICINE

## 2021-01-10 PROCEDURE — 94761 N-INVAS EAR/PLS OXIMETRY MLT: CPT

## 2021-01-10 PROCEDURE — 80069 RENAL FUNCTION PANEL: CPT

## 2021-01-10 RX ADMIN — AMLODIPINE BESYLATE 5 MG: 5 TABLET ORAL at 09:01

## 2021-01-10 RX ADMIN — IPRATROPIUM BROMIDE AND ALBUTEROL SULFATE 3 ML: .5; 3 SOLUTION RESPIRATORY (INHALATION) at 08:01

## 2021-01-10 RX ADMIN — PRAVASTATIN SODIUM 10 MG: 10 TABLET ORAL at 08:01

## 2021-01-10 RX ADMIN — IPRATROPIUM BROMIDE AND ALBUTEROL SULFATE 3 ML: .5; 3 SOLUTION RESPIRATORY (INHALATION) at 07:01

## 2021-01-10 RX ADMIN — NEPHROCAP 1 CAPSULE: 1 CAP ORAL at 08:01

## 2021-01-10 RX ADMIN — METOPROLOL SUCCINATE 100 MG: 50 TABLET, FILM COATED, EXTENDED RELEASE ORAL at 09:01

## 2021-01-10 RX ADMIN — MUPIROCIN: 20 OINTMENT TOPICAL at 08:01

## 2021-01-10 RX ADMIN — DOCUSATE SODIUM 50 MG AND SENNOSIDES 8.6 MG 1 TABLET: 8.6; 5 TABLET, FILM COATED ORAL at 08:01

## 2021-01-10 RX ADMIN — FUROSEMIDE 40 MG: 10 INJECTION, SOLUTION INTRAVENOUS at 08:01

## 2021-01-11 LAB
ALBUMIN SERPL BCP-MCNC: 2.7 G/DL (ref 3.5–5.2)
ANION GAP SERPL CALC-SCNC: 11 MMOL/L (ref 8–16)
BACTERIA FLD AEROBE CULT: NO GROWTH
BASOPHILS # BLD AUTO: 0.04 K/UL (ref 0–0.2)
BASOPHILS NFR BLD: 0.6 % (ref 0–1.9)
BUN SERPL-MCNC: 48 MG/DL (ref 8–23)
CALCIUM SERPL-MCNC: 7.4 MG/DL (ref 8.7–10.5)
CHLORIDE SERPL-SCNC: 108 MMOL/L (ref 95–110)
CO2 SERPL-SCNC: 19 MMOL/L (ref 23–29)
CREAT SERPL-MCNC: 7.5 MG/DL (ref 0.5–1.4)
DIFFERENTIAL METHOD: ABNORMAL
EOSINOPHIL # BLD AUTO: 0.3 K/UL (ref 0–0.5)
EOSINOPHIL NFR BLD: 4.4 % (ref 0–8)
ERYTHROCYTE [DISTWIDTH] IN BLOOD BY AUTOMATED COUNT: 15.8 % (ref 11.5–14.5)
EST. GFR  (AFRICAN AMERICAN): 7 ML/MIN/1.73 M^2
EST. GFR  (NON AFRICAN AMERICAN): 6 ML/MIN/1.73 M^2
GLUCOSE SERPL-MCNC: 100 MG/DL (ref 70–110)
GRAM STN SPEC: NORMAL
GRAM STN SPEC: NORMAL
HBV SURFACE AB SER-ACNC: POSITIVE M[IU]/ML
HBV SURFACE AG SERPL QL IA: NEGATIVE
HCT VFR BLD AUTO: 21.8 % (ref 40–54)
HGB BLD-MCNC: 7.2 G/DL (ref 14–18)
IMM GRANULOCYTES # BLD AUTO: 0.04 K/UL (ref 0–0.04)
IMM GRANULOCYTES NFR BLD AUTO: 0.6 % (ref 0–0.5)
LYMPHOCYTES # BLD AUTO: 1.7 K/UL (ref 1–4.8)
LYMPHOCYTES NFR BLD: 25.3 % (ref 18–48)
MAGNESIUM SERPL-MCNC: 2.5 MG/DL (ref 1.6–2.6)
MCH RBC QN AUTO: 32.7 PG (ref 27–31)
MCHC RBC AUTO-ENTMCNC: 33 G/DL (ref 32–36)
MCV RBC AUTO: 99 FL (ref 82–98)
MONOCYTES # BLD AUTO: 0.8 K/UL (ref 0.3–1)
MONOCYTES NFR BLD: 11.5 % (ref 4–15)
NEUTROPHILS # BLD AUTO: 3.9 K/UL (ref 1.8–7.7)
NEUTROPHILS NFR BLD: 57.6 % (ref 38–73)
NRBC BLD-RTO: 0 /100 WBC
PHOSPHATE SERPL-MCNC: 5.6 MG/DL (ref 2.7–4.5)
PLATELET # BLD AUTO: 144 K/UL (ref 150–350)
PMV BLD AUTO: 11 FL (ref 9.2–12.9)
POTASSIUM SERPL-SCNC: 4.7 MMOL/L (ref 3.5–5.1)
RBC # BLD AUTO: 2.2 M/UL (ref 4.6–6.2)
SODIUM SERPL-SCNC: 138 MMOL/L (ref 136–145)
WBC # BLD AUTO: 6.8 K/UL (ref 3.9–12.7)

## 2021-01-11 PROCEDURE — 36415 COLL VENOUS BLD VENIPUNCTURE: CPT

## 2021-01-11 PROCEDURE — 25000003 PHARM REV CODE 250: Performed by: INTERNAL MEDICINE

## 2021-01-11 PROCEDURE — 63600175 PHARM REV CODE 636 W HCPCS: Performed by: PHYSICIAN ASSISTANT

## 2021-01-11 PROCEDURE — 80100014 HC HEMODIALYSIS 1:1

## 2021-01-11 PROCEDURE — 63600175 PHARM REV CODE 636 W HCPCS: Performed by: NURSE PRACTITIONER

## 2021-01-11 PROCEDURE — 21400001 HC TELEMETRY ROOM

## 2021-01-11 PROCEDURE — 83735 ASSAY OF MAGNESIUM: CPT

## 2021-01-11 PROCEDURE — 25000003 PHARM REV CODE 250: Performed by: NURSE PRACTITIONER

## 2021-01-11 PROCEDURE — 80069 RENAL FUNCTION PANEL: CPT

## 2021-01-11 PROCEDURE — 25000003 PHARM REV CODE 250: Performed by: HOSPITALIST

## 2021-01-11 PROCEDURE — 85025 COMPLETE CBC W/AUTO DIFF WBC: CPT

## 2021-01-11 RX ADMIN — IRBESARTAN 75 MG: 75 TABLET, FILM COATED ORAL at 09:01

## 2021-01-11 RX ADMIN — DOCUSATE SODIUM 50 MG AND SENNOSIDES 8.6 MG 1 TABLET: 8.6; 5 TABLET, FILM COATED ORAL at 08:01

## 2021-01-11 RX ADMIN — DOCUSATE SODIUM 50 MG AND SENNOSIDES 8.6 MG 1 TABLET: 8.6; 5 TABLET, FILM COATED ORAL at 09:01

## 2021-01-11 RX ADMIN — MUPIROCIN: 20 OINTMENT TOPICAL at 08:01

## 2021-01-11 RX ADMIN — FUROSEMIDE 40 MG: 10 INJECTION, SOLUTION INTRAVENOUS at 08:01

## 2021-01-11 RX ADMIN — EPOETIN ALFA-EPBX 20000 UNITS: 10000 INJECTION, SOLUTION INTRAVENOUS; SUBCUTANEOUS at 07:01

## 2021-01-11 RX ADMIN — NEPHROCAP 1 CAPSULE: 1 CAP ORAL at 08:01

## 2021-01-11 RX ADMIN — MUPIROCIN: 20 OINTMENT TOPICAL at 09:01

## 2021-01-11 RX ADMIN — PRAVASTATIN SODIUM 10 MG: 10 TABLET ORAL at 08:01

## 2021-01-12 PROBLEM — D62 ACUTE BLOOD LOSS ANEMIA: Status: ACTIVE | Noted: 2021-01-12

## 2021-01-12 LAB
ABO + RH BLD: NORMAL
ALBUMIN SERPL BCP-MCNC: 2.6 G/DL (ref 3.5–5.2)
ANION GAP SERPL CALC-SCNC: 10 MMOL/L (ref 8–16)
BASOPHILS # BLD AUTO: 0.02 K/UL (ref 0–0.2)
BASOPHILS NFR BLD: 0.4 % (ref 0–1.9)
BLD GP AB SCN CELLS X3 SERPL QL: NORMAL
BLD PROD TYP BPU: NORMAL
BLOOD UNIT EXPIRATION DATE: NORMAL
BLOOD UNIT TYPE CODE: 5100
BLOOD UNIT TYPE: NORMAL
BUN SERPL-MCNC: 19 MG/DL (ref 8–23)
CALCIUM SERPL-MCNC: 7.4 MG/DL (ref 8.7–10.5)
CHLORIDE SERPL-SCNC: 98 MMOL/L (ref 95–110)
CO2 SERPL-SCNC: 28 MMOL/L (ref 23–29)
CODING SYSTEM: NORMAL
CREAT SERPL-MCNC: 4.2 MG/DL (ref 0.5–1.4)
DIFFERENTIAL METHOD: ABNORMAL
DISPENSE STATUS: NORMAL
EOSINOPHIL # BLD AUTO: 0.1 K/UL (ref 0–0.5)
EOSINOPHIL NFR BLD: 2.2 % (ref 0–8)
ERYTHROCYTE [DISTWIDTH] IN BLOOD BY AUTOMATED COUNT: 15.5 % (ref 11.5–14.5)
EST. GFR  (AFRICAN AMERICAN): 14 ML/MIN/1.73 M^2
EST. GFR  (NON AFRICAN AMERICAN): 12 ML/MIN/1.73 M^2
GLUCOSE SERPL-MCNC: 86 MG/DL (ref 70–110)
HCT VFR BLD AUTO: 18.9 % (ref 40–54)
HGB BLD-MCNC: 6.2 G/DL (ref 14–18)
IMM GRANULOCYTES # BLD AUTO: 0.02 K/UL (ref 0–0.04)
IMM GRANULOCYTES NFR BLD AUTO: 0.4 % (ref 0–0.5)
LYMPHOCYTES # BLD AUTO: 1.2 K/UL (ref 1–4.8)
LYMPHOCYTES NFR BLD: 21.5 % (ref 18–48)
MAGNESIUM SERPL-MCNC: 1.9 MG/DL (ref 1.6–2.6)
MCH RBC QN AUTO: 32 PG (ref 27–31)
MCHC RBC AUTO-ENTMCNC: 32.8 G/DL (ref 32–36)
MCV RBC AUTO: 97 FL (ref 82–98)
MONOCYTES # BLD AUTO: 0.6 K/UL (ref 0.3–1)
MONOCYTES NFR BLD: 10.1 % (ref 4–15)
NEUTROPHILS # BLD AUTO: 3.6 K/UL (ref 1.8–7.7)
NEUTROPHILS NFR BLD: 65.4 % (ref 38–73)
NRBC BLD-RTO: 0 /100 WBC
PHOSPHATE SERPL-MCNC: 3.9 MG/DL (ref 2.7–4.5)
PLATELET # BLD AUTO: 150 K/UL (ref 150–350)
PMV BLD AUTO: 10.4 FL (ref 9.2–12.9)
POTASSIUM SERPL-SCNC: 3.6 MMOL/L (ref 3.5–5.1)
RBC # BLD AUTO: 1.94 M/UL (ref 4.6–6.2)
SODIUM SERPL-SCNC: 136 MMOL/L (ref 136–145)
TRANS ERYTHROCYTES VOL PATIENT: NORMAL ML
WBC # BLD AUTO: 5.43 K/UL (ref 3.9–12.7)

## 2021-01-12 PROCEDURE — 94640 AIRWAY INHALATION TREATMENT: CPT

## 2021-01-12 PROCEDURE — 94761 N-INVAS EAR/PLS OXIMETRY MLT: CPT

## 2021-01-12 PROCEDURE — P9021 RED BLOOD CELLS UNIT: HCPCS

## 2021-01-12 PROCEDURE — 21400001 HC TELEMETRY ROOM

## 2021-01-12 PROCEDURE — 85018 HEMOGLOBIN: CPT

## 2021-01-12 PROCEDURE — 80069 RENAL FUNCTION PANEL: CPT

## 2021-01-12 PROCEDURE — 25000003 PHARM REV CODE 250: Performed by: HOSPITALIST

## 2021-01-12 PROCEDURE — 36430 TRANSFUSION BLD/BLD COMPNT: CPT

## 2021-01-12 PROCEDURE — 85014 HEMATOCRIT: CPT

## 2021-01-12 PROCEDURE — 36415 COLL VENOUS BLD VENIPUNCTURE: CPT

## 2021-01-12 PROCEDURE — 83735 ASSAY OF MAGNESIUM: CPT

## 2021-01-12 PROCEDURE — 63600175 PHARM REV CODE 636 W HCPCS: Performed by: NURSE PRACTITIONER

## 2021-01-12 PROCEDURE — 86900 BLOOD TYPING SEROLOGIC ABO: CPT

## 2021-01-12 PROCEDURE — 85025 COMPLETE CBC W/AUTO DIFF WBC: CPT

## 2021-01-12 PROCEDURE — 86920 COMPATIBILITY TEST SPIN: CPT

## 2021-01-12 PROCEDURE — 25000003 PHARM REV CODE 250: Performed by: NURSE PRACTITIONER

## 2021-01-12 PROCEDURE — 25000003 PHARM REV CODE 250: Performed by: INTERNAL MEDICINE

## 2021-01-12 PROCEDURE — 27000221 HC OXYGEN, UP TO 24 HOURS

## 2021-01-12 PROCEDURE — 25000242 PHARM REV CODE 250 ALT 637 W/ HCPCS: Performed by: HOSPITALIST

## 2021-01-12 RX ORDER — HYDROCODONE BITARTRATE AND ACETAMINOPHEN 500; 5 MG/1; MG/1
TABLET ORAL
Status: DISCONTINUED | OUTPATIENT
Start: 2021-01-12 | End: 2021-01-16 | Stop reason: HOSPADM

## 2021-01-12 RX ADMIN — IPRATROPIUM BROMIDE AND ALBUTEROL SULFATE 3 ML: .5; 3 SOLUTION RESPIRATORY (INHALATION) at 08:01

## 2021-01-12 RX ADMIN — DOCUSATE SODIUM 50 MG AND SENNOSIDES 8.6 MG 1 TABLET: 8.6; 5 TABLET, FILM COATED ORAL at 09:01

## 2021-01-12 RX ADMIN — DOCUSATE SODIUM 50 MG AND SENNOSIDES 8.6 MG 1 TABLET: 8.6; 5 TABLET, FILM COATED ORAL at 08:01

## 2021-01-12 RX ADMIN — IPRATROPIUM BROMIDE AND ALBUTEROL SULFATE 3 ML: .5; 3 SOLUTION RESPIRATORY (INHALATION) at 02:01

## 2021-01-12 RX ADMIN — IRBESARTAN 75 MG: 75 TABLET, FILM COATED ORAL at 08:01

## 2021-01-12 RX ADMIN — METOPROLOL SUCCINATE 100 MG: 50 TABLET, FILM COATED, EXTENDED RELEASE ORAL at 09:01

## 2021-01-12 RX ADMIN — MUPIROCIN: 20 OINTMENT TOPICAL at 08:01

## 2021-01-12 RX ADMIN — NEPHROCAP 1 CAPSULE: 1 CAP ORAL at 09:01

## 2021-01-12 RX ADMIN — FUROSEMIDE 40 MG: 10 INJECTION, SOLUTION INTRAVENOUS at 09:01

## 2021-01-12 RX ADMIN — PRAVASTATIN SODIUM 10 MG: 10 TABLET ORAL at 09:01

## 2021-01-13 PROBLEM — K59.00 CONSTIPATION: Status: ACTIVE | Noted: 2021-01-13

## 2021-01-13 PROBLEM — R33.9 URINARY RETENTION: Status: ACTIVE | Noted: 2021-01-13

## 2021-01-13 LAB
ALBUMIN SERPL BCP-MCNC: 2.6 G/DL (ref 3.5–5.2)
ANION GAP SERPL CALC-SCNC: 14 MMOL/L (ref 8–16)
BACTERIA #/AREA URNS HPF: ABNORMAL /HPF
BASOPHILS # BLD AUTO: 0.02 K/UL (ref 0–0.2)
BASOPHILS NFR BLD: 0.4 % (ref 0–1.9)
BILIRUB UR QL STRIP: NEGATIVE
BUN SERPL-MCNC: 44 MG/DL (ref 8–23)
CALCIUM SERPL-MCNC: 7.6 MG/DL (ref 8.7–10.5)
CHLORIDE SERPL-SCNC: 101 MMOL/L (ref 95–110)
CLARITY UR: ABNORMAL
CO2 SERPL-SCNC: 23 MMOL/L (ref 23–29)
COLOR UR: YELLOW
CREAT SERPL-MCNC: 6.5 MG/DL (ref 0.5–1.4)
DIFFERENTIAL METHOD: ABNORMAL
EOSINOPHIL # BLD AUTO: 0.3 K/UL (ref 0–0.5)
EOSINOPHIL NFR BLD: 6.3 % (ref 0–8)
ERYTHROCYTE [DISTWIDTH] IN BLOOD BY AUTOMATED COUNT: 15.9 % (ref 11.5–14.5)
EST. GFR  (AFRICAN AMERICAN): 8 ML/MIN/1.73 M^2
EST. GFR  (NON AFRICAN AMERICAN): 7 ML/MIN/1.73 M^2
GLUCOSE SERPL-MCNC: 80 MG/DL (ref 70–110)
GLUCOSE UR QL STRIP: NEGATIVE
HCT VFR BLD AUTO: 24 % (ref 40–54)
HCT VFR BLD AUTO: 24.3 % (ref 40–54)
HGB BLD-MCNC: 8.2 G/DL (ref 14–18)
HGB BLD-MCNC: 8.2 G/DL (ref 14–18)
HGB UR QL STRIP: ABNORMAL
HYALINE CASTS #/AREA URNS LPF: 0 /LPF
IMM GRANULOCYTES # BLD AUTO: 0.02 K/UL (ref 0–0.04)
IMM GRANULOCYTES NFR BLD AUTO: 0.4 % (ref 0–0.5)
KETONES UR QL STRIP: NEGATIVE
LEUKOCYTE ESTERASE UR QL STRIP: ABNORMAL
LYMPHOCYTES # BLD AUTO: 1.3 K/UL (ref 1–4.8)
LYMPHOCYTES NFR BLD: 28.8 % (ref 18–48)
MAGNESIUM SERPL-MCNC: 2.2 MG/DL (ref 1.6–2.6)
MCH RBC QN AUTO: 32.3 PG (ref 27–31)
MCHC RBC AUTO-ENTMCNC: 33.7 G/DL (ref 32–36)
MCV RBC AUTO: 96 FL (ref 82–98)
MICROSCOPIC COMMENT: ABNORMAL
MONOCYTES # BLD AUTO: 0.6 K/UL (ref 0.3–1)
MONOCYTES NFR BLD: 13.6 % (ref 4–15)
NEUTROPHILS # BLD AUTO: 2.3 K/UL (ref 1.8–7.7)
NEUTROPHILS NFR BLD: 50.5 % (ref 38–73)
NITRITE UR QL STRIP: NEGATIVE
NRBC BLD-RTO: 0 /100 WBC
PH UR STRIP: 7 [PH] (ref 5–8)
PHOSPHATE SERPL-MCNC: 4.9 MG/DL (ref 2.7–4.5)
PLATELET # BLD AUTO: 167 K/UL (ref 150–350)
PMV BLD AUTO: 10.3 FL (ref 9.2–12.9)
POTASSIUM SERPL-SCNC: 4.2 MMOL/L (ref 3.5–5.1)
PROT UR QL STRIP: ABNORMAL
RBC # BLD AUTO: 2.54 M/UL (ref 4.6–6.2)
RBC #/AREA URNS HPF: 12 /HPF (ref 0–4)
SODIUM SERPL-SCNC: 138 MMOL/L (ref 136–145)
SP GR UR STRIP: 1.01 (ref 1–1.03)
URN SPEC COLLECT METH UR: ABNORMAL
UROBILINOGEN UR STRIP-ACNC: NEGATIVE EU/DL
WBC # BLD AUTO: 4.62 K/UL (ref 3.9–12.7)
WBC #/AREA URNS HPF: >100 /HPF (ref 0–5)

## 2021-01-13 PROCEDURE — 87086 URINE CULTURE/COLONY COUNT: CPT

## 2021-01-13 PROCEDURE — 25000003 PHARM REV CODE 250: Performed by: STUDENT IN AN ORGANIZED HEALTH CARE EDUCATION/TRAINING PROGRAM

## 2021-01-13 PROCEDURE — 21400001 HC TELEMETRY ROOM

## 2021-01-13 PROCEDURE — 63600175 PHARM REV CODE 636 W HCPCS: Performed by: PHYSICIAN ASSISTANT

## 2021-01-13 PROCEDURE — 85025 COMPLETE CBC W/AUTO DIFF WBC: CPT

## 2021-01-13 PROCEDURE — 27000221 HC OXYGEN, UP TO 24 HOURS

## 2021-01-13 PROCEDURE — 25000003 PHARM REV CODE 250: Performed by: HOSPITALIST

## 2021-01-13 PROCEDURE — 25000003 PHARM REV CODE 250: Performed by: INTERNAL MEDICINE

## 2021-01-13 PROCEDURE — 87186 SC STD MICRODIL/AGAR DIL: CPT

## 2021-01-13 PROCEDURE — 94761 N-INVAS EAR/PLS OXIMETRY MLT: CPT

## 2021-01-13 PROCEDURE — 25000242 PHARM REV CODE 250 ALT 637 W/ HCPCS: Performed by: HOSPITALIST

## 2021-01-13 PROCEDURE — 83735 ASSAY OF MAGNESIUM: CPT

## 2021-01-13 PROCEDURE — 36415 COLL VENOUS BLD VENIPUNCTURE: CPT

## 2021-01-13 PROCEDURE — 81000 URINALYSIS NONAUTO W/SCOPE: CPT

## 2021-01-13 PROCEDURE — 80100016 HC MAINTENANCE HEMODIALYSIS

## 2021-01-13 PROCEDURE — 87088 URINE BACTERIA CULTURE: CPT

## 2021-01-13 PROCEDURE — 94640 AIRWAY INHALATION TREATMENT: CPT

## 2021-01-13 PROCEDURE — 87077 CULTURE AEROBIC IDENTIFY: CPT

## 2021-01-13 PROCEDURE — 80069 RENAL FUNCTION PANEL: CPT

## 2021-01-13 RX ORDER — POLYETHYLENE GLYCOL 3350 17 G/17G
17 POWDER, FOR SOLUTION ORAL DAILY
Status: DISCONTINUED | OUTPATIENT
Start: 2021-01-13 | End: 2021-01-16 | Stop reason: HOSPADM

## 2021-01-13 RX ADMIN — POLYETHYLENE GLYCOL 3350 17 G: 17 POWDER, FOR SOLUTION ORAL at 05:01

## 2021-01-13 RX ADMIN — IPRATROPIUM BROMIDE AND ALBUTEROL SULFATE 3 ML: .5; 3 SOLUTION RESPIRATORY (INHALATION) at 08:01

## 2021-01-13 RX ADMIN — DOCUSATE SODIUM 50 MG AND SENNOSIDES 8.6 MG 1 TABLET: 8.6; 5 TABLET, FILM COATED ORAL at 09:01

## 2021-01-13 RX ADMIN — IRBESARTAN 75 MG: 75 TABLET, FILM COATED ORAL at 09:01

## 2021-01-13 RX ADMIN — EPOETIN ALFA-EPBX 20000 UNITS: 10000 INJECTION, SOLUTION INTRAVENOUS; SUBCUTANEOUS at 11:01

## 2021-01-14 PROBLEM — R39.89 SUSPECTED UTI: Status: ACTIVE | Noted: 2021-01-14

## 2021-01-14 LAB
ALBUMIN SERPL BCP-MCNC: 2.4 G/DL (ref 3.5–5.2)
ANION GAP SERPL CALC-SCNC: 11 MMOL/L (ref 8–16)
BASOPHILS # BLD AUTO: 0.03 K/UL (ref 0–0.2)
BASOPHILS NFR BLD: 0.5 % (ref 0–1.9)
BUN SERPL-MCNC: 29 MG/DL (ref 8–23)
CALCIUM SERPL-MCNC: 7.9 MG/DL (ref 8.7–10.5)
CHLORIDE SERPL-SCNC: 104 MMOL/L (ref 95–110)
CO2 SERPL-SCNC: 23 MMOL/L (ref 23–29)
CREAT SERPL-MCNC: 4.7 MG/DL (ref 0.5–1.4)
DIFFERENTIAL METHOD: ABNORMAL
EOSINOPHIL # BLD AUTO: 0.3 K/UL (ref 0–0.5)
EOSINOPHIL NFR BLD: 4.8 % (ref 0–8)
ERYTHROCYTE [DISTWIDTH] IN BLOOD BY AUTOMATED COUNT: 15.6 % (ref 11.5–14.5)
EST. GFR  (AFRICAN AMERICAN): 12 ML/MIN/1.73 M^2
EST. GFR  (NON AFRICAN AMERICAN): 11 ML/MIN/1.73 M^2
GLUCOSE SERPL-MCNC: 111 MG/DL (ref 70–110)
HCT VFR BLD AUTO: 24.6 % (ref 40–54)
HGB BLD-MCNC: 8.1 G/DL (ref 14–18)
IMM GRANULOCYTES # BLD AUTO: 0.02 K/UL (ref 0–0.04)
IMM GRANULOCYTES NFR BLD AUTO: 0.3 % (ref 0–0.5)
LYMPHOCYTES # BLD AUTO: 1.1 K/UL (ref 1–4.8)
LYMPHOCYTES NFR BLD: 17.9 % (ref 18–48)
MAGNESIUM SERPL-MCNC: 2.3 MG/DL (ref 1.6–2.6)
MCH RBC QN AUTO: 31.8 PG (ref 27–31)
MCHC RBC AUTO-ENTMCNC: 32.9 G/DL (ref 32–36)
MCV RBC AUTO: 97 FL (ref 82–98)
MONOCYTES # BLD AUTO: 0.9 K/UL (ref 0.3–1)
MONOCYTES NFR BLD: 13.7 % (ref 4–15)
NEUTROPHILS # BLD AUTO: 3.9 K/UL (ref 1.8–7.7)
NEUTROPHILS NFR BLD: 62.8 % (ref 38–73)
NRBC BLD-RTO: 0 /100 WBC
PHOSPHATE SERPL-MCNC: 3 MG/DL (ref 2.7–4.5)
PLATELET # BLD AUTO: 166 K/UL (ref 150–350)
PMV BLD AUTO: 10.1 FL (ref 9.2–12.9)
POTASSIUM SERPL-SCNC: 4.1 MMOL/L (ref 3.5–5.1)
RBC # BLD AUTO: 2.55 M/UL (ref 4.6–6.2)
SODIUM SERPL-SCNC: 138 MMOL/L (ref 136–145)
WBC # BLD AUTO: 6.2 K/UL (ref 3.9–12.7)

## 2021-01-14 PROCEDURE — 36415 COLL VENOUS BLD VENIPUNCTURE: CPT

## 2021-01-14 PROCEDURE — 25000003 PHARM REV CODE 250: Performed by: STUDENT IN AN ORGANIZED HEALTH CARE EDUCATION/TRAINING PROGRAM

## 2021-01-14 PROCEDURE — 21400001 HC TELEMETRY ROOM

## 2021-01-14 PROCEDURE — 25000242 PHARM REV CODE 250 ALT 637 W/ HCPCS: Performed by: HOSPITALIST

## 2021-01-14 PROCEDURE — 99223 PR INITIAL HOSPITAL CARE,LEVL III: ICD-10-PCS | Mod: ,,, | Performed by: UROLOGY

## 2021-01-14 PROCEDURE — 27000221 HC OXYGEN, UP TO 24 HOURS

## 2021-01-14 PROCEDURE — 25000003 PHARM REV CODE 250: Performed by: INTERNAL MEDICINE

## 2021-01-14 PROCEDURE — 99223 1ST HOSP IP/OBS HIGH 75: CPT | Mod: ,,, | Performed by: UROLOGY

## 2021-01-14 PROCEDURE — 94761 N-INVAS EAR/PLS OXIMETRY MLT: CPT

## 2021-01-14 PROCEDURE — 94640 AIRWAY INHALATION TREATMENT: CPT

## 2021-01-14 PROCEDURE — 25000003 PHARM REV CODE 250: Performed by: UROLOGY

## 2021-01-14 PROCEDURE — 25000003 PHARM REV CODE 250: Performed by: HOSPITALIST

## 2021-01-14 PROCEDURE — 83735 ASSAY OF MAGNESIUM: CPT

## 2021-01-14 PROCEDURE — 25000003 PHARM REV CODE 250: Performed by: NURSE PRACTITIONER

## 2021-01-14 PROCEDURE — 85025 COMPLETE CBC W/AUTO DIFF WBC: CPT

## 2021-01-14 PROCEDURE — 63600175 PHARM REV CODE 636 W HCPCS: Performed by: PHYSICIAN ASSISTANT

## 2021-01-14 PROCEDURE — 80069 RENAL FUNCTION PANEL: CPT

## 2021-01-14 RX ORDER — TAMSULOSIN HYDROCHLORIDE 0.4 MG/1
0.4 CAPSULE ORAL DAILY
Status: DISCONTINUED | OUTPATIENT
Start: 2021-01-14 | End: 2021-01-16 | Stop reason: HOSPADM

## 2021-01-14 RX ORDER — LACTULOSE 10 G/15ML
30 SOLUTION ORAL ONCE
Status: COMPLETED | OUTPATIENT
Start: 2021-01-14 | End: 2021-01-14

## 2021-01-14 RX ADMIN — LACTULOSE 30 G: 10 SOLUTION ORAL at 09:01

## 2021-01-14 RX ADMIN — IPRATROPIUM BROMIDE AND ALBUTEROL SULFATE 3 ML: .5; 3 SOLUTION RESPIRATORY (INHALATION) at 08:01

## 2021-01-14 RX ADMIN — TAMSULOSIN HYDROCHLORIDE 0.4 MG: 0.4 CAPSULE ORAL at 09:01

## 2021-01-14 RX ADMIN — PRAVASTATIN SODIUM 10 MG: 10 TABLET ORAL at 09:01

## 2021-01-14 RX ADMIN — NEPHROCAP 1 CAPSULE: 1 CAP ORAL at 09:01

## 2021-01-14 RX ADMIN — DOCUSATE SODIUM 50 MG AND SENNOSIDES 8.6 MG 1 TABLET: 8.6; 5 TABLET, FILM COATED ORAL at 09:01

## 2021-01-14 RX ADMIN — POLYETHYLENE GLYCOL 3350 17 G: 17 POWDER, FOR SOLUTION ORAL at 09:01

## 2021-01-14 RX ADMIN — CEFTRIAXONE 1 G: 1 INJECTION, SOLUTION INTRAVENOUS at 09:01

## 2021-01-14 RX ADMIN — IPRATROPIUM BROMIDE AND ALBUTEROL SULFATE 3 ML: .5; 3 SOLUTION RESPIRATORY (INHALATION) at 01:01

## 2021-01-15 LAB
ALBUMIN SERPL BCP-MCNC: 2.5 G/DL (ref 3.5–5.2)
ANION GAP SERPL CALC-SCNC: 13 MMOL/L (ref 8–16)
BASOPHILS # BLD AUTO: 0.02 K/UL (ref 0–0.2)
BASOPHILS # BLD AUTO: 0.03 K/UL (ref 0–0.2)
BASOPHILS NFR BLD: 0.3 % (ref 0–1.9)
BASOPHILS NFR BLD: 0.7 % (ref 0–1.9)
BUN SERPL-MCNC: 48 MG/DL (ref 8–23)
CALCIUM SERPL-MCNC: 7.9 MG/DL (ref 8.7–10.5)
CHLORIDE SERPL-SCNC: 103 MMOL/L (ref 95–110)
CO2 SERPL-SCNC: 20 MMOL/L (ref 23–29)
CREAT SERPL-MCNC: 6.7 MG/DL (ref 0.5–1.4)
DIFFERENTIAL METHOD: ABNORMAL
DIFFERENTIAL METHOD: ABNORMAL
EOSINOPHIL # BLD AUTO: 0.2 K/UL (ref 0–0.5)
EOSINOPHIL # BLD AUTO: 0.3 K/UL (ref 0–0.5)
EOSINOPHIL NFR BLD: 5 % (ref 0–8)
EOSINOPHIL NFR BLD: 5.3 % (ref 0–8)
ERYTHROCYTE [DISTWIDTH] IN BLOOD BY AUTOMATED COUNT: 15.4 % (ref 11.5–14.5)
ERYTHROCYTE [DISTWIDTH] IN BLOOD BY AUTOMATED COUNT: 15.8 % (ref 11.5–14.5)
EST. GFR  (AFRICAN AMERICAN): 8 ML/MIN/1.73 M^2
EST. GFR  (NON AFRICAN AMERICAN): 7 ML/MIN/1.73 M^2
GLUCOSE SERPL-MCNC: 117 MG/DL (ref 70–110)
HCT VFR BLD AUTO: 24.7 % (ref 40–54)
HCT VFR BLD AUTO: 26.2 % (ref 40–54)
HGB BLD-MCNC: 8.2 G/DL (ref 14–18)
HGB BLD-MCNC: 8.8 G/DL (ref 14–18)
IMM GRANULOCYTES # BLD AUTO: 0.02 K/UL (ref 0–0.04)
IMM GRANULOCYTES # BLD AUTO: 0.02 K/UL (ref 0–0.04)
IMM GRANULOCYTES NFR BLD AUTO: 0.3 % (ref 0–0.5)
IMM GRANULOCYTES NFR BLD AUTO: 0.4 % (ref 0–0.5)
LYMPHOCYTES # BLD AUTO: 0.6 K/UL (ref 1–4.8)
LYMPHOCYTES # BLD AUTO: 1 K/UL (ref 1–4.8)
LYMPHOCYTES NFR BLD: 14 % (ref 18–48)
LYMPHOCYTES NFR BLD: 16.6 % (ref 18–48)
MAGNESIUM SERPL-MCNC: 2.3 MG/DL (ref 1.6–2.6)
MCH RBC QN AUTO: 32 PG (ref 27–31)
MCH RBC QN AUTO: 32.4 PG (ref 27–31)
MCHC RBC AUTO-ENTMCNC: 33.2 G/DL (ref 32–36)
MCHC RBC AUTO-ENTMCNC: 33.6 G/DL (ref 32–36)
MCV RBC AUTO: 96 FL (ref 82–98)
MCV RBC AUTO: 97 FL (ref 82–98)
MONOCYTES # BLD AUTO: 0.6 K/UL (ref 0.3–1)
MONOCYTES # BLD AUTO: 0.9 K/UL (ref 0.3–1)
MONOCYTES NFR BLD: 13.4 % (ref 4–15)
MONOCYTES NFR BLD: 14.4 % (ref 4–15)
NEUTROPHILS # BLD AUTO: 3 K/UL (ref 1.8–7.7)
NEUTROPHILS # BLD AUTO: 3.8 K/UL (ref 1.8–7.7)
NEUTROPHILS NFR BLD: 63.1 % (ref 38–73)
NEUTROPHILS NFR BLD: 66.5 % (ref 38–73)
NRBC BLD-RTO: 0 /100 WBC
NRBC BLD-RTO: 1 /100 WBC
PHOSPHATE SERPL-MCNC: 3.5 MG/DL (ref 2.7–4.5)
PLATELET # BLD AUTO: 181 K/UL (ref 150–350)
PLATELET # BLD AUTO: 213 K/UL (ref 150–350)
PMV BLD AUTO: 10.5 FL (ref 9.2–12.9)
PMV BLD AUTO: 10.6 FL (ref 9.2–12.9)
POTASSIUM SERPL-SCNC: 4.2 MMOL/L (ref 3.5–5.1)
RBC # BLD AUTO: 2.56 M/UL (ref 4.6–6.2)
RBC # BLD AUTO: 2.72 M/UL (ref 4.6–6.2)
SODIUM SERPL-SCNC: 136 MMOL/L (ref 136–145)
WBC # BLD AUTO: 4.56 K/UL (ref 3.9–12.7)
WBC # BLD AUTO: 6.04 K/UL (ref 3.9–12.7)

## 2021-01-15 PROCEDURE — 25000003 PHARM REV CODE 250: Performed by: NURSE PRACTITIONER

## 2021-01-15 PROCEDURE — 25000003 PHARM REV CODE 250: Performed by: HOSPITALIST

## 2021-01-15 PROCEDURE — 97802 MEDICAL NUTRITION INDIV IN: CPT

## 2021-01-15 PROCEDURE — 99232 SBSQ HOSP IP/OBS MODERATE 35: CPT | Mod: ,,, | Performed by: UROLOGY

## 2021-01-15 PROCEDURE — 83735 ASSAY OF MAGNESIUM: CPT

## 2021-01-15 PROCEDURE — 80100016 HC MAINTENANCE HEMODIALYSIS

## 2021-01-15 PROCEDURE — 25000242 PHARM REV CODE 250 ALT 637 W/ HCPCS: Performed by: HOSPITALIST

## 2021-01-15 PROCEDURE — 94640 AIRWAY INHALATION TREATMENT: CPT

## 2021-01-15 PROCEDURE — 63600175 PHARM REV CODE 636 W HCPCS: Performed by: INTERNAL MEDICINE

## 2021-01-15 PROCEDURE — 99232 PR SUBSEQUENT HOSPITAL CARE,LEVL II: ICD-10-PCS | Mod: ,,, | Performed by: UROLOGY

## 2021-01-15 PROCEDURE — 27000221 HC OXYGEN, UP TO 24 HOURS

## 2021-01-15 PROCEDURE — 25000003 PHARM REV CODE 250: Performed by: STUDENT IN AN ORGANIZED HEALTH CARE EDUCATION/TRAINING PROGRAM

## 2021-01-15 PROCEDURE — 25000003 PHARM REV CODE 250: Performed by: INTERNAL MEDICINE

## 2021-01-15 PROCEDURE — 25000003 PHARM REV CODE 250: Performed by: UROLOGY

## 2021-01-15 PROCEDURE — 85025 COMPLETE CBC W/AUTO DIFF WBC: CPT | Mod: 91

## 2021-01-15 PROCEDURE — 36415 COLL VENOUS BLD VENIPUNCTURE: CPT

## 2021-01-15 PROCEDURE — 80069 RENAL FUNCTION PANEL: CPT

## 2021-01-15 PROCEDURE — 21400001 HC TELEMETRY ROOM

## 2021-01-15 PROCEDURE — 63600175 PHARM REV CODE 636 W HCPCS: Performed by: PHYSICIAN ASSISTANT

## 2021-01-15 PROCEDURE — 63600175 PHARM REV CODE 636 W HCPCS: Performed by: NURSE PRACTITIONER

## 2021-01-15 RX ADMIN — IPRATROPIUM BROMIDE AND ALBUTEROL SULFATE 3 ML: .5; 3 SOLUTION RESPIRATORY (INHALATION) at 08:01

## 2021-01-15 RX ADMIN — METOPROLOL SUCCINATE 100 MG: 50 TABLET, FILM COATED, EXTENDED RELEASE ORAL at 03:01

## 2021-01-15 RX ADMIN — CEFTRIAXONE 1 G: 1 INJECTION, SOLUTION INTRAVENOUS at 03:01

## 2021-01-15 RX ADMIN — MORPHINE SULFATE 4 MG: 4 INJECTION INTRAVENOUS at 09:01

## 2021-01-15 RX ADMIN — AMLODIPINE BESYLATE 5 MG: 5 TABLET ORAL at 03:01

## 2021-01-15 RX ADMIN — PRAVASTATIN SODIUM 10 MG: 10 TABLET ORAL at 03:01

## 2021-01-15 RX ADMIN — FUROSEMIDE 40 MG: 10 INJECTION, SOLUTION INTRAVENOUS at 03:01

## 2021-01-15 RX ADMIN — TAMSULOSIN HYDROCHLORIDE 0.4 MG: 0.4 CAPSULE ORAL at 09:01

## 2021-01-15 RX ADMIN — DOCUSATE SODIUM 50 MG AND SENNOSIDES 8.6 MG 1 TABLET: 8.6; 5 TABLET, FILM COATED ORAL at 03:01

## 2021-01-15 RX ADMIN — DOCUSATE SODIUM 50 MG AND SENNOSIDES 8.6 MG 1 TABLET: 8.6; 5 TABLET, FILM COATED ORAL at 09:01

## 2021-01-15 RX ADMIN — IPRATROPIUM BROMIDE AND ALBUTEROL SULFATE 3 ML: .5; 3 SOLUTION RESPIRATORY (INHALATION) at 02:01

## 2021-01-15 RX ADMIN — NEPHROCAP 1 CAPSULE: 1 CAP ORAL at 03:01

## 2021-01-15 RX ADMIN — POLYETHYLENE GLYCOL 3350 17 G: 17 POWDER, FOR SOLUTION ORAL at 03:01

## 2021-01-15 RX ADMIN — EPOETIN ALFA-EPBX 20000 UNITS: 10000 INJECTION, SOLUTION INTRAVENOUS; SUBCUTANEOUS at 12:01

## 2021-01-15 RX ADMIN — OXYCODONE 5 MG: 5 TABLET ORAL at 06:01

## 2021-01-16 VITALS
HEART RATE: 88 BPM | RESPIRATION RATE: 18 BRPM | DIASTOLIC BLOOD PRESSURE: 54 MMHG | WEIGHT: 122.56 LBS | HEIGHT: 67 IN | OXYGEN SATURATION: 98 % | SYSTOLIC BLOOD PRESSURE: 105 MMHG | BODY MASS INDEX: 19.24 KG/M2 | TEMPERATURE: 98 F

## 2021-01-16 LAB
ALBUMIN SERPL BCP-MCNC: 2.4 G/DL (ref 3.5–5.2)
ANION GAP SERPL CALC-SCNC: 12 MMOL/L (ref 8–16)
BASOPHILS # BLD AUTO: 0.03 K/UL (ref 0–0.2)
BASOPHILS NFR BLD: 0.6 % (ref 0–1.9)
BUN SERPL-MCNC: 30 MG/DL (ref 8–23)
CALCIUM SERPL-MCNC: 8.4 MG/DL (ref 8.7–10.5)
CHLORIDE SERPL-SCNC: 95 MMOL/L (ref 95–110)
CO2 SERPL-SCNC: 31 MMOL/L (ref 23–29)
CREAT SERPL-MCNC: 4.8 MG/DL (ref 0.5–1.4)
DIFFERENTIAL METHOD: ABNORMAL
EOSINOPHIL # BLD AUTO: 0.3 K/UL (ref 0–0.5)
EOSINOPHIL NFR BLD: 6 % (ref 0–8)
ERYTHROCYTE [DISTWIDTH] IN BLOOD BY AUTOMATED COUNT: 15.1 % (ref 11.5–14.5)
EST. GFR  (AFRICAN AMERICAN): 12 ML/MIN/1.73 M^2
EST. GFR  (NON AFRICAN AMERICAN): 10 ML/MIN/1.73 M^2
GLUCOSE SERPL-MCNC: 131 MG/DL (ref 70–110)
HCT VFR BLD AUTO: 23.1 % (ref 40–54)
HGB BLD-MCNC: 7.7 G/DL (ref 14–18)
IMM GRANULOCYTES # BLD AUTO: 0.03 K/UL (ref 0–0.04)
IMM GRANULOCYTES NFR BLD AUTO: 0.6 % (ref 0–0.5)
LYMPHOCYTES # BLD AUTO: 0.8 K/UL (ref 1–4.8)
LYMPHOCYTES NFR BLD: 15.9 % (ref 18–48)
MAGNESIUM SERPL-MCNC: 2.2 MG/DL (ref 1.6–2.6)
MCH RBC QN AUTO: 31.7 PG (ref 27–31)
MCHC RBC AUTO-ENTMCNC: 33.3 G/DL (ref 32–36)
MCV RBC AUTO: 95 FL (ref 82–98)
MONOCYTES # BLD AUTO: 0.8 K/UL (ref 0.3–1)
MONOCYTES NFR BLD: 16.6 % (ref 4–15)
NEUTROPHILS # BLD AUTO: 2.9 K/UL (ref 1.8–7.7)
NEUTROPHILS NFR BLD: 60.3 % (ref 38–73)
NRBC BLD-RTO: 0 /100 WBC
PHOSPHATE SERPL-MCNC: 4.2 MG/DL (ref 2.7–4.5)
PLATELET # BLD AUTO: 171 K/UL (ref 150–350)
PMV BLD AUTO: 10.4 FL (ref 9.2–12.9)
POTASSIUM SERPL-SCNC: 4.1 MMOL/L (ref 3.5–5.1)
RBC # BLD AUTO: 2.43 M/UL (ref 4.6–6.2)
SODIUM SERPL-SCNC: 138 MMOL/L (ref 136–145)
WBC # BLD AUTO: 4.83 K/UL (ref 3.9–12.7)

## 2021-01-16 PROCEDURE — 25000003 PHARM REV CODE 250: Performed by: NURSE PRACTITIONER

## 2021-01-16 PROCEDURE — 63600175 PHARM REV CODE 636 W HCPCS: Performed by: PHYSICIAN ASSISTANT

## 2021-01-16 PROCEDURE — 25000003 PHARM REV CODE 250: Performed by: HOSPITALIST

## 2021-01-16 PROCEDURE — 80069 RENAL FUNCTION PANEL: CPT

## 2021-01-16 PROCEDURE — 25000242 PHARM REV CODE 250 ALT 637 W/ HCPCS: Performed by: HOSPITALIST

## 2021-01-16 PROCEDURE — 94640 AIRWAY INHALATION TREATMENT: CPT

## 2021-01-16 PROCEDURE — 27000221 HC OXYGEN, UP TO 24 HOURS

## 2021-01-16 PROCEDURE — 25000003 PHARM REV CODE 250: Performed by: INTERNAL MEDICINE

## 2021-01-16 PROCEDURE — 63600175 PHARM REV CODE 636 W HCPCS: Performed by: INTERNAL MEDICINE

## 2021-01-16 PROCEDURE — 25000003 PHARM REV CODE 250: Performed by: UROLOGY

## 2021-01-16 PROCEDURE — 99232 PR SUBSEQUENT HOSPITAL CARE,LEVL II: ICD-10-PCS | Mod: ,,, | Performed by: UROLOGY

## 2021-01-16 PROCEDURE — 36415 COLL VENOUS BLD VENIPUNCTURE: CPT

## 2021-01-16 PROCEDURE — 63600175 PHARM REV CODE 636 W HCPCS: Performed by: NURSE PRACTITIONER

## 2021-01-16 PROCEDURE — 99232 SBSQ HOSP IP/OBS MODERATE 35: CPT | Mod: ,,, | Performed by: UROLOGY

## 2021-01-16 PROCEDURE — 83735 ASSAY OF MAGNESIUM: CPT

## 2021-01-16 PROCEDURE — 94761 N-INVAS EAR/PLS OXIMETRY MLT: CPT

## 2021-01-16 PROCEDURE — 85025 COMPLETE CBC W/AUTO DIFF WBC: CPT

## 2021-01-16 PROCEDURE — 25000003 PHARM REV CODE 250: Performed by: STUDENT IN AN ORGANIZED HEALTH CARE EDUCATION/TRAINING PROGRAM

## 2021-01-16 RX ORDER — POLYETHYLENE GLYCOL 3350 17 G/17G
17 POWDER, FOR SOLUTION ORAL DAILY
Qty: 510 G | Refills: 0 | Status: ON HOLD | OUTPATIENT
Start: 2021-01-16 | End: 2022-01-01 | Stop reason: CLARIF

## 2021-01-16 RX ORDER — OXYCODONE HYDROCHLORIDE 5 MG/1
5 TABLET ORAL EVERY 6 HOURS PRN
Qty: 10 TABLET | Refills: 0 | Status: ON HOLD | OUTPATIENT
Start: 2021-01-16 | End: 2022-01-01 | Stop reason: CLARIF

## 2021-01-16 RX ORDER — SENNOSIDES 8.6 MG/1
1 TABLET ORAL 2 TIMES DAILY
COMMUNITY
Start: 2021-01-16 | End: 2021-01-16 | Stop reason: SDUPTHER

## 2021-01-16 RX ORDER — CIPROFLOXACIN 500 MG/1
500 TABLET ORAL DAILY
Qty: 4 TABLET | Refills: 0 | Status: SHIPPED | OUTPATIENT
Start: 2021-01-16 | End: 2021-01-20

## 2021-01-16 RX ORDER — TAMSULOSIN HYDROCHLORIDE 0.4 MG/1
0.4 CAPSULE ORAL DAILY
Qty: 30 CAPSULE | Refills: 11 | Status: SHIPPED | OUTPATIENT
Start: 2021-01-17 | End: 2022-01-01 | Stop reason: SDUPTHER

## 2021-01-16 RX ORDER — SENNOSIDES 8.6 MG/1
1 TABLET ORAL 2 TIMES DAILY
Qty: 30 TABLET | Refills: 3 | Status: ON HOLD | OUTPATIENT
Start: 2021-01-16 | End: 2022-01-01 | Stop reason: CLARIF

## 2021-01-16 RX ORDER — CIPROFLOXACIN 500 MG/1
500 TABLET ORAL DAILY
Qty: 4 TABLET | Refills: 0 | Status: SHIPPED | OUTPATIENT
Start: 2021-01-16 | End: 2021-01-16 | Stop reason: SDUPTHER

## 2021-01-16 RX ADMIN — METOPROLOL SUCCINATE 100 MG: 50 TABLET, FILM COATED, EXTENDED RELEASE ORAL at 09:01

## 2021-01-16 RX ADMIN — TAMSULOSIN HYDROCHLORIDE 0.4 MG: 0.4 CAPSULE ORAL at 09:01

## 2021-01-16 RX ADMIN — ONDANSETRON 4 MG: 2 INJECTION INTRAMUSCULAR; INTRAVENOUS at 07:01

## 2021-01-16 RX ADMIN — DOCUSATE SODIUM 50 MG AND SENNOSIDES 8.6 MG 1 TABLET: 8.6; 5 TABLET, FILM COATED ORAL at 09:01

## 2021-01-16 RX ADMIN — AMLODIPINE BESYLATE 5 MG: 5 TABLET ORAL at 09:01

## 2021-01-16 RX ADMIN — POLYETHYLENE GLYCOL 3350 17 G: 17 POWDER, FOR SOLUTION ORAL at 09:01

## 2021-01-16 RX ADMIN — BISACODYL 10 MG: 10 SUPPOSITORY RECTAL at 09:01

## 2021-01-16 RX ADMIN — IPRATROPIUM BROMIDE AND ALBUTEROL SULFATE 3 ML: .5; 3 SOLUTION RESPIRATORY (INHALATION) at 10:01

## 2021-01-16 RX ADMIN — PRAVASTATIN SODIUM 10 MG: 10 TABLET ORAL at 09:01

## 2021-01-16 RX ADMIN — FUROSEMIDE 40 MG: 10 INJECTION, SOLUTION INTRAVENOUS at 09:01

## 2021-01-16 RX ADMIN — IPRATROPIUM BROMIDE AND ALBUTEROL SULFATE 3 ML: .5; 3 SOLUTION RESPIRATORY (INHALATION) at 02:01

## 2021-01-16 RX ADMIN — CEFTRIAXONE 1 G: 1 INJECTION, SOLUTION INTRAVENOUS at 09:01

## 2021-01-16 RX ADMIN — NEPHROCAP 1 CAPSULE: 1 CAP ORAL at 09:01

## 2021-01-17 LAB — BACTERIA UR CULT: ABNORMAL

## 2021-02-09 ENCOUNTER — TELEPHONE (OUTPATIENT)
Dept: FAMILY MEDICINE | Facility: CLINIC | Age: 86
End: 2021-02-09

## 2021-03-14 LAB
ACID FAST MOD KINY STN SPEC: NORMAL
MYCOBACTERIUM SPEC QL CULT: NORMAL

## 2021-06-15 ENCOUNTER — OFFICE VISIT (OUTPATIENT)
Dept: PULMONOLOGY | Facility: CLINIC | Age: 86
End: 2021-06-15
Payer: MEDICARE

## 2021-06-15 ENCOUNTER — HOSPITAL ENCOUNTER (OUTPATIENT)
Dept: RADIOLOGY | Facility: HOSPITAL | Age: 86
Discharge: HOME | End: 2021-06-15
Attending: INTERNAL MEDICINE
Payer: MEDICARE

## 2021-06-15 VITALS
SYSTOLIC BLOOD PRESSURE: 143 MMHG | OXYGEN SATURATION: 97 % | WEIGHT: 122.13 LBS | DIASTOLIC BLOOD PRESSURE: 59 MMHG | HEART RATE: 71 BPM | BODY MASS INDEX: 19.17 KG/M2 | HEIGHT: 67 IN

## 2021-06-15 DIAGNOSIS — Z71.89 GOALS OF CARE, COUNSELING/DISCUSSION: ICD-10-CM

## 2021-06-15 DIAGNOSIS — J90 PLEURAL EFFUSION: ICD-10-CM

## 2021-06-15 DIAGNOSIS — R06.02 SHORTNESS OF BREATH: ICD-10-CM

## 2021-06-15 DIAGNOSIS — I35.0 NONRHEUMATIC AORTIC VALVE STENOSIS: ICD-10-CM

## 2021-06-15 DIAGNOSIS — J93.9 PNEUMOTHORAX, UNSPECIFIED TYPE: ICD-10-CM

## 2021-06-15 PROCEDURE — 71046 XR CHEST PA AND LATERAL: ICD-10-PCS | Mod: 26,,, | Performed by: RADIOLOGY

## 2021-06-15 PROCEDURE — 99215 OFFICE O/P EST HI 40 MIN: CPT | Mod: S$GLB,,, | Performed by: INTERNAL MEDICINE

## 2021-06-15 PROCEDURE — 1159F MED LIST DOCD IN RCRD: CPT | Mod: S$GLB,,, | Performed by: INTERNAL MEDICINE

## 2021-06-15 PROCEDURE — 99499 RISK ADDL DX/OHS AUDIT: ICD-10-PCS | Mod: S$GLB,,, | Performed by: INTERNAL MEDICINE

## 2021-06-15 PROCEDURE — 71046 X-RAY EXAM CHEST 2 VIEWS: CPT | Mod: 26,,, | Performed by: RADIOLOGY

## 2021-06-15 PROCEDURE — 1101F PR PT FALLS ASSESS DOC 0-1 FALLS W/OUT INJ PAST YR: ICD-10-PCS | Mod: CPTII,S$GLB,, | Performed by: INTERNAL MEDICINE

## 2021-06-15 PROCEDURE — 1126F AMNT PAIN NOTED NONE PRSNT: CPT | Mod: S$GLB,,, | Performed by: INTERNAL MEDICINE

## 2021-06-15 PROCEDURE — 71046 X-RAY EXAM CHEST 2 VIEWS: CPT | Mod: TC,FY

## 2021-06-15 PROCEDURE — 3288F PR FALLS RISK ASSESSMENT DOCUMENTED: ICD-10-PCS | Mod: CPTII,S$GLB,, | Performed by: INTERNAL MEDICINE

## 2021-06-15 PROCEDURE — 1101F PT FALLS ASSESS-DOCD LE1/YR: CPT | Mod: CPTII,S$GLB,, | Performed by: INTERNAL MEDICINE

## 2021-06-15 PROCEDURE — 1159F PR MEDICATION LIST DOCUMENTED IN MEDICAL RECORD: ICD-10-PCS | Mod: S$GLB,,, | Performed by: INTERNAL MEDICINE

## 2021-06-15 PROCEDURE — 1126F PR PAIN SEVERITY QUANTIFIED, NO PAIN PRESENT: ICD-10-PCS | Mod: S$GLB,,, | Performed by: INTERNAL MEDICINE

## 2021-06-15 PROCEDURE — 99499 UNLISTED E&M SERVICE: CPT | Mod: S$GLB,,, | Performed by: INTERNAL MEDICINE

## 2021-06-15 PROCEDURE — 99999 PR PBB SHADOW E&M-EST. PATIENT-LVL V: CPT | Mod: PBBFAC,,, | Performed by: INTERNAL MEDICINE

## 2021-06-15 PROCEDURE — 3288F FALL RISK ASSESSMENT DOCD: CPT | Mod: CPTII,S$GLB,, | Performed by: INTERNAL MEDICINE

## 2021-06-15 PROCEDURE — 99999 PR PBB SHADOW E&M-EST. PATIENT-LVL V: ICD-10-PCS | Mod: PBBFAC,,, | Performed by: INTERNAL MEDICINE

## 2021-06-15 PROCEDURE — 99215 PR OFFICE/OUTPT VISIT, EST, LEVL V, 40-54 MIN: ICD-10-PCS | Mod: S$GLB,,, | Performed by: INTERNAL MEDICINE

## 2021-06-15 RX ORDER — IBUPROFEN 600 MG/1
600 TABLET ORAL 4 TIMES DAILY PRN
Status: ON HOLD | COMMUNITY
Start: 2021-02-10 | End: 2022-01-01 | Stop reason: CLARIF

## 2021-06-24 ENCOUNTER — OFFICE VISIT (OUTPATIENT)
Dept: CARDIOLOGY | Facility: CLINIC | Age: 86
End: 2021-06-24
Payer: MEDICARE

## 2021-06-24 VITALS
SYSTOLIC BLOOD PRESSURE: 132 MMHG | WEIGHT: 121.69 LBS | OXYGEN SATURATION: 96 % | BODY MASS INDEX: 19.1 KG/M2 | HEART RATE: 64 BPM | DIASTOLIC BLOOD PRESSURE: 63 MMHG | HEIGHT: 67 IN

## 2021-06-24 DIAGNOSIS — I35.0 AORTIC VALVE STENOSIS, ETIOLOGY OF CARDIAC VALVE DISEASE UNSPECIFIED: ICD-10-CM

## 2021-06-24 DIAGNOSIS — E78.2 MIXED HYPERLIPIDEMIA: ICD-10-CM

## 2021-06-24 DIAGNOSIS — R06.02 SHORTNESS OF BREATH: Primary | ICD-10-CM

## 2021-06-24 DIAGNOSIS — N18.6 ESRD (END STAGE RENAL DISEASE): ICD-10-CM

## 2021-06-24 DIAGNOSIS — I25.10 CORONARY ARTERY CALCIFICATION SEEN ON CAT SCAN: ICD-10-CM

## 2021-06-24 DIAGNOSIS — I10 ESSENTIAL HYPERTENSION: ICD-10-CM

## 2021-06-24 DIAGNOSIS — I70.0 AORTIC ATHEROSCLEROSIS: ICD-10-CM

## 2021-06-24 PROCEDURE — 99999 PR PBB SHADOW E&M-EST. PATIENT-LVL V: ICD-10-PCS | Mod: PBBFAC,,, | Performed by: INTERNAL MEDICINE

## 2021-06-24 PROCEDURE — 99499 RISK ADDL DX/OHS AUDIT: ICD-10-PCS | Mod: S$GLB,,, | Performed by: INTERNAL MEDICINE

## 2021-06-24 PROCEDURE — 1159F MED LIST DOCD IN RCRD: CPT | Mod: S$GLB,,, | Performed by: INTERNAL MEDICINE

## 2021-06-24 PROCEDURE — 99499 UNLISTED E&M SERVICE: CPT | Mod: S$GLB,,, | Performed by: INTERNAL MEDICINE

## 2021-06-24 PROCEDURE — 1159F PR MEDICATION LIST DOCUMENTED IN MEDICAL RECORD: ICD-10-PCS | Mod: S$GLB,,, | Performed by: INTERNAL MEDICINE

## 2021-06-24 PROCEDURE — 99214 OFFICE O/P EST MOD 30 MIN: CPT | Mod: S$GLB,,, | Performed by: INTERNAL MEDICINE

## 2021-06-24 PROCEDURE — 3288F PR FALLS RISK ASSESSMENT DOCUMENTED: ICD-10-PCS | Mod: CPTII,S$GLB,, | Performed by: INTERNAL MEDICINE

## 2021-06-24 PROCEDURE — 1101F PT FALLS ASSESS-DOCD LE1/YR: CPT | Mod: CPTII,S$GLB,, | Performed by: INTERNAL MEDICINE

## 2021-06-24 PROCEDURE — 3288F FALL RISK ASSESSMENT DOCD: CPT | Mod: CPTII,S$GLB,, | Performed by: INTERNAL MEDICINE

## 2021-06-24 PROCEDURE — 99214 PR OFFICE/OUTPT VISIT, EST, LEVL IV, 30-39 MIN: ICD-10-PCS | Mod: S$GLB,,, | Performed by: INTERNAL MEDICINE

## 2021-06-24 PROCEDURE — 99999 PR PBB SHADOW E&M-EST. PATIENT-LVL V: CPT | Mod: PBBFAC,,, | Performed by: INTERNAL MEDICINE

## 2021-06-24 PROCEDURE — 1101F PR PT FALLS ASSESS DOC 0-1 FALLS W/OUT INJ PAST YR: ICD-10-PCS | Mod: CPTII,S$GLB,, | Performed by: INTERNAL MEDICINE

## 2021-06-24 PROCEDURE — 1126F PR PAIN SEVERITY QUANTIFIED, NO PAIN PRESENT: ICD-10-PCS | Mod: S$GLB,,, | Performed by: INTERNAL MEDICINE

## 2021-06-24 PROCEDURE — 1126F AMNT PAIN NOTED NONE PRSNT: CPT | Mod: S$GLB,,, | Performed by: INTERNAL MEDICINE

## 2021-06-24 RX ORDER — AMLODIPINE BESYLATE 5 MG/1
5 TABLET ORAL DAILY
Qty: 90 TABLET | Refills: 3 | Status: SHIPPED | OUTPATIENT
Start: 2021-06-24 | End: 2022-01-01 | Stop reason: SDUPTHER

## 2021-08-20 ENCOUNTER — TELEPHONE (OUTPATIENT)
Dept: FAMILY MEDICINE | Facility: CLINIC | Age: 86
End: 2021-08-20

## 2021-10-04 ENCOUNTER — TELEPHONE (OUTPATIENT)
Dept: FAMILY MEDICINE | Facility: CLINIC | Age: 86
End: 2021-10-04

## 2021-10-05 ENCOUNTER — TELEPHONE (OUTPATIENT)
Dept: PULMONOLOGY | Facility: HOSPITAL | Age: 86
End: 2021-10-05

## 2021-10-05 ENCOUNTER — TELEPHONE (OUTPATIENT)
Dept: PULMONOLOGY | Facility: CLINIC | Age: 86
End: 2021-10-05

## 2021-10-05 DIAGNOSIS — J96.11 CHRONIC RESPIRATORY FAILURE WITH HYPOXIA: Primary | ICD-10-CM

## 2021-10-06 ENCOUNTER — TELEPHONE (OUTPATIENT)
Dept: PULMONOLOGY | Facility: CLINIC | Age: 86
End: 2021-10-06

## 2021-10-27 ENCOUNTER — TELEPHONE (OUTPATIENT)
Dept: PULMONOLOGY | Facility: CLINIC | Age: 86
End: 2021-10-27
Payer: MEDICARE

## 2021-11-02 ENCOUNTER — HOSPITAL ENCOUNTER (OUTPATIENT)
Dept: RESPIRATORY THERAPY | Facility: HOSPITAL | Age: 86
Discharge: HOME | End: 2021-11-02
Attending: INTERNAL MEDICINE
Payer: MEDICARE

## 2021-11-02 DIAGNOSIS — J96.11 CHRONIC RESPIRATORY FAILURE WITH HYPOXIA: ICD-10-CM

## 2021-11-02 LAB — 6MWT: NORMAL

## 2021-11-02 PROCEDURE — 94618 PULMONARY STRESS TESTING: CPT

## 2021-11-02 PROCEDURE — 94618 PULMONARY STRESS TESTING: ICD-10-PCS | Mod: 26,,, | Performed by: INTERNAL MEDICINE

## 2021-11-02 PROCEDURE — 94618 PULMONARY STRESS TESTING: CPT | Mod: 26,,, | Performed by: INTERNAL MEDICINE

## 2021-11-15 ENCOUNTER — TELEPHONE (OUTPATIENT)
Dept: PULMONOLOGY | Facility: CLINIC | Age: 86
End: 2021-11-15
Payer: MEDICARE

## 2021-11-17 ENCOUNTER — TELEPHONE (OUTPATIENT)
Dept: CARDIOLOGY | Facility: CLINIC | Age: 86
End: 2021-11-17
Payer: MEDICARE

## 2021-11-17 DIAGNOSIS — I10 ESSENTIAL HYPERTENSION: ICD-10-CM

## 2021-11-17 RX ORDER — METOPROLOL SUCCINATE 100 MG/1
100 TABLET, EXTENDED RELEASE ORAL DAILY
Qty: 90 TABLET | Refills: 3 | Status: SHIPPED | OUTPATIENT
Start: 2021-11-17 | End: 2022-01-01 | Stop reason: SDUPTHER

## 2022-01-01 ENCOUNTER — OFFICE VISIT (OUTPATIENT)
Dept: VASCULAR SURGERY | Facility: CLINIC | Age: 87
End: 2022-01-01
Payer: MEDICARE

## 2022-01-01 ENCOUNTER — HOSPITAL ENCOUNTER (OUTPATIENT)
Facility: HOSPITAL | Age: 87
Discharge: HOME | End: 2022-03-22
Attending: SURGERY | Admitting: SURGERY
Payer: MEDICARE

## 2022-01-01 ENCOUNTER — DOCUMENT SCAN (OUTPATIENT)
Dept: HOME HEALTH SERVICES | Facility: HOSPITAL | Age: 87
End: 2022-01-01
Payer: MEDICARE

## 2022-01-01 ENCOUNTER — TELEPHONE (OUTPATIENT)
Dept: FAMILY MEDICINE | Facility: CLINIC | Age: 87
End: 2022-01-01
Payer: MEDICARE

## 2022-01-01 ENCOUNTER — EXTERNAL HOME HEALTH (OUTPATIENT)
Dept: HOME HEALTH SERVICES | Facility: HOSPITAL | Age: 87
End: 2022-01-01
Payer: MEDICARE

## 2022-01-01 ENCOUNTER — PES CALL (OUTPATIENT)
Dept: ADMINISTRATIVE | Facility: CLINIC | Age: 87
End: 2022-01-01
Payer: MEDICARE

## 2022-01-01 ENCOUNTER — TELEPHONE (OUTPATIENT)
Dept: VASCULAR SURGERY | Facility: CLINIC | Age: 87
End: 2022-01-01
Payer: MEDICARE

## 2022-01-01 ENCOUNTER — HOSPITAL ENCOUNTER (OUTPATIENT)
Facility: HOSPITAL | Age: 87
Discharge: HOME | End: 2022-05-17
Attending: EMERGENCY MEDICINE | Admitting: EMERGENCY MEDICINE
Payer: MEDICARE

## 2022-01-01 ENCOUNTER — HOSPITAL ENCOUNTER (INPATIENT)
Facility: HOSPITAL | Age: 87
LOS: 13 days | DRG: 291 | End: 2022-11-18
Attending: STUDENT IN AN ORGANIZED HEALTH CARE EDUCATION/TRAINING PROGRAM | Admitting: INTERNAL MEDICINE
Payer: MEDICARE

## 2022-01-01 ENCOUNTER — OFFICE VISIT (OUTPATIENT)
Dept: FAMILY MEDICINE | Facility: CLINIC | Age: 87
End: 2022-01-01
Payer: MEDICARE

## 2022-01-01 ENCOUNTER — HOSPITAL ENCOUNTER (OUTPATIENT)
Dept: CARDIOLOGY | Facility: HOSPITAL | Age: 87
Discharge: HOME | End: 2022-03-10
Attending: SURGERY
Payer: MEDICARE

## 2022-01-01 ENCOUNTER — HOSPITAL ENCOUNTER (OUTPATIENT)
Facility: HOSPITAL | Age: 87
Discharge: HOME HEALTH | End: 2022-10-05
Attending: EMERGENCY MEDICINE | Admitting: EMERGENCY MEDICINE
Payer: MEDICARE

## 2022-01-01 ENCOUNTER — HOSPITAL ENCOUNTER (INPATIENT)
Facility: HOSPITAL | Age: 87
LOS: 2 days | Discharge: HOME HEALTH | DRG: 189 | End: 2022-01-26
Attending: EMERGENCY MEDICINE | Admitting: INTERNAL MEDICINE
Payer: MEDICARE

## 2022-01-01 ENCOUNTER — HOSPITAL ENCOUNTER (OUTPATIENT)
Dept: CARDIOLOGY | Facility: HOSPITAL | Age: 87
Discharge: HOME | End: 2022-04-05
Attending: SURGERY
Payer: MEDICARE

## 2022-01-01 ENCOUNTER — TELEPHONE (OUTPATIENT)
Dept: CARDIOLOGY | Facility: CLINIC | Age: 87
End: 2022-01-01
Payer: MEDICARE

## 2022-01-01 ENCOUNTER — HOSPITAL ENCOUNTER (OUTPATIENT)
Dept: PREADMISSION TESTING | Facility: HOSPITAL | Age: 87
Discharge: HOME | End: 2022-03-17
Attending: SURGERY
Payer: MEDICARE

## 2022-01-01 ENCOUNTER — PATIENT OUTREACH (OUTPATIENT)
Dept: ADMINISTRATIVE | Facility: OTHER | Age: 87
End: 2022-01-01
Payer: MEDICARE

## 2022-01-01 ENCOUNTER — HOSPITAL ENCOUNTER (OUTPATIENT)
Dept: RADIOLOGY | Facility: HOSPITAL | Age: 87
Discharge: HOME | End: 2022-11-01
Attending: INTERNAL MEDICINE
Payer: MEDICARE

## 2022-01-01 ENCOUNTER — OFFICE VISIT (OUTPATIENT)
Dept: CARDIOLOGY | Facility: CLINIC | Age: 87
End: 2022-01-01
Payer: MEDICARE

## 2022-01-01 VITALS
SYSTOLIC BLOOD PRESSURE: 141 MMHG | OXYGEN SATURATION: 96 % | BODY MASS INDEX: 20.76 KG/M2 | RESPIRATION RATE: 20 BRPM | TEMPERATURE: 98 F | HEART RATE: 65 BPM | WEIGHT: 124.75 LBS | DIASTOLIC BLOOD PRESSURE: 65 MMHG

## 2022-01-01 VITALS
HEIGHT: 67 IN | OXYGEN SATURATION: 96 % | WEIGHT: 122.69 LBS | BODY MASS INDEX: 19.26 KG/M2 | HEART RATE: 66 BPM | DIASTOLIC BLOOD PRESSURE: 48 MMHG | SYSTOLIC BLOOD PRESSURE: 122 MMHG | RESPIRATION RATE: 19 BRPM

## 2022-01-01 VITALS
HEIGHT: 65 IN | OXYGEN SATURATION: 99 % | HEART RATE: 61 BPM | TEMPERATURE: 98 F | WEIGHT: 124.56 LBS | SYSTOLIC BLOOD PRESSURE: 125 MMHG | DIASTOLIC BLOOD PRESSURE: 48 MMHG | RESPIRATION RATE: 17 BRPM | BODY MASS INDEX: 20.75 KG/M2

## 2022-01-01 VITALS
DIASTOLIC BLOOD PRESSURE: 60 MMHG | WEIGHT: 126 LBS | BODY MASS INDEX: 20.99 KG/M2 | HEIGHT: 65 IN | SYSTOLIC BLOOD PRESSURE: 118 MMHG

## 2022-01-01 VITALS
HEIGHT: 65 IN | DIASTOLIC BLOOD PRESSURE: 66 MMHG | OXYGEN SATURATION: 98 % | SYSTOLIC BLOOD PRESSURE: 152 MMHG | HEART RATE: 74 BPM | WEIGHT: 113.56 LBS | BODY MASS INDEX: 18.92 KG/M2 | RESPIRATION RATE: 17 BRPM | TEMPERATURE: 97 F

## 2022-01-01 VITALS
OXYGEN SATURATION: 100 % | HEART RATE: 61 BPM | RESPIRATION RATE: 16 BRPM | WEIGHT: 124.75 LBS | HEIGHT: 65 IN | TEMPERATURE: 97 F | BODY MASS INDEX: 20.79 KG/M2 | SYSTOLIC BLOOD PRESSURE: 105 MMHG | DIASTOLIC BLOOD PRESSURE: 44 MMHG

## 2022-01-01 VITALS
TEMPERATURE: 99 F | DIASTOLIC BLOOD PRESSURE: 60 MMHG | HEART RATE: 76 BPM | HEIGHT: 65 IN | WEIGHT: 124 LBS | SYSTOLIC BLOOD PRESSURE: 137 MMHG | RESPIRATION RATE: 19 BRPM | OXYGEN SATURATION: 95 % | BODY MASS INDEX: 20.66 KG/M2

## 2022-01-01 VITALS
OXYGEN SATURATION: 93 % | HEART RATE: 122 BPM | WEIGHT: 231.5 LBS | HEIGHT: 62 IN | BODY MASS INDEX: 42.6 KG/M2 | DIASTOLIC BLOOD PRESSURE: 41 MMHG | SYSTOLIC BLOOD PRESSURE: 67 MMHG | TEMPERATURE: 103 F | RESPIRATION RATE: 18 BRPM

## 2022-01-01 VITALS
DIASTOLIC BLOOD PRESSURE: 40 MMHG | BODY MASS INDEX: 19.48 KG/M2 | HEART RATE: 64 BPM | SYSTOLIC BLOOD PRESSURE: 120 MMHG | RESPIRATION RATE: 18 BRPM | WEIGHT: 124.13 LBS | HEIGHT: 67 IN | TEMPERATURE: 98 F | OXYGEN SATURATION: 98 %

## 2022-01-01 VITALS
HEART RATE: 67 BPM | SYSTOLIC BLOOD PRESSURE: 141 MMHG | DIASTOLIC BLOOD PRESSURE: 61 MMHG | OXYGEN SATURATION: 100 % | WEIGHT: 120 LBS | BODY MASS INDEX: 18.83 KG/M2 | TEMPERATURE: 98 F | RESPIRATION RATE: 16 BRPM | HEIGHT: 67 IN

## 2022-01-01 VITALS
HEIGHT: 65 IN | SYSTOLIC BLOOD PRESSURE: 154 MMHG | OXYGEN SATURATION: 98 % | HEART RATE: 68 BPM | BODY MASS INDEX: 20.64 KG/M2 | WEIGHT: 123.88 LBS | TEMPERATURE: 98 F | DIASTOLIC BLOOD PRESSURE: 46 MMHG

## 2022-01-01 VITALS
WEIGHT: 123.13 LBS | DIASTOLIC BLOOD PRESSURE: 60 MMHG | SYSTOLIC BLOOD PRESSURE: 116 MMHG | BODY MASS INDEX: 20.49 KG/M2

## 2022-01-01 DIAGNOSIS — N18.6 ESRD (END STAGE RENAL DISEASE): ICD-10-CM

## 2022-01-01 DIAGNOSIS — R06.02 SHORTNESS OF BREATH: ICD-10-CM

## 2022-01-01 DIAGNOSIS — N40.0 BENIGN PROSTATIC HYPERPLASIA, UNSPECIFIED WHETHER LOWER URINARY TRACT SYMPTOMS PRESENT: ICD-10-CM

## 2022-01-01 DIAGNOSIS — R06.02 SHORTNESS OF BREATH: Primary | ICD-10-CM

## 2022-01-01 DIAGNOSIS — T82.858D ARTERIOVENOUS FISTULA STENOSIS, SUBSEQUENT ENCOUNTER: Primary | ICD-10-CM

## 2022-01-01 DIAGNOSIS — R07.9 CHEST PAIN: ICD-10-CM

## 2022-01-01 DIAGNOSIS — I27.20 PULMONARY HYPERTENSION: ICD-10-CM

## 2022-01-01 DIAGNOSIS — I10 ESSENTIAL HYPERTENSION: ICD-10-CM

## 2022-01-01 DIAGNOSIS — Z23 NEED FOR VACCINATION FOR STREP PNEUMONIAE: ICD-10-CM

## 2022-01-01 DIAGNOSIS — I35.0 NONRHEUMATIC AORTIC VALVE STENOSIS: ICD-10-CM

## 2022-01-01 DIAGNOSIS — J90 PLEURAL EFFUSION: ICD-10-CM

## 2022-01-01 DIAGNOSIS — E87.70 FLUID OVERLOAD: Primary | ICD-10-CM

## 2022-01-01 DIAGNOSIS — N18.6 ESRD (END STAGE RENAL DISEASE): Primary | ICD-10-CM

## 2022-01-01 DIAGNOSIS — I70.0 AORTIC ATHEROSCLEROSIS: ICD-10-CM

## 2022-01-01 DIAGNOSIS — I50.9 CONGESTIVE HEART FAILURE, UNSPECIFIED HF CHRONICITY, UNSPECIFIED HEART FAILURE TYPE: ICD-10-CM

## 2022-01-01 DIAGNOSIS — N18.6 ESRD ON DIALYSIS: ICD-10-CM

## 2022-01-01 DIAGNOSIS — R79.89 ELEVATED TROPONIN: ICD-10-CM

## 2022-01-01 DIAGNOSIS — D69.6 THROMBOCYTOPENIA: ICD-10-CM

## 2022-01-01 DIAGNOSIS — E78.2 MIXED HYPERLIPIDEMIA: ICD-10-CM

## 2022-01-01 DIAGNOSIS — J81.0 ACUTE PULMONARY EDEMA: Primary | ICD-10-CM

## 2022-01-01 DIAGNOSIS — Z99.2 END STAGE RENAL DISEASE ON DIALYSIS: ICD-10-CM

## 2022-01-01 DIAGNOSIS — Z99.2 ESRD (END STAGE RENAL DISEASE) ON DIALYSIS: Primary | ICD-10-CM

## 2022-01-01 DIAGNOSIS — I35.0 AORTIC STENOSIS: ICD-10-CM

## 2022-01-01 DIAGNOSIS — Z00.00 ENCOUNTER FOR PREVENTIVE HEALTH EXAMINATION: Primary | ICD-10-CM

## 2022-01-01 DIAGNOSIS — R09.02 HYPOXIA: ICD-10-CM

## 2022-01-01 DIAGNOSIS — N18.6 ESRD (END STAGE RENAL DISEASE) ON DIALYSIS: Primary | ICD-10-CM

## 2022-01-01 DIAGNOSIS — I35.0 AORTIC VALVE STENOSIS, ETIOLOGY OF CARDIAC VALVE DISEASE UNSPECIFIED: Primary | ICD-10-CM

## 2022-01-01 DIAGNOSIS — J90 PLEURAL EFFUSION ON RIGHT: Primary | ICD-10-CM

## 2022-01-01 DIAGNOSIS — T82.858D ARTERIOVENOUS FISTULA STENOSIS, SUBSEQUENT ENCOUNTER: ICD-10-CM

## 2022-01-01 DIAGNOSIS — N18.6 END STAGE RENAL DISEASE ON DIALYSIS: ICD-10-CM

## 2022-01-01 DIAGNOSIS — J81.0 FLASH PULMONARY EDEMA: ICD-10-CM

## 2022-01-01 DIAGNOSIS — Z99.2 ESRD ON DIALYSIS: ICD-10-CM

## 2022-01-01 DIAGNOSIS — T82.590A DIALYSIS AV FISTULA MALFUNCTION, INITIAL ENCOUNTER: ICD-10-CM

## 2022-01-01 DIAGNOSIS — I25.10 CORONARY ARTERY CALCIFICATION SEEN ON CAT SCAN: ICD-10-CM

## 2022-01-01 LAB
ABO + RH BLD: NORMAL
ALBUMIN SERPL BCP-MCNC: 2.4 G/DL (ref 3.5–5.2)
ALBUMIN SERPL BCP-MCNC: 2.5 G/DL (ref 3.5–5.2)
ALBUMIN SERPL BCP-MCNC: 2.6 G/DL (ref 3.5–5.2)
ALBUMIN SERPL BCP-MCNC: 2.8 G/DL (ref 3.5–5.2)
ALBUMIN SERPL BCP-MCNC: 2.9 G/DL (ref 3.5–5.2)
ALBUMIN SERPL BCP-MCNC: 2.9 G/DL (ref 3.5–5.2)
ALBUMIN SERPL BCP-MCNC: 3 G/DL (ref 3.5–5.2)
ALLENS TEST: ABNORMAL
ALLENS TEST: ABNORMAL
ALP SERPL-CCNC: 104 U/L (ref 55–135)
ALP SERPL-CCNC: 68 U/L (ref 55–135)
ALP SERPL-CCNC: 71 U/L (ref 55–135)
ALP SERPL-CCNC: 72 U/L (ref 55–135)
ALP SERPL-CCNC: 77 U/L (ref 55–135)
ALP SERPL-CCNC: 80 U/L (ref 55–135)
ALP SERPL-CCNC: 82 U/L (ref 55–135)
ALT SERPL W/O P-5'-P-CCNC: 12 U/L (ref 10–44)
ALT SERPL W/O P-5'-P-CCNC: 13 U/L (ref 10–44)
ALT SERPL W/O P-5'-P-CCNC: 15 U/L (ref 10–44)
ALT SERPL W/O P-5'-P-CCNC: 18 U/L (ref 10–44)
ALT SERPL W/O P-5'-P-CCNC: 21 U/L (ref 10–44)
ALT SERPL W/O P-5'-P-CCNC: 28 U/L (ref 10–44)
ALT SERPL W/O P-5'-P-CCNC: 32 U/L (ref 10–44)
ANION GAP SERPL CALC-SCNC: 10 MMOL/L (ref 8–16)
ANION GAP SERPL CALC-SCNC: 10 MMOL/L (ref 8–16)
ANION GAP SERPL CALC-SCNC: 11 MMOL/L (ref 8–16)
ANION GAP SERPL CALC-SCNC: 11 MMOL/L (ref 8–16)
ANION GAP SERPL CALC-SCNC: 12 MMOL/L (ref 8–16)
ANION GAP SERPL CALC-SCNC: 12 MMOL/L (ref 8–16)
ANION GAP SERPL CALC-SCNC: 13 MMOL/L (ref 8–16)
ANION GAP SERPL CALC-SCNC: 13 MMOL/L (ref 8–16)
ANION GAP SERPL CALC-SCNC: 14 MMOL/L (ref 8–16)
ANION GAP SERPL CALC-SCNC: 15 MMOL/L (ref 8–16)
ANION GAP SERPL CALC-SCNC: 16 MMOL/L (ref 8–16)
ANION GAP SERPL CALC-SCNC: 18 MMOL/L (ref 8–16)
ANION GAP SERPL CALC-SCNC: 19 MMOL/L (ref 8–16)
ANION GAP SERPL CALC-SCNC: 20 MMOL/L (ref 8–16)
ANION GAP SERPL CALC-SCNC: 20 MMOL/L (ref 8–16)
ANION GAP SERPL CALC-SCNC: 9 MMOL/L (ref 8–16)
AST SERPL-CCNC: 15 U/L (ref 10–40)
AST SERPL-CCNC: 19 U/L (ref 10–40)
AST SERPL-CCNC: 20 U/L (ref 10–40)
AST SERPL-CCNC: 29 U/L (ref 10–40)
AST SERPL-CCNC: 34 U/L (ref 10–40)
AV INDEX (PROSTH): 0.43
AV MEAN GRADIENT: 42 MMHG
AV PEAK GRADIENT: 67 MMHG
AV REGURGITATION PRESSURE HALF TIME: 372.24 MS
AV VALVE AREA: 1.32 CM2
AV VELOCITY RATIO: 0.5
BACTERIA #/AREA URNS HPF: ABNORMAL /HPF
BACTERIA BLD CULT: NORMAL
BACTERIA UR CULT: ABNORMAL
BASOPHILS # BLD AUTO: 0.01 K/UL (ref 0–0.2)
BASOPHILS # BLD AUTO: 0.01 K/UL (ref 0–0.2)
BASOPHILS # BLD AUTO: 0.02 K/UL (ref 0–0.2)
BASOPHILS # BLD AUTO: 0.03 K/UL (ref 0–0.2)
BASOPHILS # BLD AUTO: 0.05 K/UL (ref 0–0.2)
BASOPHILS NFR BLD: 0.1 % (ref 0–1.9)
BASOPHILS NFR BLD: 0.2 % (ref 0–1.9)
BASOPHILS NFR BLD: 0.3 % (ref 0–1.9)
BASOPHILS NFR BLD: 0.5 % (ref 0–1.9)
BASOPHILS NFR BLD: 0.6 % (ref 0–1.9)
BASOPHILS NFR BLD: 0.6 % (ref 0–1.9)
BASOPHILS NFR BLD: 0.7 % (ref 0–1.9)
BASOPHILS NFR BLD: 1 % (ref 0–1.9)
BASOPHILS NFR BLD: 1 % (ref 0–1.9)
BILIRUB SERPL-MCNC: 0.9 MG/DL (ref 0.1–1)
BILIRUB SERPL-MCNC: 0.9 MG/DL (ref 0.1–1)
BILIRUB SERPL-MCNC: 1 MG/DL (ref 0.1–1)
BILIRUB SERPL-MCNC: 1 MG/DL (ref 0.1–1)
BILIRUB SERPL-MCNC: 1.4 MG/DL (ref 0.1–1)
BILIRUB SERPL-MCNC: 1.4 MG/DL (ref 0.1–1)
BILIRUB SERPL-MCNC: 1.5 MG/DL (ref 0.1–1)
BILIRUB UR QL STRIP: NEGATIVE
BLD GP AB SCN CELLS X3 SERPL QL: NORMAL
BNP SERPL-MCNC: 2947 PG/ML (ref 0–99)
BNP SERPL-MCNC: 3036 PG/ML (ref 0–99)
BNP SERPL-MCNC: 3076 PG/ML (ref 0–99)
BNP SERPL-MCNC: 3276 PG/ML (ref 0–99)
BSA FOR ECHO PROCEDURE: 1.6 M2
BUN SERPL-MCNC: 111 MG/DL (ref 8–23)
BUN SERPL-MCNC: 114 MG/DL (ref 8–23)
BUN SERPL-MCNC: 117 MG/DL (ref 6–30)
BUN SERPL-MCNC: 28 MG/DL (ref 8–23)
BUN SERPL-MCNC: 29 MG/DL (ref 8–23)
BUN SERPL-MCNC: 34 MG/DL (ref 8–23)
BUN SERPL-MCNC: 38 MG/DL (ref 8–23)
BUN SERPL-MCNC: 39 MG/DL (ref 8–23)
BUN SERPL-MCNC: 48 MG/DL (ref 8–23)
BUN SERPL-MCNC: 59 MG/DL (ref 8–23)
BUN SERPL-MCNC: 62 MG/DL (ref 8–23)
BUN SERPL-MCNC: 62 MG/DL (ref 8–23)
BUN SERPL-MCNC: 63 MG/DL (ref 8–23)
BUN SERPL-MCNC: 67 MG/DL (ref 8–23)
BUN SERPL-MCNC: 71 MG/DL (ref 8–23)
BUN SERPL-MCNC: 71 MG/DL (ref 8–23)
BUN SERPL-MCNC: 73 MG/DL (ref 8–23)
BUN SERPL-MCNC: 81 MG/DL (ref 8–23)
CALCIUM SERPL-MCNC: 7.9 MG/DL (ref 8.7–10.5)
CALCIUM SERPL-MCNC: 8 MG/DL (ref 8.7–10.5)
CALCIUM SERPL-MCNC: 8.1 MG/DL (ref 8.7–10.5)
CALCIUM SERPL-MCNC: 8.2 MG/DL (ref 8.7–10.5)
CALCIUM SERPL-MCNC: 8.4 MG/DL (ref 8.7–10.5)
CALCIUM SERPL-MCNC: 8.4 MG/DL (ref 8.7–10.5)
CALCIUM SERPL-MCNC: 8.5 MG/DL (ref 8.7–10.5)
CALCIUM SERPL-MCNC: 8.5 MG/DL (ref 8.7–10.5)
CALCIUM SERPL-MCNC: 8.6 MG/DL (ref 8.7–10.5)
CALCIUM SERPL-MCNC: 8.6 MG/DL (ref 8.7–10.5)
CALCIUM SERPL-MCNC: 8.7 MG/DL (ref 8.7–10.5)
CALCIUM SERPL-MCNC: 8.9 MG/DL (ref 8.7–10.5)
CALCIUM SERPL-MCNC: 9 MG/DL (ref 8.7–10.5)
CALCIUM SERPL-MCNC: 9.1 MG/DL (ref 8.7–10.5)
CALCIUM SERPL-MCNC: 9.2 MG/DL (ref 8.7–10.5)
CALCIUM SERPL-MCNC: 9.2 MG/DL (ref 8.7–10.5)
CALCIUM SERPL-MCNC: 9.4 MG/DL (ref 8.7–10.5)
CHLORIDE SERPL-SCNC: 100 MMOL/L (ref 95–110)
CHLORIDE SERPL-SCNC: 101 MMOL/L (ref 95–110)
CHLORIDE SERPL-SCNC: 102 MMOL/L (ref 95–110)
CHLORIDE SERPL-SCNC: 94 MMOL/L (ref 95–110)
CHLORIDE SERPL-SCNC: 95 MMOL/L (ref 95–110)
CHLORIDE SERPL-SCNC: 95 MMOL/L (ref 95–110)
CHLORIDE SERPL-SCNC: 96 MMOL/L (ref 95–110)
CHLORIDE SERPL-SCNC: 97 MMOL/L (ref 95–110)
CHLORIDE SERPL-SCNC: 99 MMOL/L (ref 95–110)
CLARITY UR: ABNORMAL
CO2 SERPL-SCNC: 19 MMOL/L (ref 23–29)
CO2 SERPL-SCNC: 21 MMOL/L (ref 23–29)
CO2 SERPL-SCNC: 22 MMOL/L (ref 23–29)
CO2 SERPL-SCNC: 23 MMOL/L (ref 23–29)
CO2 SERPL-SCNC: 23 MMOL/L (ref 23–29)
CO2 SERPL-SCNC: 26 MMOL/L (ref 23–29)
CO2 SERPL-SCNC: 27 MMOL/L (ref 23–29)
CO2 SERPL-SCNC: 27 MMOL/L (ref 23–29)
CO2 SERPL-SCNC: 28 MMOL/L (ref 23–29)
CO2 SERPL-SCNC: 29 MMOL/L (ref 23–29)
CO2 SERPL-SCNC: 31 MMOL/L (ref 23–29)
CO2 SERPL-SCNC: 32 MMOL/L (ref 23–29)
CO2 SERPL-SCNC: 34 MMOL/L (ref 23–29)
COLOR UR: ABNORMAL
CREAT SERPL-MCNC: 10.3 MG/DL (ref 0.5–1.4)
CREAT SERPL-MCNC: 10.3 MG/DL (ref 0.5–1.4)
CREAT SERPL-MCNC: 10.9 MG/DL (ref 0.5–1.4)
CREAT SERPL-MCNC: 5 MG/DL (ref 0.5–1.4)
CREAT SERPL-MCNC: 5.1 MG/DL (ref 0.5–1.4)
CREAT SERPL-MCNC: 5.7 MG/DL (ref 0.5–1.4)
CREAT SERPL-MCNC: 6 MG/DL (ref 0.5–1.4)
CREAT SERPL-MCNC: 6.3 MG/DL (ref 0.5–1.4)
CREAT SERPL-MCNC: 6.4 MG/DL (ref 0.5–1.4)
CREAT SERPL-MCNC: 6.5 MG/DL (ref 0.5–1.4)
CREAT SERPL-MCNC: 7 MG/DL (ref 0.5–1.4)
CREAT SERPL-MCNC: 7.1 MG/DL (ref 0.5–1.4)
CREAT SERPL-MCNC: 7.1 MG/DL (ref 0.5–1.4)
CREAT SERPL-MCNC: 7.3 MG/DL (ref 0.5–1.4)
CREAT SERPL-MCNC: 7.6 MG/DL (ref 0.5–1.4)
CREAT SERPL-MCNC: 7.6 MG/DL (ref 0.5–1.4)
CREAT SERPL-MCNC: 7.8 MG/DL (ref 0.5–1.4)
CREAT SERPL-MCNC: 9.2 MG/DL (ref 0.5–1.4)
CTP QC/QA: YES
CV ECHO LV RWT: 0.44 CM
DELSYS: ABNORMAL
DIFFERENTIAL METHOD: ABNORMAL
DOP CALC AO PEAK VEL: 4.08 M/S
DOP CALC AO VTI: 113 CM
DOP CALC LVOT AREA: 3 CM2
DOP CALC LVOT DIAMETER: 1.97 CM
DOP CALC LVOT PEAK VEL: 2.05 M/S
DOP CALC LVOT STROKE VOLUME: 148.67 CM3
DOP CALCLVOT PEAK VEL VTI: 48.8 CM
E WAVE DECELERATION TIME: 205.05 MSEC
E/A RATIO: 1.28
ECHO LV POSTERIOR WALL: 1.03 CM (ref 0.6–1.1)
EJECTION FRACTION: 55 %
EOSINOPHIL # BLD AUTO: 0 K/UL (ref 0–0.5)
EOSINOPHIL # BLD AUTO: 0.1 K/UL (ref 0–0.5)
EOSINOPHIL # BLD AUTO: 0.2 K/UL (ref 0–0.5)
EOSINOPHIL # BLD AUTO: 0.3 K/UL (ref 0–0.5)
EOSINOPHIL # BLD AUTO: 0.3 K/UL (ref 0–0.5)
EOSINOPHIL # BLD AUTO: 0.4 K/UL (ref 0–0.5)
EOSINOPHIL NFR BLD: 0.3 % (ref 0–8)
EOSINOPHIL NFR BLD: 0.8 % (ref 0–8)
EOSINOPHIL NFR BLD: 1.1 % (ref 0–8)
EOSINOPHIL NFR BLD: 10.9 % (ref 0–8)
EOSINOPHIL NFR BLD: 2.1 % (ref 0–8)
EOSINOPHIL NFR BLD: 2.4 % (ref 0–8)
EOSINOPHIL NFR BLD: 2.9 % (ref 0–8)
EOSINOPHIL NFR BLD: 8.1 % (ref 0–8)
EOSINOPHIL NFR BLD: 8.2 % (ref 0–8)
EP: 12
ERYTHROCYTE [DISTWIDTH] IN BLOOD BY AUTOMATED COUNT: 14.5 % (ref 11.5–14.5)
ERYTHROCYTE [DISTWIDTH] IN BLOOD BY AUTOMATED COUNT: 14.6 % (ref 11.5–14.5)
ERYTHROCYTE [DISTWIDTH] IN BLOOD BY AUTOMATED COUNT: 14.7 % (ref 11.5–14.5)
ERYTHROCYTE [DISTWIDTH] IN BLOOD BY AUTOMATED COUNT: 14.8 % (ref 11.5–14.5)
ERYTHROCYTE [DISTWIDTH] IN BLOOD BY AUTOMATED COUNT: 14.9 % (ref 11.5–14.5)
ERYTHROCYTE [DISTWIDTH] IN BLOOD BY AUTOMATED COUNT: 15.1 % (ref 11.5–14.5)
ERYTHROCYTE [DISTWIDTH] IN BLOOD BY AUTOMATED COUNT: 15.6 % (ref 11.5–14.5)
ERYTHROCYTE [DISTWIDTH] IN BLOOD BY AUTOMATED COUNT: 15.8 % (ref 11.5–14.5)
ERYTHROCYTE [DISTWIDTH] IN BLOOD BY AUTOMATED COUNT: 16.2 % (ref 11.5–14.5)
ERYTHROCYTE [DISTWIDTH] IN BLOOD BY AUTOMATED COUNT: 16.5 % (ref 11.5–14.5)
ERYTHROCYTE [DISTWIDTH] IN BLOOD BY AUTOMATED COUNT: 16.6 % (ref 11.5–14.5)
ERYTHROCYTE [SEDIMENTATION RATE] IN BLOOD BY WESTERGREN METHOD: 10 MM/H
EST. GFR  (AFRICAN AMERICAN): 11 ML/MIN/1.73 M^2
EST. GFR  (AFRICAN AMERICAN): 4 ML/MIN/1.73 M^2
EST. GFR  (AFRICAN AMERICAN): 5 ML/MIN/1.73 M^2
EST. GFR  (AFRICAN AMERICAN): 7 ML/MIN/1.73 M^2
EST. GFR  (AFRICAN AMERICAN): 7 ML/MIN/1.73 M^2
EST. GFR  (AFRICAN AMERICAN): 9 ML/MIN/1.73 M^2
EST. GFR  (NO RACE VARIABLE): 11 ML/MIN/1.73 M^2
EST. GFR  (NO RACE VARIABLE): 5 ML/MIN/1.73 M^2
EST. GFR  (NO RACE VARIABLE): 6 ML/MIN/1.73 M^2
EST. GFR  (NO RACE VARIABLE): 7 ML/MIN/1.73 M^2
EST. GFR  (NO RACE VARIABLE): 8 ML/MIN/1.73 M^2
EST. GFR  (NO RACE VARIABLE): 9 ML/MIN/1.73 M^2
EST. GFR  (NON AFRICAN AMERICAN): 10 ML/MIN/1.73 M^2
EST. GFR  (NON AFRICAN AMERICAN): 4 ML/MIN/1.73 M^2
EST. GFR  (NON AFRICAN AMERICAN): 4 ML/MIN/1.73 M^2
EST. GFR  (NON AFRICAN AMERICAN): 6 ML/MIN/1.73 M^2
EST. GFR  (NON AFRICAN AMERICAN): 6 ML/MIN/1.73 M^2
EST. GFR  (NON AFRICAN AMERICAN): 8 ML/MIN/1.73 M^2
FIO2: 100
FOLATE SERPL-MCNC: 17.6 NG/ML (ref 4–24)
FRACTIONAL SHORTENING: 28 % (ref 28–44)
GLUCOSE SERPL-MCNC: 102 MG/DL (ref 70–110)
GLUCOSE SERPL-MCNC: 105 MG/DL (ref 70–110)
GLUCOSE SERPL-MCNC: 109 MG/DL (ref 70–110)
GLUCOSE SERPL-MCNC: 113 MG/DL (ref 70–110)
GLUCOSE SERPL-MCNC: 126 MG/DL (ref 70–110)
GLUCOSE SERPL-MCNC: 152 MG/DL (ref 70–110)
GLUCOSE SERPL-MCNC: 155 MG/DL (ref 70–110)
GLUCOSE SERPL-MCNC: 80 MG/DL (ref 70–110)
GLUCOSE SERPL-MCNC: 83 MG/DL (ref 70–110)
GLUCOSE SERPL-MCNC: 85 MG/DL (ref 70–110)
GLUCOSE SERPL-MCNC: 86 MG/DL (ref 70–110)
GLUCOSE SERPL-MCNC: 94 MG/DL (ref 70–110)
GLUCOSE SERPL-MCNC: 96 MG/DL (ref 70–110)
GLUCOSE SERPL-MCNC: 98 MG/DL (ref 70–110)
GLUCOSE SERPL-MCNC: 98 MG/DL (ref 70–110)
GLUCOSE SERPL-MCNC: 99 MG/DL (ref 70–110)
GLUCOSE UR QL STRIP: NEGATIVE
HAV IGM SERPL QL IA: NEGATIVE
HAV IGM SERPL QL IA: NEGATIVE
HBV CORE IGM SERPL QL IA: NEGATIVE
HBV CORE IGM SERPL QL IA: NEGATIVE
HBV SURFACE AB SER QL IA: POSITIVE
HBV SURFACE AB SER QL IA: POSITIVE
HBV SURFACE AB SERPL IA-ACNC: 41 MIU/ML
HBV SURFACE AB SERPL IA-ACNC: 51 MIU/ML
HBV SURFACE AG SERPL QL IA: NEGATIVE
HBV SURFACE AG SERPL QL IA: NORMAL
HCO3 UR-SCNC: 25.7 MMOL/L (ref 24–28)
HCO3 UR-SCNC: 40 MMOL/L (ref 24–28)
HCT VFR BLD AUTO: 20.7 % (ref 40–54)
HCT VFR BLD AUTO: 29 % (ref 40–54)
HCT VFR BLD AUTO: 30.8 % (ref 40–54)
HCT VFR BLD AUTO: 32.2 % (ref 40–54)
HCT VFR BLD AUTO: 32.3 % (ref 40–54)
HCT VFR BLD AUTO: 32.8 % (ref 40–54)
HCT VFR BLD AUTO: 32.8 % (ref 40–54)
HCT VFR BLD AUTO: 33 % (ref 40–54)
HCT VFR BLD AUTO: 33.4 % (ref 40–54)
HCT VFR BLD AUTO: 34 % (ref 40–54)
HCT VFR BLD AUTO: 34.1 % (ref 40–54)
HCT VFR BLD CALC: 33 %PCV (ref 36–54)
HCV AB SERPL QL IA: NEGATIVE
HCV AB SERPL QL IA: NEGATIVE
HD FISTULA OUTFLOW VEIN VESSEL: NORMAL
HD FISTULA OUTFLOW VEIN VESSEL: NORMAL
HD INFLOW ARTERY VESSEL: NORMAL
HD INFLOW ARTERY VESSEL: NORMAL
HGB BLD-MCNC: 10.2 G/DL (ref 14–18)
HGB BLD-MCNC: 10.8 G/DL (ref 14–18)
HGB BLD-MCNC: 10.9 G/DL (ref 14–18)
HGB BLD-MCNC: 10.9 G/DL (ref 14–18)
HGB BLD-MCNC: 11.1 G/DL (ref 14–18)
HGB BLD-MCNC: 11.1 G/DL (ref 14–18)
HGB BLD-MCNC: 11.3 G/DL (ref 14–18)
HGB BLD-MCNC: 11.4 G/DL (ref 14–18)
HGB BLD-MCNC: 11.6 G/DL (ref 14–18)
HGB BLD-MCNC: 6.7 G/DL (ref 14–18)
HGB BLD-MCNC: 9.6 G/DL (ref 14–18)
HGB UR QL STRIP: ABNORMAL
HYALINE CASTS #/AREA URNS LPF: 0 /LPF
IMM GRANULOCYTES # BLD AUTO: 0 K/UL (ref 0–0.04)
IMM GRANULOCYTES # BLD AUTO: 0.01 K/UL (ref 0–0.04)
IMM GRANULOCYTES # BLD AUTO: 0.02 K/UL (ref 0–0.04)
IMM GRANULOCYTES # BLD AUTO: 0.1 K/UL (ref 0–0.04)
IMM GRANULOCYTES NFR BLD AUTO: 0 % (ref 0–0.5)
IMM GRANULOCYTES NFR BLD AUTO: 0.2 % (ref 0–0.5)
IMM GRANULOCYTES NFR BLD AUTO: 0.3 % (ref 0–0.5)
IMM GRANULOCYTES NFR BLD AUTO: 0.4 % (ref 0–0.5)
IMM GRANULOCYTES NFR BLD AUTO: 0.8 % (ref 0–0.5)
INR PPP: 1.1 (ref 0.8–1.2)
INTERVENTRICULAR SEPTUM: 1 CM (ref 0.6–1.1)
IP: 18
IVC DIAMETER: 1.81 CM
IVRT: 0 MSEC
KETONES UR QL STRIP: NEGATIVE
LA MAJOR: 6.55 CM
LA MINOR: 6.39 CM
LA WIDTH: 4.8 CM
LEFT ATRIUM SIZE: 4.42 CM
LEFT ATRIUM VOLUME INDEX: 71.6 ML/M2
LEFT ATRIUM VOLUME: 116.66 CM3
LEFT INTERNAL DIMENSION IN SYSTOLE: 3.4 CM (ref 2.1–4)
LEFT VENTRICLE DIASTOLIC VOLUME INDEX: 62.64 ML/M2
LEFT VENTRICLE DIASTOLIC VOLUME: 102.1 ML
LEFT VENTRICLE MASS INDEX: 103 G/M2
LEFT VENTRICLE SYSTOLIC VOLUME INDEX: 29 ML/M2
LEFT VENTRICLE SYSTOLIC VOLUME: 47.34 ML
LEFT VENTRICULAR INTERNAL DIMENSION IN DIASTOLE: 4.69 CM (ref 3.5–6)
LEFT VENTRICULAR MASS: 167.25 G
LEUKOCYTE ESTERASE UR QL STRIP: ABNORMAL
LV SEPTAL E/E' RATIO: 27.4 M/S
LVOT MG: 9.22 MMHG
LVOT MV: 1.41 CM/S
LYMPHOCYTES # BLD AUTO: 0.7 K/UL (ref 1–4.8)
LYMPHOCYTES # BLD AUTO: 0.7 K/UL (ref 1–4.8)
LYMPHOCYTES # BLD AUTO: 0.8 K/UL (ref 1–4.8)
LYMPHOCYTES # BLD AUTO: 0.9 K/UL (ref 1–4.8)
LYMPHOCYTES # BLD AUTO: 1.3 K/UL (ref 1–4.8)
LYMPHOCYTES # BLD AUTO: 1.4 K/UL (ref 1–4.8)
LYMPHOCYTES # BLD AUTO: 1.5 K/UL (ref 1–4.8)
LYMPHOCYTES NFR BLD: 16.5 % (ref 18–48)
LYMPHOCYTES NFR BLD: 16.7 % (ref 18–48)
LYMPHOCYTES NFR BLD: 22.4 % (ref 18–48)
LYMPHOCYTES NFR BLD: 22.8 % (ref 18–48)
LYMPHOCYTES NFR BLD: 23 % (ref 18–48)
LYMPHOCYTES NFR BLD: 24.1 % (ref 18–48)
LYMPHOCYTES NFR BLD: 26.7 % (ref 18–48)
LYMPHOCYTES NFR BLD: 28.5 % (ref 18–48)
LYMPHOCYTES NFR BLD: 6.1 % (ref 18–48)
MAGNESIUM SERPL-MCNC: 2 MG/DL (ref 1.6–2.6)
MAGNESIUM SERPL-MCNC: 2.2 MG/DL (ref 1.6–2.6)
MAGNESIUM SERPL-MCNC: 2.4 MG/DL (ref 1.6–2.6)
MAGNESIUM SERPL-MCNC: 2.5 MG/DL (ref 1.6–2.6)
MCH RBC QN AUTO: 32.7 PG (ref 27–31)
MCH RBC QN AUTO: 33.1 PG (ref 27–31)
MCH RBC QN AUTO: 33.4 PG (ref 27–31)
MCH RBC QN AUTO: 33.7 PG (ref 27–31)
MCH RBC QN AUTO: 33.8 PG (ref 27–31)
MCH RBC QN AUTO: 33.9 PG (ref 27–31)
MCH RBC QN AUTO: 34 PG (ref 27–31)
MCH RBC QN AUTO: 34.2 PG (ref 27–31)
MCH RBC QN AUTO: 34.4 PG (ref 27–31)
MCHC RBC AUTO-ENTMCNC: 32.4 G/DL (ref 32–36)
MCHC RBC AUTO-ENTMCNC: 33 G/DL (ref 32–36)
MCHC RBC AUTO-ENTMCNC: 33.1 G/DL (ref 32–36)
MCHC RBC AUTO-ENTMCNC: 33.1 G/DL (ref 32–36)
MCHC RBC AUTO-ENTMCNC: 33.2 G/DL (ref 32–36)
MCHC RBC AUTO-ENTMCNC: 33.2 G/DL (ref 32–36)
MCHC RBC AUTO-ENTMCNC: 33.5 G/DL (ref 32–36)
MCHC RBC AUTO-ENTMCNC: 33.5 G/DL (ref 32–36)
MCHC RBC AUTO-ENTMCNC: 34 G/DL (ref 32–36)
MCHC RBC AUTO-ENTMCNC: 34.4 G/DL (ref 32–36)
MCHC RBC AUTO-ENTMCNC: 34.5 G/DL (ref 32–36)
MCV RBC AUTO: 100 FL (ref 82–98)
MCV RBC AUTO: 101 FL (ref 82–98)
MCV RBC AUTO: 101 FL (ref 82–98)
MCV RBC AUTO: 102 FL (ref 82–98)
MCV RBC AUTO: 103 FL (ref 82–98)
MCV RBC AUTO: 104 FL (ref 82–98)
MCV RBC AUTO: 106 FL (ref 82–98)
MCV RBC AUTO: 97 FL (ref 82–98)
MCV RBC AUTO: 99 FL (ref 82–98)
MICROSCOPIC COMMENT: ABNORMAL
MIN VOL: 16.6
MODE: ABNORMAL
MONOCYTES # BLD AUTO: 0.3 K/UL (ref 0.3–1)
MONOCYTES # BLD AUTO: 0.4 K/UL (ref 0.3–1)
MONOCYTES # BLD AUTO: 0.5 K/UL (ref 0.3–1)
MONOCYTES # BLD AUTO: 0.8 K/UL (ref 0.3–1)
MONOCYTES NFR BLD: 10.9 % (ref 4–15)
MONOCYTES NFR BLD: 11 % (ref 4–15)
MONOCYTES NFR BLD: 11.3 % (ref 4–15)
MONOCYTES NFR BLD: 6.4 % (ref 4–15)
MONOCYTES NFR BLD: 7.1 % (ref 4–15)
MONOCYTES NFR BLD: 7.3 % (ref 4–15)
MONOCYTES NFR BLD: 7.6 % (ref 4–15)
MONOCYTES NFR BLD: 8.6 % (ref 4–15)
MONOCYTES NFR BLD: 8.9 % (ref 4–15)
MV PEAK A VEL: 1.07 M/S
MV PEAK E VEL: 1.37 M/S
MV STENOSIS PRESSURE HALF TIME: 59.46 MS
MV VALVE AREA P 1/2 METHOD: 3.7 CM2
NEUTROPHILS # BLD AUTO: 1.6 K/UL (ref 1.8–7.7)
NEUTROPHILS # BLD AUTO: 1.7 K/UL (ref 1.8–7.7)
NEUTROPHILS # BLD AUTO: 10.2 K/UL (ref 1.8–7.7)
NEUTROPHILS # BLD AUTO: 2.8 K/UL (ref 1.8–7.7)
NEUTROPHILS # BLD AUTO: 3.2 K/UL (ref 1.8–7.7)
NEUTROPHILS # BLD AUTO: 3.8 K/UL (ref 1.8–7.7)
NEUTROPHILS # BLD AUTO: 3.9 K/UL (ref 1.8–7.7)
NEUTROPHILS # BLD AUTO: 4.4 K/UL (ref 1.8–7.7)
NEUTROPHILS # BLD AUTO: 5.6 K/UL (ref 1.8–7.7)
NEUTROPHILS NFR BLD: 51.4 % (ref 38–73)
NEUTROPHILS NFR BLD: 53.7 % (ref 38–73)
NEUTROPHILS NFR BLD: 55.7 % (ref 38–73)
NEUTROPHILS NFR BLD: 66.1 % (ref 38–73)
NEUTROPHILS NFR BLD: 66.9 % (ref 38–73)
NEUTROPHILS NFR BLD: 67.8 % (ref 38–73)
NEUTROPHILS NFR BLD: 69.2 % (ref 38–73)
NEUTROPHILS NFR BLD: 73.2 % (ref 38–73)
NEUTROPHILS NFR BLD: 85.6 % (ref 38–73)
NITRITE UR QL STRIP: NEGATIVE
NRBC BLD-RTO: 0 /100 WBC
PCO2 BLDA: 47.1 MMHG (ref 35–45)
PCO2 BLDA: 50 MMHG (ref 35–45)
PH SMN: 7.34 [PH] (ref 7.35–7.45)
PH SMN: 7.51 [PH] (ref 7.35–7.45)
PH UR STRIP: 7 [PH] (ref 5–8)
PHOSPHATE SERPL-MCNC: 2.6 MG/DL (ref 2.7–4.5)
PHOSPHATE SERPL-MCNC: 4.3 MG/DL (ref 2.7–4.5)
PHOSPHATE SERPL-MCNC: 4.4 MG/DL (ref 2.7–4.5)
PHOSPHATE SERPL-MCNC: 4.6 MG/DL (ref 2.7–4.5)
PHOSPHATE SERPL-MCNC: 5.3 MG/DL (ref 2.7–4.5)
PHOSPHATE SERPL-MCNC: 5.6 MG/DL (ref 2.7–4.5)
PHOSPHATE SERPL-MCNC: 5.7 MG/DL (ref 2.7–4.5)
PHOSPHATE SERPL-MCNC: 5.8 MG/DL (ref 2.7–4.5)
PHOSPHATE SERPL-MCNC: 5.9 MG/DL (ref 2.7–4.5)
PHOSPHATE SERPL-MCNC: 6.3 MG/DL (ref 2.7–4.5)
PHOSPHATE SERPL-MCNC: 6.3 MG/DL (ref 2.7–4.5)
PHOSPHATE SERPL-MCNC: 6.7 MG/DL (ref 2.7–4.5)
PISA AR MAX VEL: 3.69 M/S
PISA TR MAX VEL: 3.75 M/S
PLATELET # BLD AUTO: 101 K/UL (ref 150–450)
PLATELET # BLD AUTO: 110 K/UL (ref 150–450)
PLATELET # BLD AUTO: 124 K/UL (ref 150–450)
PLATELET # BLD AUTO: 127 K/UL (ref 150–450)
PLATELET # BLD AUTO: 136 K/UL (ref 150–450)
PLATELET # BLD AUTO: 138 K/UL (ref 150–450)
PLATELET # BLD AUTO: 152 K/UL (ref 150–450)
PLATELET # BLD AUTO: 180 K/UL (ref 150–450)
PLATELET # BLD AUTO: 77 K/UL (ref 150–450)
PLATELET # BLD AUTO: 93 K/UL (ref 150–450)
PLATELET # BLD AUTO: 98 K/UL (ref 150–450)
PLATELET BLD QL SMEAR: NORMAL
PMV BLD AUTO: 10 FL (ref 9.2–12.9)
PMV BLD AUTO: 10.3 FL (ref 9.2–12.9)
PMV BLD AUTO: 10.4 FL (ref 9.2–12.9)
PMV BLD AUTO: 10.5 FL (ref 9.2–12.9)
PMV BLD AUTO: 10.6 FL (ref 9.2–12.9)
PMV BLD AUTO: 11.1 FL (ref 9.2–12.9)
PMV BLD AUTO: 11.4 FL (ref 9.2–12.9)
PMV BLD AUTO: 9.9 FL (ref 9.2–12.9)
PO2 BLDA: 21 MMHG (ref 40–60)
PO2 BLDA: 39 MMHG (ref 40–60)
POC BE: 0 MMOL/L
POC BE: 15 MMOL/L
POC IONIZED CALCIUM: 1.01 MMOL/L (ref 1.06–1.42)
POC MOLECULAR INFLUENZA A AGN: NEGATIVE
POC MOLECULAR INFLUENZA B AGN: NEGATIVE
POC SATURATED O2: 39 % (ref 95–100)
POC SATURATED O2: 70 % (ref 95–100)
POC TCO2 (MEASURED): 26 MMOL/L (ref 23–29)
POC TCO2: 27 MMOL/L (ref 24–29)
POC TCO2: 42 MMOL/L (ref 24–29)
POCT GLUCOSE: 98 MG/DL (ref 70–110)
POTASSIUM BLD-SCNC: 3.9 MMOL/L (ref 3.5–5.1)
POTASSIUM SERPL-SCNC: 3.2 MMOL/L (ref 3.5–5.1)
POTASSIUM SERPL-SCNC: 3.7 MMOL/L (ref 3.5–5.1)
POTASSIUM SERPL-SCNC: 3.7 MMOL/L (ref 3.5–5.1)
POTASSIUM SERPL-SCNC: 3.8 MMOL/L (ref 3.5–5.1)
POTASSIUM SERPL-SCNC: 3.9 MMOL/L (ref 3.5–5.1)
POTASSIUM SERPL-SCNC: 4.1 MMOL/L (ref 3.5–5.1)
POTASSIUM SERPL-SCNC: 4.2 MMOL/L (ref 3.5–5.1)
POTASSIUM SERPL-SCNC: 4.4 MMOL/L (ref 3.5–5.1)
POTASSIUM SERPL-SCNC: 4.5 MMOL/L (ref 3.5–5.1)
POTASSIUM SERPL-SCNC: 4.5 MMOL/L (ref 3.5–5.1)
POTASSIUM SERPL-SCNC: 4.6 MMOL/L (ref 3.5–5.1)
POTASSIUM SERPL-SCNC: 4.8 MMOL/L (ref 3.5–5.1)
POTASSIUM SERPL-SCNC: 4.9 MMOL/L (ref 3.5–5.1)
PROT SERPL-MCNC: 6.4 G/DL (ref 6–8.4)
PROT SERPL-MCNC: 7.5 G/DL (ref 6–8.4)
PROT SERPL-MCNC: 7.5 G/DL (ref 6–8.4)
PROT SERPL-MCNC: 7.7 G/DL (ref 6–8.4)
PROT SERPL-MCNC: 7.8 G/DL (ref 6–8.4)
PROT SERPL-MCNC: 7.8 G/DL (ref 6–8.4)
PROT SERPL-MCNC: 8 G/DL (ref 6–8.4)
PROT UR QL STRIP: ABNORMAL
PROTHROMBIN TIME: 12.1 SEC (ref 9–12.5)
PV PEAK VELOCITY: 1.13 CM/S
RA MAJOR: 6.04 CM
RA PRESSURE: 3 MMHG
RA WIDTH: 3.9 CM
RBC # BLD AUTO: 1.95 M/UL (ref 4.6–6.2)
RBC # BLD AUTO: 2.81 M/UL (ref 4.6–6.2)
RBC # BLD AUTO: 3.12 M/UL (ref 4.6–6.2)
RBC # BLD AUTO: 3.17 M/UL (ref 4.6–6.2)
RBC # BLD AUTO: 3.18 M/UL (ref 4.6–6.2)
RBC # BLD AUTO: 3.23 M/UL (ref 4.6–6.2)
RBC # BLD AUTO: 3.29 M/UL (ref 4.6–6.2)
RBC # BLD AUTO: 3.29 M/UL (ref 4.6–6.2)
RBC # BLD AUTO: 3.37 M/UL (ref 4.6–6.2)
RBC # BLD AUTO: 3.38 M/UL (ref 4.6–6.2)
RBC # BLD AUTO: 3.42 M/UL (ref 4.6–6.2)
RBC #/AREA URNS HPF: 3 /HPF (ref 0–4)
RIGHT DIS GRAFT PSV: 174 CM/ SEC
RIGHT DIS GRAFT PSV: 221 CM/ SEC
RIGHT INFLOW PSV: 246 CM/S
RIGHT INFLOW PSV: 322 CM/S
RIGHT MID GRAFT PSV: 194 CM/S
RIGHT MID GRAFT PSV: 252 CM/S
RIGHT OUTFLOW VEIN PSV: 190 CM/ SEC
RIGHT OUTFLOW VEIN PSV: 73 CM/ SEC
RIGHT PROX ANA PSV: 141 CM/S
RIGHT PROX ANA PSV: 291 CM/S
RIGHT PROX GRAFT PSV: 270 CM/S
RIGHT PROX GRAFT PSV: 612 CM/S
RIGHT VENTRICULAR END-DIASTOLIC DIMENSION: 4.07 CM
RIGHT VOLUME FLOW PSV: 4042 ML/MIN
RIGHT VOLUME FLOW PSV: 5775 ML/MIN
SAMPLE: ABNORMAL
SARS-COV-2 RDRP RESP QL NAA+PROBE: NEGATIVE
SINUS: 3 CM
SITE: ABNORMAL
SITE: ABNORMAL
SODIUM BLD-SCNC: 139 MMOL/L (ref 136–145)
SODIUM SERPL-SCNC: 133 MMOL/L (ref 136–145)
SODIUM SERPL-SCNC: 135 MMOL/L (ref 136–145)
SODIUM SERPL-SCNC: 135 MMOL/L (ref 136–145)
SODIUM SERPL-SCNC: 136 MMOL/L (ref 136–145)
SODIUM SERPL-SCNC: 137 MMOL/L (ref 136–145)
SODIUM SERPL-SCNC: 137 MMOL/L (ref 136–145)
SODIUM SERPL-SCNC: 139 MMOL/L (ref 136–145)
SODIUM SERPL-SCNC: 140 MMOL/L (ref 136–145)
SODIUM SERPL-SCNC: 141 MMOL/L (ref 136–145)
SODIUM SERPL-SCNC: 143 MMOL/L (ref 136–145)
SP GR UR STRIP: 1.01 (ref 1–1.03)
SP02: 98
SPONT RATE: 14
SQUAMOUS #/AREA URNS HPF: 0 /HPF
STJ: 2.42 CM
TDI SEPTAL: 0.05 M/S
TR MAX PG: 56 MMHG
TRICUSPID ANNULAR PLANE SYSTOLIC EXCURSION: 2.4 CM
TROPONIN I SERPL DL<=0.01 NG/ML-MCNC: 0.02 NG/ML (ref 0–0.03)
TROPONIN I SERPL DL<=0.01 NG/ML-MCNC: 0.04 NG/ML (ref 0–0.03)
TROPONIN I SERPL DL<=0.01 NG/ML-MCNC: 0.05 NG/ML (ref 0–0.03)
TROPONIN I SERPL DL<=0.01 NG/ML-MCNC: 0.05 NG/ML (ref 0–0.03)
TROPONIN I SERPL DL<=0.01 NG/ML-MCNC: 0.07 NG/ML (ref 0–0.03)
TV PEAK GRADIENT: 18.88 MMHG
TV REST PULMONARY ARTERY PRESSURE: 59 MMHG
URN SPEC COLLECT METH UR: ABNORMAL
UROBILINOGEN UR STRIP-ACNC: NEGATIVE EU/DL
VIT B12 SERPL-MCNC: 1320 PG/ML (ref 210–950)
WBC # BLD AUTO: 11.87 K/UL (ref 3.9–12.7)
WBC # BLD AUTO: 15.04 K/UL (ref 3.9–12.7)
WBC # BLD AUTO: 3.09 K/UL (ref 3.9–12.7)
WBC # BLD AUTO: 3.11 K/UL (ref 3.9–12.7)
WBC # BLD AUTO: 4.51 K/UL (ref 3.9–12.7)
WBC # BLD AUTO: 4.67 K/UL (ref 3.9–12.7)
WBC # BLD AUTO: 4.98 K/UL (ref 3.9–12.7)
WBC # BLD AUTO: 5.61 K/UL (ref 3.9–12.7)
WBC # BLD AUTO: 5.84 K/UL (ref 3.9–12.7)
WBC # BLD AUTO: 6.49 K/UL (ref 3.9–12.7)
WBC # BLD AUTO: 7.69 K/UL (ref 3.9–12.7)
WBC #/AREA URNS HPF: >100 /HPF (ref 0–5)
YEAST URNS QL MICRO: ABNORMAL

## 2022-01-01 PROCEDURE — 63600175 PHARM REV CODE 636 W HCPCS: Performed by: INTERNAL MEDICINE

## 2022-01-01 PROCEDURE — 1101F PT FALLS ASSESS-DOCD LE1/YR: CPT | Mod: CPTII,S$GLB,, | Performed by: INTERNAL MEDICINE

## 2022-01-01 PROCEDURE — 36415 COLL VENOUS BLD VENIPUNCTURE: CPT | Performed by: PHYSICIAN ASSISTANT

## 2022-01-01 PROCEDURE — 99223 PR INITIAL HOSPITAL CARE,LEVL III: ICD-10-PCS | Mod: ,,, | Performed by: STUDENT IN AN ORGANIZED HEALTH CARE EDUCATION/TRAINING PROGRAM

## 2022-01-01 PROCEDURE — 80069 RENAL FUNCTION PANEL: CPT | Performed by: PHYSICIAN ASSISTANT

## 2022-01-01 PROCEDURE — 25000003 PHARM REV CODE 250: Performed by: PHYSICIAN ASSISTANT

## 2022-01-01 PROCEDURE — 94760 N-INVAS EAR/PLS OXIMETRY 1: CPT

## 2022-01-01 PROCEDURE — 3078F DIAST BP <80 MM HG: CPT | Mod: CPTII,S$GLB,, | Performed by: FAMILY MEDICINE

## 2022-01-01 PROCEDURE — 1159F PR MEDICATION LIST DOCUMENTED IN MEDICAL RECORD: ICD-10-PCS | Mod: CPTII,S$GLB,, | Performed by: SURGERY

## 2022-01-01 PROCEDURE — 80053 COMPREHEN METABOLIC PANEL: CPT | Performed by: STUDENT IN AN ORGANIZED HEALTH CARE EDUCATION/TRAINING PROGRAM

## 2022-01-01 PROCEDURE — 82803 BLOOD GASES ANY COMBINATION: CPT

## 2022-01-01 PROCEDURE — 63600175 PHARM REV CODE 636 W HCPCS: Mod: JG | Performed by: INTERNAL MEDICINE

## 2022-01-01 PROCEDURE — 99233 SBSQ HOSP IP/OBS HIGH 50: CPT | Mod: ,,, | Performed by: STUDENT IN AN ORGANIZED HEALTH CARE EDUCATION/TRAINING PROGRAM

## 2022-01-01 PROCEDURE — 93010 ELECTROCARDIOGRAM REPORT: CPT | Mod: ,,, | Performed by: INTERNAL MEDICINE

## 2022-01-01 PROCEDURE — 3074F SYST BP LT 130 MM HG: CPT | Mod: CPTII,S$GLB,, | Performed by: INTERNAL MEDICINE

## 2022-01-01 PROCEDURE — 82330 ASSAY OF CALCIUM: CPT

## 2022-01-01 PROCEDURE — 97535 SELF CARE MNGMENT TRAINING: CPT

## 2022-01-01 PROCEDURE — 3078F PR MOST RECENT DIASTOLIC BLOOD PRESSURE < 80 MM HG: ICD-10-PCS | Mod: CPTII,S$GLB,, | Performed by: SURGERY

## 2022-01-01 PROCEDURE — 1126F AMNT PAIN NOTED NONE PRSNT: CPT | Mod: CPTII,S$GLB,, | Performed by: NURSE PRACTITIONER

## 2022-01-01 PROCEDURE — 1101F PR PT FALLS ASSESS DOC 0-1 FALLS W/OUT INJ PAST YR: ICD-10-PCS | Mod: CPTII,S$GLB,, | Performed by: SURGERY

## 2022-01-01 PROCEDURE — 63600175 PHARM REV CODE 636 W HCPCS: Performed by: FAMILY MEDICINE

## 2022-01-01 PROCEDURE — 87077 CULTURE AEROBIC IDENTIFY: CPT | Performed by: INTERNAL MEDICINE

## 2022-01-01 PROCEDURE — 97110 THERAPEUTIC EXERCISES: CPT | Mod: CQ

## 2022-01-01 PROCEDURE — 3078F PR MOST RECENT DIASTOLIC BLOOD PRESSURE < 80 MM HG: ICD-10-PCS | Mod: CPTII,S$GLB,, | Performed by: NURSE PRACTITIONER

## 2022-01-01 PROCEDURE — 85025 COMPLETE CBC W/AUTO DIFF WBC: CPT | Performed by: INTERNAL MEDICINE

## 2022-01-01 PROCEDURE — 1101F PR PT FALLS ASSESS DOC 0-1 FALLS W/OUT INJ PAST YR: ICD-10-PCS | Mod: CPTII,S$GLB,, | Performed by: NURSE PRACTITIONER

## 2022-01-01 PROCEDURE — 27000221 HC OXYGEN, UP TO 24 HOURS

## 2022-01-01 PROCEDURE — 21400001 HC TELEMETRY ROOM

## 2022-01-01 PROCEDURE — 99233 PR SUBSEQUENT HOSPITAL CARE,LEVL III: ICD-10-PCS | Mod: ,,, | Performed by: STUDENT IN AN ORGANIZED HEALTH CARE EDUCATION/TRAINING PROGRAM

## 2022-01-01 PROCEDURE — 1126F PR PAIN SEVERITY QUANTIFIED, NO PAIN PRESENT: ICD-10-PCS | Mod: CPTII,S$GLB,, | Performed by: NURSE PRACTITIONER

## 2022-01-01 PROCEDURE — G0009 PNEUMOCOCCAL CONJUGATE VACCINE 20-VALENT: ICD-10-PCS | Mod: S$GLB,,, | Performed by: NURSE PRACTITIONER

## 2022-01-01 PROCEDURE — 80053 COMPREHEN METABOLIC PANEL: CPT | Performed by: EMERGENCY MEDICINE

## 2022-01-01 PROCEDURE — 99223 PR INITIAL HOSPITAL CARE,LEVL III: ICD-10-PCS | Mod: ,,, | Performed by: INTERNAL MEDICINE

## 2022-01-01 PROCEDURE — 99900035 HC TECH TIME PER 15 MIN (STAT)

## 2022-01-01 PROCEDURE — 3078F DIAST BP <80 MM HG: CPT | Mod: CPTII,S$GLB,, | Performed by: INTERNAL MEDICINE

## 2022-01-01 PROCEDURE — 1101F PR PT FALLS ASSESS DOC 0-1 FALLS W/OUT INJ PAST YR: ICD-10-PCS | Mod: CPTII,S$GLB,, | Performed by: INTERNAL MEDICINE

## 2022-01-01 PROCEDURE — 93010 EKG 12-LEAD: ICD-10-PCS | Mod: ,,, | Performed by: INTERNAL MEDICINE

## 2022-01-01 PROCEDURE — 83735 ASSAY OF MAGNESIUM: CPT | Performed by: EMERGENCY MEDICINE

## 2022-01-01 PROCEDURE — G0439 PPPS, SUBSEQ VISIT: HCPCS | Mod: S$GLB,,, | Performed by: NURSE PRACTITIONER

## 2022-01-01 PROCEDURE — 99223 1ST HOSP IP/OBS HIGH 75: CPT | Mod: ,,, | Performed by: STUDENT IN AN ORGANIZED HEALTH CARE EDUCATION/TRAINING PROGRAM

## 2022-01-01 PROCEDURE — 85025 COMPLETE CBC W/AUTO DIFF WBC: CPT | Performed by: STUDENT IN AN ORGANIZED HEALTH CARE EDUCATION/TRAINING PROGRAM

## 2022-01-01 PROCEDURE — 36415 COLL VENOUS BLD VENIPUNCTURE: CPT | Performed by: NURSE PRACTITIONER

## 2022-01-01 PROCEDURE — 36901 INTRO CATH DIALYSIS CIRCUIT: CPT | Mod: ,,, | Performed by: SURGERY

## 2022-01-01 PROCEDURE — 87186 SC STD MICRODIL/AGAR DIL: CPT | Performed by: INTERNAL MEDICINE

## 2022-01-01 PROCEDURE — 99356 PR PROLONGED SERV,INPATIENT,1ST HR: CPT | Mod: ,,, | Performed by: STUDENT IN AN ORGANIZED HEALTH CARE EDUCATION/TRAINING PROGRAM

## 2022-01-01 PROCEDURE — 94640 AIRWAY INHALATION TREATMENT: CPT

## 2022-01-01 PROCEDURE — 93990 DOPPLER FLOW TESTING: CPT | Mod: 26,,, | Performed by: SURGERY

## 2022-01-01 PROCEDURE — 1159F PR MEDICATION LIST DOCUMENTED IN MEDICAL RECORD: ICD-10-PCS | Mod: CPTII,S$GLB,, | Performed by: NURSE PRACTITIONER

## 2022-01-01 PROCEDURE — 36901 PR INTRO CATH, DIALYSIS CIRCUIT: ICD-10-PCS | Mod: ,,, | Performed by: SURGERY

## 2022-01-01 PROCEDURE — 93005 ELECTROCARDIOGRAM TRACING: CPT

## 2022-01-01 PROCEDURE — 25000003 PHARM REV CODE 250: Performed by: STUDENT IN AN ORGANIZED HEALTH CARE EDUCATION/TRAINING PROGRAM

## 2022-01-01 PROCEDURE — 99285 EMERGENCY DEPT VISIT HI MDM: CPT | Mod: 25

## 2022-01-01 PROCEDURE — 3077F PR MOST RECENT SYSTOLIC BLOOD PRESSURE >= 140 MM HG: ICD-10-PCS | Mod: CPTII,S$GLB,, | Performed by: FAMILY MEDICINE

## 2022-01-01 PROCEDURE — 85014 HEMATOCRIT: CPT

## 2022-01-01 PROCEDURE — 1159F PR MEDICATION LIST DOCUMENTED IN MEDICAL RECORD: ICD-10-PCS | Mod: CPTII,S$GLB,, | Performed by: INTERNAL MEDICINE

## 2022-01-01 PROCEDURE — 94660 CPAP INITIATION&MGMT: CPT

## 2022-01-01 PROCEDURE — 97161 PT EVAL LOW COMPLEX 20 MIN: CPT

## 2022-01-01 PROCEDURE — 3074F SYST BP LT 130 MM HG: CPT | Mod: CPTII,S$GLB,, | Performed by: NURSE PRACTITIONER

## 2022-01-01 PROCEDURE — 25000003 PHARM REV CODE 250: Performed by: INTERNAL MEDICINE

## 2022-01-01 PROCEDURE — G0378 HOSPITAL OBSERVATION PER HR: HCPCS

## 2022-01-01 PROCEDURE — 71046 X-RAY EXAM CHEST 2 VIEWS: CPT | Mod: TC,50,FY,PO

## 2022-01-01 PROCEDURE — 99152 PR MOD CONSCIOUS SEDATION, SAME PHYS, 5+ YRS, FIRST 15 MIN: ICD-10-PCS | Mod: ,,, | Performed by: SURGERY

## 2022-01-01 PROCEDURE — 99499 RISK ADDL DX/OHS AUDIT: ICD-10-PCS | Mod: S$GLB,,, | Performed by: FAMILY MEDICINE

## 2022-01-01 PROCEDURE — 71046 X-RAY EXAM CHEST 2 VIEWS: CPT | Mod: 26,76,, | Performed by: RADIOLOGY

## 2022-01-01 PROCEDURE — 36415 COLL VENOUS BLD VENIPUNCTURE: CPT | Performed by: STUDENT IN AN ORGANIZED HEALTH CARE EDUCATION/TRAINING PROGRAM

## 2022-01-01 PROCEDURE — 93000 EKG 12-LEAD: ICD-10-PCS | Mod: S$GLB,,, | Performed by: INTERNAL MEDICINE

## 2022-01-01 PROCEDURE — 80100016 HC MAINTENANCE HEMODIALYSIS

## 2022-01-01 PROCEDURE — 25000003 PHARM REV CODE 250: Performed by: SURGERY

## 2022-01-01 PROCEDURE — 27000190 HC CPAP FULL FACE MASK W/VALVE

## 2022-01-01 PROCEDURE — 71046 X-RAY EXAM CHEST 2 VIEWS: CPT | Mod: 26,,, | Performed by: RADIOLOGY

## 2022-01-01 PROCEDURE — 99999 PR PBB SHADOW E&M-EST. PATIENT-LVL IV: CPT | Mod: PBBFAC,,, | Performed by: NURSE PRACTITIONER

## 2022-01-01 PROCEDURE — 84484 ASSAY OF TROPONIN QUANT: CPT | Performed by: EMERGENCY MEDICINE

## 2022-01-01 PROCEDURE — 84484 ASSAY OF TROPONIN QUANT: CPT | Mod: 91 | Performed by: NURSE PRACTITIONER

## 2022-01-01 PROCEDURE — 99214 OFFICE O/P EST MOD 30 MIN: CPT | Mod: S$GLB,,, | Performed by: NURSE PRACTITIONER

## 2022-01-01 PROCEDURE — 25000003 PHARM REV CODE 250: Performed by: CLINIC/CENTER

## 2022-01-01 PROCEDURE — 96374 THER/PROPH/DIAG INJ IV PUSH: CPT

## 2022-01-01 PROCEDURE — 3288F PR FALLS RISK ASSESSMENT DOCUMENTED: ICD-10-PCS | Mod: CPTII,S$GLB,, | Performed by: NURSE PRACTITIONER

## 2022-01-01 PROCEDURE — 99222 PR INITIAL HOSPITAL CARE,LEVL II: ICD-10-PCS | Mod: ,,, | Performed by: SURGERY

## 2022-01-01 PROCEDURE — 1126F PR PAIN SEVERITY QUANTIFIED, NO PAIN PRESENT: ICD-10-PCS | Mod: CPTII,S$GLB,, | Performed by: SURGERY

## 2022-01-01 PROCEDURE — 83735 ASSAY OF MAGNESIUM: CPT | Performed by: PHYSICIAN ASSISTANT

## 2022-01-01 PROCEDURE — 90935 HEMODIALYSIS ONE EVALUATION: CPT

## 2022-01-01 PROCEDURE — 83880 ASSAY OF NATRIURETIC PEPTIDE: CPT | Performed by: EMERGENCY MEDICINE

## 2022-01-01 PROCEDURE — 36415 COLL VENOUS BLD VENIPUNCTURE: CPT | Performed by: INTERNAL MEDICINE

## 2022-01-01 PROCEDURE — 99214 OFFICE O/P EST MOD 30 MIN: CPT | Mod: S$GLB,,, | Performed by: SURGERY

## 2022-01-01 PROCEDURE — 3078F DIAST BP <80 MM HG: CPT | Mod: CPTII,S$GLB,, | Performed by: NURSE PRACTITIONER

## 2022-01-01 PROCEDURE — 99232 PR SUBSEQUENT HOSPITAL CARE,LEVL II: ICD-10-PCS | Mod: ,,, | Performed by: SURGERY

## 2022-01-01 PROCEDURE — 90677 PNEUMOCOCCAL CONJUGATE VACCINE 20-VALENT: ICD-10-PCS | Mod: S$GLB,,, | Performed by: NURSE PRACTITIONER

## 2022-01-01 PROCEDURE — 84484 ASSAY OF TROPONIN QUANT: CPT | Performed by: STUDENT IN AN ORGANIZED HEALTH CARE EDUCATION/TRAINING PROGRAM

## 2022-01-01 PROCEDURE — 99214 PR OFFICE/OUTPT VISIT, EST, LEVL IV, 30-39 MIN: ICD-10-PCS | Mod: S$GLB,,, | Performed by: INTERNAL MEDICINE

## 2022-01-01 PROCEDURE — 92610 EVALUATE SWALLOWING FUNCTION: CPT

## 2022-01-01 PROCEDURE — 80048 BASIC METABOLIC PNL TOTAL CA: CPT | Performed by: INTERNAL MEDICINE

## 2022-01-01 PROCEDURE — 86706 HEP B SURFACE ANTIBODY: CPT | Performed by: INTERNAL MEDICINE

## 2022-01-01 PROCEDURE — G0180 MD CERTIFICATION HHA PATIENT: HCPCS | Mod: ,,, | Performed by: FAMILY MEDICINE

## 2022-01-01 PROCEDURE — 94761 N-INVAS EAR/PLS OXIMETRY MLT: CPT

## 2022-01-01 PROCEDURE — 1159F MED LIST DOCD IN RCRD: CPT | Mod: CPTII,S$GLB,, | Performed by: NURSE PRACTITIONER

## 2022-01-01 PROCEDURE — 99214 PR OFFICE/OUTPT VISIT, EST, LEVL IV, 30-39 MIN: ICD-10-PCS | Mod: S$GLB,,, | Performed by: NURSE PRACTITIONER

## 2022-01-01 PROCEDURE — 25500020 PHARM REV CODE 255: Performed by: STUDENT IN AN ORGANIZED HEALTH CARE EDUCATION/TRAINING PROGRAM

## 2022-01-01 PROCEDURE — 87502 INFLUENZA DNA AMP PROBE: CPT

## 2022-01-01 PROCEDURE — 1101F PT FALLS ASSESS-DOCD LE1/YR: CPT | Mod: CPTII,S$GLB,, | Performed by: SURGERY

## 2022-01-01 PROCEDURE — 3288F PR FALLS RISK ASSESSMENT DOCUMENTED: ICD-10-PCS | Mod: CPTII,S$GLB,, | Performed by: SURGERY

## 2022-01-01 PROCEDURE — 1101F PT FALLS ASSESS-DOCD LE1/YR: CPT | Mod: CPTII,S$GLB,, | Performed by: NURSE PRACTITIONER

## 2022-01-01 PROCEDURE — G0257 UNSCHED DIALYSIS ESRD PT HOS: HCPCS

## 2022-01-01 PROCEDURE — 3078F PR MOST RECENT DIASTOLIC BLOOD PRESSURE < 80 MM HG: ICD-10-PCS | Mod: CPTII,S$GLB,, | Performed by: INTERNAL MEDICINE

## 2022-01-01 PROCEDURE — 97530 THERAPEUTIC ACTIVITIES: CPT

## 2022-01-01 PROCEDURE — 80100014 HC HEMODIALYSIS 1:1

## 2022-01-01 PROCEDURE — 99999 PR PBB SHADOW E&M-EST. PATIENT-LVL II: CPT | Mod: PBBFAC,,, | Performed by: SURGERY

## 2022-01-01 PROCEDURE — 83735 ASSAY OF MAGNESIUM: CPT | Performed by: STUDENT IN AN ORGANIZED HEALTH CARE EDUCATION/TRAINING PROGRAM

## 2022-01-01 PROCEDURE — 99214 OFFICE O/P EST MOD 30 MIN: CPT | Mod: S$GLB,,, | Performed by: INTERNAL MEDICINE

## 2022-01-01 PROCEDURE — P9047 ALBUMIN (HUMAN), 25%, 50ML: HCPCS | Mod: JG | Performed by: INTERNAL MEDICINE

## 2022-01-01 PROCEDURE — 87340 HEPATITIS B SURFACE AG IA: CPT | Performed by: INTERNAL MEDICINE

## 2022-01-01 PROCEDURE — 96372 THER/PROPH/DIAG INJ SC/IM: CPT | Performed by: FAMILY MEDICINE

## 2022-01-01 PROCEDURE — 97165 OT EVAL LOW COMPLEX 30 MIN: CPT

## 2022-01-01 PROCEDURE — 93990 DOPPLER FLOW TESTING: CPT | Mod: TC

## 2022-01-01 PROCEDURE — 3078F DIAST BP <80 MM HG: CPT | Mod: CPTII,S$GLB,, | Performed by: SURGERY

## 2022-01-01 PROCEDURE — 3288F FALL RISK ASSESSMENT DOCD: CPT | Mod: CPTII,S$GLB,, | Performed by: SURGERY

## 2022-01-01 PROCEDURE — 3288F PR FALLS RISK ASSESSMENT DOCUMENTED: ICD-10-PCS | Mod: CPTII,S$GLB,, | Performed by: INTERNAL MEDICINE

## 2022-01-01 PROCEDURE — 71046 X-RAY EXAM CHEST 2 VIEWS: CPT | Mod: TC,FY,PO

## 2022-01-01 PROCEDURE — 83880 ASSAY OF NATRIURETIC PEPTIDE: CPT | Performed by: STUDENT IN AN ORGANIZED HEALTH CARE EDUCATION/TRAINING PROGRAM

## 2022-01-01 PROCEDURE — 99214 OFFICE O/P EST MOD 30 MIN: CPT | Mod: S$GLB,,, | Performed by: FAMILY MEDICINE

## 2022-01-01 PROCEDURE — 99223 1ST HOSP IP/OBS HIGH 75: CPT | Mod: ,,, | Performed by: INTERNAL MEDICINE

## 2022-01-01 PROCEDURE — 25000003 PHARM REV CODE 250: Performed by: EMERGENCY MEDICINE

## 2022-01-01 PROCEDURE — G0180 PR HOME HEALTH MD CERTIFICATION: ICD-10-PCS | Mod: ,,, | Performed by: FAMILY MEDICINE

## 2022-01-01 PROCEDURE — 1126F AMNT PAIN NOTED NONE PRSNT: CPT | Mod: CPTII,S$GLB,, | Performed by: SURGERY

## 2022-01-01 PROCEDURE — 87088 URINE BACTERIA CULTURE: CPT | Performed by: INTERNAL MEDICINE

## 2022-01-01 PROCEDURE — G0439 PR MEDICARE ANNUAL WELLNESS SUBSEQUENT VISIT: ICD-10-PCS | Mod: S$GLB,,, | Performed by: NURSE PRACTITIONER

## 2022-01-01 PROCEDURE — 85025 COMPLETE CBC W/AUTO DIFF WBC: CPT | Performed by: EMERGENCY MEDICINE

## 2022-01-01 PROCEDURE — 3288F FALL RISK ASSESSMENT DOCD: CPT | Mod: CPTII,S$GLB,, | Performed by: INTERNAL MEDICINE

## 2022-01-01 PROCEDURE — 36907 BALO ANGIOP CTR DIALYSIS SEG: CPT | Mod: ,,, | Performed by: SURGERY

## 2022-01-01 PROCEDURE — 99152 MOD SED SAME PHYS/QHP 5/>YRS: CPT | Mod: ,,, | Performed by: SURGERY

## 2022-01-01 PROCEDURE — 84484 ASSAY OF TROPONIN QUANT: CPT | Mod: 91 | Performed by: INTERNAL MEDICINE

## 2022-01-01 PROCEDURE — 1160F RVW MEDS BY RX/DR IN RCRD: CPT | Mod: CPTII,S$GLB,, | Performed by: NURSE PRACTITIONER

## 2022-01-01 PROCEDURE — 3288F FALL RISK ASSESSMENT DOCD: CPT | Mod: CPTII,S$GLB,, | Performed by: NURSE PRACTITIONER

## 2022-01-01 PROCEDURE — 27100171 HC OXYGEN HIGH FLOW UP TO 24 HOURS

## 2022-01-01 PROCEDURE — 63600175 PHARM REV CODE 636 W HCPCS: Performed by: EMERGENCY MEDICINE

## 2022-01-01 PROCEDURE — 25000242 PHARM REV CODE 250 ALT 637 W/ HCPCS: Performed by: INTERNAL MEDICINE

## 2022-01-01 PROCEDURE — 99999 PR PBB SHADOW E&M-EST. PATIENT-LVL V: ICD-10-PCS | Mod: PBBFAC,,, | Performed by: NURSE PRACTITIONER

## 2022-01-01 PROCEDURE — U0002 COVID-19 LAB TEST NON-CDC: HCPCS | Performed by: EMERGENCY MEDICINE

## 2022-01-01 PROCEDURE — 82565 ASSAY OF CREATININE: CPT

## 2022-01-01 PROCEDURE — 81000 URINALYSIS NONAUTO W/SCOPE: CPT | Performed by: INTERNAL MEDICINE

## 2022-01-01 PROCEDURE — 1159F MED LIST DOCD IN RCRD: CPT | Mod: CPTII,S$GLB,, | Performed by: SURGERY

## 2022-01-01 PROCEDURE — 99497 ADVNCD CARE PLAN 30 MIN: CPT | Mod: ,,, | Performed by: STUDENT IN AN ORGANIZED HEALTH CARE EDUCATION/TRAINING PROGRAM

## 2022-01-01 PROCEDURE — 25000003 PHARM REV CODE 250: Performed by: RADIOLOGY

## 2022-01-01 PROCEDURE — 97116 GAIT TRAINING THERAPY: CPT | Performed by: PHYSICAL THERAPIST

## 2022-01-01 PROCEDURE — 3077F SYST BP >= 140 MM HG: CPT | Mod: CPTII,S$GLB,, | Performed by: FAMILY MEDICINE

## 2022-01-01 PROCEDURE — 85025 COMPLETE CBC W/AUTO DIFF WBC: CPT | Performed by: NURSE PRACTITIONER

## 2022-01-01 PROCEDURE — 99232 SBSQ HOSP IP/OBS MODERATE 35: CPT | Mod: ,,, | Performed by: SURGERY

## 2022-01-01 PROCEDURE — 1126F PR PAIN SEVERITY QUANTIFIED, NO PAIN PRESENT: ICD-10-PCS | Mod: CPTII,S$GLB,, | Performed by: INTERNAL MEDICINE

## 2022-01-01 PROCEDURE — 82607 VITAMIN B-12: CPT | Performed by: STUDENT IN AN ORGANIZED HEALTH CARE EDUCATION/TRAINING PROGRAM

## 2022-01-01 PROCEDURE — 3074F SYST BP LT 130 MM HG: CPT | Mod: CPTII,S$GLB,, | Performed by: SURGERY

## 2022-01-01 PROCEDURE — 1160F PR REVIEW ALL MEDS BY PRESCRIBER/CLIN PHARMACIST DOCUMENTED: ICD-10-PCS | Mod: CPTII,S$GLB,, | Performed by: FAMILY MEDICINE

## 2022-01-01 PROCEDURE — 99999 PR PBB SHADOW E&M-EST. PATIENT-LVL V: CPT | Mod: PBBFAC,,, | Performed by: INTERNAL MEDICINE

## 2022-01-01 PROCEDURE — G0180 PR HOME HEALTH MD CERTIFICATION: ICD-10-PCS | Mod: ,,, | Performed by: INTERNAL MEDICINE

## 2022-01-01 PROCEDURE — 85025 COMPLETE CBC W/AUTO DIFF WBC: CPT | Performed by: PHYSICIAN ASSISTANT

## 2022-01-01 PROCEDURE — 82746 ASSAY OF FOLIC ACID SERUM: CPT | Performed by: STUDENT IN AN ORGANIZED HEALTH CARE EDUCATION/TRAINING PROGRAM

## 2022-01-01 PROCEDURE — 85027 COMPLETE CBC AUTOMATED: CPT | Performed by: PHYSICIAN ASSISTANT

## 2022-01-01 PROCEDURE — 63600175 PHARM REV CODE 636 W HCPCS: Performed by: STUDENT IN AN ORGANIZED HEALTH CARE EDUCATION/TRAINING PROGRAM

## 2022-01-01 PROCEDURE — 92611 MOTION FLUOROSCOPY/SWALLOW: CPT

## 2022-01-01 PROCEDURE — 85610 PROTHROMBIN TIME: CPT | Performed by: STUDENT IN AN ORGANIZED HEALTH CARE EDUCATION/TRAINING PROGRAM

## 2022-01-01 PROCEDURE — 3078F PR MOST RECENT DIASTOLIC BLOOD PRESSURE < 80 MM HG: ICD-10-PCS | Mod: CPTII,S$GLB,, | Performed by: FAMILY MEDICINE

## 2022-01-01 PROCEDURE — 99214 PR OFFICE/OUTPT VISIT, EST, LEVL IV, 30-39 MIN: ICD-10-PCS | Mod: S$GLB,,, | Performed by: FAMILY MEDICINE

## 2022-01-01 PROCEDURE — 99999 PR PBB SHADOW E&M-EST. PATIENT-LVL IV: ICD-10-PCS | Mod: PBBFAC,,, | Performed by: NURSE PRACTITIONER

## 2022-01-01 PROCEDURE — 99499 UNLISTED E&M SERVICE: CPT | Mod: S$GLB,,, | Performed by: SURGERY

## 2022-01-01 PROCEDURE — 25500020 PHARM REV CODE 255: Performed by: INTERNAL MEDICINE

## 2022-01-01 PROCEDURE — 99497 PR ADVNCD CARE PLAN 30 MIN: ICD-10-PCS | Mod: ,,, | Performed by: STUDENT IN AN ORGANIZED HEALTH CARE EDUCATION/TRAINING PROGRAM

## 2022-01-01 PROCEDURE — 63600175 PHARM REV CODE 636 W HCPCS: Performed by: SURGERY

## 2022-01-01 PROCEDURE — G0180 MD CERTIFICATION HHA PATIENT: HCPCS | Mod: ,,, | Performed by: INTERNAL MEDICINE

## 2022-01-01 PROCEDURE — 99999 PR PBB SHADOW E&M-EST. PATIENT-LVL II: ICD-10-PCS | Mod: PBBFAC,,, | Performed by: SURGERY

## 2022-01-01 PROCEDURE — 93990 CV US HEMODIALYSIS ACCESS (CUPID ONLY): ICD-10-PCS | Mod: 26,,, | Performed by: SURGERY

## 2022-01-01 PROCEDURE — 25000003 PHARM REV CODE 250: Performed by: NURSE PRACTITIONER

## 2022-01-01 PROCEDURE — 36907 BALO ANGIOP CTR DIALYSIS SEG: CPT | Performed by: SURGERY

## 2022-01-01 PROCEDURE — 84484 ASSAY OF TROPONIN QUANT: CPT | Performed by: INTERNAL MEDICINE

## 2022-01-01 PROCEDURE — 87086 URINE CULTURE/COLONY COUNT: CPT | Performed by: INTERNAL MEDICINE

## 2022-01-01 PROCEDURE — 99999 PR PBB SHADOW E&M-EST. PATIENT-LVL III: CPT | Mod: PBBFAC,,, | Performed by: FAMILY MEDICINE

## 2022-01-01 PROCEDURE — 99999 PR PBB SHADOW E&M-EST. PATIENT-LVL V: ICD-10-PCS | Mod: PBBFAC,,, | Performed by: INTERNAL MEDICINE

## 2022-01-01 PROCEDURE — 97166 OT EVAL MOD COMPLEX 45 MIN: CPT

## 2022-01-01 PROCEDURE — 96372 THER/PROPH/DIAG INJ SC/IM: CPT | Mod: 59 | Performed by: FAMILY MEDICINE

## 2022-01-01 PROCEDURE — 80053 COMPREHEN METABOLIC PANEL: CPT | Performed by: NURSE PRACTITIONER

## 2022-01-01 PROCEDURE — 84132 ASSAY OF SERUM POTASSIUM: CPT

## 2022-01-01 PROCEDURE — 99999 PR PBB SHADOW E&M-EST. PATIENT-LVL V: CPT | Mod: PBBFAC,,, | Performed by: SURGERY

## 2022-01-01 PROCEDURE — 30200315 PPD INTRADERMAL TEST REV CODE 302: Performed by: INTERNAL MEDICINE

## 2022-01-01 PROCEDURE — 3074F PR MOST RECENT SYSTOLIC BLOOD PRESSURE < 130 MM HG: ICD-10-PCS | Mod: CPTII,S$GLB,, | Performed by: NURSE PRACTITIONER

## 2022-01-01 PROCEDURE — 3074F PR MOST RECENT SYSTOLIC BLOOD PRESSURE < 130 MM HG: ICD-10-PCS | Mod: CPTII,S$GLB,, | Performed by: INTERNAL MEDICINE

## 2022-01-01 PROCEDURE — 99999 PR PBB SHADOW E&M-EST. PATIENT-LVL III: ICD-10-PCS | Mod: PBBFAC,,, | Performed by: FAMILY MEDICINE

## 2022-01-01 PROCEDURE — 80074 ACUTE HEPATITIS PANEL: CPT | Performed by: INTERNAL MEDICINE

## 2022-01-01 PROCEDURE — 99222 1ST HOSP IP/OBS MODERATE 55: CPT | Mod: ,,, | Performed by: SURGERY

## 2022-01-01 PROCEDURE — 99999 PR PBB SHADOW E&M-EST. PATIENT-LVL V: CPT | Mod: PBBFAC,,, | Performed by: NURSE PRACTITIONER

## 2022-01-01 PROCEDURE — 99153 MOD SED SAME PHYS/QHP EA: CPT | Performed by: SURGERY

## 2022-01-01 PROCEDURE — 36901 INTRO CATH DIALYSIS CIRCUIT: CPT | Mod: 59 | Performed by: SURGERY

## 2022-01-01 PROCEDURE — 97530 THERAPEUTIC ACTIVITIES: CPT | Mod: CQ

## 2022-01-01 PROCEDURE — 1160F PR REVIEW ALL MEDS BY PRESCRIBER/CLIN PHARMACIST DOCUMENTED: ICD-10-PCS | Mod: CPTII,S$GLB,, | Performed by: NURSE PRACTITIONER

## 2022-01-01 PROCEDURE — 63600175 PHARM REV CODE 636 W HCPCS: Performed by: HOSPITALIST

## 2022-01-01 PROCEDURE — 36907 PR TRANSLML BALLOON ANGIO, CTR DIALYSIS SEGMENT THRU DIALYSIS CIRCUIT: ICD-10-PCS | Mod: ,,, | Performed by: SURGERY

## 2022-01-01 PROCEDURE — 86580 TB INTRADERMAL TEST: CPT | Performed by: INTERNAL MEDICINE

## 2022-01-01 PROCEDURE — 93000 ELECTROCARDIOGRAM COMPLETE: CPT | Mod: S$GLB,,, | Performed by: INTERNAL MEDICINE

## 2022-01-01 PROCEDURE — 99214 PR OFFICE/OUTPT VISIT, EST, LEVL IV, 30-39 MIN: ICD-10-PCS | Mod: S$GLB,,, | Performed by: SURGERY

## 2022-01-01 PROCEDURE — 87040 BLOOD CULTURE FOR BACTERIA: CPT | Performed by: STUDENT IN AN ORGANIZED HEALTH CARE EDUCATION/TRAINING PROGRAM

## 2022-01-01 PROCEDURE — 84100 ASSAY OF PHOSPHORUS: CPT | Performed by: EMERGENCY MEDICINE

## 2022-01-01 PROCEDURE — 1159F PR MEDICATION LIST DOCUMENTED IN MEDICAL RECORD: ICD-10-PCS | Mod: CPTII,S$GLB,, | Performed by: FAMILY MEDICINE

## 2022-01-01 PROCEDURE — 99499 RISK ADDL DX/OHS AUDIT: ICD-10-PCS | Mod: S$GLB,,, | Performed by: SURGERY

## 2022-01-01 PROCEDURE — 3074F PR MOST RECENT SYSTOLIC BLOOD PRESSURE < 130 MM HG: ICD-10-PCS | Mod: CPTII,S$GLB,, | Performed by: SURGERY

## 2022-01-01 PROCEDURE — 97110 THERAPEUTIC EXERCISES: CPT | Performed by: PHYSICAL THERAPIST

## 2022-01-01 PROCEDURE — 84295 ASSAY OF SERUM SODIUM: CPT

## 2022-01-01 PROCEDURE — 86850 RBC ANTIBODY SCREEN: CPT | Performed by: STUDENT IN AN ORGANIZED HEALTH CARE EDUCATION/TRAINING PROGRAM

## 2022-01-01 PROCEDURE — 25000003 PHARM REV CODE 250: Performed by: FAMILY MEDICINE

## 2022-01-01 PROCEDURE — 20000000 HC ICU ROOM

## 2022-01-01 PROCEDURE — 25500020 PHARM REV CODE 255: Performed by: SURGERY

## 2022-01-01 PROCEDURE — 99499 UNLISTED E&M SERVICE: CPT | Mod: S$GLB,,, | Performed by: FAMILY MEDICINE

## 2022-01-01 PROCEDURE — G0009 ADMIN PNEUMOCOCCAL VACCINE: HCPCS | Mod: S$GLB,,, | Performed by: NURSE PRACTITIONER

## 2022-01-01 PROCEDURE — 1126F AMNT PAIN NOTED NONE PRSNT: CPT | Mod: CPTII,S$GLB,, | Performed by: INTERNAL MEDICINE

## 2022-01-01 PROCEDURE — 99999 PR PBB SHADOW E&M-EST. PATIENT-LVL V: ICD-10-PCS | Mod: PBBFAC,,, | Performed by: SURGERY

## 2022-01-01 PROCEDURE — 92526 ORAL FUNCTION THERAPY: CPT

## 2022-01-01 PROCEDURE — 87635 SARS-COV-2 COVID-19 AMP PRB: CPT | Performed by: EMERGENCY MEDICINE

## 2022-01-01 PROCEDURE — 1159F MED LIST DOCD IN RCRD: CPT | Mod: CPTII,S$GLB,, | Performed by: FAMILY MEDICINE

## 2022-01-01 PROCEDURE — 90677 PCV20 VACCINE IM: CPT | Mod: S$GLB,,, | Performed by: NURSE PRACTITIONER

## 2022-01-01 PROCEDURE — 99152 MOD SED SAME PHYS/QHP 5/>YRS: CPT | Performed by: SURGERY

## 2022-01-01 PROCEDURE — 84100 ASSAY OF PHOSPHORUS: CPT | Performed by: STUDENT IN AN ORGANIZED HEALTH CARE EDUCATION/TRAINING PROGRAM

## 2022-01-01 PROCEDURE — A9698 NON-RAD CONTRAST MATERIALNOC: HCPCS | Performed by: STUDENT IN AN ORGANIZED HEALTH CARE EDUCATION/TRAINING PROGRAM

## 2022-01-01 PROCEDURE — 1159F MED LIST DOCD IN RCRD: CPT | Mod: CPTII,S$GLB,, | Performed by: INTERNAL MEDICINE

## 2022-01-01 PROCEDURE — 1170F FXNL STATUS ASSESSED: CPT | Mod: CPTII,S$GLB,, | Performed by: NURSE PRACTITIONER

## 2022-01-01 PROCEDURE — 96376 TX/PRO/DX INJ SAME DRUG ADON: CPT | Mod: 59

## 2022-01-01 PROCEDURE — 71046 XR CHEST PA AND LATERAL: ICD-10-PCS | Mod: 26,,, | Performed by: RADIOLOGY

## 2022-01-01 PROCEDURE — 99356 PR PROLONGED SERV,INPATIENT,1ST HR: ICD-10-PCS | Mod: ,,, | Performed by: STUDENT IN AN ORGANIZED HEALTH CARE EDUCATION/TRAINING PROGRAM

## 2022-01-01 PROCEDURE — 1170F PR FUNCTIONAL STATUS ASSESSED: ICD-10-PCS | Mod: CPTII,S$GLB,, | Performed by: NURSE PRACTITIONER

## 2022-01-01 PROCEDURE — 97110 THERAPEUTIC EXERCISES: CPT

## 2022-01-01 PROCEDURE — 1160F RVW MEDS BY RX/DR IN RCRD: CPT | Mod: CPTII,S$GLB,, | Performed by: FAMILY MEDICINE

## 2022-01-01 RX ORDER — SODIUM CHLORIDE 9 MG/ML
INJECTION, SOLUTION INTRAVENOUS
Status: DISCONTINUED | OUTPATIENT
Start: 2022-01-01 | End: 2022-11-19 | Stop reason: HOSPADM

## 2022-01-01 RX ORDER — IBUPROFEN 200 MG
16 TABLET ORAL
Status: DISCONTINUED | OUTPATIENT
Start: 2022-01-01 | End: 2022-01-01 | Stop reason: HOSPADM

## 2022-01-01 RX ORDER — ACETAMINOPHEN 325 MG/1
650 TABLET ORAL EVERY 8 HOURS PRN
Status: DISCONTINUED | OUTPATIENT
Start: 2022-01-01 | End: 2022-01-01 | Stop reason: HOSPADM

## 2022-01-01 RX ORDER — LIDOCAINE HYDROCHLORIDE 10 MG/ML
INJECTION INFILTRATION; PERINEURAL
Status: COMPLETED | OUTPATIENT
Start: 2022-01-01 | End: 2022-01-01

## 2022-01-01 RX ORDER — MUPIROCIN 20 MG/G
OINTMENT TOPICAL 2 TIMES DAILY
Status: DISCONTINUED | OUTPATIENT
Start: 2022-01-01 | End: 2022-01-01

## 2022-01-01 RX ORDER — TAMSULOSIN HYDROCHLORIDE 0.4 MG/1
0.4 CAPSULE ORAL DAILY
Status: DISCONTINUED | OUTPATIENT
Start: 2022-01-01 | End: 2022-01-01

## 2022-01-01 RX ORDER — FUROSEMIDE 10 MG/ML
40 INJECTION INTRAMUSCULAR; INTRAVENOUS ONCE
Status: COMPLETED | OUTPATIENT
Start: 2022-01-01 | End: 2022-01-01

## 2022-01-01 RX ORDER — AMLODIPINE BESYLATE 5 MG/1
5 TABLET ORAL DAILY
Qty: 90 TABLET | Refills: 2 | Status: SHIPPED | OUTPATIENT
Start: 2022-01-01 | End: 2022-11-19

## 2022-01-01 RX ORDER — ACETAMINOPHEN 325 MG/1
650 TABLET ORAL EVERY 6 HOURS PRN
Status: DISCONTINUED | OUTPATIENT
Start: 2022-01-01 | End: 2022-01-01 | Stop reason: HOSPADM

## 2022-01-01 RX ORDER — NALOXONE HCL 0.4 MG/ML
0.02 VIAL (ML) INJECTION
Status: DISCONTINUED | OUTPATIENT
Start: 2022-01-01 | End: 2022-01-01 | Stop reason: HOSPADM

## 2022-01-01 RX ORDER — HEPARIN SODIUM 1000 [USP'U]/ML
INJECTION, SOLUTION INTRAVENOUS; SUBCUTANEOUS CODE/TRAUMA/SEDATION MEDICATION
Status: COMPLETED | OUTPATIENT
Start: 2022-01-01 | End: 2022-01-01

## 2022-01-01 RX ORDER — PRAVASTATIN SODIUM 10 MG/1
10 TABLET ORAL DAILY
Status: DISCONTINUED | OUTPATIENT
Start: 2022-01-01 | End: 2022-01-01 | Stop reason: HOSPADM

## 2022-01-01 RX ORDER — POTASSIUM CHLORIDE 750 MG/1
50 TABLET, EXTENDED RELEASE ORAL ONCE
Status: COMPLETED | OUTPATIENT
Start: 2022-01-01 | End: 2022-01-01

## 2022-01-01 RX ORDER — ASCORBIC ACID, THIAMINE MONONITRATE,RIBOFLAVIN, NIACINAMIDE, PYRIDOXINE HYDROCHLORIDE, FOLIC ACID, CYANOCOBALAMIN, BIOTIN, CALCIUM PANTOTHENATE, 100; 1.5; 1.7; 20; 10; 1; 6000; 150000; 5 MG/1; MG/1; MG/1; MG/1; MG/1; MG/1; UG/1; UG/1; MG/1
CAPSULE, LIQUID FILLED ORAL DAILY
COMMUNITY
Start: 2022-01-01 | End: 2022-11-19

## 2022-01-01 RX ORDER — TAMSULOSIN HYDROCHLORIDE 0.4 MG/1
0.4 CAPSULE ORAL DAILY
Qty: 90 CAPSULE | Refills: 2 | Status: SHIPPED | OUTPATIENT
Start: 2022-01-01 | End: 2022-11-19

## 2022-01-01 RX ORDER — CEFAZOLIN SODIUM 2 G/50ML
2 SOLUTION INTRAVENOUS ONCE
Status: DISCONTINUED | OUTPATIENT
Start: 2022-01-01 | End: 2022-01-01

## 2022-01-01 RX ORDER — PRAVASTATIN SODIUM 10 MG/1
10 TABLET ORAL DAILY
Qty: 90 TABLET | Refills: 3 | Status: SHIPPED | OUTPATIENT
Start: 2022-01-01 | End: 2022-01-01 | Stop reason: SDUPTHER

## 2022-01-01 RX ORDER — HYDROMORPHONE HYDROCHLORIDE 1 MG/ML
0.5 INJECTION, SOLUTION INTRAMUSCULAR; INTRAVENOUS; SUBCUTANEOUS EVERY 4 HOURS PRN
Status: DISCONTINUED | OUTPATIENT
Start: 2022-01-01 | End: 2022-01-01

## 2022-01-01 RX ORDER — FUROSEMIDE 10 MG/ML
120 INJECTION INTRAMUSCULAR; INTRAVENOUS ONCE
Status: COMPLETED | OUTPATIENT
Start: 2022-01-01 | End: 2022-01-01

## 2022-01-01 RX ORDER — ALBUTEROL SULFATE 90 UG/1
2 AEROSOL, METERED RESPIRATORY (INHALATION) EVERY 4 HOURS PRN
Qty: 8 G | Refills: 6 | Status: SHIPPED | OUTPATIENT
Start: 2022-01-01 | End: 2022-01-01 | Stop reason: SDUPTHER

## 2022-01-01 RX ORDER — IPRATROPIUM BROMIDE AND ALBUTEROL SULFATE 2.5; .5 MG/3ML; MG/3ML
3 SOLUTION RESPIRATORY (INHALATION) EVERY 6 HOURS PRN
Status: DISCONTINUED | OUTPATIENT
Start: 2022-01-01 | End: 2022-01-01 | Stop reason: HOSPADM

## 2022-01-01 RX ORDER — GUAIFENESIN 100 MG/5ML
200 SOLUTION ORAL EVERY 4 HOURS PRN
Status: DISCONTINUED | OUTPATIENT
Start: 2022-01-01 | End: 2022-01-01

## 2022-01-01 RX ORDER — ALBUMIN HUMAN 250 G/1000ML
25 SOLUTION INTRAVENOUS ONCE
Status: COMPLETED | OUTPATIENT
Start: 2022-01-01 | End: 2022-01-01

## 2022-01-01 RX ORDER — ACETAMINOPHEN 325 MG/1
650 TABLET ORAL EVERY 6 HOURS PRN
Status: DISCONTINUED | OUTPATIENT
Start: 2022-01-01 | End: 2022-01-01

## 2022-01-01 RX ORDER — METOPROLOL SUCCINATE 50 MG/1
100 TABLET, EXTENDED RELEASE ORAL DAILY
Status: DISCONTINUED | OUTPATIENT
Start: 2022-01-01 | End: 2022-01-01

## 2022-01-01 RX ORDER — SODIUM CHLORIDE 9 MG/ML
INJECTION, SOLUTION INTRAVENOUS ONCE
Status: CANCELLED | OUTPATIENT
Start: 2022-01-01 | End: 2022-01-01

## 2022-01-01 RX ORDER — SODIUM CHLORIDE 9 MG/ML
INJECTION, SOLUTION INTRAVENOUS
Status: CANCELLED | OUTPATIENT
Start: 2022-01-01

## 2022-01-01 RX ORDER — GLUCAGON 1 MG
1 KIT INJECTION
Status: DISCONTINUED | OUTPATIENT
Start: 2022-01-01 | End: 2022-01-01 | Stop reason: HOSPADM

## 2022-01-01 RX ORDER — FUROSEMIDE 20 MG/1
40 TABLET ORAL DAILY
Qty: 180 TABLET | Refills: 1 | Status: SHIPPED | OUTPATIENT
Start: 2022-01-01 | End: 2022-11-19

## 2022-01-01 RX ORDER — ONDANSETRON 4 MG/1
4 TABLET, FILM COATED ORAL ONCE
Status: COMPLETED | OUTPATIENT
Start: 2022-01-01 | End: 2022-01-01

## 2022-01-01 RX ORDER — METOPROLOL SUCCINATE 100 MG/1
100 TABLET, EXTENDED RELEASE ORAL DAILY
Qty: 90 TABLET | Refills: 2 | Status: SHIPPED | OUTPATIENT
Start: 2022-01-01 | End: 2022-11-19

## 2022-01-01 RX ORDER — MUPIROCIN 20 MG/G
OINTMENT TOPICAL 2 TIMES DAILY
Status: DISCONTINUED | OUTPATIENT
Start: 2022-01-01 | End: 2022-01-01 | Stop reason: HOSPADM

## 2022-01-01 RX ORDER — FUROSEMIDE 10 MG/ML
40 INJECTION INTRAMUSCULAR; INTRAVENOUS
Status: COMPLETED | OUTPATIENT
Start: 2022-01-01 | End: 2022-01-01

## 2022-01-01 RX ORDER — LIDOCAINE AND PRILOCAINE 25; 25 MG/G; MG/G
CREAM TOPICAL
Status: DISCONTINUED | OUTPATIENT
Start: 2022-01-01 | End: 2022-01-01 | Stop reason: HOSPADM

## 2022-01-01 RX ORDER — IPRATROPIUM BROMIDE AND ALBUTEROL SULFATE 2.5; .5 MG/3ML; MG/3ML
3 SOLUTION RESPIRATORY (INHALATION)
Status: DISCONTINUED | OUTPATIENT
Start: 2022-01-01 | End: 2022-01-01

## 2022-01-01 RX ORDER — FENTANYL CITRATE 50 UG/ML
INJECTION, SOLUTION INTRAMUSCULAR; INTRAVENOUS CODE/TRAUMA/SEDATION MEDICATION
Status: COMPLETED | OUTPATIENT
Start: 2022-01-01 | End: 2022-01-01

## 2022-01-01 RX ORDER — TAMSULOSIN HYDROCHLORIDE 0.4 MG/1
0.4 CAPSULE ORAL DAILY
Qty: 90 CAPSULE | Refills: 2 | Status: SHIPPED | OUTPATIENT
Start: 2022-01-01 | End: 2022-01-01 | Stop reason: SDUPTHER

## 2022-01-01 RX ORDER — HEPARIN SODIUM 5000 [USP'U]/ML
5000 INJECTION, SOLUTION INTRAVENOUS; SUBCUTANEOUS EVERY 8 HOURS
Status: DISCONTINUED | OUTPATIENT
Start: 2022-01-01 | End: 2022-01-01

## 2022-01-01 RX ORDER — AMLODIPINE BESYLATE 5 MG/1
5 TABLET ORAL DAILY
Status: DISCONTINUED | OUTPATIENT
Start: 2022-01-01 | End: 2022-01-01

## 2022-01-01 RX ORDER — HYDROMORPHONE HYDROCHLORIDE 1 MG/ML
0.5 INJECTION, SOLUTION INTRAMUSCULAR; INTRAVENOUS; SUBCUTANEOUS
Status: DISCONTINUED | OUTPATIENT
Start: 2022-01-01 | End: 2022-11-19 | Stop reason: HOSPADM

## 2022-01-01 RX ORDER — METOPROLOL SUCCINATE 100 MG/1
100 TABLET, EXTENDED RELEASE ORAL DAILY
Qty: 90 TABLET | Refills: 2 | Status: SHIPPED | OUTPATIENT
Start: 2022-01-01 | End: 2022-01-01 | Stop reason: SDUPTHER

## 2022-01-01 RX ORDER — CEFAZOLIN SODIUM 1 G/50ML
SOLUTION INTRAVENOUS
Status: COMPLETED | OUTPATIENT
Start: 2022-01-01 | End: 2022-01-01

## 2022-01-01 RX ORDER — PRAVASTATIN SODIUM 10 MG/1
20 TABLET ORAL DAILY
Status: DISCONTINUED | OUTPATIENT
Start: 2022-01-01 | End: 2022-01-01 | Stop reason: HOSPADM

## 2022-01-01 RX ORDER — POLYETHYLENE GLYCOL 3350 17 G/17G
17 POWDER, FOR SOLUTION ORAL 2 TIMES DAILY PRN
Status: DISCONTINUED | OUTPATIENT
Start: 2022-01-01 | End: 2022-01-01 | Stop reason: HOSPADM

## 2022-01-01 RX ORDER — LACTULOSE 10 G/15ML
30 SOLUTION ORAL ONCE
Status: COMPLETED | OUTPATIENT
Start: 2022-01-01 | End: 2022-01-01

## 2022-01-01 RX ORDER — MIDAZOLAM HYDROCHLORIDE 1 MG/ML
INJECTION INTRAMUSCULAR; INTRAVENOUS CODE/TRAUMA/SEDATION MEDICATION
Status: COMPLETED | OUTPATIENT
Start: 2022-01-01 | End: 2022-01-01

## 2022-01-01 RX ORDER — FUROSEMIDE 10 MG/ML
80 INJECTION INTRAMUSCULAR; INTRAVENOUS
Status: DISCONTINUED | OUTPATIENT
Start: 2022-01-01 | End: 2022-01-01

## 2022-01-01 RX ORDER — HYDRALAZINE HYDROCHLORIDE 20 MG/ML
10 INJECTION INTRAMUSCULAR; INTRAVENOUS EVERY 6 HOURS PRN
Status: DISCONTINUED | OUTPATIENT
Start: 2022-01-01 | End: 2022-11-19 | Stop reason: HOSPADM

## 2022-01-01 RX ORDER — MUPIROCIN 20 MG/G
OINTMENT TOPICAL 2 TIMES DAILY
Status: DISPENSED | OUTPATIENT
Start: 2022-01-01 | End: 2022-01-01

## 2022-01-01 RX ORDER — HEPARIN SODIUM 5000 [USP'U]/ML
5000 INJECTION, SOLUTION INTRAVENOUS; SUBCUTANEOUS EVERY 8 HOURS
Status: DISCONTINUED | OUTPATIENT
Start: 2022-01-01 | End: 2022-01-01 | Stop reason: HOSPADM

## 2022-01-01 RX ORDER — PRAVASTATIN SODIUM 10 MG/1
10 TABLET ORAL DAILY
Qty: 90 TABLET | Refills: 2 | Status: SHIPPED | OUTPATIENT
Start: 2022-01-01 | End: 2022-01-01 | Stop reason: SDUPTHER

## 2022-01-01 RX ORDER — HYDRALAZINE HYDROCHLORIDE 20 MG/ML
10 INJECTION INTRAMUSCULAR; INTRAVENOUS EVERY 6 HOURS PRN
Status: DISCONTINUED | OUTPATIENT
Start: 2022-01-01 | End: 2022-01-01 | Stop reason: HOSPADM

## 2022-01-01 RX ORDER — SODIUM CHLORIDE 9 MG/ML
INJECTION, SOLUTION INTRAVENOUS ONCE
Status: DISCONTINUED | OUTPATIENT
Start: 2022-01-01 | End: 2022-11-19 | Stop reason: HOSPADM

## 2022-01-01 RX ORDER — CALCIUM ACETATE 667 MG/1
667 CAPSULE ORAL
Status: DISCONTINUED | OUTPATIENT
Start: 2022-01-01 | End: 2022-01-01

## 2022-01-01 RX ORDER — SODIUM CHLORIDE 0.9 % (FLUSH) 0.9 %
10 SYRINGE (ML) INJECTION
Status: DISCONTINUED | OUTPATIENT
Start: 2022-01-01 | End: 2022-01-01 | Stop reason: HOSPADM

## 2022-01-01 RX ORDER — ASPIRIN 325 MG
325 TABLET ORAL
Status: COMPLETED | OUTPATIENT
Start: 2022-01-01 | End: 2022-01-01

## 2022-01-01 RX ORDER — PRAVASTATIN SODIUM 10 MG/1
10 TABLET ORAL DAILY
Qty: 90 TABLET | Refills: 2 | Status: SHIPPED | OUTPATIENT
Start: 2022-01-01 | End: 2022-11-19

## 2022-01-01 RX ORDER — ALBUTEROL SULFATE 2.5 MG/.5ML
2.5 SOLUTION RESPIRATORY (INHALATION) EVERY 4 HOURS PRN
Status: DISCONTINUED | OUTPATIENT
Start: 2022-01-01 | End: 2022-11-19 | Stop reason: HOSPADM

## 2022-01-01 RX ORDER — LIDOCAINE HYDROCHLORIDE 20 MG/ML
INJECTION, SOLUTION INFILTRATION; PERINEURAL CODE/TRAUMA/SEDATION MEDICATION
Status: COMPLETED | OUTPATIENT
Start: 2022-01-01 | End: 2022-01-01

## 2022-01-01 RX ORDER — METOLAZONE 5 MG/1
5 TABLET ORAL DAILY
Status: DISCONTINUED | OUTPATIENT
Start: 2022-01-01 | End: 2022-01-01

## 2022-01-01 RX ORDER — SODIUM CHLORIDE 9 MG/ML
INJECTION, SOLUTION INTRAVENOUS ONCE
Status: DISCONTINUED | OUTPATIENT
Start: 2022-01-01 | End: 2022-01-01 | Stop reason: HOSPADM

## 2022-01-01 RX ORDER — METOPROLOL SUCCINATE 50 MG/1
100 TABLET, EXTENDED RELEASE ORAL DAILY
Status: DISCONTINUED | OUTPATIENT
Start: 2022-01-01 | End: 2022-01-01 | Stop reason: HOSPADM

## 2022-01-01 RX ORDER — MORPHINE SULFATE 4 MG/ML
2 INJECTION, SOLUTION INTRAMUSCULAR; INTRAVENOUS EVERY 6 HOURS PRN
Status: DISCONTINUED | OUTPATIENT
Start: 2022-01-01 | End: 2022-01-01

## 2022-01-01 RX ORDER — TALC
6 POWDER (GRAM) TOPICAL NIGHTLY
Status: DISCONTINUED | OUTPATIENT
Start: 2022-01-01 | End: 2022-01-01

## 2022-01-01 RX ORDER — VIT B COMP NO.3/FOLIC/C/BIOTIN 1 MG-60 MG
1 TABLET ORAL DAILY
COMMUNITY
Start: 2022-01-01 | End: 2022-11-19

## 2022-01-01 RX ORDER — ASPIRIN 81 MG/1
81 TABLET ORAL DAILY
Status: DISCONTINUED | OUTPATIENT
Start: 2022-01-01 | End: 2022-01-01

## 2022-01-01 RX ORDER — TAMSULOSIN HYDROCHLORIDE 0.4 MG/1
0.4 CAPSULE ORAL DAILY
Qty: 90 CAPSULE | Refills: 0 | Status: SHIPPED | OUTPATIENT
Start: 2022-01-01 | End: 2022-01-01 | Stop reason: SDUPTHER

## 2022-01-01 RX ORDER — HYDROMORPHONE HYDROCHLORIDE 1 MG/ML
0.5 INJECTION, SOLUTION INTRAMUSCULAR; INTRAVENOUS; SUBCUTANEOUS ONCE
Status: COMPLETED | OUTPATIENT
Start: 2022-01-01 | End: 2022-01-01

## 2022-01-01 RX ORDER — FUROSEMIDE 20 MG/1
20 TABLET ORAL DAILY
Qty: 90 TABLET | Refills: 3 | Status: SHIPPED | OUTPATIENT
Start: 2022-01-01 | End: 2022-01-01 | Stop reason: SDUPTHER

## 2022-01-01 RX ORDER — AMLODIPINE BESYLATE 5 MG/1
5 TABLET ORAL DAILY
Qty: 90 TABLET | Refills: 2 | Status: SHIPPED | OUTPATIENT
Start: 2022-01-01 | End: 2022-01-01 | Stop reason: SDUPTHER

## 2022-01-01 RX ORDER — SODIUM CHLORIDE 0.9 % (FLUSH) 0.9 %
10 SYRINGE (ML) INJECTION EVERY 8 HOURS PRN
Status: DISCONTINUED | OUTPATIENT
Start: 2022-01-01 | End: 2022-01-01 | Stop reason: HOSPADM

## 2022-01-01 RX ORDER — BNT162B2 0.23 MG/2.25ML
INJECTION, SUSPENSION INTRAMUSCULAR
COMMUNITY
Start: 2022-01-01 | End: 2022-11-19

## 2022-01-01 RX ORDER — TAMSULOSIN HYDROCHLORIDE 0.4 MG/1
0.4 CAPSULE ORAL DAILY
Status: DISCONTINUED | OUTPATIENT
Start: 2022-01-01 | End: 2022-01-01 | Stop reason: HOSPADM

## 2022-01-01 RX ORDER — PRAVASTATIN SODIUM 10 MG/1
10 TABLET ORAL DAILY
Status: DISCONTINUED | OUTPATIENT
Start: 2022-01-01 | End: 2022-01-01

## 2022-01-01 RX ORDER — IBUPROFEN 200 MG
24 TABLET ORAL
Status: DISCONTINUED | OUTPATIENT
Start: 2022-01-01 | End: 2022-01-01 | Stop reason: HOSPADM

## 2022-01-01 RX ORDER — SODIUM CHLORIDE 9 MG/ML
INJECTION, SOLUTION INTRAVENOUS
Status: DISCONTINUED | OUTPATIENT
Start: 2022-01-01 | End: 2022-01-01 | Stop reason: HOSPADM

## 2022-01-01 RX ORDER — AMLODIPINE BESYLATE 5 MG/1
5 TABLET ORAL DAILY
Status: DISCONTINUED | OUTPATIENT
Start: 2022-01-01 | End: 2022-01-01 | Stop reason: HOSPADM

## 2022-01-01 RX ORDER — ALBUTEROL SULFATE 90 UG/1
2 AEROSOL, METERED RESPIRATORY (INHALATION) EVERY 4 HOURS PRN
Qty: 8 G | Refills: 6 | Status: SHIPPED | OUTPATIENT
Start: 2022-01-01 | End: 2022-11-19

## 2022-01-01 RX ORDER — FUROSEMIDE 20 MG/1
20 TABLET ORAL DAILY
Status: DISCONTINUED | OUTPATIENT
Start: 2022-01-01 | End: 2022-01-01 | Stop reason: HOSPADM

## 2022-01-01 RX ORDER — ONDANSETRON 2 MG/ML
INJECTION INTRAMUSCULAR; INTRAVENOUS CODE/TRAUMA/SEDATION MEDICATION
Status: COMPLETED | OUTPATIENT
Start: 2022-01-01 | End: 2022-01-01

## 2022-01-01 RX ORDER — FUROSEMIDE 10 MG/ML
40 INJECTION INTRAMUSCULAR; INTRAVENOUS
Status: DISCONTINUED | OUTPATIENT
Start: 2022-01-01 | End: 2022-01-01

## 2022-01-01 RX ORDER — TAMSULOSIN HYDROCHLORIDE 0.4 MG/1
0.4 CAPSULE ORAL DAILY
Qty: 30 CAPSULE | Refills: 11 | OUTPATIENT
Start: 2022-01-01 | End: 2022-01-01 | Stop reason: SDUPTHER

## 2022-01-01 RX ORDER — IPRATROPIUM BROMIDE AND ALBUTEROL SULFATE 2.5; .5 MG/3ML; MG/3ML
3 SOLUTION RESPIRATORY (INHALATION) EVERY 4 HOURS PRN
Status: DISCONTINUED | OUTPATIENT
Start: 2022-01-01 | End: 2022-01-01 | Stop reason: HOSPADM

## 2022-01-01 RX ORDER — METOPROLOL SUCCINATE 100 MG/1
100 TABLET, EXTENDED RELEASE ORAL DAILY
Qty: 90 TABLET | Refills: 2 | Status: ON HOLD | OUTPATIENT
Start: 2022-01-01 | End: 2022-01-01 | Stop reason: SDUPTHER

## 2022-01-01 RX ORDER — ALBUTEROL SULFATE 90 UG/1
2 AEROSOL, METERED RESPIRATORY (INHALATION) EVERY 4 HOURS PRN
Qty: 8 G | Refills: 5 | OUTPATIENT
Start: 2022-01-01

## 2022-01-01 RX ORDER — LANOLIN ALCOHOL/MO/W.PET/CERES
1 CREAM (GRAM) TOPICAL 2 TIMES DAILY
Status: DISCONTINUED | OUTPATIENT
Start: 2022-01-01 | End: 2022-01-01 | Stop reason: HOSPADM

## 2022-01-01 RX ORDER — BENZONATATE 100 MG/1
200 CAPSULE ORAL 3 TIMES DAILY PRN
Status: DISCONTINUED | OUTPATIENT
Start: 2022-01-01 | End: 2022-01-01 | Stop reason: HOSPADM

## 2022-01-01 RX ADMIN — HEPARIN SODIUM 5000 UNITS: 5000 INJECTION INTRAVENOUS; SUBCUTANEOUS at 01:11

## 2022-01-01 RX ADMIN — FUROSEMIDE 80 MG: 10 INJECTION, SOLUTION INTRAMUSCULAR; INTRAVENOUS at 01:11

## 2022-01-01 RX ADMIN — HEPARIN SODIUM 5000 UNITS: 5000 INJECTION INTRAVENOUS; SUBCUTANEOUS at 09:11

## 2022-01-01 RX ADMIN — METOLAZONE 5 MG: 5 TABLET ORAL at 04:01

## 2022-01-01 RX ADMIN — FUROSEMIDE 80 MG: 10 INJECTION, SOLUTION INTRAMUSCULAR; INTRAVENOUS at 03:11

## 2022-01-01 RX ADMIN — CALCIUM ACETATE 667 MG: 667 CAPSULE ORAL at 04:11

## 2022-01-01 RX ADMIN — MUPIROCIN: 20 OINTMENT TOPICAL at 09:01

## 2022-01-01 RX ADMIN — METOPROLOL SUCCINATE 100 MG: 50 TABLET, EXTENDED RELEASE ORAL at 08:11

## 2022-01-01 RX ADMIN — CALCIUM ACETATE 667 MG: 667 CAPSULE ORAL at 08:11

## 2022-01-01 RX ADMIN — MUPIROCIN: 20 OINTMENT TOPICAL at 09:11

## 2022-01-01 RX ADMIN — FUROSEMIDE 40 MG: 10 INJECTION, SOLUTION INTRAMUSCULAR; INTRAVENOUS at 01:11

## 2022-01-01 RX ADMIN — HEPARIN SODIUM 5000 UNITS: 5000 INJECTION INTRAVENOUS; SUBCUTANEOUS at 03:11

## 2022-01-01 RX ADMIN — AMLODIPINE BESYLATE 5 MG: 5 TABLET ORAL at 02:11

## 2022-01-01 RX ADMIN — ASPIRIN 325 MG ORAL TABLET 325 MG: 325 PILL ORAL at 02:10

## 2022-01-01 RX ADMIN — FUROSEMIDE 40 MG: 10 INJECTION, SOLUTION INTRAVENOUS at 10:05

## 2022-01-01 RX ADMIN — LIDOCAINE HYDROCHLORIDE 10 ML: 10 INJECTION, SOLUTION INFILTRATION; PERINEURAL at 11:10

## 2022-01-01 RX ADMIN — ALBUTEROL SULFATE 2.5 MG: 2.5 SOLUTION RESPIRATORY (INHALATION) at 04:11

## 2022-01-01 RX ADMIN — TAMSULOSIN HYDROCHLORIDE 0.4 MG: 0.4 CAPSULE ORAL at 09:11

## 2022-01-01 RX ADMIN — LIDOCAINE AND PRILOCAINE: 25; 25 CREAM TOPICAL at 12:01

## 2022-01-01 RX ADMIN — ALBUTEROL SULFATE 2.5 MG: 2.5 SOLUTION RESPIRATORY (INHALATION) at 01:11

## 2022-01-01 RX ADMIN — MUPIROCIN: 20 OINTMENT TOPICAL at 08:11

## 2022-01-01 RX ADMIN — GUAIFENESIN 200 MG: 200 SOLUTION ORAL at 01:11

## 2022-01-01 RX ADMIN — MUPIROCIN: 20 OINTMENT TOPICAL at 09:05

## 2022-01-01 RX ADMIN — HEPARIN SODIUM 5000 UNITS: 5000 INJECTION INTRAVENOUS; SUBCUTANEOUS at 10:05

## 2022-01-01 RX ADMIN — HEPARIN SODIUM 5000 UNITS: 5000 INJECTION INTRAVENOUS; SUBCUTANEOUS at 06:11

## 2022-01-01 RX ADMIN — TAMSULOSIN HYDROCHLORIDE 0.4 MG: 0.4 CAPSULE ORAL at 08:11

## 2022-01-01 RX ADMIN — FUROSEMIDE 80 MG: 10 INJECTION, SOLUTION INTRAMUSCULAR; INTRAVENOUS at 02:11

## 2022-01-01 RX ADMIN — HEPARIN SODIUM 5000 UNITS: 5000 INJECTION INTRAVENOUS; SUBCUTANEOUS at 02:11

## 2022-01-01 RX ADMIN — ACETAMINOPHEN 650 MG: 325 TABLET ORAL at 07:11

## 2022-01-01 RX ADMIN — FUROSEMIDE 40 MG: 10 INJECTION, SOLUTION INTRAMUSCULAR; INTRAVENOUS at 12:11

## 2022-01-01 RX ADMIN — PRAVASTATIN SODIUM 10 MG: 10 TABLET ORAL at 09:10

## 2022-01-01 RX ADMIN — FUROSEMIDE 40 MG: 10 INJECTION, SOLUTION INTRAMUSCULAR; INTRAVENOUS at 02:10

## 2022-01-01 RX ADMIN — AMLODIPINE BESYLATE 5 MG: 5 TABLET ORAL at 08:11

## 2022-01-01 RX ADMIN — LIDOCAINE HYDROCHLORIDE 5 ML: 10 INJECTION, SOLUTION INFILTRATION; PERINEURAL at 10:11

## 2022-01-01 RX ADMIN — HEPARIN SODIUM 5000 UNITS: 5000 INJECTION INTRAVENOUS; SUBCUTANEOUS at 02:01

## 2022-01-01 RX ADMIN — LIDOCAINE HYDROCHLORIDE 5 ML: 20 INJECTION, SOLUTION INFILTRATION; PERINEURAL at 08:03

## 2022-01-01 RX ADMIN — MUPIROCIN: 20 OINTMENT TOPICAL at 08:01

## 2022-01-01 RX ADMIN — PRAVASTATIN SODIUM 10 MG: 10 TABLET ORAL at 08:11

## 2022-01-01 RX ADMIN — PRAVASTATIN SODIUM 10 MG: 10 TABLET ORAL at 09:11

## 2022-01-01 RX ADMIN — HEPARIN SODIUM 5000 UNITS: 5000 INJECTION INTRAVENOUS; SUBCUTANEOUS at 05:05

## 2022-01-01 RX ADMIN — FUROSEMIDE 20 MG: 20 TABLET ORAL at 09:05

## 2022-01-01 RX ADMIN — METOPROLOL SUCCINATE 100 MG: 50 TABLET, EXTENDED RELEASE ORAL at 09:11

## 2022-01-01 RX ADMIN — MUPIROCIN: 20 OINTMENT TOPICAL at 10:11

## 2022-01-01 RX ADMIN — TAMSULOSIN HYDROCHLORIDE 0.4 MG: 0.4 CAPSULE ORAL at 09:05

## 2022-01-01 RX ADMIN — ALBUMIN (HUMAN) 25 G: 12.5 SOLUTION INTRAVENOUS at 11:11

## 2022-01-01 RX ADMIN — IOHEXOL 75 ML: 350 INJECTION, SOLUTION INTRAVENOUS at 03:11

## 2022-01-01 RX ADMIN — GUAIFENESIN 200 MG: 200 SOLUTION ORAL at 06:11

## 2022-01-01 RX ADMIN — FUROSEMIDE 40 MG: 10 INJECTION, SOLUTION INTRAMUSCULAR; INTRAVENOUS at 06:05

## 2022-01-01 RX ADMIN — HYDROMORPHONE HYDROCHLORIDE 0.5 MG: 1 INJECTION, SOLUTION INTRAMUSCULAR; INTRAVENOUS; SUBCUTANEOUS at 06:11

## 2022-01-01 RX ADMIN — Medication 1 EACH: at 09:01

## 2022-01-01 RX ADMIN — Medication 1 EACH: at 08:01

## 2022-01-01 RX ADMIN — Medication 1 EACH: at 12:01

## 2022-01-01 RX ADMIN — GUAIFENESIN 200 MG: 200 SOLUTION ORAL at 04:11

## 2022-01-01 RX ADMIN — PRAVASTATIN SODIUM 20 MG: 10 TABLET ORAL at 12:01

## 2022-01-01 RX ADMIN — BENZONATATE 200 MG: 100 CAPSULE ORAL at 09:01

## 2022-01-01 RX ADMIN — MUPIROCIN: 20 OINTMENT TOPICAL at 12:01

## 2022-01-01 RX ADMIN — HEPARIN SODIUM 3000 UNITS: 1000 INJECTION, SOLUTION INTRAVENOUS; SUBCUTANEOUS at 08:03

## 2022-01-01 RX ADMIN — ONDANSETRON HYDROCHLORIDE 4 MG: 4 TABLET, FILM COATED ORAL at 12:03

## 2022-01-01 RX ADMIN — METOPROLOL SUCCINATE 100 MG: 50 TABLET, EXTENDED RELEASE ORAL at 01:11

## 2022-01-01 RX ADMIN — TUBERCULIN PURIFIED PROTEIN DERIVATIVE 5 UNITS: 5 INJECTION, SOLUTION INTRADERMAL at 11:11

## 2022-01-01 RX ADMIN — ACETAMINOPHEN 650 MG: 325 TABLET ORAL at 12:03

## 2022-01-01 RX ADMIN — FUROSEMIDE 120 MG: 10 INJECTION, SOLUTION INTRAVENOUS at 01:01

## 2022-01-01 RX ADMIN — LIDOCAINE AND PRILOCAINE: 25; 25 CREAM TOPICAL at 03:01

## 2022-01-01 RX ADMIN — GUAIFENESIN 200 MG: 200 SOLUTION ORAL at 11:11

## 2022-01-01 RX ADMIN — AMLODIPINE BESYLATE 5 MG: 5 TABLET ORAL at 01:11

## 2022-01-01 RX ADMIN — AMLODIPINE BESYLATE 5 MG: 5 TABLET ORAL at 09:11

## 2022-01-01 RX ADMIN — HYDROMORPHONE HYDROCHLORIDE 0.5 MG: 1 INJECTION, SOLUTION INTRAMUSCULAR; INTRAVENOUS; SUBCUTANEOUS at 02:11

## 2022-01-01 RX ADMIN — HEPARIN SODIUM 5000 UNITS: 5000 INJECTION INTRAVENOUS; SUBCUTANEOUS at 05:11

## 2022-01-01 RX ADMIN — MORPHINE SULFATE 2 MG: 4 INJECTION INTRAVENOUS at 03:11

## 2022-01-01 RX ADMIN — FENTANYL CITRATE 50 MCG: 50 INJECTION, SOLUTION INTRAMUSCULAR; INTRAVENOUS at 08:03

## 2022-01-01 RX ADMIN — ONDANSETRON 4 MG: 2 INJECTION, SOLUTION INTRAMUSCULAR; INTRAVENOUS at 08:03

## 2022-01-01 RX ADMIN — MUPIROCIN: 20 OINTMENT TOPICAL at 08:05

## 2022-01-01 RX ADMIN — HYDROMORPHONE HYDROCHLORIDE 0.5 MG: 1 INJECTION, SOLUTION INTRAMUSCULAR; INTRAVENOUS; SUBCUTANEOUS at 05:11

## 2022-01-01 RX ADMIN — HEPARIN SODIUM 5000 UNITS: 5000 INJECTION INTRAVENOUS; SUBCUTANEOUS at 10:11

## 2022-01-01 RX ADMIN — GUAIFENESIN 200 MG: 200 SOLUTION ORAL at 10:11

## 2022-01-01 RX ADMIN — MUPIROCIN: 20 OINTMENT TOPICAL at 09:10

## 2022-01-01 RX ADMIN — FUROSEMIDE 40 MG: 10 INJECTION, SOLUTION INTRAMUSCULAR; INTRAVENOUS at 04:11

## 2022-01-01 RX ADMIN — HEPARIN SODIUM 5000 UNITS: 5000 INJECTION INTRAVENOUS; SUBCUTANEOUS at 07:11

## 2022-01-01 RX ADMIN — MIDAZOLAM HYDROCHLORIDE 1 MG: 1 INJECTION INTRAMUSCULAR; INTRAVENOUS at 08:03

## 2022-01-01 RX ADMIN — ASPIRIN 325 MG ORAL TABLET 325 MG: 325 PILL ORAL at 09:11

## 2022-01-01 RX ADMIN — IOHEXOL 64 ML: 300 INJECTION, SOLUTION INTRAVENOUS at 09:03

## 2022-01-01 RX ADMIN — POTASSIUM CHLORIDE 50 MEQ: 750 TABLET, EXTENDED RELEASE ORAL at 01:11

## 2022-01-01 RX ADMIN — GUAIFENESIN 200 MG: 200 SOLUTION ORAL at 07:11

## 2022-01-01 RX ADMIN — CEFAZOLIN SODIUM 1 G: 1 SOLUTION INTRAVENOUS at 08:03

## 2022-01-01 RX ADMIN — HEPARIN SODIUM 5000 UNITS: 5000 INJECTION INTRAVENOUS; SUBCUTANEOUS at 04:11

## 2022-01-01 RX ADMIN — TAMSULOSIN HYDROCHLORIDE 0.4 MG: 0.4 CAPSULE ORAL at 01:11

## 2022-01-01 RX ADMIN — PRAVASTATIN SODIUM 20 MG: 10 TABLET ORAL at 08:01

## 2022-01-01 RX ADMIN — LACTULOSE 30 G: 10 SOLUTION ORAL at 07:11

## 2022-01-01 RX ADMIN — AMLODIPINE BESYLATE 5 MG: 5 TABLET ORAL at 09:05

## 2022-01-01 RX ADMIN — FENTANYL CITRATE 25 MCG: 50 INJECTION, SOLUTION INTRAMUSCULAR; INTRAVENOUS at 09:03

## 2022-01-01 RX ADMIN — PRAVASTATIN SODIUM 10 MG: 10 TABLET ORAL at 01:11

## 2022-01-01 RX ADMIN — PRAVASTATIN SODIUM 10 MG: 10 TABLET ORAL at 09:05

## 2022-01-01 RX ADMIN — LIDOCAINE AND PRILOCAINE: 25; 25 CREAM TOPICAL at 01:01

## 2022-01-01 RX ADMIN — BARIUM SULFATE 5 ML: 0.81 POWDER, FOR SUSPENSION ORAL at 11:11

## 2022-01-01 RX ADMIN — CALCIUM ACETATE 667 MG: 667 CAPSULE ORAL at 11:11

## 2022-01-01 RX ADMIN — MUPIROCIN: 20 OINTMENT TOPICAL at 10:05

## 2022-01-24 PROBLEM — J81.0 FLASH PULMONARY EDEMA: Status: ACTIVE | Noted: 2022-01-01

## 2022-01-24 NOTE — CONSULTS
[facility] - Intensive Care  Vascular Surgery  Consult Note    Inpatient consult to Vascular Surgery  Consult performed by: Jude Ordaz MD  Consult ordered by: Jatin Julian MD        Subjective:     Chief Complaint/Reason for Admission: LUE AVF pain    History of Present Illness:             Jude Ordaz MD [facility]                         01/24/2022    HPI:  Mandeep Willams is a 86 y.o. male with   Patient Active Problem List   Diagnosis    HTN (hypertension)    ESRD (end stage renal disease)    Hyperlipidemia    Transient vision disturbance, left    End stage renal disease on dialysis    Chest pain, atypical    Pleural effusion    Pneumothorax    Tobacco use disorder    Anemia of chronic disorder    Acute blood loss anemia    Urinary retention    Constipation    Suspected UTI    Nonrheumatic aortic valve stenosis    Goals of care, counseling/discussion    Flash pulmonary edema    Dialysis AV fistula malfunction, initial encounter    being managed by PCP and specialists who is here today for evaluation of LUE AVF pain.  Patient states location is LUE AVF occurring for weeks.  No access issues or flow abnormalities per Nephrology.      Past Medical History:   Diagnosis Date    ESRD (end stage renal disease)     HTN (hypertension)     Hyperlipidemia      Past Surgical History:   Procedure Laterality Date    BACK SURGERY  2005    DIALYSIS FISTULA CREATION  4/2012    left arm    JOINT REPLACEMENT Right     hip     Family History   Problem Relation Age of Onset    Diabetes Sister     Hypertension Sister     Arthritis Sister     Cancer Neg Hx     Heart disease Neg Hx     Stroke Neg Hx     Amblyopia Neg Hx     Blindness Neg Hx     Cataracts Neg Hx     Glaucoma Neg Hx     Macular degeneration Neg Hx     Retinal detachment Neg Hx     Strabismus Neg Hx     Thyroid disease Neg Hx      Social History     Socioeconomic History     Marital status:    Occupational History     Employer: [employer]   Tobacco Use    Smoking status: Former Smoker     Packs/day: 1.00     Years: 10.00     Pack years: 10.00     Quit date: 1971     Years since quittin.1    Smokeless tobacco: Never Used   Substance and Sexual Activity    Alcohol use: No    Drug use: No    Sexual activity: Not Currently     Partners: Female     Comment:        Current Facility-Administered Medications:     acetaminophen tablet 650 mg, 650 mg, Oral, Q8H PRN, Jatin Julian MD    albuterol-ipratropium 2.5 mg-0.5 mg/3 mL nebulizer solution 3 mL, 3 mL, Nebulization, Q4H PRN, Jatin Julian MD    dextrose 50% injection 12.5 g, 12.5 g, Intravenous, PRN, Jatin Julian MD    dextrose 50% injection 25 g, 25 g, Intravenous, PRN, Jatin Julian MD    ferrous sulfate tablet 1 each, 1 tablet, Oral, BID, Isidro Fisher MD, 1 each at 22 1219    glucagon (human recombinant) injection 1 mg, 1 mg, Intramuscular, PRN, Jatin Julian MD    glucose chewable tablet 16 g, 16 g, Oral, PRN, Jatin Julian MD    glucose chewable tablet 24 g, 24 g, Oral, PRN, Jatni Julian MD    LIDOcaine-prilocaine cream, , Topical (Top), PRN, MONSE Rodney, Given at 22 1218    metOLazone tablet 5 mg, 5 mg, Oral, Daily, Jatin Julian MD, 5 mg at 22 0429    mupirocin 2 % ointment, , Nasal, BID, MONSE Rodney, Given at 22 1219    naloxone 0.4 mg/mL injection 0.02 mg, 0.02 mg, Intravenous, PRN, Jatin Julian MD    polyethylene glycol packet 17 g, 17 g, Oral, BID PRN, Jatin Julian MD    pravastatin tablet 20 mg, 20 mg, Oral, Daily, Isidro Fisher MD, 20 mg at 22 1219    sodium chloride 0.9% flush 10 mL, 10 mL, Intravenous, Q8H PRN, Jatin A Julian, MD        Medications Prior to Admission   Medication Sig Dispense Refill Last Dose    albuterol (PROAIR HFA) 90 mcg/actuation inhaler Inhale 2 puffs into the lungs every 4 (four)  hours as needed for Wheezing or Shortness of Breath. Rescue 8 g 5 1/23/2022    amLODIPine (NORVASC) 5 MG tablet Take 1 tablet (5 mg total) by mouth once daily. 90 tablet 3 1/23/2022    calcium acetate 667 mg (169 mg calcium)/5 mL Soln Take by mouth.   1/23/2022    furosemide (LASIX) 20 MG tablet TAKE ONE TABLET BY MOUTH ONCE DAILY 90 tablet 3 1/23/2022    metoprolol succinate (TOPROL-XL) 100 MG 24 hr tablet Take 1 tablet (100 mg total) by mouth once daily. 90 tablet 3 1/23/2022    multivitamin with minerals tablet Take 1 tablet by mouth once daily.   1/23/2022    omega-3 acid ethyl esters (LOVAZA) 1 gram capsule Take 2 capsules (2 g total) by mouth 2 (two) times daily. 360 capsule 3 1/23/2022    pravastatin (PRAVACHOL) 10 MG tablet TAKE ONE TABLET BY MOUTH ONCE DAILY FOR cholesterol 90 tablet 3 1/23/2022    tamsulosin (FLOMAX) 0.4 mg Cap Take 1 capsule (0.4 mg total) by mouth once daily. 30 capsule 11 1/23/2022    albuterol-ipratropium (DUO-NEB) 2.5 mg-0.5 mg/3 mL nebulizer solution NEBULIZE ONE vial (3ml) BY MOUTH EVERY 6 HOURS 180 mL 0     ammonium lactate (LAC-HYDRIN) 12 % lotion Apply topically daily as needed for Dry Skin. 500 g 5 Unknown    AURYXIA 210 mg iron Tab TAKE ONE TABLET BEFORE MEALS THREE TIMES DAILY  3 Unknown    ibuprofen (ADVIL,MOTRIN) 600 MG tablet Take 600 mg by mouth 4 (four) times daily as needed.   Unknown    ketoconazole (NIZORAL) 2 % shampoo Apply topically twice a week. To itchy area, lather, leave in 5 minutes then rinse 120 mL 2 Unknown    NON FORMULARY MEDICATION        oxyCODONE (ROXICODONE) 5 MG immediate release tablet Take 1 tablet (5 mg total) by mouth every 6 (six) hours as needed. 10 tablet 0 Unknown    polyethylene glycol (GLYCOLAX) 17 gram/dose powder Mix 1 capful (17 g) with fluids and drink by mouth once daily. 510 g 0 Unknown    senna (SENNA) 8.6 mg tablet Take 1 tablet by mouth 2 (two) times daily. 30 tablet 3 Unknown    vit b cmplx 3-fa-vit c-biotin  1- mg-mg-mcg (ELIE-KAUSHIK RX) 1- mg-mg-mcg Tab TAKE ONE TABLET BY MOUTH ONCE DAILY 90 tablet 3 Unknown       Review of patient's allergies indicates:  No Known Allergies    Past Medical History:   Diagnosis Date    ESRD (end stage renal disease)     HTN (hypertension)     Hyperlipidemia      Past Surgical History:   Procedure Laterality Date    BACK SURGERY      DIALYSIS FISTULA CREATION  2012    left arm    JOINT REPLACEMENT Right     hip     Family History     Problem Relation (Age of Onset)    Arthritis Sister    Diabetes Sister    Hypertension Sister        Tobacco Use    Smoking status: Former Smoker     Packs/day: 1.00     Years: 10.00     Pack years: 10.00     Quit date: 1971     Years since quittin.1    Smokeless tobacco: Never Used   Substance and Sexual Activity    Alcohol use: No    Drug use: No    Sexual activity: Not Currently     Partners: Female     Comment:      Review of Systems   Constitutional: Negative for chills and fever.   HENT: Negative for congestion.    Eyes: Negative for visual disturbance.   Respiratory: Negative for shortness of breath.    Cardiovascular: Negative for chest pain.   Gastrointestinal: Negative for abdominal distention.   Endocrine: Negative for cold intolerance.   Genitourinary: Negative for flank pain.   Musculoskeletal: Negative for back pain.   Skin: Negative for pallor, rash and wound.   Allergic/Immunologic: Negative for immunocompromised state.   Neurological: Negative for dizziness.   Hematological: Does not bruise/bleed easily.   Psychiatric/Behavioral: Negative for agitation.     Objective:     Vital Signs (Most Recent):  Temp: 98.9 °F (37.2 °C) (22 0715)  Pulse: 85 (22 1500)  Resp: (!) 27 (22 1500)  BP: (!) 155/67 (22 1500)  SpO2: 96 % (22 1500) Vital Signs (24h Range):  Temp:  [97.7 °F (36.5 °C)-98.9 °F (37.2 °C)] 98.9 °F (37.2 °C)  Pulse:  [61-88] 85  Resp:  [17-44] 27  SpO2:  [93 %-100  %] 96 %  BP: (115-163)/() 155/67     Weight: 55.2 kg (121 lb 11.1 oz)  Body mass index is 20.25 kg/m².    Physical Exam  Vitals reviewed.   Constitutional:       General: He is not in acute distress.     Appearance: He is well-developed and well-nourished. He is not diaphoretic.   HENT:      Head: Normocephalic and atraumatic.   Eyes:      Conjunctiva/sclera: Conjunctivae normal.   Cardiovascular:      Rate and Rhythm: Normal rate.      Comments: +thrill LUE AVF  Pulmonary:      Effort: Pulmonary effort is normal.   Abdominal:      General: There is no distension.      Palpations: Abdomen is soft. There is no mass.      Tenderness: There is no abdominal tenderness. There is no guarding or rebound.      Hernia: No hernia is present.   Musculoskeletal:         General: No deformity. Normal range of motion.      Cervical back: Neck supple.   Skin:     Findings: No rash.   Neurological:      Mental Status: He is alert and oriented to person, place, and time.   Psychiatric:         Mood and Affect: Mood and affect normal.         Significant Labs:  All pertinent labs from the last 24 hours have been reviewed.    Significant Diagnostics:  I have reviewed all pertinent imaging results/findings within the past 24 hours.    Assessment/Plan:     Dialysis AV fistula malfunction, initial encounter  -Imaging reviewed.  No hemodynamic significant stenosis, good flow.  Rec Emla cream overlying access sites.  If flow issues, will perform fistulogram.        Thank you for your consult. I will follow-up with patient. Please contact us if you have any additional questions.    Jude Ordaz MD  Vascular Surgery  [facility] - [place] - Intensive Care

## 2022-01-24 NOTE — PLAN OF CARE
Patient seen and examined.  Imaging reviewed.  No evidence of hemodynamically significant stenosis or flow issue.  Recommend accessing fistula with EMLA cream for pain control.  If there are access issues, instructed Nephrology team to contact me and I will perform fistulogram.

## 2022-01-24 NOTE — CARE UPDATE
[facility] - [place] Intensive Care  ICU Shift Summary  Date: 1/24/2022      Prehospitalization: Home  Admit Date / LOS : 1/23/2022/ 0 days    Diagnosis: Flash pulmonary edema    Consults:        Active: Nephro, Pulm CC and Vascular       Needed: OT and PT     Code Status: Full Code   Advanced Directive: <no information>    LDA: AVF, BIPAP and PIV       Central Lines/Site/Justification:Patient Does Not Have Central Line       Urinary Cath/Order/Justification:Patient Does Not Have Urinary Catheter    Vasopressors/Infusions:        GOALS: Volume/ Hemodynamic: N/A                     RASS: 0  alert and calm    Pain Management: none       Pain Controlled: not applicable     Rhythm: NSR    Respiratory Device: Nasal Cannula and Bipap    Oxygen Concentration (%):  [] 30             Most Recent SBT/ SAT: N/A       MOVE Screen: PASS  ICU Liberation: yes    VTE Prophylaxis: Pharm  Mobility: Bedrest  Stress Ulcer Prophylaxis: Yes    Isolation: No active isolations  Dietary: PO   Tolerance: yes  Advancement: yes    I & O (24h):    Intake/Output Summary (Last 24 hours) at 1/24/2022 1720  Last data filed at 1/24/2022 0500  Gross per 24 hour   Intake --   Output 600 ml   Net -600 ml        Restraints: No    Significant Dates:  Post Op Date: N/A  Rescue Date: N/A  Imaging/ Diagnostics: N/A    Noteworthy Labs:  none    COVID Test: (--)  CBC/Anemia Labs: Coags:    Recent Labs   Lab 01/23/22 2229 01/23/22 2246 01/24/22 0104 01/24/22  0540   WBC 5.61  --   --  6.49   HGB 6.7*  --   --  11.1*   HCT 20.7* 33*  --  33.4*   PLT 77*  --   --  124*   *  --   --  102*   RDW 15.1*  --   --  14.8*   FOLATE  --   --  17.6  --    [id number]  --   --  1320*  --     Recent Labs   Lab 01/24/22  0104   INR 1.1        Chemistries:   Recent Labs   Lab 01/23/22 2229 01/24/22  0540    141   K 4.4 4.5    102   CO2 21* 19*   * 114*   CREATININE 10.9* 10.3*   CALCIUM 8.0* 8.1*   PROT 8.0 7.5   BILITOT 1.4* 1.4*   ALKPHOS 82 68    ALT 28 21   AST 29 20   MG 2.5  --    PHOS 5.7*  --         Cardiac Enzymes: Ejection Fractions:    Recent Labs     01/23/22  2229   TROPONINI 0.048*    Nuc Rest EF   Date Value Ref Range Status   12/11/2018 51  Final        POCT Glucose: HbA1c:    No results for input(s): POCTGLUCOSE in the last 168 hours. No results found for: HGBA1C        ICU LOS 16h  Level of Care: Critical Care    Chart Check: 12 HR Done  Shift Summary/Plan for the shift: Patient remains in ICU alternating BiPAP and O2-NC, SOB noted with minimal activities, Emla cream applied to AVF site per HD nurses and continues hemodialysis at bed side so far patient tolerated well, diminished UOP, on  renal diet,  family and friened visited at bedside,  service used throughout shift, plan of care reviewed, safety maintained, no acute distress noted at present time, will continue to monitor.

## 2022-01-24 NOTE — SUBJECTIVE & OBJECTIVE
Medications Prior to Admission   Medication Sig Dispense Refill Last Dose    albuterol (PROAIR HFA) 90 mcg/actuation inhaler Inhale 2 puffs into the lungs every 4 (four) hours as needed for Wheezing or Shortness of Breath. Rescue 8 g 5 1/23/2022    amLODIPine (NORVASC) 5 MG tablet Take 1 tablet (5 mg total) by mouth once daily. 90 tablet 3 1/23/2022    calcium acetate 667 mg (169 mg calcium)/5 mL Soln Take by mouth.   1/23/2022    furosemide (LASIX) 20 MG tablet TAKE ONE TABLET BY MOUTH ONCE DAILY 90 tablet 3 1/23/2022    metoprolol succinate (TOPROL-XL) 100 MG 24 hr tablet Take 1 tablet (100 mg total) by mouth once daily. 90 tablet 3 1/23/2022    multivitamin with minerals tablet Take 1 tablet by mouth once daily.   1/23/2022    omega-3 acid ethyl esters (LOVAZA) 1 gram capsule Take 2 capsules (2 g total) by mouth 2 (two) times daily. 360 capsule 3 1/23/2022    pravastatin (PRAVACHOL) 10 MG tablet TAKE ONE TABLET BY MOUTH ONCE DAILY FOR cholesterol 90 tablet 3 1/23/2022    tamsulosin (FLOMAX) 0.4 mg Cap Take 1 capsule (0.4 mg total) by mouth once daily. 30 capsule 11 1/23/2022    albuterol-ipratropium (DUO-NEB) 2.5 mg-0.5 mg/3 mL nebulizer solution NEBULIZE ONE vial (3ml) BY MOUTH EVERY 6 HOURS 180 mL 0     ammonium lactate (LAC-HYDRIN) 12 % lotion Apply topically daily as needed for Dry Skin. 500 g 5 Unknown    AURYXIA 210 mg iron Tab TAKE ONE TABLET BEFORE MEALS THREE TIMES DAILY  3 Unknown    ibuprofen (ADVIL,MOTRIN) 600 MG tablet Take 600 mg by mouth 4 (four) times daily as needed.   Unknown    ketoconazole (NIZORAL) 2 % shampoo Apply topically twice a week. To itchy area, lather, leave in 5 minutes then rinse 120 mL 2 Unknown    NON FORMULARY MEDICATION        oxyCODONE (ROXICODONE) 5 MG immediate release tablet Take 1 tablet (5 mg total) by mouth every 6 (six) hours as needed. 10 tablet 0 Unknown    polyethylene glycol (GLYCOLAX) 17 gram/dose powder Mix 1 capful (17 g) with fluids and drink  by mouth once daily. 510 g 0 Unknown    senna (SENNA) 8.6 mg tablet Take 1 tablet by mouth 2 (two) times daily. 30 tablet 3 Unknown    vit b cmplx 3-fa-vit c-biotin 1- mg-mg-mcg (ELIE-KAUSHIK RX) 1- mg-mg-mcg Tab TAKE ONE TABLET BY MOUTH ONCE DAILY 90 tablet 3 Unknown       Review of patient's allergies indicates:  No Known Allergies    Past Medical History:   Diagnosis Date    ESRD (end stage renal disease)     HTN (hypertension)     Hyperlipidemia      Past Surgical History:   Procedure Laterality Date    BACK SURGERY      DIALYSIS FISTULA CREATION  2012    left arm    JOINT REPLACEMENT Right     hip     Family History     Problem Relation (Age of Onset)    Arthritis Sister    Diabetes Sister    Hypertension Sister        Tobacco Use    Smoking status: Former Smoker     Packs/day: 1.00     Years: 10.00     Pack years: 10.00     Quit date: 1971     Years since quittin.1    Smokeless tobacco: Never Used   Substance and Sexual Activity    Alcohol use: No    Drug use: No    Sexual activity: Not Currently     Partners: Female     Comment:      Review of Systems   Constitutional: Negative for chills and fever.   HENT: Negative for congestion.    Eyes: Negative for visual disturbance.   Respiratory: Negative for shortness of breath.    Cardiovascular: Negative for chest pain.   Gastrointestinal: Negative for abdominal distention.   Endocrine: Negative for cold intolerance.   Genitourinary: Negative for flank pain.   Musculoskeletal: Negative for back pain.   Skin: Negative for pallor, rash and wound.   Allergic/Immunologic: Negative for immunocompromised state.   Neurological: Negative for dizziness.   Hematological: Does not bruise/bleed easily.   Psychiatric/Behavioral: Negative for agitation.     Objective:     Vital Signs (Most Recent):  Temp: 98.9 °F (37.2 °C) (22 0715)  Pulse: 85 (22 1500)  Resp: (!) 27 (22 1500)  BP: (!) 155/67 (22 1500)  SpO2:  96 % (01/24/22 1500) Vital Signs (24h Range):  Temp:  [97.7 °F (36.5 °C)-98.9 °F (37.2 °C)] 98.9 °F (37.2 °C)  Pulse:  [61-88] 85  Resp:  [17-44] 27  SpO2:  [93 %-100 %] 96 %  BP: (115-163)/() 155/67     Weight: 55.2 kg (121 lb 11.1 oz)  Body mass index is 20.25 kg/m².    Physical Exam  Vitals reviewed.   Constitutional:       General: He is not in acute distress.     Appearance: He is well-developed and well-nourished. He is not diaphoretic.   HENT:      Head: Normocephalic and atraumatic.   Eyes:      Conjunctiva/sclera: Conjunctivae normal.   Cardiovascular:      Rate and Rhythm: Normal rate.      Comments: +thrill LUE AVF  Pulmonary:      Effort: Pulmonary effort is normal.   Abdominal:      General: There is no distension.      Palpations: Abdomen is soft. There is no mass.      Tenderness: There is no abdominal tenderness. There is no guarding or rebound.      Hernia: No hernia is present.   Musculoskeletal:         General: No deformity. Normal range of motion.      Cervical back: Neck supple.   Skin:     Findings: No rash.   Neurological:      Mental Status: He is alert and oriented to person, place, and time.   Psychiatric:         Mood and Affect: Mood and affect normal.         Significant Labs:  All pertinent labs from the last 24 hours have been reviewed.    Significant Diagnostics:  I have reviewed all pertinent imaging results/findings within the past 24 hours.

## 2022-01-24 NOTE — ED PROVIDER NOTES
"Encounter Date: 1/23/2022    SCRIBE #1 NOTE: I, Kianna العراقي, am scribing for, and in the presence of,  Valentina Cook MD. I have scribed the following portions of the note - Other sections scribed: HPI, ROS.       History     Chief Complaint   Patient presents with    Respiratory Distress     Per EMS report, initial room air sats 67%, increased work of breathing, tachypneic. Pt also missed approx 4 days of dialysis. EMS placed pt on CPAP. Pt immediately roomed to ED 17. +Language barrier     Mandepe Willams is a 86 y.o male with a PMHx of ESRD, HTN, and hyperlipidemia presenting to the ED with a chief complaint of respiratory distress. Per EMS, they received a call for shortness of breath with unknown duration. Upon arrival on scene the patient had a SpO2 of 67% on RA. Upon CPAP placement the patient's SpO2 improved to 98%. EMS denies giving the patient any medications en route. Per patient's relative, the patient called him a couple hours ago complaining of worsening shortness of breath. Relative states he saw the patient today at noon and the patient was at baseline. Is not on at home O2. Relative states that the patient went to dialysis on 1/19/22 and again on 1/21/22 but that he was unable to receive treatment due to issues with his LUE fistula. Relative states that the patient has chronic left lower arm pain and that he always thought it was related to his LUE fistula. Is unknown exactly when the pain started - "a long time".  Sounds like the access problem may have been related to pain in that extremity. Relative denies known fever, cough, chest pain, and abdominal pain.     The history is provided by the EMS personnel. The history is limited by the condition of the patient and a language barrier. No  was used.     Review of patient's allergies indicates:  No Known Allergies  Past Medical History:   Diagnosis Date    ESRD (end stage renal disease)     HTN (hypertension)     " Hyperlipidemia      Past Surgical History:   Procedure Laterality Date    BACK SURGERY      DIALYSIS FISTULA CREATION  2012    left arm    JOINT REPLACEMENT Right     hip     Family History   Problem Relation Age of Onset    Diabetes Sister     Hypertension Sister     Arthritis Sister     Cancer Neg Hx     Heart disease Neg Hx     Stroke Neg Hx     Amblyopia Neg Hx     Blindness Neg Hx     Cataracts Neg Hx     Glaucoma Neg Hx     Macular degeneration Neg Hx     Retinal detachment Neg Hx     Strabismus Neg Hx     Thyroid disease Neg Hx      Social History     Tobacco Use    Smoking status: Former Smoker     Packs/day: 1.00     Years: 10.00     Pack years: 10.00     Quit date: 1971     Years since quittin.1    Smokeless tobacco: Never Used   Substance Use Topics    Alcohol use: No    Drug use: No     Review of Systems   Unable to perform ROS: Severe respiratory distress   Constitutional: Negative for fever.   Respiratory: Positive for shortness of breath. Negative for cough.    Cardiovascular: Negative for chest pain.   Gastrointestinal: Negative for abdominal pain.   Musculoskeletal: Positive for arthralgias (left lower arm pain).       Physical Exam     Initial Vitals   BP Pulse Resp Temp SpO2   22 2220 22 2219 22 2219 22 2305 22 2219   (!) 133/110 88 (!) 44 98.5 °F (36.9 °C) 98 %      MAP       --                Physical Exam   Constitutional: Well-developed, Well-nourished, + acute distressed, Alert  HENT: Normocephalic, Atraumatic  Eyes: Conjunctiva normal  Neck: Supple, ROM normal, + JVD  Cardiac: Regular rate  Pulmonary/Chest wall: + respiratory distress, crackles, no chest wall tenderness, tachypnea with increased WOB  Abdomen: Soft, nontender, nondistended, no rebound, no guarding  Musc: Normal ROM, No obvious joint swelling  Lymph: + lower extremity edema  Neuro: oriented x 3, no focal neurologic deficit  Skin: Pink, warm, dry.  L arm with  hyperpigmentation to entire forearm, AV fistula to proximal LUE  Psych: Behavior normal, Mood and affect normal    Previous medical record and nursing documentation reviewed where available.          ED Course   Procedures  Labs Reviewed   CBC W/ AUTO DIFFERENTIAL - Abnormal; Notable for the following components:       Result Value    RBC 1.95 (*)     Hemoglobin 6.7 (*)     Hematocrit 20.7 (*)      (*)     MCH 34.4 (*)     RDW 15.1 (*)     Platelets 77 (*)     All other components within normal limits   COMPREHENSIVE METABOLIC PANEL - Abnormal; Notable for the following components:    CO2 21 (*)     Glucose 155 (*)      (*)     Creatinine 10.9 (*)     Calcium 8.0 (*)     Albumin 2.9 (*)     Total Bilirubin 1.4 (*)     Anion Gap 19 (*)     eGFR if  4 (*)     eGFR if non  4 (*)     All other components within normal limits   B-TYPE NATRIURETIC PEPTIDE - Abnormal; Notable for the following components:    BNP 3,076 (*)     All other components within normal limits   TROPONIN I - Abnormal; Notable for the following components:    Troponin I 0.048 (*)     All other components within normal limits   ISTAT PROCEDURE - Abnormal; Notable for the following components:    POC Glucose 152 (*)     POC  (*)     POC Creatinine 10.3 (*)     POC Anion Gap 19 (*)     POC Ionized Calcium 1.01 (*)     POC Hematocrit 33 (*)     All other components within normal limits   ISTAT PROCEDURE - Abnormal; Notable for the following components:    POC PH 7.344 (*)     POC PCO2 47.1 (*)     POC PO2 39 (*)     POC SATURATED O2 70 (*)     All other components within normal limits   VITAMIN B12   FOLATE   SARS-COV-2 RDRP GENE        ECG Results          EKG 12-lead (Final result)  Result time 01/24/22 18:45:39    Final result by Interface, Lab In [facility] (01/24/22 18:45:39)                 Narrative:    Test Reason : R06.02,    Vent. Rate : 086 BPM     Atrial Rate : 086 BPM     P-R Int : 164 ms           QRS Dur : 100 ms      QT Int : 394 ms       P-R-T Axes : 059 088 061 degrees     QTc Int : 471 ms    Normal sinus rhythm  Incomplete right bundle branch block  Borderline Abnormal ECG  When compared with ECG of 07-JAN-2021 18:49,  Significant changes have occurred  Confirmed by Ayo Orellana MD ([id number]) on 1/24/2022 6:45:25 PM    Referred By: [email or web address]   SELF           Confirmed By:Ayo Orellana MD                            Imaging Results          US Hemodialysis Access (Final result)  Result time 01/24/22 01:46:33    Final result by Misty Gee MD (01/24/22 01:46:33)                 Impression:      As above.      Electronically signed by: Misty Gee  Date:    01/24/2022  Time:    01:46             Narrative:    EXAMINATION:  US HEMODIALYSIS ACCESS    CLINICAL HISTORY:  painful fistula, unable to tolerate HD- please measure flow and assess for stenosis etc;    TECHNIQUE:  Real-time ultrasound of the hemodialysis was performed.    COMPARISON:  None.    FINDINGS:  Brachial a/inflow: 146 cm/sec    S mass stenosis: 226 centimeter/seconds    Proximal AVF: 403 cm/sec    Mid AV fistula flow volume 209 centimeters/second and 3908 millimeters/minute    A distal AVF: 167 cm/sec    Celiac arch: 93 cm sec    Subclavian vein/out flow: 90 cm/sec                               X-Ray Chest 1 View (Final result)  Result time 01/23/22 22:44:00    Final result by Matteo Coelho MD (01/23/22 22:44:00)                 Impression:      Cardiomegaly with increase in bilateral pleural effusions and new bilateral ground-glass airspace opacities.  The findings most likely represent worsening fluid overload state.      Electronically signed by: Matteo Coelho MD  Date:    01/23/2022  Time:    22:44             Narrative:    EXAMINATION:  XR CHEST 1 VIEW    CLINICAL HISTORY:  shortness of breath;    TECHNIQUE:  Single frontal view of the chest was performed.    COMPARISON:  06/15/2021.    FINDINGS:  Monitoring EKG leads are  present.  The trachea is unremarkable.  There is stable enlargement of the cardiomediastinal silhouette.  There is increase in the right-sided pleural effusion.  There is a new trace left-sided pleural effusion.  There is no evidence of a pneumothorax.  There is no evidence of pneumomediastinum.  There are new bilateral ground-glass airspace opacities.  There are degenerative changes in the osseous structures.                                 Medications   furosemide injection 120 mg (120 mg Intravenous Given 1/24/22 0112)     Medical Decision Making:   History:   Old Medical Records: I decided to obtain old medical records.  Clinical Tests:   Lab Tests: Ordered and Reviewed  Radiological Study: Ordered and Reviewed  Medical Tests: Ordered and Reviewed  ED Management:  Patient is a 86 year old male with ESRD, missed HD this week because of trouble accessing fistula, here with acute onset of dyspnea, likely pulmonary edema due to fluid overload.  Initial O2 sat 60s, now improving on bipap.  Denies other symptoms.  EKG without acute changes to suggest electrolyte disturbance, arrhythmia or acute ischemia.  CXR with pulm edema and bilateral effusions.  Trop slightly elevated - likely secondary to ESRD vs demand.  Pateint will need urgent HD but much more comfortable on bipap and electrolytes stable so can likely wait until morning.  Discussed with nephrology who agrees.  Vasc surg consult in am.  Discussed with ICU doc who will admit.      I spent > 30 min in the care of this critically ill patient.          Scribe Attestation:   Scribe #1: I performed the above scribed service and the documentation accurately describes the services I performed. I attest to the accuracy of the note.                 Clinical Impression:   Final diagnoses:  [R06.02] Shortness of breath  [J81.0] Acute pulmonary edema (Primary)        I, Valentina Cook, personally performed the services described in this documentation. All medical record  entries made by the scribe were at my direction and in my presence. I have reviewed the chart and agree that the record reflects my personal performance and is accurate and complete.       ED Disposition Condition    Admit               Valentina Cook MD  01/27/22 1746       Valentina Cook MD  01/27/22 1746

## 2022-01-24 NOTE — H&P
[facility]  History & Physical    Patient Name: Mandeep Willams  MRN: [id number]  Patient Class: IP- Inpatient  Admission Date: 1/23/2022  Attending Physician: Al Allison MD   Primary Care Provider: Guy Mcclelland Jr, MD         Patient information was obtained from patient, past medical records and ER records.     Subjective:     Principal Problem:Flash pulmonary edema    Chief Complaint:   Chief Complaint   Patient presents with    Respiratory Distress      +Language barrier        HPI:   Mandeep Willams is a 86 y.o. male who  has a past medical history of ESRD, HTN, and Hyperlipidemia, presented to the ED with CC of SOB. Patient was unable to tolerate dialysis due to pain at fistula sight, per family he often has pain there. So did not get full HD session on Friday and is now coming in with flash pulmonary Edema. Anion Gap Metabolic Acidosis. . Cr 10.9. BNP 3,076 and Trop 0.048. Cardiomegaly with increase in bilateral pleural effusions and new bilateral ground-glass airspace opacities. The findings most likely represent worsening fluid overload state. SpO2 of 67% on RA, not on home O2. Placed on BiPAP in ED. Hb 6.7 with , however not far from baseline and in diluted state without signs of active bleeding. Relative states that the patient went to dialysis on 1/19/22 and again on 1/21/22 but that he was unable to receive treatment due to issues with his LUE fistula. Bipap was weaned from 100% down to 40% and patient resting comfortably. Nephrology and VasSx consulted for am, as well as US of fistula. HPI limited due to Bipap and acute state. Denies CP or hemoptysis/hematochezia/hematuria.       Past Medical History:   Diagnosis Date    ESRD (end stage renal disease)     HTN (hypertension)     Hyperlipidemia        Past Surgical History:   Procedure Laterality Date    BACK SURGERY  2005    DIALYSIS FISTULA CREATION  4/2012    left arm    JOINT REPLACEMENT Right      hip       Review of patient's allergies indicates:  No Known Allergies    No current facility-administered medications on file prior to encounter.     Current Outpatient Medications on File Prior to Encounter   Medication Sig    albuterol (PROAIR HFA) 90 mcg/actuation inhaler Inhale 2 puffs into the lungs every 4 (four) hours as needed for Wheezing or Shortness of Breath. Rescue    amLODIPine (NORVASC) 5 MG tablet Take 1 tablet (5 mg total) by mouth once daily.    calcium acetate 667 mg (169 mg calcium)/5 mL Soln Take by mouth.    furosemide (LASIX) 20 MG tablet TAKE ONE TABLET BY MOUTH ONCE DAILY    metoprolol succinate (TOPROL-XL) 100 MG 24 hr tablet Take 1 tablet (100 mg total) by mouth once daily.    multivitamin with minerals tablet Take 1 tablet by mouth once daily.    omega-3 acid ethyl esters (LOVAZA) 1 gram capsule Take 2 capsules (2 g total) by mouth 2 (two) times daily.    pravastatin (PRAVACHOL) 10 MG tablet TAKE ONE TABLET BY MOUTH ONCE DAILY FOR cholesterol    tamsulosin (FLOMAX) 0.4 mg Cap Take 1 capsule (0.4 mg total) by mouth once daily.    albuterol-ipratropium (DUO-NEB) 2.5 mg-0.5 mg/3 mL nebulizer solution NEBULIZE ONE vial (3ml) BY MOUTH EVERY 6 HOURS    ammonium lactate (LAC-HYDRIN) 12 % lotion Apply topically daily as needed for Dry Skin.    AURYXIA 210 mg iron Tab TAKE ONE TABLET BEFORE MEALS THREE TIMES DAILY    ibuprofen (ADVIL,MOTRIN) 600 MG tablet Take 600 mg by mouth 4 (four) times daily as needed.    ketoconazole (NIZORAL) 2 % shampoo Apply topically twice a week. To itchy area, lather, leave in 5 minutes then rinse    NON FORMULARY MEDICATION     oxyCODONE (ROXICODONE) 5 MG immediate release tablet Take 1 tablet (5 mg total) by mouth every 6 (six) hours as needed.    polyethylene glycol (GLYCOLAX) 17 gram/dose powder Mix 1 capful (17 g) with fluids and drink by mouth once daily.    senna (SENNA) 8.6 mg tablet Take 1 tablet by mouth 2 (two) times daily.    vit b cmplx  3-fa-vit c-biotin 1- mg-mg-mcg (ELIE-KAUSHIK RX) 1- mg-mg-mcg Tab TAKE ONE TABLET BY MOUTH ONCE DAILY     Family History     Problem Relation (Age of Onset)    Arthritis Sister    Diabetes Sister    Hypertension Sister        Tobacco Use    Smoking status: Former Smoker     Packs/day: 1.00     Years: 10.00     Pack years: 10.00     Quit date: 1971     Years since quittin.1    Smokeless tobacco: Never Used   Substance and Sexual Activity    Alcohol use: No    Drug use: No    Sexual activity: Not Currently     Partners: Female     Comment:      Review of Systems   Constitutional: Positive for activity change and fatigue. Negative for fever and unexpected weight change.   HENT: Negative for trouble swallowing and voice change.    Eyes: Negative for photophobia and visual disturbance.   Respiratory: Positive for shortness of breath. Negative for cough.    Cardiovascular: Positive for leg swelling. Negative for chest pain and palpitations.   Gastrointestinal: Negative for abdominal distention, abdominal pain, blood in stool, constipation, diarrhea, nausea and vomiting.   Endocrine: Negative for polydipsia, polyphagia and polyuria.   Genitourinary: Positive for decreased urine volume and difficulty urinating. Negative for dysuria, flank pain and hematuria.   Musculoskeletal: Positive for myalgias (SATHYA pain at fistula). Negative for gait problem and joint swelling.   Skin: Negative for pallor and rash.   Allergic/Immunologic: Negative for immunocompromised state.   Neurological: Negative for seizures, syncope, facial asymmetry and weakness.   Psychiatric/Behavioral: Negative for agitation, behavioral problems and confusion.     Objective:     Vital Signs (Most Recent):  Temp: 98.5 °F (36.9 °C) (22 2305)  Pulse: 61 (22)  Resp: 17 (22)  BP: (!) 115/56 (22 0032)  SpO2: 100 % (22) Vital Signs (24h Range):  Temp:  [98.5 °F (36.9 °C)] 98.5 °F (36.9  °C)  Pulse:  [61-88] 61  Resp:  [17-44] 17  SpO2:  [96 %-100 %] 100 %  BP: (115-163)/() 115/56     Weight: 65.1 kg (143 lb 8.3 oz)  Body mass index is 22.48 kg/m².    Physical Exam  Vitals and nursing note reviewed.   Constitutional:       General: He is in acute distress.      Appearance: He is well-developed, normal weight and well-nourished. He is ill-appearing and toxic-appearing. He is not diaphoretic.   HENT:      Head: Normocephalic and atraumatic.      Nose: Nose normal. No congestion.      Mouth/Throat:      Mouth: Mucous membranes are moist.      Pharynx: No oropharyngeal exudate.   Eyes:      General: No scleral icterus.     Pupils: Pupils are equal, round, and reactive to light.   Neck:      Thyroid: No thyromegaly.   Cardiovascular:      Rate and Rhythm: Normal rate and regular rhythm.      Heart sounds: Murmur heard.   No friction rub. No gallop.    Pulmonary:      Effort: Respiratory distress present.      Breath sounds: No stridor. Rhonchi and rales present. No wheezing.   Abdominal:      General: There is no distension.      Palpations: Abdomen is soft.      Tenderness: There is no abdominal tenderness. There is no guarding.   Musculoskeletal:         General: No deformity. Normal range of motion.      Cervical back: Normal range of motion. No rigidity.      Right lower leg: Edema present.      Left lower leg: Edema present.   Lymphadenopathy:      Cervical: No cervical adenopathy.   Skin:     General: Skin is warm.      Capillary Refill: Capillary refill takes less than 2 seconds.      Coloration: Skin is not jaundiced.      Findings: No bruising.   Neurological:      General: No focal deficit present.      Mental Status: He is alert and oriented to person, place, and time. Mental status is at baseline.      Cranial Nerves: No cranial nerve deficit.   Psychiatric:         Mood and Affect: Mood and affect and mood normal.         Behavior: Behavior normal.           CRANIAL NERVES     CN III,  IV, VI   Pupils are equal, round, and reactive to light.           Recent Results (from the past 24 hour(s))   CBC Auto Differential    Collection Time: 01/23/22 10:29 PM   Result Value Ref Range    WBC 5.61 3.90 - 12.70 K/uL    RBC 1.95 (L) 4.60 - 6.20 M/uL    Hemoglobin 6.7 (L) 14.0 - 18.0 g/dL    Hematocrit 20.7 (L) 40.0 - 54.0 %     (H) 82 - 98 fL    MCH 34.4 (H) 27.0 - 31.0 pg    MCHC 32.4 32.0 - 36.0 g/dL    RDW 15.1 (H) 11.5 - 14.5 %    Platelets 77 (L) 150 - 450 K/uL    MPV 9.9 9.2 - 12.9 fL    Immature Granulocytes 0.4 0.0 - 0.5 %    Gran # (ANC) 3.8 1.8 - 7.7 K/uL    Immature Grans (Abs) 0.02 0.00 - 0.04 K/uL    Lymph # 1.4 1.0 - 4.8 K/uL    Mono # 0.4 0.3 - 1.0 K/uL    Eos # 0.1 0.0 - 0.5 K/uL    Baso # 0.01 0.00 - 0.20 K/uL    nRBC 0 0 /100 WBC    Gran % 66.9 38.0 - 73.0 %    Lymph % 24.1 18.0 - 48.0 %    Mono % 7.3 4.0 - 15.0 %    Eosinophil % 1.1 0.0 - 8.0 %    Basophil % 0.2 0.0 - 1.9 %    Differential Method Automated    Comprehensive Metabolic Panel    Collection Time: 01/23/22 10:29 PM   Result Value Ref Range    Sodium 140 136 - 145 mmol/L    Potassium 4.4 3.5 - 5.1 mmol/L    Chloride 100 95 - 110 mmol/L    CO2 21 (L) 23 - 29 mmol/L    Glucose 155 (H) 70 - 110 mg/dL     (H) 8 - 23 mg/dL    Creatinine 10.9 (H) 0.5 - 1.4 mg/dL    Calcium 8.0 (L) 8.7 - 10.5 mg/dL    Total Protein 8.0 6.0 - 8.4 g/dL    Albumin 2.9 (L) 3.5 - 5.2 g/dL    Total Bilirubin 1.4 (H) 0.1 - 1.0 mg/dL    Alkaline Phosphatase 82 55 - 135 U/L    AST 29 10 - 40 U/L    ALT 28 10 - 44 U/L    Anion Gap 19 (H) 8 - 16 mmol/L    eGFR if African American 4 (A) >60 mL/min/1.73 m^2    eGFR if non African American 4 (A) >60 mL/min/1.73 m^2   Brain Natriuretic Peptide    Collection Time: 01/23/22 10:29 PM   Result Value Ref Range    BNP 3,076 (H) 0 - 99 pg/mL   Troponin I    Collection Time: 01/23/22 10:29 PM   Result Value Ref Range    Troponin I 0.048 (H) 0.000 - 0.026 ng/mL   ISTAT PROCEDURE    Collection Time: 01/23/22  10:46 PM   Result Value Ref Range    POC Glucose 152 (H) 70 - 110 mg/dL    POC  (H) 6 - 30 mg/dL    POC Creatinine 10.3 (H) 0.5 - 1.4 mg/dL    POC Sodium 139 136 - 145 mmol/L    POC Potassium 3.9 3.5 - 5.1 mmol/L    POC Chloride 99 95 - 110 mmol/L    POC TCO2 (MEASURED) 26 23 - 29 mmol/L    POC Anion Gap 19 (H) 8 - 16 mmol/L    POC Ionized Calcium 1.01 (L) 1.06 - 1.42 mmol/L    POC Hematocrit 33 (L) 36 - 54 %PCV    Sample VENOUS    ISTAT PROCEDURE    Collection Time: 01/23/22 10:47 PM   Result Value Ref Range    POC PH 7.344 (L) 7.35 - 7.45    POC PCO2 47.1 (H) 35 - 45 mmHg    POC PO2 39 (L) 40 - 60 mmHg    POC HCO3 25.7 24 - 28 mmol/L    POC BE 0 -2 to 2 mmol/L    POC SATURATED O2 70 (L) 95 - 100 %    POC TCO2 27 24 - 29 mmol/L    Rate 10     Sample VENOUS     Site Other     Allens Test N/A     DelSys CPAP/BiPAP     Mode BiPAP     FiO2 100     Spont Rate 14     Min Vol 16.6     Sp02 98     IP 18     EP 12    POCT COVID-19 Rapid Screening    Collection Time: 01/23/22 11:07 PM   Result Value Ref Range    POC Rapid COVID Negative Negative     Acceptable Yes        Microbiology Results (last 7 days)     Procedure Component Value Units Date/Time    Blood culture [[id number]]     Order Status: Sent Specimen: Blood            Imaging Results          X-Ray Chest 1 View (Final result)  Result time 01/23/22 22:44:00    Final result by Matteo Coelho MD (01/23/22 22:44:00)                 Impression:      Cardiomegaly with increase in bilateral pleural effusions and new bilateral ground-glass airspace opacities.  The findings most likely represent worsening fluid overload state.      Electronically signed by: Matteo Coelho MD  Date:    01/23/2022  Time:    22:44             Narrative:    EXAMINATION:  XR CHEST 1 VIEW    CLINICAL HISTORY:  shortness of breath;    TECHNIQUE:  Single frontal view of the chest was performed.    COMPARISON:  06/15/2021.    FINDINGS:  Monitoring EKG leads are present.  The  trachea is unremarkable.  There is stable enlargement of the cardiomediastinal silhouette.  There is increase in the right-sided pleural effusion.  There is a new trace left-sided pleural effusion.  There is no evidence of a pneumothorax.  There is no evidence of pneumomediastinum.  There are new bilateral ground-glass airspace opacities.  There are degenerative changes in the osseous structures.                                    Assessment/Plan:     * Flash pulmonary edema  See HD note  -will need volume removal via HD today      End stage renal disease on dialysis  Unable to tolerate HD 2/2 pain at fistula site  No signs of infection at site, and afebrile with normal WBC#  -US of fistula ordered  -VascSx consult to assess  -Neph consult for HD      Goals of care, counseling/discussion  -Consider palliative care consult    Previous:  During this visit, I engaged the patient in the advance care planning process.  D/w patient and nephewFletcher, at length.  Patient and family is aware of multiple major co-morbidities contributing to patient's dyspnea.  ESRD + severe AS + pulmonary htn + pleural effusion.  At this point in time, the patient does have full decision-making capacity.  We discussed different extreme health states that she could experience, and reviewed what kind of medical care she would want in those situations.  The patient communicated that if she were comatose and had little chance of a meaningful recovery, he will defer to medical provider for appropriate medical decision.  However, patient did specifically stated that he does not want heroic measure such as intubation or CPR in the case of cardiopulmonary arrest.  In addition to the above preference, other important end-of-life issues for the patient .  The patient  HAS NOT completed a living will to reflect these preferences.  The patient   HAS NOT designated a healthcare power of  to make decisions on her behalf.  COLBY completed with the  aid of  and Fletcher ro.      Hyperlipidemia  Cont statin      HTN (hypertension)  -145 mostly, stable  Volume removal first        Review home med list after HD    VTE Risk Mitigation (From admission, onward)         Ordered     IP VTE HIGH RISK PATIENT  Once         01/24/22 0031     Place sequential compression device  Until discontinued         01/24/22 0031     Reason for No Pharmacological VTE Prophylaxis  Once        Question:  Reasons:  Answer:  Thrombocytopenia    01/24/22 0031              Critical care time spent on the evaluation and treatment of severe organ dysfunction, review of pertinent labs and imaging studies, discussions with consulting providers and discussions with patient/family: 35 minutes.     Jatin Julian MD  Department of Hospital Medicine   [facility] [place]

## 2022-01-24 NOTE — HPI
Mandeep Willams is a 86 y.o. male who  has a past medical history of ESRD, HTN, and Hyperlipidemia, presented to the ED with CC of SOB. Patient was unable to tolerate dialysis due to pain at fistula sight, per family he often has pain there. So did not get full HD session on Friday and is now coming in with flash pulmonary Edema. Anion Gap Metabolic Acidosis. . Cr 10.9. BNP 3,076 and Trop 0.048. Cardiomegaly with increase in bilateral pleural effusions and new bilateral ground-glass airspace opacities. The findings most likely represent worsening fluid overload state. SpO2 of 67% on RA, not on home O2. Placed on BiPAP in ED. Hb 6.7 with , however not far from baseline and in diluted state without signs of active bleeding. Relative states that the patient went to dialysis on 1/19/22 and again on 1/21/22 but that he was unable to receive treatment due to issues with his LUE fistula. Bipap was weaned from 100% down to 40% and patient resting comfortably. Nephrology and VasSx consulted for am, as well as US of fistula. HPI limited due to Bipap and acute state. Denies CP or hemoptysis/hematochezia/hematuria.     - Jatin Julian MD

## 2022-01-24 NOTE — EICU
e-ICU admit note         Reason for ICU admission:    Acute hypoxic resp failure    Fluid overload    HPI:    85 yo male HD pt  c/o SOB    EMS placed on BiPAP, saturation 67%--> 98%    Last  dialysis on 1/19 and 1/21/22 but both times has issues with LUE fistula.          PMx:    ESRD  HTN  hyperlipidemia         Home Meds:    oxyCODONE  amLODIPine  calcium acetate   furosemide  metoprolol   multivitamin  pravastatin   tamsulosin  albuterol-ipratropium   AURYXIA 210 mg iron   ibuprofen          Significant labs:    Hb 6.7    Creat 11  Troponin 0.048  7.34/47/37/39 ( venous)                Significant images    CXR:    Cardiomegaly with increase in bilateral pleural effusions and new bilateral ground-glass airspace opacities.  The findings most likely represent worsening fluid overload state    ECG:    NSR, no STEMI             I viewed the pt via camera at  3:15 am:    144/64, 74 sinus, 100%, R 20     Asleep    Off BiPAP , on FM    NAD                          Assessment/Plan:    Acute hypoxic resp failure  Fluid overload  BiPAP prn    Incomplete HDs  Plan for HD    Hx HTN  Hold home  meds    Chronic anemia  Most baseline  Now 6.7  Should receive 1 pRBC with HD  Fe tabs bid    HLD  statin                 DVT ppx    Maik Fisher MD  e-ICU  [id number]

## 2022-01-24 NOTE — CONSULTS
"[facility] - [place] - Intensive Care  Nephrology  Consult Note    Patient Name: Mandeep Willams  MRN: [id number]  Admission Date: 1/23/2022  Hospital Length of Stay: 0 days  Attending Provider: Al Allison MD   Primary Care Physician: Guy Mcclelland Jr, MD  Principal Problem:Flash pulmonary edema    Inpatient consult to Nephrology  Consult performed by: MONSE Rodney  Consult ordered by: Harjeet Brewster MD  Reason for consult: esrd on HD        Subjective:     HPI: Mr Willams is a 86 yr old male, Mandarin speaking only, w/ PMH of ESRD on iHD MWF, HTN, HLD who presented to [facility] yesterday 1/23/22 with chief complaint of SOB. Admitted for flash pulmonary edema thought to be due to missed HD treatments. Nephrology consulted for ESRD, dialysis management. Pt usual HD MWF at [facility] under Dr Henriquez. /Martii used as patient is Mandarin speaking. Patient reports that his last HD was 4 days ago, when asked why this occurred, sites reason as his chronic dialysis unit " isnt responsible." Mr Willmas tells the  that, " he would like dialysis today, but does not want to discuss this situation any further." Informed patient that HD will be today and we will use Emla cream to numb the fistula, as the ultrasound revealed no abnormalities. He does endorse SOB. Of note, for martii interview patient taken off BiPAP and placed on NC with O2 sats 86-90%. Placed back on BiPAP at end of brief interview.    Past Medical History:   Diagnosis Date    ESRD (end stage renal disease)     HTN (hypertension)     Hyperlipidemia        Past Surgical History:   Procedure Laterality Date    BACK SURGERY  2005    DIALYSIS FISTULA CREATION  4/2012    left arm    JOINT REPLACEMENT Right     hip       Review of patient's allergies indicates:  No Known Allergies  Current Facility-Administered Medications   Medication Frequency    acetaminophen tablet 650 mg Q8H PRN    albuterol-ipratropium 2.5 mg-0.5 mg/3 mL nebulizer " solution 3 mL Q4H PRN    dextrose 50% injection 12.5 g PRN    dextrose 50% injection 25 g PRN    ferrous sulfate tablet 1 each BID    glucagon (human recombinant) injection 1 mg PRN    glucose chewable tablet 16 g PRN    glucose chewable tablet 24 g PRN    LIDOcaine-prilocaine cream PRN    metOLazone tablet 5 mg Daily    mupirocin 2 % ointment BID    naloxone 0.4 mg/mL injection 0.02 mg PRN    polyethylene glycol packet 17 g BID PRN    pravastatin tablet 20 mg Daily    sodium chloride 0.9% flush 10 mL Q8H PRN     Family History     Problem Relation (Age of Onset)    Arthritis Sister    Diabetes Sister    Hypertension Sister        Tobacco Use    Smoking status: Former Smoker     Packs/day: 1.00     Years: 10.00     Pack years: 10.00     Quit date: 1971     Years since quittin.1    Smokeless tobacco: Never Used   Substance and Sexual Activity    Alcohol use: No    Drug use: No    Sexual activity: Not Currently     Partners: Female     Comment:      Review of Systems   Unable to perform ROS: Severe respiratory distress (patient did not want to discuss further)   Respiratory: Positive for shortness of breath.      Objective:     Vital Signs (Most Recent):  Temp: 98.9 °F (37.2 °C) (22 0715)  Pulse: 73 (22 1100)  Resp: (!) 23 (22 1100)  BP: (!) 133/59 (22 1100)  SpO2: 97 % (22 1100)  O2 Device (Oxygen Therapy): BiPAP (22 0922) Vital Signs (24h Range):  Temp:  [97.7 °F (36.5 °C)-98.9 °F (37.2 °C)] 98.9 °F (37.2 °C)  Pulse:  [61-88] 73  Resp:  [17-44] 23  SpO2:  [93 %-100 %] 97 %  BP: (115-163)/() 133/59     Weight: 55.2 kg (121 lb 11.1 oz) (22 0247)  Body mass index is 20.25 kg/m².  Body surface area is 1.59 meters squared.    I/O last 3 completed shifts:  In: -   Out: 600 [Urine:600]    Physical exam   Physical Exam  Vitals reviewed.   Constitutional:       Appearance: He is ill-appearing.   HENT:      Head: Normocephalic and atraumatic.       Mouth/Throat:      Mouth: Mucous membranes are moist.      Pharynx: No posterior oropharyngeal erythema.   Eyes:      General: No scleral icterus.     Extraocular Movements: Extraocular movements intact.      Conjunctiva/sclera: Conjunctivae normal.   Cardiovascular:      Rate and Rhythm: Normal rate and regular rhythm.      Heart sounds: Murmur heard.       Pulmonary:      Breath sounds: Rhonchi present.      Comments: On BiPAP  Abdominal:      General: There is no distension.      Palpations: Abdomen is soft.   Musculoskeletal:      Right lower leg: No edema.      Left lower leg: No edema.   Skin:     General: Skin is warm and dry.   Neurological:      General: No focal deficit present.      Mental Status: He is alert and oriented to person, place, and time.      Comments: Answers questions appropriately, follows commands   Psychiatric:         Mood and Affect: Mood normal.         Thought Content: Thought content normal.           Significant Labs:  CBC:   Recent Labs   Lab 01/24/22  0540   WBC 6.49   RBC 3.29*   HGB 11.1*   HCT 33.4*   *   *   MCH 33.7*   MCHC 33.2     CMP:   Recent Labs   Lab 01/24/22  0540      CALCIUM 8.1*   ALBUMIN 2.8*   PROT 7.5      K 4.5   CO2 19*      *   CREATININE 10.3*   ALKPHOS 68   ALT 21   AST 20   BILITOT 1.4*     All labs within the past 24 hours have been reviewed.    Significant Imaging:  Labs: Reviewed  X-Ray: Reviewed    Assessment/Plan:     ESRD - usual HD on MWF with Dr Henriquez at [facility]  - missed HD treatments, apparently d/t AVF discomfort  - US of AVF without abnormalities- cleared for use by vascular surgery - emla cream prior to HD  - HD today with lower flow rates/ filter size, and plan for HD again tomorrow with UF as tolerated  - monitor daily weights, strict I&Os  - avoid nephrotoxic agents including NSAIDs, renally dose medications  - daily labs     Hypervolemia/ Acute hypoxemic respiratory failure  -  Currently on BiPAP, desats on NC  - HD today and tomorrow with UF as tolerated  - continue diuretics- still makes urine  - daily wts, strict Is and Os      Anemia of chronic kidney disease   - Hgb up today, monitor for need to start GREGORY  - continue to monitor CBC      Acidosis   - high bicarb bath with HD today   - trend level     Thank you for your consult. I will follow-up with patient. Please contact us if you have any additional questions.    MONSE Rodney   DOS: 01/24/2022  Nephrology  [facility] - [place] - Intensive Care

## 2022-01-24 NOTE — ASSESSMENT & PLAN NOTE
-Consider palliative care consult    Previous:  During this visit, I engaged the patient in the advance care planning process.  D/w patient and nephewFletcher, at length.  Patient and family is aware of multiple major co-morbidities contributing to patient's dyspnea.  ESRD + severe AS + pulmonary htn + pleural effusion.  At this point in time, the patient does have full decision-making capacity.  We discussed different extreme health states that she could experience, and reviewed what kind of medical care she would want in those situations.  The patient communicated that if she were comatose and had little chance of a meaningful recovery, he will defer to medical provider for appropriate medical decision.  However, patient did specifically stated that he does not want heroic measure such as intubation or CPR in the case of cardiopulmonary arrest.  In addition to the above preference, other important end-of-life issues for the patient .  The patient  HAS NOT completed a living will to reflect these preferences.  The patient   HAS NOT designated a healthcare power of  to make decisions on her behalf.  COLBY completed with the aid of  and Fletcher ro.

## 2022-01-24 NOTE — ASSESSMENT & PLAN NOTE
-Imaging reviewed.  No hemodynamic significant stenosis, good flow.  Rec Emla cream overlying access sites.  If flow issues, will perform fistulogram.

## 2022-01-24 NOTE — CARE UPDATE
Reviewed H and P by my colleague and agree with A and P. Patient presenting with flash pulmonary edema likely secondary to missing dialysis due to a fistula issue. Initially requiring Bi-pap and ICU admission. Nephrology and vascular consulted.  Likely dialysis today then to floor if improves.

## 2022-01-24 NOTE — CARE UPDATE
Transfer Summary:     See Dr. Julian's H and P on 1/24/22. Briefly- has missed dialysis secondary to pain at fistula sight. Vasc noted no stenosis recommens EMLA cream for pain control. Came In with respiratory failure requiring Bi-pap. Patient had emergent dialysis and was changed to NC. He will be transferred to [facility] today. Home possibly on 1/25.

## 2022-01-24 NOTE — ASSESSMENT & PLAN NOTE
Unable to tolerate HD 2/2 pain at fistula site  No signs of infection at site, and afebrile with normal WBC#  -US of fistula ordered  -VascSx consult to assess  -Neph consult for HD

## 2022-01-24 NOTE — PLAN OF CARE
Problem: Adult Inpatient Plan of Care  Goal: Plan of Care Review  Outcome: Ongoing, Progressing    Patient remains in ICU alternating BiPAP and O2-NC, SOB noted with minimal activities, Emla cream applied to AVF site per HD nurses and continues hemodialysis at bed side so far patient tolerated well, diminished UOP, on  renal diet,  family and friened visited at bedside,  service used throughout shift, plan of care reviewed, safety maintained, no acute distress noted at present time, will continue to monitor.

## 2022-01-24 NOTE — HPI
Jude Ordaz MD [facility]                         2022    HPI:  Mandeep Willams is a 86 y.o. male with   Patient Active Problem List   Diagnosis    HTN (hypertension)    ESRD (end stage renal disease)    Hyperlipidemia    Transient vision disturbance, left    End stage renal disease on dialysis    Chest pain, atypical    Pleural effusion    Pneumothorax    Tobacco use disorder    Anemia of chronic disorder    Acute blood loss anemia    Urinary retention    Constipation    Suspected UTI    Nonrheumatic aortic valve stenosis    Goals of care, counseling/discussion    Flash pulmonary edema    Dialysis AV fistula malfunction, initial encounter    being managed by PCP and specialists who is here today for evaluation of LUE AVF pain.  Patient states location is LUE AVF occurring for weeks.  No access issues or flow abnormalities per Nephrology.      Past Medical History:   Diagnosis Date    ESRD (end stage renal disease)     HTN (hypertension)     Hyperlipidemia      Past Surgical History:   Procedure Laterality Date    BACK SURGERY      DIALYSIS FISTULA CREATION  2012    left arm    JOINT REPLACEMENT Right     hip     Family History   Problem Relation Age of Onset    Diabetes Sister     Hypertension Sister     Arthritis Sister     Cancer Neg Hx     Heart disease Neg Hx     Stroke Neg Hx     Amblyopia Neg Hx     Blindness Neg Hx     Cataracts Neg Hx     Glaucoma Neg Hx     Macular degeneration Neg Hx     Retinal detachment Neg Hx     Strabismus Neg Hx     Thyroid disease Neg Hx      Social History     Socioeconomic History    Marital status:    Occupational History     Employer: [employer]   Tobacco Use    Smoking status: Former Smoker     Packs/day: 1.00     Years: 10.00     Pack years: 10.00     Quit date: 1971     Years since quittin.1    Smokeless tobacco: Never Used   Substance and Sexual Activity    Alcohol use: No     Drug use: No    Sexual activity: Not Currently     Partners: Female     Comment:        Current Facility-Administered Medications:     acetaminophen tablet 650 mg, 650 mg, Oral, Q8H PRN, Jatin Julian MD    albuterol-ipratropium 2.5 mg-0.5 mg/3 mL nebulizer solution 3 mL, 3 mL, Nebulization, Q4H PRN, Jatin Julian MD    dextrose 50% injection 12.5 g, 12.5 g, Intravenous, PRN, Jatin Julian MD    dextrose 50% injection 25 g, 25 g, Intravenous, PRN, Jatin Julian MD    ferrous sulfate tablet 1 each, 1 tablet, Oral, BID, Isidro Fisher MD, 1 each at 01/24/22 1219    glucagon (human recombinant) injection 1 mg, 1 mg, Intramuscular, PRN, Jatin Julian MD    glucose chewable tablet 16 g, 16 g, Oral, PRN, Jatin Julian MD    glucose chewable tablet 24 g, 24 g, Oral, PRN, Jatin Julian MD    LIDOcaine-prilocaine cream, , Topical (Top), PRN, MONSE Rodney, Given at 01/24/22 1218    metOLazone tablet 5 mg, 5 mg, Oral, Daily, Jatin Julian MD, 5 mg at 01/24/22 0429    mupirocin 2 % ointment, , Nasal, BID, MONSE Rodney, Given at 01/24/22 1219    naloxone 0.4 mg/mL injection 0.02 mg, 0.02 mg, Intravenous, PRN, Jatin Julian MD    polyethylene glycol packet 17 g, 17 g, Oral, BID PRN, Jatin Julian MD    pravastatin tablet 20 mg, 20 mg, Oral, Daily, Isidro Fisher MD, 20 mg at 01/24/22 1219    sodium chloride 0.9% flush 10 mL, 10 mL, Intravenous, Q8H PRN, Jatin Julian MD

## 2022-01-24 NOTE — SUBJECTIVE & OBJECTIVE
Past Medical History:   Diagnosis Date    ESRD (end stage renal disease)     HTN (hypertension)     Hyperlipidemia        Past Surgical History:   Procedure Laterality Date    BACK SURGERY  2005    DIALYSIS FISTULA CREATION  4/2012    left arm    JOINT REPLACEMENT Right     hip       Review of patient's allergies indicates:  No Known Allergies    No current facility-administered medications on file prior to encounter.     Current Outpatient Medications on File Prior to Encounter   Medication Sig    albuterol (PROAIR HFA) 90 mcg/actuation inhaler Inhale 2 puffs into the lungs every 4 (four) hours as needed for Wheezing or Shortness of Breath. Rescue    amLODIPine (NORVASC) 5 MG tablet Take 1 tablet (5 mg total) by mouth once daily.    calcium acetate 667 mg (169 mg calcium)/5 mL Soln Take by mouth.    furosemide (LASIX) 20 MG tablet TAKE ONE TABLET BY MOUTH ONCE DAILY    metoprolol succinate (TOPROL-XL) 100 MG 24 hr tablet Take 1 tablet (100 mg total) by mouth once daily.    multivitamin with minerals tablet Take 1 tablet by mouth once daily.    omega-3 acid ethyl esters (LOVAZA) 1 gram capsule Take 2 capsules (2 g total) by mouth 2 (two) times daily.    pravastatin (PRAVACHOL) 10 MG tablet TAKE ONE TABLET BY MOUTH ONCE DAILY FOR cholesterol    tamsulosin (FLOMAX) 0.4 mg Cap Take 1 capsule (0.4 mg total) by mouth once daily.    albuterol-ipratropium (DUO-NEB) 2.5 mg-0.5 mg/3 mL nebulizer solution NEBULIZE ONE vial (3ml) BY MOUTH EVERY 6 HOURS    ammonium lactate (LAC-HYDRIN) 12 % lotion Apply topically daily as needed for Dry Skin.    AURYXIA 210 mg iron Tab TAKE ONE TABLET BEFORE MEALS THREE TIMES DAILY    ibuprofen (ADVIL,MOTRIN) 600 MG tablet Take 600 mg by mouth 4 (four) times daily as needed.    ketoconazole (NIZORAL) 2 % shampoo Apply topically twice a week. To itchy area, lather, leave in 5 minutes then rinse    NON FORMULARY MEDICATION     oxyCODONE (ROXICODONE) 5 MG immediate release  tablet Take 1 tablet (5 mg total) by mouth every 6 (six) hours as needed.    polyethylene glycol (GLYCOLAX) 17 gram/dose powder Mix 1 capful (17 g) with fluids and drink by mouth once daily.    senna (SENNA) 8.6 mg tablet Take 1 tablet by mouth 2 (two) times daily.    vit b cmplx 3-fa-vit c-biotin 1- mg-mg-mcg (ELIE-KAUSHIK RX) 1- mg-mg-mcg Tab TAKE ONE TABLET BY MOUTH ONCE DAILY     Family History     Problem Relation (Age of Onset)    Arthritis Sister    Diabetes Sister    Hypertension Sister        Tobacco Use    Smoking status: Former Smoker     Packs/day: 1.00     Years: 10.00     Pack years: 10.00     Quit date: 1971     Years since quittin.1    Smokeless tobacco: Never Used   Substance and Sexual Activity    Alcohol use: No    Drug use: No    Sexual activity: Not Currently     Partners: Female     Comment:      Review of Systems   Constitutional: Positive for activity change and fatigue. Negative for fever and unexpected weight change.   HENT: Negative for trouble swallowing and voice change.    Eyes: Negative for photophobia and visual disturbance.   Respiratory: Positive for shortness of breath. Negative for cough.    Cardiovascular: Positive for leg swelling. Negative for chest pain and palpitations.   Gastrointestinal: Negative for abdominal distention, abdominal pain, blood in stool, constipation, diarrhea, nausea and vomiting.   Endocrine: Negative for polydipsia, polyphagia and polyuria.   Genitourinary: Positive for decreased urine volume and difficulty urinating. Negative for dysuria, flank pain and hematuria.   Musculoskeletal: Positive for myalgias (SATHYA pain at fistula). Negative for gait problem and joint swelling.   Skin: Negative for pallor and rash.   Allergic/Immunologic: Negative for immunocompromised state.   Neurological: Negative for seizures, syncope, facial asymmetry and weakness.   Psychiatric/Behavioral: Negative for agitation, behavioral problems  and confusion.     Objective:     Vital Signs (Most Recent):  Temp: 98.5 °F (36.9 °C) (01/23/22 2305)  Pulse: 61 (01/24/22 0052)  Resp: 17 (01/24/22 0052)  BP: (!) 115/56 (01/24/22 0032)  SpO2: 100 % (01/24/22 0052) Vital Signs (24h Range):  Temp:  [98.5 °F (36.9 °C)] 98.5 °F (36.9 °C)  Pulse:  [61-88] 61  Resp:  [17-44] 17  SpO2:  [96 %-100 %] 100 %  BP: (115-163)/() 115/56     Weight: 65.1 kg (143 lb 8.3 oz)  Body mass index is 22.48 kg/m².    Physical Exam  Vitals and nursing note reviewed.   Constitutional:       General: He is in acute distress.      Appearance: He is well-developed, normal weight and well-nourished. He is ill-appearing and toxic-appearing. He is not diaphoretic.   HENT:      Head: Normocephalic and atraumatic.      Nose: Nose normal. No congestion.      Mouth/Throat:      Mouth: Mucous membranes are moist.      Pharynx: No oropharyngeal exudate.   Eyes:      General: No scleral icterus.     Pupils: Pupils are equal, round, and reactive to light.   Neck:      Thyroid: No thyromegaly.   Cardiovascular:      Rate and Rhythm: Normal rate and regular rhythm.      Heart sounds: Murmur heard.   No friction rub. No gallop.    Pulmonary:      Effort: Respiratory distress present.      Breath sounds: No stridor. Rhonchi and rales present. No wheezing.   Abdominal:      General: There is no distension.      Palpations: Abdomen is soft.      Tenderness: There is no abdominal tenderness. There is no guarding.   Musculoskeletal:         General: No deformity. Normal range of motion.      Cervical back: Normal range of motion. No rigidity.      Right lower leg: Edema present.      Left lower leg: Edema present.   Lymphadenopathy:      Cervical: No cervical adenopathy.   Skin:     General: Skin is warm.      Capillary Refill: Capillary refill takes less than 2 seconds.      Coloration: Skin is not jaundiced.      Findings: No bruising.   Neurological:      General: No focal deficit present.      Mental  Status: He is alert and oriented to person, place, and time. Mental status is at baseline.      Cranial Nerves: No cranial nerve deficit.   Psychiatric:         Mood and Affect: Mood and affect and mood normal.         Behavior: Behavior normal.           CRANIAL NERVES     CN III, IV, VI   Pupils are equal, round, and reactive to light.           Recent Results (from the past 24 hour(s))   CBC Auto Differential    Collection Time: 01/23/22 10:29 PM   Result Value Ref Range    WBC 5.61 3.90 - 12.70 K/uL    RBC 1.95 (L) 4.60 - 6.20 M/uL    Hemoglobin 6.7 (L) 14.0 - 18.0 g/dL    Hematocrit 20.7 (L) 40.0 - 54.0 %     (H) 82 - 98 fL    MCH 34.4 (H) 27.0 - 31.0 pg    MCHC 32.4 32.0 - 36.0 g/dL    RDW 15.1 (H) 11.5 - 14.5 %    Platelets 77 (L) 150 - 450 K/uL    MPV 9.9 9.2 - 12.9 fL    Immature Granulocytes 0.4 0.0 - 0.5 %    Gran # (ANC) 3.8 1.8 - 7.7 K/uL    Immature Grans (Abs) 0.02 0.00 - 0.04 K/uL    Lymph # 1.4 1.0 - 4.8 K/uL    Mono # 0.4 0.3 - 1.0 K/uL    Eos # 0.1 0.0 - 0.5 K/uL    Baso # 0.01 0.00 - 0.20 K/uL    nRBC 0 0 /100 WBC    Gran % 66.9 38.0 - 73.0 %    Lymph % 24.1 18.0 - 48.0 %    Mono % 7.3 4.0 - 15.0 %    Eosinophil % 1.1 0.0 - 8.0 %    Basophil % 0.2 0.0 - 1.9 %    Differential Method Automated    Comprehensive Metabolic Panel    Collection Time: 01/23/22 10:29 PM   Result Value Ref Range    Sodium 140 136 - 145 mmol/L    Potassium 4.4 3.5 - 5.1 mmol/L    Chloride 100 95 - 110 mmol/L    CO2 21 (L) 23 - 29 mmol/L    Glucose 155 (H) 70 - 110 mg/dL     (H) 8 - 23 mg/dL    Creatinine 10.9 (H) 0.5 - 1.4 mg/dL    Calcium 8.0 (L) 8.7 - 10.5 mg/dL    Total Protein 8.0 6.0 - 8.4 g/dL    Albumin 2.9 (L) 3.5 - 5.2 g/dL    Total Bilirubin 1.4 (H) 0.1 - 1.0 mg/dL    Alkaline Phosphatase 82 55 - 135 U/L    AST 29 10 - 40 U/L    ALT 28 10 - 44 U/L    Anion Gap 19 (H) 8 - 16 mmol/L    eGFR if African American 4 (A) >60 mL/min/1.73 m^2    eGFR if non African American 4 (A) >60 mL/min/1.73 m^2   Brain  Natriuretic Peptide    Collection Time: 01/23/22 10:29 PM   Result Value Ref Range    BNP 3,076 (H) 0 - 99 pg/mL   Troponin I    Collection Time: 01/23/22 10:29 PM   Result Value Ref Range    Troponin I 0.048 (H) 0.000 - 0.026 ng/mL   ISTAT PROCEDURE    Collection Time: 01/23/22 10:46 PM   Result Value Ref Range    POC Glucose 152 (H) 70 - 110 mg/dL    POC  (H) 6 - 30 mg/dL    POC Creatinine 10.3 (H) 0.5 - 1.4 mg/dL    POC Sodium 139 136 - 145 mmol/L    POC Potassium 3.9 3.5 - 5.1 mmol/L    POC Chloride 99 95 - 110 mmol/L    POC TCO2 (MEASURED) 26 23 - 29 mmol/L    POC Anion Gap 19 (H) 8 - 16 mmol/L    POC Ionized Calcium 1.01 (L) 1.06 - 1.42 mmol/L    POC Hematocrit 33 (L) 36 - 54 %PCV    Sample VENOUS    ISTAT PROCEDURE    Collection Time: 01/23/22 10:47 PM   Result Value Ref Range    POC PH 7.344 (L) 7.35 - 7.45    POC PCO2 47.1 (H) 35 - 45 mmHg    POC PO2 39 (L) 40 - 60 mmHg    POC HCO3 25.7 24 - 28 mmol/L    POC BE 0 -2 to 2 mmol/L    POC SATURATED O2 70 (L) 95 - 100 %    POC TCO2 27 24 - 29 mmol/L    Rate 10     Sample VENOUS     Site Other     Allens Test N/A     DelSys CPAP/BiPAP     Mode BiPAP     FiO2 100     Spont Rate 14     Min Vol 16.6     Sp02 98     IP 18     EP 12    POCT COVID-19 Rapid Screening    Collection Time: 01/23/22 11:07 PM   Result Value Ref Range    POC Rapid COVID Negative Negative     Acceptable Yes        Microbiology Results (last 7 days)     Procedure Component Value Units Date/Time    Blood culture [[id number]]     Order Status: Sent Specimen: Blood            Imaging Results          X-Ray Chest 1 View (Final result)  Result time 01/23/22 22:44:00    Final result by Matteo Coelho MD (01/23/22 22:44:00)                 Impression:      Cardiomegaly with increase in bilateral pleural effusions and new bilateral ground-glass airspace opacities.  The findings most likely represent worsening fluid overload state.      Electronically signed by: Matteo Coelho,  MD  Date:    01/23/2022  Time:    22:44             Narrative:    EXAMINATION:  XR CHEST 1 VIEW    CLINICAL HISTORY:  shortness of breath;    TECHNIQUE:  Single frontal view of the chest was performed.    COMPARISON:  06/15/2021.    FINDINGS:  Monitoring EKG leads are present.  The trachea is unremarkable.  There is stable enlargement of the cardiomediastinal silhouette.  There is increase in the right-sided pleural effusion.  There is a new trace left-sided pleural effusion.  There is no evidence of a pneumothorax.  There is no evidence of pneumomediastinum.  There are new bilateral ground-glass airspace opacities.  There are degenerative changes in the osseous structures.

## 2022-01-25 PROBLEM — R53.81 DEBILITY: Status: ACTIVE | Noted: 2022-01-01

## 2022-01-25 NOTE — PLAN OF CARE
[facility]  Initial Discharge Assessment       Primary Care Provider: Guy Mcclelland Jr, MD    Admission Diagnosis: Shortness of breath [R06.02]  Acute pulmonary edema [J81.0]  Chest pain [R07.9]    Admission Date: 1/23/2022  Expected Discharge Date:      Discharge Barriers Identified: None    Payor: [employer] / Plan: [employer] / Product Type: Medicare Advantage /     Extended Emergency Contact Information  Primary Emergency Contact: fletcher bruner  Mobile Phone: [phone]  Relation: Relative  Preferred language: English   needed? No  Secondary Emergency Contact: Kathi Bruner  Mobile Phone: [phone]  Relation: Relative   needed? No    Discharge Plan A: Home  Discharge Plan B: Home      [facility] [place] - [address]  [address]  [place] [address]  Phone: [phone] Fax: [phone]    [facility] [place] - [address]  [address]  [place] [address]  Phone: [phone] Fax: [phone]      Initial Assessment (most recent)       Adult Discharge Assessment - 01/25/22 1523          Discharge Assessment    Assessment Type Discharge Planning Assessment     Confirmed/corrected address, phone number and insurance Yes     Confirmed Demographics Correct on Facesheet     Source of Information family;health record   Fletcher To    When was your last doctors appointment? 11/02/21     Communicated BONILLA with patient/caregiver No     Reason For Admission Shortness of breath     Lives With alone     Facility Arrived From: home     Do you expect to return to your current living situation? Yes     Do you have help at home or someone to help you manage your care at home? Yes     Who are your caregiver(s) and their phone number(s)? Patient has Long Term Care Services ([facility])     Prior to hospitilization cognitive status: Alert/Oriented     Current cognitive status: Alert/Oriented     Walking or  Climbing Stairs Difficulty ambulation difficulty, requires equipment     Mobility Management RW     Dressing/Bathing Difficulty bathing difficulty, assistance 1 person     Dressing/Bathing Management Assistance from another     Equipment Currently Used at Home walker, rolling     Readmission within 30 days? No     Patient currently being followed by outpatient case management? No     Do you currently have service(s) that help you manage your care at home? Yes     Name and Contact number of agency PCA/Long Term Care Services ([facility])     Is the pt/caregiver preference to resume services with current agency Yes     Do you take prescription medications? Yes     Do you have prescription coverage? Yes     Coverage [facility]     Do you have any problems affording any of your prescribed medications? No     Is the patient taking medications as prescribed? yes     Who is going to help you get home at discharge? Friend     How do you get to doctors appointments? family or friend will provide     Are you on dialysis? Yes     Dialysis Name and Scheduled days [facility]     Discharge Plan A Home     Discharge Plan B Home     DME Needed Upon Discharge  none     Discharge Plan discussed with: Friend     Name(s) and Number(s) Fletcher To:  [phone]     Discharge Barriers Identified None                 Discharge planning assessment completed with assistance from friendFletcher.  Patient from home alone and has Long Term Care services in the home.  Patient receives dialyis @ [facility].  Patient's friend assists with transportation to doctor appointments.     PCP followup appt. scheduled.   Follow-up Information       Guy Mcclelland Jr, MD On 2/10/2022.    Specialty: Family Medicine  Why: February 10, 2022 @ 1:00 p.m. see Dr. Mcclelland for your hospital followup visit.  Contact information:  [address] [place] [address]  [place] [address]  [phone]

## 2022-01-25 NOTE — ASSESSMENT & PLAN NOTE
-Imaging reviewed.  No hemodynamic significant stenosis, good flow. HD without pain or issues yesterday. Rec continuing Emla cream overlying access sites.  If flow issues, will perform fistulogram.

## 2022-01-25 NOTE — PROGRESS NOTES
[facility]  Progress Note    Patient Name: Mandeep Willams  MRN: [id number]  Patient Class: IP- Inpatient   Admission Date: 1/23/2022  Length of Stay: 1 days  Attending Physician: Leonardo Kay MD  Primary Care Provider: Guy Mcclelland Jr, MD        Subjective:     Principal Problem:Flash pulmonary edema        HPI:    Mandeep Willams is a 86 y.o. male who  has a past medical history of ESRD, HTN, and Hyperlipidemia, presented to the ED with CC of SOB. Patient was unable to tolerate dialysis due to pain at fistula sight, per family he often has pain there. So did not get full HD session on Friday and is now coming in with flash pulmonary Edema. Anion Gap Metabolic Acidosis. . Cr 10.9. BNP 3,076 and Trop 0.048. Cardiomegaly with increase in bilateral pleural effusions and new bilateral ground-glass airspace opacities. The findings most likely represent worsening fluid overload state. SpO2 of 67% on RA, not on home O2. Placed on BiPAP in ED. Hb 6.7 with , however not far from baseline and in diluted state without signs of active bleeding. Relative states that the patient went to dialysis on 1/19/22 and again on 1/21/22 but that he was unable to receive treatment due to issues with his LUE fistula. Bipap was weaned from 100% down to 40% and patient resting comfortably. Nephrology and VasSx consulted for am, as well as US of fistula. HPI limited due to Bipap and acute state. Denies CP or hemoptysis/hematochezia/hematuria.       Overview/Hospital Course:  Admitted for pulmonary edema in the setting of missed dialysis (unable to tolerate due to pain). Improved with initial dialysis treatment but still requiring supplemental oxygen. Repeat dialysis 1/25.      Interval History: No events overnight.     Review of Systems   Constitutional: Negative for chills and fever.   Respiratory: Positive for shortness of breath. Negative for cough.    Cardiovascular: Negative for chest pain and leg  swelling.   Gastrointestinal: Negative for abdominal distention and abdominal pain.   Neurological: Positive for weakness.     Objective:     Vital Signs (Most Recent):  Temp: 98.7 °F (37.1 °C) (01/25/22 1515)  Pulse: 69 (01/25/22 1640)  Resp: 18 (01/25/22 1535)  BP: (!) 149/60 (01/25/22 1640)  SpO2: 100 % (01/25/22 1515) Vital Signs (24h Range):  Temp:  [97.8 °F (36.6 °C)-99.1 °F (37.3 °C)] 98.7 °F (37.1 °C)  Pulse:  [60-85] 69  Resp:  [16-33] 18  SpO2:  [96 %-100 %] 100 %  BP: ()/(42-66) 149/60     Weight: 51.5 kg (113 lb 8.6 oz)  Body mass index is 18.89 kg/m².    Intake/Output Summary (Last 24 hours) at 1/25/2022 1703  Last data filed at 1/25/2022 0200  Gross per 24 hour   Intake 700 ml   Output 3643 ml   Net -2943 ml      Physical Exam  Constitutional:       General: He is not in acute distress.     Appearance: He is ill-appearing. He is not toxic-appearing or diaphoretic.   Cardiovascular:      Rate and Rhythm: Normal rate and regular rhythm.      Heart sounds: No murmur heard.  No gallop.    Pulmonary:      Effort: Pulmonary effort is normal. No respiratory distress.      Breath sounds: Rales present. No wheezing.   Abdominal:      General: Bowel sounds are normal. There is no distension.      Palpations: Abdomen is soft.      Tenderness: There is no abdominal tenderness.         Significant Labs: All pertinent labs within the past 24 hours have been reviewed.    Significant Imaging: I have reviewed all pertinent imaging results/findings within the past 24 hours.      Assessment/Plan:      * Flash pulmonary edema  Due to missed dialysis, improved with reinitiation of dialysis      Dialysis AV fistula malfunction, initial encounter  AVF appears to be functioning correctly, but pt w/ significant pain w/ HD, now improved with EMLA cream.  - vascular following, no intervention planned    End stage renal disease on dialysis  Unable to tolerate HD 2/2 pain at fistula site. US without acute abnormality  -  vascular surgery consulted  - nephrology consulted  - EMLA cream for fistula pain      HTN (hypertension)  -145 mostly, stable  Volume removal first      Hyperlipidemia  Cont statin      Debility  Pt concerned about increasing debility  - PT/OT consulted      Goals of care, counseling/discussion  -Consider palliative care consult    Previous:  During this visit, I engaged the patient in the advance care planning process.  D/w patient and nephewFletcher, at length.  Patient and family is aware of multiple major co-morbidities contributing to patient's dyspnea.  ESRD + severe AS + pulmonary htn + pleural effusion.  At this point in time, the patient does have full decision-making capacity.  We discussed different extreme health states that she could experience, and reviewed what kind of medical care she would want in those situations.  The patient communicated that if she were comatose and had little chance of a meaningful recovery, he will defer to medical provider for appropriate medical decision.  However, patient did specifically stated that he does not want heroic measure such as intubation or CPR in the case of cardiopulmonary arrest.  In addition to the above preference, other important end-of-life issues for the patient .  The patient  HAS NOT completed a living will to reflect these preferences.  The patient   HAS NOT designated a healthcare power of  to make decisions on her behalf.  LAPOST completed with the aid of  and nephFletcher chin.      VTE Risk Mitigation (From admission, onward)         Ordered     IP VTE HIGH RISK PATIENT  Once         01/24/22 0031     Place sequential compression device  Until discontinued         01/24/22 0031     Reason for No Pharmacological VTE Prophylaxis  Once        Question:  Reasons:  Answer:  Thrombocytopenia    01/24/22 0031                Discharge Planning   BONILLA:      Code Status: Full Code   Is the patient medically ready for discharge?:      Reason for patient still in hospital (select all that apply): Patient trending condition, Laboratory test, Treatment, Consult recommendations and Pending disposition  Discharge Plan A: Home            Critical care time spent on the evaluation and treatment of severe organ dysfunction, review of pertinent labs and imaging studies, discussions with consulting providers and discussions with patient/family: 35 minutes.      Leonardo Kay MD  Department of Hospital Medicine   [facility] [place] - Intensive Care

## 2022-01-25 NOTE — PROGRESS NOTES
[facility] Intensive Care  Vascular Surgery  Progress Note    Patient Name: Mandeep Willams  MRN: [id number]  Admission Date: 1/23/2022  Primary Care Provider: Guy Mcclelland Jr, MD    Subjective:     Interval History: HD without issues yesterday.    Post-Op Info:  * No surgery found *           Medications:  Continuous Infusions:  Scheduled Meds:   ferrous sulfate  1 tablet Oral BID    mupirocin   Nasal BID    pravastatin  20 mg Oral Daily     PRN Meds:acetaminophen, albuterol-ipratropium, benzonatate, dextrose 50%, dextrose 50%, glucagon (human recombinant), glucose, glucose, LIDOcaine-prilocaine, naloxone, pneumoc 13-payal conj-dip cr(PF), polyethylene glycol, sodium chloride 0.9%     Objective:     Vital Signs (Most Recent):  Temp: 98.7 °F (37.1 °C) (01/25/22 1515)  Pulse: 71 (01/25/22 1540)  Resp: 18 (01/25/22 1535)  BP: (!) 126/51 (01/25/22 1540)  SpO2: 100 % (01/25/22 1515) Vital Signs (24h Range):  Temp:  [97.8 °F (36.6 °C)-99.1 °F (37.3 °C)] 98.7 °F (37.1 °C)  Pulse:  [60-85] 71  Resp:  [16-33] 18  SpO2:  [96 %-100 %] 100 %  BP: ()/(42-93) 126/51         Physical Exam  Vitals reviewed.   Constitutional:       General: He is not in acute distress.     Appearance: He is well-developed. He is not diaphoretic.   HENT:      Head: Normocephalic and atraumatic.   Eyes:      Conjunctiva/sclera: Conjunctivae normal.   Cardiovascular:      Rate and Rhythm: Normal rate.      Comments: +thrill LUE AVF  Pulmonary:      Effort: Pulmonary effort is normal.   Abdominal:      General: There is no distension.      Palpations: Abdomen is soft. There is no mass.      Tenderness: There is no abdominal tenderness. There is no guarding or rebound.      Hernia: No hernia is present.   Musculoskeletal:         General: No deformity. Normal range of motion.      Cervical back: Neck supple.   Skin:     Findings: No rash.   Neurological:      Mental Status: He is alert and oriented to person, place, and time.         Significant  Labs:  All pertinent labs from the last 24 hours have been reviewed.    Significant Diagnostics:  I have reviewed all pertinent imaging results/findings within the past 24 hours.    Assessment/Plan:     Dialysis AV fistula malfunction, initial encounter  -Imaging reviewed.  No hemodynamic significant stenosis, good flow. HD without pain or issues yesterday. Rec continuing Emla cream overlying access sites.  If flow issues, will perform fistulogram.        Jude Ordaz MD  Vascular Surgery  [facility] [place] - Intensive Care

## 2022-01-25 NOTE — ASSESSMENT & PLAN NOTE
AVF appears to be functioning correctly, but pt w/ significant pain w/ HD, now improved with EMLA cream.  - vascular following, no intervention planned

## 2022-01-25 NOTE — PROVIDER TRANSFER
86y M w/ ESRD admitted for pulmonary edema after missing dialysis. Pt unable to tolerate dialysis due to pain at fistula site. US of fistula without issue, pain improved with EMLA cream, pt able to tolerate dialysis.     Possible DC tomorrow if weaned from oxygen. PT/OT evaluation pending

## 2022-01-25 NOTE — SUBJECTIVE & OBJECTIVE
Interval History: No events overnight.     Review of Systems   Constitutional: Negative for chills and fever.   Respiratory: Positive for shortness of breath. Negative for cough.    Cardiovascular: Negative for chest pain and leg swelling.   Gastrointestinal: Negative for abdominal distention and abdominal pain.   Neurological: Positive for weakness.     Objective:     Vital Signs (Most Recent):  Temp: 98.7 °F (37.1 °C) (01/25/22 1515)  Pulse: 69 (01/25/22 1640)  Resp: 18 (01/25/22 1535)  BP: (!) 149/60 (01/25/22 1640)  SpO2: 100 % (01/25/22 1515) Vital Signs (24h Range):  Temp:  [97.8 °F (36.6 °C)-99.1 °F (37.3 °C)] 98.7 °F (37.1 °C)  Pulse:  [60-85] 69  Resp:  [16-33] 18  SpO2:  [96 %-100 %] 100 %  BP: ()/(42-66) 149/60     Weight: 51.5 kg (113 lb 8.6 oz)  Body mass index is 18.89 kg/m².    Intake/Output Summary (Last 24 hours) at 1/25/2022 1703  Last data filed at 1/25/2022 0200  Gross per 24 hour   Intake 700 ml   Output 3643 ml   Net -2943 ml      Physical Exam  Constitutional:       General: He is not in acute distress.     Appearance: He is ill-appearing. He is not toxic-appearing or diaphoretic.   Cardiovascular:      Rate and Rhythm: Normal rate and regular rhythm.      Heart sounds: No murmur heard.  No gallop.    Pulmonary:      Effort: Pulmonary effort is normal. No respiratory distress.      Breath sounds: Rales present. No wheezing.   Abdominal:      General: Bowel sounds are normal. There is no distension.      Palpations: Abdomen is soft.      Tenderness: There is no abdominal tenderness.         Significant Labs: All pertinent labs within the past 24 hours have been reviewed.    Significant Imaging: I have reviewed all pertinent imaging results/findings within the past 24 hours.

## 2022-01-25 NOTE — ASSESSMENT & PLAN NOTE
Unable to tolerate HD 2/2 pain at fistula site. US without acute abnormality  - vascular surgery consulted  - nephrology consulted  - EMLA cream for fistula pain

## 2022-01-25 NOTE — SUBJECTIVE & OBJECTIVE
Medications:  Continuous Infusions:  Scheduled Meds:   ferrous sulfate  1 tablet Oral BID    mupirocin   Nasal BID    pravastatin  20 mg Oral Daily     PRN Meds:acetaminophen, albuterol-ipratropium, benzonatate, dextrose 50%, dextrose 50%, glucagon (human recombinant), glucose, glucose, LIDOcaine-prilocaine, naloxone, pneumoc 13-payal conj-dip cr(PF), polyethylene glycol, sodium chloride 0.9%     Objective:     Vital Signs (Most Recent):  Temp: 98.7 °F (37.1 °C) (01/25/22 1515)  Pulse: 71 (01/25/22 1540)  Resp: 18 (01/25/22 1535)  BP: (!) 126/51 (01/25/22 1540)  SpO2: 100 % (01/25/22 1515) Vital Signs (24h Range):  Temp:  [97.8 °F (36.6 °C)-99.1 °F (37.3 °C)] 98.7 °F (37.1 °C)  Pulse:  [60-85] 71  Resp:  [16-33] 18  SpO2:  [96 %-100 %] 100 %  BP: ()/(42-93) 126/51         Physical Exam  Vitals reviewed.   Constitutional:       General: He is not in acute distress.     Appearance: He is well-developed. He is not diaphoretic.   HENT:      Head: Normocephalic and atraumatic.   Eyes:      Conjunctiva/sclera: Conjunctivae normal.   Cardiovascular:      Rate and Rhythm: Normal rate.      Comments: +thrill LUE AVF  Pulmonary:      Effort: Pulmonary effort is normal.   Abdominal:      General: There is no distension.      Palpations: Abdomen is soft. There is no mass.      Tenderness: There is no abdominal tenderness. There is no guarding or rebound.      Hernia: No hernia is present.   Musculoskeletal:         General: No deformity. Normal range of motion.      Cervical back: Neck supple.   Skin:     Findings: No rash.   Neurological:      Mental Status: He is alert and oriented to person, place, and time.         Significant Labs:  All pertinent labs from the last 24 hours have been reviewed.    Significant Diagnostics:  I have reviewed all pertinent imaging results/findings within the past 24 hours.

## 2022-01-25 NOTE — PROGRESS NOTES
"[facility] - Intensive Care  Nephrology  Progress Note    Patient Name: Mandeep Willams  MRN: [id number]  Admission Date: 1/23/2022  Hospital Length of Stay: 1 days  Attending Provider: Leonardo Kay MD   Primary Care Physician: Guy Mcclelland Jr, MD  Principal Problem:Flash pulmonary edema  Inpatient consult to Nephrology  Consult performed by: MONSE Rodney  Consult ordered by: Harjeet Brewster MD  Reason for consult: esrd on HD  Subjective:     Interval History: Hd yesterday with 2.2L net UF, tolerated well. Emla cream applied to AVF prior to treatment. Today, patient states he " feels the same," as yesterday. He is mainly concerned about being able to ambulate with cane and is worried that if he does not walk soon his two legs will become " useless."  Has orders to transfer to floor  MARTII  used    Review of patient's allergies indicates:  No Known Allergies  Current Facility-Administered Medications   Medication Frequency    acetaminophen tablet 650 mg Q8H PRN    albuterol-ipratropium 2.5 mg-0.5 mg/3 mL nebulizer solution 3 mL Q4H PRN    benzonatate capsule 200 mg TID PRN    dextrose 50% injection 12.5 g PRN    dextrose 50% injection 25 g PRN    ferrous sulfate tablet 1 each BID    glucagon (human recombinant) injection 1 mg PRN    glucose chewable tablet 16 g PRN    glucose chewable tablet 24 g PRN    LIDOcaine-prilocaine cream PRN    mupirocin 2 % ointment BID    naloxone 0.4 mg/mL injection 0.02 mg PRN    pneumoc 13-payal conj-dip cr(PF) (PREVNAR 13 (PF)) 0.5 mL vaccine x 1 dose    polyethylene glycol packet 17 g BID PRN    pravastatin tablet 20 mg Daily    sodium chloride 0.9% flush 10 mL Q8H PRN       Objective:     Vital Signs (Most Recent):  Temp: 98.4 °F (36.9 °C) (01/25/22 1115)  Pulse: 69 (01/25/22 1200)  Resp: (!) 24 (01/25/22 1200)  BP: (!) 144/64 (01/25/22 1200)  SpO2: 98 % (01/25/22 1200)  O2 Device (Oxygen Therapy): nasal cannula (01/25/22 0800) Vital Signs (24h " Range):  Temp:  [97.8 °F (36.6 °C)-99.1 °F (37.3 °C)] 98.4 °F (36.9 °C)  Pulse:  [60-85] 69  Resp:  [16-33] 24  SpO2:  [93 %-100 %] 98 %  BP: ()/(42-95) 144/64     Weight: 51.5 kg (113 lb 8.6 oz) (01/25/22 0542)  Body mass index is 18.89 kg/m².  Body surface area is 1.54 meters squared.    I/O last 3 completed shifts:  In: 700 [Other:700]  Out: 4243 [Urine:1300; Other:2943]    Physical Exam  Vitals reviewed.   Constitutional:       Appearance: thin frail appearing  male  HENT:      Head: Normocephalic and atraumatic.      Mouth/Throat:      Mouth: Mucous membranes are moist.      Pharynx: No posterior oropharyngeal erythema.   Eyes:      General: No scleral icterus.     Extraocular Movements: Extraocular movements intact.      Conjunctiva/sclera: Conjunctivae normal.   Cardiovascular:      Rate and Rhythm: Normal rate and regular rhythm.      Heart sounds: Murmur heard.    No peripheral edema  Pulmonary:      Breath sounds: Rhonchi present.      Comments: normal effort on NC  Abdominal:      General: There is no distension.      Palpations: Abdomen is soft.   Musculoskeletal:      Right lower leg: No edema.      Left lower leg: No edema.   Skin:     General: Skin is warm and dry.   Neurological:      General: No focal deficit present.      Mental Status: He is alert and oriented to person, place, and time.      Comments: Answers questions appropriately, follows commands   Psychiatric:         Mood and Affect: Mood normal.         Thought Content: Thought content normal.        Significant Labs:sure  CBC:   Recent Labs   Lab 01/25/22 0418   WBC 5.84   RBC 3.23*   HGB 11.1*   HCT 32.3*   *   *   MCH 34.4*   MCHC 34.4     CMP:   Recent Labs   Lab 01/24/22  0540 01/24/22  0540 01/25/22 0418      < > 98   CALCIUM 8.1*   < > 8.4*   ALBUMIN 2.8*   < > 2.6*   PROT 7.5  --   --       < > 137   K 4.5   < > 4.2   CO2 19*   < > 27      < > 96   *   < > 73*   CREATININE 10.3*    < > 7.6*   ALKPHOS 68  --   --    ALT 21  --   --    AST 20  --   --    BILITOT 1.4*  --   --     < > = values in this interval not displayed.     All labs within the past 24 hours have been reviewed.    Significant Imaging:  Labs: Reviewed    Assessment/Plan:   ESRD - usual HD on MWF with Dr Henriquez at [facility]  - missed HD treatments x2, apparently d/t AVF discomfort  - US of AVF without abnormalities- cleared for use by vascular surgery - continue emla cream prior to HD  - HD today and then resume usual MWF schedule thereafter   - monitor daily weights, strict I&Os  - avoid nephrotoxic agents including NSAIDs, renally dose medications  - daily labs     Hypervolemia/ Acute hypoxemic respiratory failure  - has been weaned off BiPAP- now comfortable on NC  - HD today and tomorrow with UF as tolerated  - continue diuretics- still makes urine  - daily wts, strict Is and Os      Anemia of chronic kidney disease   - Hgb at goal, GREGORY not indicated  - continue to monitor CBC      Acidosis   - level trending up s/p high bicarb bath with HD   - trend level      CKD MBD  - CCa wnl  - phos is slightly elevated  - HD again today, no need for binders at this time   - renal diet    Case discussed with Dr Baugh  Thank you for your consult. I will follow-up with patient. Please contact us if you have any additional questions.     MONSE Rodney   DOS: 01/25/2022  Nephrology  [facility] [place] - Intensive Care

## 2022-01-26 NOTE — PLAN OF CARE
[facility]      HOME HEALTH ORDERS  FACE TO FACE ENCOUNTER    Patient Name: Mandeep Willams  YOB: 1935    PCP: Guy Mcclelland Jr, MD   PCP Address: [facility] [address]  PCP Phone Number: [phone]  PCP Fax: [phone]    Encounter Date: 1/23/22    Admit to Home Health    Diagnoses:  Active Hospital Problems    Diagnosis  POA    *Flash pulmonary edema [J81.0]  Yes    Debility [R53.81]  Unknown    Dialysis AV fistula malfunction, initial encounter [T82.590A]  Yes    Goals of care, counseling/discussion [Z71.89]  Not Applicable         End stage renal disease on dialysis [N18.6, Z99.2]  Not Applicable    HTN (hypertension) [I10]  Yes    Hyperlipidemia [E78.5]  Yes      Resolved Hospital Problems   No resolved problems to display.       Follow Up Appointments:  Future Appointments   Date Time Provider Department Center   2/10/2022  1:00 PM Guy Mcclelland Jr., MD [facility]       Allergies:Review of patient's allergies indicates:  No Known Allergies    Medications: Review discharge medications with patient and family and provide education.    Current Discharge Medication List      CONTINUE these medications which have NOT CHANGED    Details   albuterol (PROAIR HFA) 90 mcg/actuation inhaler Inhale 2 puffs into the lungs every 4 (four) hours as needed for Wheezing or Shortness of Breath. Rescue  Qty: 8 g, Refills: 5    Associated Diagnoses: Shortness of breath      amLODIPine (NORVASC) 5 MG tablet Take 1 tablet (5 mg total) by mouth once daily.  Qty: 90 tablet, Refills: 3    Comments: .  Associated Diagnoses: Essential hypertension      calcium acetate 667 mg (169 mg calcium)/5 mL Soln Take by mouth.      furosemide (LASIX) 20 MG tablet TAKE ONE TABLET BY MOUTH ONCE DAILY  Qty: 90 tablet, Refills: 3    Associated Diagnoses: Essential hypertension      metoprolol succinate (TOPROL-XL) 100 MG 24 hr tablet Take 1 tablet (100 mg total) by mouth once daily.  Qty: 90  tablet, Refills: 3    Comments: .  Associated Diagnoses: Essential hypertension      multivitamin with minerals tablet Take 1 tablet by mouth once daily.      omega-3 acid ethyl esters (LOVAZA) 1 gram capsule Take 2 capsules (2 g total) by mouth 2 (two) times daily.  Qty: 360 capsule, Refills: 3    Associated Diagnoses: Mixed hyperlipidemia      pravastatin (PRAVACHOL) 10 MG tablet TAKE ONE TABLET BY MOUTH ONCE DAILY FOR cholesterol  Qty: 90 tablet, Refills: 3    Associated Diagnoses: Mixed hyperlipidemia      tamsulosin (FLOMAX) 0.4 mg Cap Take 1 capsule (0.4 mg total) by mouth once daily.  Qty: 30 capsule, Refills: 11      albuterol-ipratropium (DUO-NEB) 2.5 mg-0.5 mg/3 mL nebulizer solution NEBULIZE ONE vial (3ml) BY MOUTH EVERY 6 HOURS  Qty: 180 mL, Refills: 0    Associated Diagnoses: Pleural effusion; Congestive heart failure, unspecified HF chronicity, unspecified heart failure type; Shortness of breath               I have seen and examined this patient within the last 30 days. My clinical findings that support the need for the home health skilled services and home bound status are the following:no   Weakness/numbness causing balance and gait disturbance due to Weakness/Debility making it taxing to leave home.  Requiring assistive device to leave home due to unsteady gait caused by  Weakness/Debility.  Medical restrictions requiring assistance of another human to leave home due to  Fluid/volume overload and Unstable ambulation.  Mental confusion making it unsafe for patient to leave home alone due to  cognitive impairment.     Diet:   renal diet    Referrals/ Consults  Physical Therapy to evaluate and treat. Evaluate for home safety and equipment needs; Establish/upgrade home exercise program. Perform / instruct on therapeutic exercises, gait training, transfer training, and Range of Motion.  Occupational Therapy to evaluate and treat. Evaluate home environment for safety and equipment needs. Perform/Instruct  on transfers, ADL training, ROM, and therapeutic exercises.  Aide to provide assistance with personal care, ADLs, and vital signs.    Activities:   activity as tolerated    Nursing:   Agency to admit patient within 24 hours of hospital discharge unless specified on physician order or at patient request    SN to complete comprehensive assessment including routine vital signs. Instruct on disease process and s/s of complications to report to MD. Review/verify medication list sent home with the patient at time of discharge  and instruct patient/caregiver as needed. Frequency may be adjusted depending on start of care date.     Skilled nurse to perform up to 3 visits PRN for symptoms related to diagnosis    Notify MD if SBP > 160 or < 90; DBP > 90 or < 50; HR > 120 or < 50; Temp > 101; O2 < 88%    Ok to schedule additional visits based on staff availability and patient request on consecutive days within the home health episode.      Home Health Aide:  Physical Therapy Three times weekly and Occupational Therapy Three times weekly      I certify that this patient is confined to his home and needs physical therapy and occupational therapy.

## 2022-01-26 NOTE — PLAN OF CARE
Follow-up Information     Guy Mcclelland Jr, MD On 2/10/2022.    Specialty: Family Medicine  Why: February 10, 2022 @ 1:00 p.m. see Dr. Mcclelland for your hospital followup visit.  Contact information:  [address] [address]  [phone]             [facility] On 1/27/2022.    Why: Home Health  Contact information:  N WILL ASSIGN HOME HEALTH PROVIDER           Kassi Tam.    Specialty: Dialysis Center  Why: out patient services:  mwf  Contact information:  [address] [address] [phone]

## 2022-01-26 NOTE — PROGRESS NOTES
TN SENT HOME HEALTH ORDER TO [facility] [phone], PATIENT WILL BE CALLED WITH PROVIDER'S NAME.

## 2022-01-26 NOTE — PROGRESS NOTES
[facility] Intensive Care  Nephrology  Progress Note    Patient Name: Mandeep Willams  MRN: [id number]  Admission Date: 1/23/2022  Hospital Length of Stay: 2 days  Attending Provider: Kianna Junior MD   Primary Care Physician: Guy Mcclelland Jr, MD  Principal Problem:Flash pulmonary edema  Inpatient consult to Nephrology  Consult performed by: MONSE Rodney  Consult ordered by: Harjeet Brewster MD  Reason for consult: esrd on HD  Subjective:     Interval History: Seen and examined during HD today, tolerating treatment well.  used via ipad from 4th floor nurses station. Patient is feeling better today. He denies SOB and has no complaints.   /59 HR 74     AP -189     Review of patient's allergies indicates:  No Known Allergies  Current Facility-Administered Medications   Medication Frequency    acetaminophen tablet 650 mg Q8H PRN    albuterol-ipratropium 2.5 mg-0.5 mg/3 mL nebulizer solution 3 mL Q4H PRN    benzonatate capsule 200 mg TID PRN    dextrose 50% injection 12.5 g PRN    dextrose 50% injection 25 g PRN    ferrous sulfate tablet 1 each BID    glucagon (human recombinant) injection 1 mg PRN    glucose chewable tablet 16 g PRN    glucose chewable tablet 24 g PRN    heparin (porcine) injection 5,000 Units Q8H    LIDOcaine-prilocaine cream PRN    naloxone 0.4 mg/mL injection 0.02 mg PRN    pneumoc 13-payal conj-dip cr(PF) (PREVNAR 13 (PF)) 0.5 mL vaccine x 1 dose    polyethylene glycol packet 17 g BID PRN    pravastatin tablet 20 mg Daily    sodium chloride 0.9% flush 10 mL Q8H PRN       Objective:     Vital Signs (Most Recent):  Temp: 97.5 °F (36.4 °C) (01/26/22 1211)  Pulse: 70 (01/26/22 1211)  Resp: 18 (01/26/22 1211)  BP: (!) 116/57 (01/26/22 1211)  SpO2: 98 % (01/26/22 1211)  O2 Device (Oxygen Therapy): room air (01/26/22 0921) Vital Signs (24h Range):  Temp:  [97 °F (36.1 °C)-99.5 °F (37.5 °C)] 97.5 °F (36.4 °C)  Pulse:  [59-80] 70  Resp:  [16-27]  18  SpO2:  [94 %-100 %] 98 %  BP: (110-149)/(47-63) 116/57     Weight: 51.5 kg (113 lb 8.6 oz) (01/25/22 0542)  Body mass index is 18.89 kg/m².  Body surface area is 1.54 meters squared.    I/O last 3 completed shifts:  In: 1200 [Other:1200]  Out: 5643 [Urine:700; Other:4943]    Physical Exam  Vitals reviewed.   Constitutional:       Appearance: thin frail appearing  male  HENT:      Head: Normocephalic and atraumatic.      Mouth/Throat:      Mouth: Mucous membranes are moist.      Pharynx: No posterior oropharyngeal erythema.   Eyes:      General: No scleral icterus.     Extraocular Movements: Extraocular movements intact.      Conjunctiva/sclera: Conjunctivae normal.   Cardiovascular:      Rate and Rhythm: Normal rate and regular rhythm.      Heart sounds: Murmur heard.    No peripheral edema  Pulmonary:      Breath sounds: CTAB, no over cracklespresent.      Comments: normal effort on RA  Abdominal:      General: There is no distension.      Palpations: Abdomen is soft.   Musculoskeletal:      Right lower leg: No edema.      Left lower leg: No edema.   Skin:     General: Skin is warm and dry.   Neurological:      General: No focal deficit present.      Mental Status: He is alert and oriented to person, place, and time.      Comments: Answers questions appropriately, follows commands   Psychiatric:         Mood and Affect: Mood normal.         Thought Content: Thought content normal.        Significant Labs:sure  CBC:   Recent Labs   Lab 01/26/22  0922   WBC 4.98   RBC 3.18*   HGB 10.8*   HCT 32.2*   *   *   MCH 34.0*   MCHC 33.5     CMP:   Recent Labs   Lab 01/24/22  0540 01/25/22  0418 01/26/22  0922      < > 96   CALCIUM 8.1*   < > 8.4*   ALBUMIN 2.8*   < > 2.4*   PROT 7.5  --   --       < > 135*   K 4.5   < > 3.9   CO2 19*   < > 29      < > 94*   *   < > 59*   CREATININE 10.3*   < > 6.0*   ALKPHOS 68  --   --    ALT 21  --   --    AST 20  --   --    BILITOT 1.4*  --    --     < > = values in this interval not displayed.     All labs within the past 24 hours have been reviewed.    Significant Imaging:  Labs: Reviewed    Assessment/Plan:   ESRD - usual HD on MWF with Dr Henriquez at [facility]  - missed HD treatments x2, apparently d/t AVF discomfort  - US of AVF without abnormalities- cleared for use by vascular surgery - continue emla cream prior to HD  - HD today and then resume usual MWF schedule thereafter while admitted  - monitor daily weights, strict I&Os  - avoid nephrotoxic agents including NSAIDs, renally dose medications  - daily labs     Hypervolemia/ Acute hypoxemic respiratory failure  - has been weaned off BiPAP- now on RA  - HD today with UF as tolerated  - continue diuretics- still makes urine  - daily wts, strict Is and Os      Anemia of chronic kidney disease   - Hgb at goal, GREGORY not indicated  - continue to monitor CBC      Acidosis   - level trending up s/p high bicarb bath with HD   - trend level      CKD MBD  - CCa wnl  - phos is trending down  - HD again today, no need for binders at this time   - renal diet    Case discussed with Dr Baugh  Thank you for your consult. I will follow-up with patient. Please contact us if you have any additional questions.     MONSE Rodney   DOS: 01/26/2022  Nephrology  [facility] [place] - Intensive Care

## 2022-01-26 NOTE — PLAN OF CARE
01/26/22 0940   Medicare Message   Important Message from Medicare regarding Discharge Appeal Rights Given to patient/caregiver;Explained to patient/caregiver;Signed/date by patient/caregiver   Date IMM was signed 01/26/22   Time IMM was signed 0940   [facility] [phone]

## 2022-01-26 NOTE — PLAN OF CARE
[place] - Telemetry  Discharge Final Note  TN sent secure chat to telemetry nurse Ly that patient is cleared for discharge from  Case management's standpoint.  TN spoke with pt's nephew Fletcher, pt's help at home.Pt has PCA.  MILAGROS Hutson [facility].  Primary Care Provider: Guy Mcclelland Jr, MD    Expected Discharge Date: 1/26/2022    Final Discharge Note (most recent)     Final Note - 01/26/22 1049        Final Note    Assessment Type Final Discharge Note     Anticipated Discharge Disposition Home or Self Care     What phone number can be called within the next 1-3 days to see how you are doing after discharge? --   see chart    Hospital Resources/Appts/Education Provided Appointments scheduled by Navigator/Coordinator;Provided patient/caregiver with written discharge plan information;Post-Acute resouces added to AVS;Provided education on problems/symptoms using teachback        Post-Acute Status    Coverage PHN     Discharge Delays None known at this time                 Important Message from Medicare  Important Message from Medicare regarding Discharge Appeal Rights: Given to patient/caregiver,Explained to patient/caregiver,Signed/date by patient/caregiver     Date IMM was signed: 01/26/22  Time IMM was signed: 0940    Contact Info     Guy Mcclelland Jr, MD   Specialty: Family Medicine   Relationship: PCP - General    [address] [place], [place] [address] [address]   Phone: [phone]       Next Steps: Follow up on 2/10/2022    Instructions: February 10, 2022 @ 1:00 p.m. see Dr. Mcclelland for your hospital followup visit.

## 2022-01-26 NOTE — PT/OT/SLP EVAL
"Physical Therapy Evaluation and Discharge Note    Patient Name:  Mandeep Willams   MRN:  [id number]    Recommendations:     Discharge Recommendations:  home with home health,home health PT,home health OT (FAmily support, HHSN/Aide)   Discharge Equipment Recommendations: none   Barriers to discharge: Pt lives alone, decreased CG support     Assessment:     Mandeep Willams is a 86 y.o. male admitted with a medical diagnosis of Flash pulmonary edema. .  At this time, patient is functioning at their prior level of function and does not require further acute PT services.     Recent Surgery: * No surgery found *      Plan:     During this hospitalization, patient does not require further acute PT services.  Please re-consult if situation changes.      Subjective     Chief Complaint: He has been stuck in bed for the last three days and now he is "useless"  Patient/Family Comments/goals: PLOF, to walk and exercise, HHC at time of D/C  Pain/Comfort:  Pain Rating 1: 0/10    Patients cultural, spiritual, Faith conflicts given the current situation: no    Living Environment:  Pt lives alone in a [facility] with no concerns  Prior to admission, patients level of function was Mod I with rollator for ambulation .  Equipment used at home: rollator "stool in bathroom to sit on and bathe".  DME owned (not currently used): none.  Upon discharge, patient will have assistance from Family    Objective:   Mandarin  Shanika #[id number] utilized throughout evaluation and treatment session  Communicated with nsg prior to session.  Patient found HOB elevated with bed alarm upon PT entry to room.    General Precautions: Standard, fall   Orthopedic Precautions:N/A   Braces: N/A   Respiratory Status: Room air    Exams:  · Cognitive Exam:  Patient is oriented to person and year, able to follow commands  · Gross Motor Coordination:  Mildly impaired 2/2 generalized weakness and deconditioning  · Postural Exam:  Patient presented with the following " abnormalities:    · -       Rounded shoulders  · -       Forward head  · Sensation:    · -       Intact  light/touch B LE's  · Skin Integrity/Edema:      · -       Skin integrity: Visible skin intact  · RLE ROM: WFL  · RLE Strength: WFL  · LLE ROM: WFL  · LLE Strength: WFL    Functional Mobility:  · Bed Mobility:     · Scooting: stand by assistance   · Supine to Sit: minimum assistance  · Transfers:     · Sit to Stand:  supervision with rolling walker  · Gait: Supervision/Mod I x 200' with decreased segundo  · Balance: FAir+ sit and stand    AM-PAC 6 CLICK MOBILITY  Total Score:18       Therapeutic Activities and Exercises:   Educate pt to perform B FAQ's while sitting    AM-PAC 6 CLICK MOBILITY  Total Score:18     Patient left up in chair with all lines intact, call button in reach and nsg notified.    GOALS:   Multidisciplinary Problems     Physical Therapy Goals        Problem: Physical Therapy Goal    Goal Priority Disciplines Outcome Goal Variances Interventions   Physical Therapy Goal     PT, PT/OT Adequate for Care Transition                     History:     Past Medical History:   Diagnosis Date    ESRD (end stage renal disease)     HTN (hypertension)     Hyperlipidemia        Past Surgical History:   Procedure Laterality Date    BACK SURGERY  2005    DIALYSIS FISTULA CREATION  4/2012    left arm    JOINT REPLACEMENT Right     hip       Time Tracking:     PT Received On: 01/26/22  PT Start Time: 1043     PT Stop Time: 1133  PT Total Time (min): 50 min     Billable Minutes: Evaluation 15 and Therapeutic Exercise 15       01/26/2022

## 2022-01-26 NOTE — NURSING
Pt completed HD. Net 1.5 L fluid removed. Tolerated well. VSS. NAD. Needles pulled and manual pressure held until hemostasis achieved. Report given to primary RN.

## 2022-01-26 NOTE — PLAN OF CARE
Problem: Physical Therapy Goal  Goal: Physical Therapy Goal  Outcome: Adequate for Care Transition   Initial PT evaluation performed today.  Pt OK for D/C home with family support and HHPT/OT/SN/Aide.  PT to sign off.  OK for OOB to chair and ambulate with nursing staff and RW.

## 2022-01-26 NOTE — PT/OT/SLP EVAL
"Occupational Therapy   Evaluation and Discharge Note    Name: Mandeep Willams  MRN: [id number]  Admitting Diagnosis:  Flash pulmonary edema   Recent Surgery: * No surgery found *      Recommendations:     Discharge Recommendations: home health OT,home health PT  Discharge Equipment Recommendations:  bedside commode  Barriers to discharge:  Decreased caregiver support    Assessment:     Mandeep Willams is a 86 y.o. male with a medical diagnosis of Flash pulmonary edema. At this time, patient is functioning at their prior level of function and does not require further acute OT services.     Plan:     During this hospitalization, patient does not require further acute OT services.  Please re-consult if situation changes.    · Plan of Care Reviewed with: patient    Subjective     Chief Complaint: Patient reporting "days" of bed fast engagement levels have left him "useless".   Patient/Family Comments/goals:Return to own home, accepting of HH follow up, seeking routine ex as means to maintain his strength and efficacy.     Occupational Profile:  Living Environment: Patient resides alone in a [facility], no steps to enter, home equipped with tub shower combo, Patient describes shower chair utilization. ns  Patients level of function prior to admission: (I) all ADLs and home management. Son in law provides transportation as needed. Patient describes Mod I with Rollator for ambulation.    Equipment used at home: rollator "stool in bathroom to sit on and bathe".    Upon discharge, patient will have assistance from nearby and supportive Family.   Pain/Comfort:  · Pain Rating 1: 0/100/10    Patients cultural, spiritual, Zoroastrianism conflicts given the current situation:  None  Mandarin  Shanika #[id number] utilized throughout evaluation and treatment session.  Objective:     Communicated with: RN prior to session.  Patient found supine with no lines attached. O2 disengaged, )2 sats 97-96% throughout eval. measures taken by Therapist via " [pulse oximeter.       General Precautions: Standard, fall   Orthopedic Precautions:N/A   Braces: N/A  Respiratory Status: Room air     Occupational Performance:    Bed Mobility:    · Patient completed Rolling/Turning to Left with  stand by assistance  · Patient completed Scooting/Bridging with stand by assistance  · Patient completed Supine to Sit with minimum assistance    Functional Mobility/Transfers: Extended amb/distance mobility measures taken by PT during co eval.   · Patient completed Sit <> Stand Transfer with supervision  with  rolling walker   · Patient completed Bed <> Chair Transfer using Step Transfer technique with modified independence with rolling walker  · Toilet t/f not completed. Patient stating needs untimely. Patient general standing task tolerance levels good > very good for distance amb (200 feet per PT).  Functional Mobility: FAIR (+) dyn standing, no obs LOB in transitional movements.     Activities of Daily Living:  · Feeding:  independence    · Upper Body Dressing: stand by assistance    · Lower Body Dressing: moderate assistance nopn skid socks  · Toileting: adult breif in place. Patient politely declining offer of assist to toilet. Patient states no need.       Cognitive/Visual Perceptual: Mandarin  Shanika #[id number] utilized throughout evaluation and treatment session.  Cognitive/Psychosocial Skills:     -       Oriented to: Person, Place, Time and Situation   -       Follows Commands/attention:Follows multistep  commands  -       Communication: clear/fluent Mandarin/Cantonese. Mandarin  Shanika                #[id number] utilized throughout evaluation and treatment session.  -       Memory: No Deficits noted  -       Safety awareness/insight to disability: intact   -       Mood/Affect/Coping skills/emotional control: Cooperative and Pleasant    Physical Exam:  · Postural Exam: Rounded shoulders, Forward head  · Sensation: Intact  light/touch BUE's  · Skin Integrity/Edema:  Visible skin intact, no edema noted  · RUE ROM: WFL  · RUE Strength: WFL  · LUE ROM: WFL  · LUE Strength: WFL  (B) Gross / Fine Motor Coordination:  grossly intact    AMPAC 6 Click ADL:  AMPAC Total Score: 21    Treatment & Education:  *Patienteducation provided re: role of OT, and OT Treatment rationales / protocols Via . Patient verbalized understanding.  *Patient agreeable to therapy session. Lights on / shade open for session.    SC:   *Basic self-care tasks and rudimentary functional mobility undertaken -- assist levels noted above.  TE:   *Education provided regarding the importance of early/consistent mobility to maximize recovery in conjunction w/ edu related to importance of performing daily     UB/LB AROM exercises.  *Education underway for intro deep relaxed/restorative breathing tech as needed for non Rx pain management/MM relaxation, and to support internal self reguation.     Fair (+) return demonstration, f/u recommended.   *Patient further engaged in edu for intro (I) ex program for: P/AA/AROM there ex to BUE / BLE ( )x 10 reps, x3/day, all joints/all planes in     sit as indicated for recovery of effective respiration in functional tasks, and to bolster proper circulation while in acute setting.  SESSION'S END:  *General in room safety and (S)'d mobility advised, call button use reviewed.  *Questions/concerns within OT scope addressed.    Education:    Patient left up in chair with call button in reach and RN notified    GOALS:   Multidisciplinary Problems     Occupational Therapy Goals     Not on file                History:     Past Medical History:   Diagnosis Date    ESRD (end stage renal disease)     HTN (hypertension)     Hyperlipidemia          Past Surgical History:   Procedure Laterality Date    BACK SURGERY  2005    DIALYSIS FISTULA CREATION  4/2012    left arm    JOINT REPLACEMENT Right     hip       Time Tracking:     OT Date of Treatment: 01/26/22  OT Start Time:  1044  OT Stop Time: 1137  OT Total Time (min): 53 min  Co eval.   Billable Minutes:Evaluation 14  Therapeutic Activity 9  1/26/2022

## 2022-01-27 NOTE — NURSING
Pt completed HD. Net 1.5 L fluid removed. Tolerated well. VSS. NAD. Needles pulled and manual pressure held until hemostasis achieved. Report given to primary nurse.

## 2022-01-27 NOTE — NURSING
Report given to night nurse Alejandro RN, patient is discharged. Tele and IV removed with tip intact. Spoke with Patients nephew who will be picking him up by the ED.

## 2022-02-02 PROBLEM — J81.0 FLASH PULMONARY EDEMA: Status: RESOLVED | Noted: 2022-01-01 | Resolved: 2022-01-01

## 2022-02-24 NOTE — TELEPHONE ENCOUNTER
----- Message from Kaelyn Alba sent at 2/23/2022  9:19 AM CST -----  Contact: [facility] [phone]  Type: RX Refill Request    Who Called: [facility]    Have you contacted your pharmacy:Yes. Pharmacy is calling.    Refill or New Rx: Refill    RX Name and Strength: tamsulosin (FLOMAX) 0.4 mg Cap, furosemide (LASIX) 20 MG tablet    Is this a 30 day or 90 day RX: 90 day    Preferred Pharmacy with phone number: .  [facility] [place], [address] [address]  Phone: [phone] Fax: [phone]    Local or Mail Order: Local    Would the patient rather a call back or a response via My [facility]? Call back    Best Call Back Number: [phone]

## 2022-02-25 NOTE — TELEPHONE ENCOUNTER
----- Message from Toby Matos MA sent at 2/25/2022 11:58 AM CST -----  Contact: [facility] [phone]  I saw that you wrote a prescription for him yesterday. The pharmacy called and said they did not see it. I checked the pt's chart and I saw that the prescription transmission status was print instead of e-prescribe.  ----- Message -----  From: Kaelyn Alba  Sent: 2/25/2022  10:42 AM CST  To: Esteban Rollins Staff    Type:Call Back    Who called: [facility]    What is the request in detail: Calling to check the status of Refill Request from yesterday, 02-24-22 for tamsulosin (FLOMAX) 0.4 mg Cap? Please send in.     Would the patient rather a call back or a response via My [facility]? Call back    Best call back number: [phone]

## 2022-03-10 NOTE — H&P (VIEW-ONLY)
Jude Ordaz MD [facility]                         03/10/2022    HPI:  Mandeep Willams is a 86 y.o. male with   Patient Active Problem List   Diagnosis    HTN (hypertension)    ESRD (end stage renal disease)    Hyperlipidemia    Transient vision disturbance, left    End stage renal disease on dialysis    Chest pain, atypical    Pleural effusion    Pneumothorax    Tobacco use disorder    Anemia of chronic disorder    Acute blood loss anemia    Urinary retention    Constipation    Suspected UTI    Nonrheumatic aortic valve stenosis    Goals of care, counseling/discussion    Debility    being managed by PCP and specialists who is in need for eval of long term dialysis access. Seen in hospital for pain during dialysis, resolved with Emla cream.    Past Medical History:   Diagnosis Date    ESRD (end stage renal disease)     HTN (hypertension)     Hyperlipidemia      Past Surgical History:   Procedure Laterality Date    BACK SURGERY      DIALYSIS FISTULA CREATION  2012    left arm    JOINT REPLACEMENT Right     hip     Family History   Problem Relation Age of Onset    Diabetes Sister     Hypertension Sister     Arthritis Sister     Cancer Neg Hx     Heart disease Neg Hx     Stroke Neg Hx     Amblyopia Neg Hx     Blindness Neg Hx     Cataracts Neg Hx     Glaucoma Neg Hx     Macular degeneration Neg Hx     Retinal detachment Neg Hx     Strabismus Neg Hx     Thyroid disease Neg Hx      Social History     Socioeconomic History    Marital status:    Occupational History     Employer: [employer]   Tobacco Use    Smoking status: Former Smoker     Packs/day: 1.00     Years: 10.00     Pack years: 10.00     Quit date: 1971     Years since quittin.2    Smokeless tobacco: Never Used   Substance and Sexual Activity    Alcohol use: No    Drug use: No    Sexual activity: Not Currently     Partners: Female      Comment:        Current Outpatient Medications:     albuterol (PROAIR HFA) 90 mcg/actuation inhaler, Inhale 2 puffs into the lungs every 4 (four) hours as needed for Wheezing or Shortness of Breath. Rescue, Disp: 8 g, Rfl: 5    albuterol-ipratropium (DUO-NEB) 2.5 mg-0.5 mg/3 mL nebulizer solution, NEBULIZE ONE vial (3ml) BY MOUTH EVERY 6 HOURS, Disp: 180 mL, Rfl: 0    amLODIPine (NORVASC) 5 MG tablet, Take 1 tablet (5 mg total) by mouth once daily., Disp: 90 tablet, Rfl: 3    calcium acetate 667 mg (169 mg calcium)/5 mL Soln, Take by mouth., Disp: , Rfl:     furosemide (LASIX) 20 MG tablet, Take 1 tablet (20 mg total) by mouth once daily., Disp: 90 tablet, Rfl: 3    metoprolol succinate (TOPROL-XL) 100 MG 24 hr tablet, Take 1 tablet (100 mg total) by mouth once daily., Disp: 90 tablet, Rfl: 3    multivitamin with minerals tablet, Take 1 tablet by mouth once daily., Disp: , Rfl:     omega-3 acid ethyl esters (LOVAZA) 1 gram capsule, Take 2 capsules (2 g total) by mouth 2 (two) times daily., Disp: 360 capsule, Rfl: 3    pravastatin (PRAVACHOL) 10 MG tablet, TAKE ONE TABLET BY MOUTH ONCE DAILY FOR cholesterol, Disp: 90 tablet, Rfl: 3    tamsulosin (FLOMAX) 0.4 mg Cap, Take 1 capsule (0.4 mg total) by mouth once daily., Disp: 30 capsule, Rfl: 11    REVIEW OF SYSTEMS:  General: No fevers or chills; ENT: No sore throat; Allergy and Immunology: no persistent infections; Hematological and Lymphatic: No history of bleeding or easy bruising; Endocrine: negative; Respiratory: no cough, shortness of breath, or wheezing; Cardiovascular: no chest pain or dyspnea on exertion; Gastrointestinal: no abdominal pain/back, change in bowel habits, or bloody stools; Genito-Urinary: no dysuria, trouble voiding, or hematuria; Musculoskeletal: negative; Neurological: no TIA or stroke symptoms; Psychiatric: no nervousness, anxiety or depression.    PHYSICAL EXAM:                General appearance:  Alert,  well-appearing, and in no distress.  Oriented to person, place, and time                    Neurological: Normal speech, no focal findings noted; CN II - XII grossly intact. BUE with sensation to light touch.            Musculoskeletal: Digits/nail without cyanosis/clubbing.  Strength 5/5 BUE.                    Neck: Supple                  Chest:  No use of accessory muscles               Cardiac: Normal rate              Abdomen: Soft         Extremities:   2 + R radial pulse, 2 + L radial pulse     2 + R brachial pulse, 2 + L brachial pulse     no RUE edema, no LUE edema         Skin: No tissue loss    LAB RESULTS:  No results found for: CBC  Lab Results   Component Value Date    LABPROT 12.1 01/24/2022    INR 1.1 01/24/2022     Lab Results   Component Value Date     (L) 01/26/2022    K 3.9 01/26/2022    CL 94 (L) 01/26/2022    CO2 29 01/26/2022    GLU 96 01/26/2022    BUN 59 (H) 01/26/2022    CREATININE 6.0 (H) 01/26/2022    CALCIUM 8.4 (L) 01/26/2022    ANIONGAP 12 01/26/2022    EGFRNONAA 8 (A) 01/26/2022     Lab Results   Component Value Date    WBC 4.98 01/26/2022    RBC 3.18 (L) 01/26/2022    HGB 10.8 (L) 01/26/2022    HCT 32.2 (L) 01/26/2022     (H) 01/26/2022    MCH 34.0 (H) 01/26/2022    MCHC 33.5 01/26/2022    RDW 14.5 01/26/2022     (L) 01/26/2022    MPV 10.5 01/26/2022    GRAN 2.8 01/26/2022    GRAN 55.7 01/26/2022    LYMPH 1.3 01/26/2022    LYMPH 26.7 01/26/2022    MONO 0.4 01/26/2022    MONO 8.6 01/26/2022    EOS 0.4 01/26/2022    BASO 0.03 01/26/2022    EOSINOPHIL 8.2 (H) 01/26/2022    BASOPHIL 0.6 01/26/2022    DIFFMETHOD Automated 01/26/2022     .No results found for: HGBA1C    IMAGING:  All pertinent imaging has been reviewed and interpreted independently.    HD US 3/2022:  Proximal stenosis    IMP/PLAN:  86 y.o. male with   Patient Active Problem List   Diagnosis    HTN (hypertension)    ESRD (end stage renal disease)    Hyperlipidemia    Transient vision disturbance,  left    End stage renal disease on dialysis    Chest pain, atypical    Pleural effusion    Pneumothorax    Tobacco use disorder    Anemia of chronic disorder    Acute blood loss anemia    Urinary retention    Constipation    Suspected UTI    Nonrheumatic aortic valve stenosis    Goals of care, counseling/discussion    Debility    being managed by PCP and specialists who is here today for evaluation of long term HD access.    -Recommend LUE fistulogram/possible intervention, transradial       I spent 12 minutes evaluating this patient and greater than 50% of the time was spent counseling, coordinator care and discussing the plan of care.  All questions were answered and patient stated understanding with agreement with the above treatment plan.    Jude Ordaz MD [facility]  Vascular and Endovascular Surgery

## 2022-03-10 NOTE — PROGRESS NOTES
Jude Ordaz MD [facility]                         03/10/2022    HPI:  Mandeep Willams is a 86 y.o. male with   Patient Active Problem List   Diagnosis    HTN (hypertension)    ESRD (end stage renal disease)    Hyperlipidemia    Transient vision disturbance, left    End stage renal disease on dialysis    Chest pain, atypical    Pleural effusion    Pneumothorax    Tobacco use disorder    Anemia of chronic disorder    Acute blood loss anemia    Urinary retention    Constipation    Suspected UTI    Nonrheumatic aortic valve stenosis    Goals of care, counseling/discussion    Debility    being managed by PCP and specialists who is in need for eval of long term dialysis access. Seen in hospital for pain during dialysis, resolved with Emla cream.    Past Medical History:   Diagnosis Date    ESRD (end stage renal disease)     HTN (hypertension)     Hyperlipidemia      Past Surgical History:   Procedure Laterality Date    BACK SURGERY      DIALYSIS FISTULA CREATION  2012    left arm    JOINT REPLACEMENT Right     hip     Family History   Problem Relation Age of Onset    Diabetes Sister     Hypertension Sister     Arthritis Sister     Cancer Neg Hx     Heart disease Neg Hx     Stroke Neg Hx     Amblyopia Neg Hx     Blindness Neg Hx     Cataracts Neg Hx     Glaucoma Neg Hx     Macular degeneration Neg Hx     Retinal detachment Neg Hx     Strabismus Neg Hx     Thyroid disease Neg Hx      Social History     Socioeconomic History    Marital status:    Occupational History     Employer: [employer]   Tobacco Use    Smoking status: Former Smoker     Packs/day: 1.00     Years: 10.00     Pack years: 10.00     Quit date: 1971     Years since quittin.2    Smokeless tobacco: Never Used   Substance and Sexual Activity    Alcohol use: No    Drug use: No    Sexual activity: Not Currently     Partners: Female      Comment:        Current Outpatient Medications:     albuterol (PROAIR HFA) 90 mcg/actuation inhaler, Inhale 2 puffs into the lungs every 4 (four) hours as needed for Wheezing or Shortness of Breath. Rescue, Disp: 8 g, Rfl: 5    albuterol-ipratropium (DUO-NEB) 2.5 mg-0.5 mg/3 mL nebulizer solution, NEBULIZE ONE vial (3ml) BY MOUTH EVERY 6 HOURS, Disp: 180 mL, Rfl: 0    amLODIPine (NORVASC) 5 MG tablet, Take 1 tablet (5 mg total) by mouth once daily., Disp: 90 tablet, Rfl: 3    calcium acetate 667 mg (169 mg calcium)/5 mL Soln, Take by mouth., Disp: , Rfl:     furosemide (LASIX) 20 MG tablet, Take 1 tablet (20 mg total) by mouth once daily., Disp: 90 tablet, Rfl: 3    metoprolol succinate (TOPROL-XL) 100 MG 24 hr tablet, Take 1 tablet (100 mg total) by mouth once daily., Disp: 90 tablet, Rfl: 3    multivitamin with minerals tablet, Take 1 tablet by mouth once daily., Disp: , Rfl:     omega-3 acid ethyl esters (LOVAZA) 1 gram capsule, Take 2 capsules (2 g total) by mouth 2 (two) times daily., Disp: 360 capsule, Rfl: 3    pravastatin (PRAVACHOL) 10 MG tablet, TAKE ONE TABLET BY MOUTH ONCE DAILY FOR cholesterol, Disp: 90 tablet, Rfl: 3    tamsulosin (FLOMAX) 0.4 mg Cap, Take 1 capsule (0.4 mg total) by mouth once daily., Disp: 30 capsule, Rfl: 11    REVIEW OF SYSTEMS:  General: No fevers or chills; ENT: No sore throat; Allergy and Immunology: no persistent infections; Hematological and Lymphatic: No history of bleeding or easy bruising; Endocrine: negative; Respiratory: no cough, shortness of breath, or wheezing; Cardiovascular: no chest pain or dyspnea on exertion; Gastrointestinal: no abdominal pain/back, change in bowel habits, or bloody stools; Genito-Urinary: no dysuria, trouble voiding, or hematuria; Musculoskeletal: negative; Neurological: no TIA or stroke symptoms; Psychiatric: no nervousness, anxiety or depression.    PHYSICAL EXAM:                General appearance:  Alert,  well-appearing, and in no distress.  Oriented to person, place, and time                    Neurological: Normal speech, no focal findings noted; CN II - XII grossly intact. BUE with sensation to light touch.            Musculoskeletal: Digits/nail without cyanosis/clubbing.  Strength 5/5 BUE.                    Neck: Supple                  Chest:  No use of accessory muscles               Cardiac: Normal rate              Abdomen: Soft         Extremities:   2 + R radial pulse, 2 + L radial pulse     2 + R brachial pulse, 2 + L brachial pulse     no RUE edema, no LUE edema         Skin: No tissue loss    LAB RESULTS:  No results found for: CBC  Lab Results   Component Value Date    LABPROT 12.1 01/24/2022    INR 1.1 01/24/2022     Lab Results   Component Value Date     (L) 01/26/2022    K 3.9 01/26/2022    CL 94 (L) 01/26/2022    CO2 29 01/26/2022    GLU 96 01/26/2022    BUN 59 (H) 01/26/2022    CREATININE 6.0 (H) 01/26/2022    CALCIUM 8.4 (L) 01/26/2022    ANIONGAP 12 01/26/2022    EGFRNONAA 8 (A) 01/26/2022     Lab Results   Component Value Date    WBC 4.98 01/26/2022    RBC 3.18 (L) 01/26/2022    HGB 10.8 (L) 01/26/2022    HCT 32.2 (L) 01/26/2022     (H) 01/26/2022    MCH 34.0 (H) 01/26/2022    MCHC 33.5 01/26/2022    RDW 14.5 01/26/2022     (L) 01/26/2022    MPV 10.5 01/26/2022    GRAN 2.8 01/26/2022    GRAN 55.7 01/26/2022    LYMPH 1.3 01/26/2022    LYMPH 26.7 01/26/2022    MONO 0.4 01/26/2022    MONO 8.6 01/26/2022    EOS 0.4 01/26/2022    BASO 0.03 01/26/2022    EOSINOPHIL 8.2 (H) 01/26/2022    BASOPHIL 0.6 01/26/2022    DIFFMETHOD Automated 01/26/2022     .No results found for: HGBA1C    IMAGING:  All pertinent imaging has been reviewed and interpreted independently.    HD US 3/2022:  Proximal stenosis    IMP/PLAN:  86 y.o. male with   Patient Active Problem List   Diagnosis    HTN (hypertension)    ESRD (end stage renal disease)    Hyperlipidemia    Transient vision disturbance,  left    End stage renal disease on dialysis    Chest pain, atypical    Pleural effusion    Pneumothorax    Tobacco use disorder    Anemia of chronic disorder    Acute blood loss anemia    Urinary retention    Constipation    Suspected UTI    Nonrheumatic aortic valve stenosis    Goals of care, counseling/discussion    Debility    being managed by PCP and specialists who is here today for evaluation of long term HD access.    -Recommend LUE fistulogram/possible intervention, transradial       I spent 12 minutes evaluating this patient and greater than 50% of the time was spent counseling, coordinator care and discussing the plan of care.  All questions were answered and patient stated understanding with agreement with the above treatment plan.    Jude Ordaz MD [facility]  Vascular and Endovascular Surgery

## 2022-03-17 NOTE — DISCHARGE INSTRUCTIONS
"  Your surgery is scheduled for __Tuesday March 22, 2022_.    Call [phone] between 2 p.m. and 5 p.m. on   Monday_ to find out your arrival time for the day of your surgery.      Please report to SAME DAY SURGERY UNIT on the 2nd FLOOR at _______ a.m.  Use front door entrance. The doors open at 0530 am.      If you need WHEELCHAIR assistance please call  [phone] from your cell phone or "0"  from the  hospital courtesy phone located to the right after you enter the hospital lobby.      INSTRUCTIONS IMPORTANT!!!  ¨ Do not eat  after 12 midnight-. OK to brush teeth, no   gum, candy or mints!    MAY DRINK WATER UNTIL HOSPITAL ARRIVAL TIME    ¨ Take only these medicines with a small swallow of water-morning of surgery.  Take med's checked on MED LIST      __x__  Prep instructions:  SHOWER     ___x_  Please shower using Hibiclens soap the night before AND  the morning of your surgery/procedure. Do not use Hibiclens on your face or genitals      __x__  No powder, lotions or creams to your body.  __x__  You may wear only deodorant on the day of surgery.  __x__  Please remove all jewelry, including piercings and leave at home.  __x__  No money or valuables needed. Please leave at home.  You may bring your cell phone.  __x__  Please bring any documents given by your doctor.  __x__  If going home the same day, arrange for a ride home. You will not be able to   drive if Anesthesia was used.  __x__  Wear loose fitting clothing. Allow for dressings, bandages.  __x__  Stop Aspirin, Ibuprofen, Motrin and Aleve at least 3-5 days before surgery, unless otherwise instructed by your doctor, or the nurse.              You MAY use Tylenol/acetaminophen until day of surgery.    __x__  Call MD for temperature above 101 degrees.        __x__ Stop taking any Fish Oil supplement or                   Vitamin E at least 5 days prior to surgery.          I have read or had read and explained to me, and understand the above " information.  Additional comments or instructions:Please call   [phone] if you have any questions regarding the instructions above.

## 2022-03-22 NOTE — OP NOTE
Date: 3/22/2022     Surgeon(s) and Role:   Jude Ordaz MD    Assistant: None    Pre-op Diagnosis:   1. 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 Final diagnoses:  None      Post-op Diagnosis: Same     Procedure(s):   1. US guided L radial artery percutaneous access  2. LUE fistulogram  3. Central venogram  4. Moderate sedation  5. Balloon angioplasty of L subclavian vein with 8x40 balloon [place]  6. Balloon angioplasty of proximal LUE AVF with 6x40 [place] balloon    Anesthesia: Local MAC     Findings/Key Components:   Successful treatment of > 70% stenosis  Strong palpable thrill     EBL: Minimal    PROCEDURE IN DETAIL:After an informed consent was obtained the patient was taken to the interventional suite and placed in the supine position.  The patient was brought to the IR suite, placed in supine. Arm was prepped and draped in the standard surgical fashion. Under ultrasound guidance, the L radial artery was accessed with a micropuncture needle; ultrasound confirmed vessel patency, followed by placement of 4/3-M[place] micropuncture dilator. Through this, an 0.035-inch wire was placed in the short 6-M[place] sheath.   A fistulogram and central venogram was performed.  After independent review and interpretation, based upon the fistulogram results, I decided to intervene. Through this, an 0.035-inch Glidewire was placed through the high-grade stenosis, which was demonstrated by the angiogram.  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 This was treated with the high-pressure, noncompliant balloon(s) listed above. Resolution of the stenosis was noted. Strong thrill could be felt. The sheath was removed, a vasc band was placed with good hemostasis.    MODERATE SEDATION   I was present for and monitored the patients cardio respiratory functions during the moderate sedation.  See nurses notes for Intra-service start and end times, the medications their doseage and route.    Time of sedation:  39 mins.

## 2022-03-22 NOTE — SEDATION DOCUMENTATION
Skin tear noted on left upper arm when surgical sterile fenestrated drape removed.  Dr. Ordaz informed and came to look at skin injury.

## 2022-03-22 NOTE — PLAN OF CARE
03/22/22 1420   Final Note   Assessment Type Final Discharge Note   Anticipated Discharge Disposition Home-Health   Hospital Resources/Appts/Education Provided Appointments scheduled and added to AVS   TN spoke with patient's son-in-law, Fletcher and provided DC info.

## 2022-03-22 NOTE — DISCHARGE INSTRUCTIONS
Limit movement of the wrist.  Do not bend wrist or perform heavy lifting for 24 hours.      NO blood pressure cuff or venipuncture to affected arm for 24 hours.    If bleeding occurs, apply pressure to site for 20 minutes. Apply band aid once bleeding stops.  Call 911 if unable to stop bleeding with pressure.     Keep your follow up appointment.    Dressing: remove in 48 hours     Do not drive, drink alcohol, or sign legal documents for 24 hours, or if taking narcotic pain medication.     Fall Prevention  Millions of people fall every year and injure themselves. You may have had anesthesia or sedation which may increase your risk of falling. You may have health issues that put you at an increased risk of falling.     Here are ways to reduce your risk of falling.    Make your home safe by keeping walkways clear of objects you may trip over.  Use non-slip pads under rugs. Do not use area rugs or small throw rugs.  Use non-slip mats in bathtubs and showers.  Install handrails and lights on staircases.  Do not walk in poorly lit areas.  Do not stand on chairs or wobbly ladders.  Use caution when reaching overhead or looking upward. This position can cause a loss of balance.  Be sure your shoes fit properly, have non-slip bottoms and are in good condition.   Wear shoes both inside and out. Avoid going barefoot or wearing slippers.  Be cautious when going up and down stairs, curbs, and when walking on uneven sidewalks.  If your balance is poor, consider using a cane or walker.  If your fall was related to alcohol use, stop or limit alcohol intake.   If your fall was related to use of sleeping medicines, talk to your doctor about this. You may need to reduce your dosage at bedtime if you awaken during the night to go to the bathroom.    To reduce the need for nighttime bathroom trips:  Avoid drinking fluids for several hours before going to bed  Empty your bladder before going to bed  Men can keep a urinal at the  bedside  Stay as active as you can. Balance, flexibility, strength, and endurance all come from exercise. They all play a role in preventing falls. Ask your healthcare provider which types of activity are right for you.  Get your vision checked on a regular basis.  If you have pets, know where they are before you stand up or walk so you don't trip over them.  Use night lights.           [facility] TO ASSIGN HOME HEALTH TO FOLLOW UP WITH YOU AT HOME

## 2022-03-22 NOTE — BRIEF OP NOTE
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    DISPOSITION: Home or Self Care    FINAL DIAGNOSIS:  <principal problem not specified>    FOLLOWUP: In clinic in 1 week with HD US    DISCHARGE INSTRUCTIONS:    Discharge Procedure Orders   Remove dressing in 48 hours     CV US Hemodialysis Access   Standing Status: Future Standing Exp. Date: 03/22/23     Order Specific Question Answer Comments   Release to patient Immediate      Activity as tolerated

## 2022-03-22 NOTE — INTERVAL H&P NOTE
The patient has been examined and the H&P has been reviewed:    I concur with the findings and no changes have occurred since H&P was written.    Surgery risks, benefits and alternative options discussed and understood by patient/family.    Mallampati Scale Class 2   ASA Classification Class III         There are no hospital problems to display for this patient.

## 2022-03-22 NOTE — PLAN OF CARE
03/22/22 1406   Post-Acute Status   Post-Acute Authorization Home Health   Home Health Status Referrals Sent   oRDERS received for HH and faxed to [facility] @ [phone].

## 2022-03-22 NOTE — PLAN OF CARE
[facility]      HOME HEALTH ORDERS  FACE TO FACE ENCOUNTER    Patient Name: Mandeep Willams  YOB: 1935    PCP: Guy Mcclelland Jr, MD   PCP Address: [address] [place] [address] / SARTHAK DAVE  PCP Phone Number: [phone]  PCP Fax: [phone]    Encounter Date: 3/10/22    Admit to Home Health    Diagnoses:  There are no hospital problems to display for this patient.      Follow Up Appointments:  No future appointments.    Allergies:Review of patient's allergies indicates:  No Known Allergies    Medications: Review discharge medications with patient and family and provide education.    Current Facility-Administered Medications   Medication Dose Route Frequency Provider Last Rate Last Admin    cefazolin (ANCEF) 2 gram in dextrose 5% 50 mL IVPB (premix)  2 g Intravenous Once Jude Ordaz MD         Current Discharge Medication List      CONTINUE these medications which have NOT CHANGED    Details   albuterol-ipratropium (DUO-NEB) 2.5 mg-0.5 mg/3 mL nebulizer solution NEBULIZE ONE vial (3ml) BY MOUTH EVERY 6 HOURS  Qty: 180 mL, Refills: 0    Associated Diagnoses: Pleural effusion; Congestive heart failure, unspecified HF chronicity, unspecified heart failure type; Shortness of breath      amLODIPine (NORVASC) 5 MG tablet Take 1 tablet (5 mg total) by mouth once daily.  Qty: 90 tablet, Refills: 3    Comments: .  Associated Diagnoses: Essential hypertension      furosemide (LASIX) 20 MG tablet Take 1 tablet (20 mg total) by mouth once daily.  Qty: 90 tablet, Refills: 3    Associated Diagnoses: Essential hypertension      metoprolol succinate (TOPROL-XL) 100 MG 24 hr tablet Take 1 tablet (100 mg total) by mouth once daily.  Qty: 90 tablet, Refills: 3    Comments: .  Associated Diagnoses: Essential hypertension      multivitamin with minerals tablet Take 1 tablet by mouth once daily.      omega-3 acid ethyl esters (LOVAZA) 1 gram capsule Take 2 capsules (2 g total) by mouth 2 (two) times  daily.  Qty: 360 capsule, Refills: 3    Associated Diagnoses: Mixed hyperlipidemia      pravastatin (PRAVACHOL) 10 MG tablet TAKE ONE TABLET BY MOUTH ONCE DAILY FOR cholesterol  Qty: 90 tablet, Refills: 3    Associated Diagnoses: Mixed hyperlipidemia      tamsulosin (FLOMAX) 0.4 mg Cap Take 1 capsule (0.4 mg total) by mouth once daily.  Qty: 30 capsule, Refills: 11      albuterol (PROAIR HFA) 90 mcg/actuation inhaler Inhale 2 puffs into the lungs every 4 (four) hours as needed for Wheezing or Shortness of Breath. Rescue  Qty: 8 g, Refills: 5    Associated Diagnoses: Shortness of breath      calcium acetate 667 mg (169 mg calcium)/5 mL Soln Take by mouth.               I have seen and examined this patient within the last 30 days. My clinical findings that support the need for the home health skilled services and home bound status are the following:no   Medical restrictions requiring assistance of another human to leave home due to  Decreased range of motions in extremities.     Diet:   renal diet    Labs:  None    Referrals/ Consults  Aide to provide assistance with personal care, ADLs, and vital signs.    Activities:   activity as tolerated    Nursing:   Agency to admit patient within 6 hours of hospital discharge unless specified on physician order or at patient request    SN to complete comprehensive assessment including routine vital signs. Instruct on disease process and s/s of complications to report to MD. Review/verify medication list sent home with the patient at time of discharge  and instruct patient/caregiver as needed. Frequency may be adjusted depending on start of care date.     Skilled nurse to perform up to 3 visits PRN for symptoms related to diagnosis    Notify MD if SBP > 160 or < 90; DBP > 90 or < 50; HR > 120 or < 50; Temp > 101; O2 < 88%    Ok to schedule additional visits based on staff availability and patient request on consecutive days within the home health episode.    When multiple  disciplines ordered:    Start of Care occurs on Sunday - Wednesday schedule remaining discipline evaluations as ordered on separate consecutive days following the start of care.    Thursday SOC -schedule subsequent evaluations Friday and Monday the following week.     Friday - Saturday SOC - schedule subsequent discipline evaluations on consecutive days starting Monday of the following week.    For all post-discharge communication and subsequent orders please contact patient's primary care physician. If unable to reach primary care physician or do not receive response within 30 minutes, please contact the vascular surgery [phone] for clinical staff order clarification    Miscellaneous   Routine Skin for Bedridden Patients: Instruct patient/caregiver to apply moisture barrier cream to all skin folds and wet areas in perineal area daily and after baths and all bowel movements.    Home Health Aide:  Nursing Daily    Wound Care Orders  yes:  Skin tear, left upper extremity.  Place non adherent dressing to left arm daily.  Check for signs of infection and keep clean/dry.  Contact vascular surgery clinic with any questions or concerns.    I certify that this patient is confined to his home and needs intermittent skilled nursing care.

## 2022-03-23 NOTE — TELEPHONE ENCOUNTER
----- Message from Clifton Kelly sent at 3/22/2022  2:04 PM CDT -----      Name of Who is Calling: [facility]      What is the request in detail: [facility] called to get a week f/u appt for pt.Please contact to further discuss and advise.           Can the clinic reply by [facility]: N      What [facility]: [phone]

## 2022-03-23 NOTE — TELEPHONE ENCOUNTER
Nurse spoke to the pt's family and gave them the date and time of the testing and office visit    ----- Message from Quintin Santana sent at 3/22/2022  1:46 PM CDT -----  Name of Who is Calling: Fletcher (son)         What is the request in detail: Fletcher is calling to schedule the patient an appointment. Please advise          Can the clinic reply by [facility]: No         What Number to Call Back if not in [facility]: [phone]

## 2022-04-07 NOTE — TELEPHONE ENCOUNTER
----- Message from Kenya Smith MA sent at 4/7/2022 10:47 AM CDT -----  Type: Patient Call Back    Who called:[facility] [place]     What is the request in detail:tamsulosin (FLOMAX) 0.4 mg Cap 30 capsule .. following up on a refill request for this medication ..     Can the clinic reply by [facility]?no    Would the patient rather a call back or a response via [facility]? yes    Best call back number:[phone]

## 2022-05-13 NOTE — TELEPHONE ENCOUNTER
----- Message from Shanika Schmidt sent at 5/13/2022  9:10 AM CDT -----  Type: RX Refill Request    Who Called: pt    Have you contacted your pharmacy:    Refill or New Rx:tamsulosin (FLOMAX) 0.4 mg Cap    RX Name and Strength:    How is the patient currently taking it? (ex. 1XDay):    Is this a 30 day or 90 day RX:    Preferred Pharmacy with phone number:  [facility] [place] [address] [address]  Phone: [phone] Fax: [phone]      Local or Mail Order:    Ordering Provider:    Would the patient rather a call back or a response via [facility]? call    Best Call Back Number:[phone] (home) [phone] (work)      Additional Information:

## 2022-05-13 NOTE — TELEPHONE ENCOUNTER
No new care gaps identified.  [facility] Embedded Care Gaps. Reference number: [id number]. 5/13/2022   10:25:20 AM CDT

## 2022-05-16 PROBLEM — J96.01 ACUTE HYPOXEMIC RESPIRATORY FAILURE: Status: ACTIVE | Noted: 2022-01-01

## 2022-05-16 NOTE — ASSESSMENT & PLAN NOTE
Nephrology consulted from ED with plans for dialysis this a.m.  Will recommend continuing Monday, Wednesday, Friday dialysis schedule at discharge.

## 2022-05-16 NOTE — CONSULTS
[facility] [place] - Telemetry  Nephrology  Consult Note    Patient Name: Mandeep Willams  MRN: [id number]  Admission Date: 5/16/2022  Hospital Length of Stay: 0 days  Attending Provider: Hermilo Reed MD   Primary Care Physician: Guy Mcclelland Jr, MD  Principal Problem:<principal problem not specified>    Inpatient consult to Nephrology  Consult performed by: MONSE Rodney  Consult ordered by: Anny Cao MD  Reason for consult: fluid overload        Subjective:     HPI: Mr Willams is a 86 yr old male, Mandarin speaking only, w/ PMH of ESRD on iHD MWF, HTN, HLD who presented to [facility] today with chief complaint of SOB. Placed in observation for acute hypoxic respiratory failure secondary to volume overload. Nephrology consulted for ESRD/ hypervolemia. Chart reviewed. Pt usual HD MWF at [facility] under Dr Henriquez. /Martii used as patient is Mandarin speaking. He reports last HD was Friday PTA. Denies eating salty foods over the weekend or drinking excessive fluid. He is unsure what his EDW is. Denies productive cough. He endorses shortness of breath and feels his breathing is very difficult right now. He denies any other complaints.     Past Medical History:   Diagnosis Date    ESRD (end stage renal disease)     HTN (hypertension)     Hyperlipidemia        Past Surgical History:   Procedure Laterality Date    BACK SURGERY  2005    DIALYSIS FISTULA CREATION  4/2012    left arm    FISTULOGRAM Left 3/22/2022    Procedure: Fistulogram, transradial;  Surgeon: Jude Ordaz MD;  Location: [facility];  Service: Vascular;  Laterality: Left;  RN Pre Op 3-17-22.  May need Chinese  day of procedure. CA    JOINT REPLACEMENT Right     hip       Review of patient's allergies indicates:  No Known Allergies  Current Facility-Administered Medications   Medication Frequency    albuterol-ipratropium 2.5 mg-0.5 mg/3 mL nebulizer solution 3 mL Q6H PRN    amLODIPine tablet 5 mg Daily    heparin (porcine)  injection 5,000 Units Q8H    hydrALAZINE injection 10 mg Q6H PRN    metoprolol succinate (TOPROL-XL) 24 hr tablet 100 mg Daily    mupirocin 2 % ointment BID    pravastatin tablet 10 mg Daily    sodium chloride 0.9% flush 10 mL PRN    tamsulosin 24 hr capsule 0.4 mg Daily     Facility-Administered Medications Ordered in Other Encounters   Medication Frequency    cefazolin (ANCEF) 2 gram in dextrose 5% 50 mL IVPB (premix) Once     Family History     Problem Relation (Age of Onset)    Arthritis Sister    Diabetes Sister    Hypertension Sister        Tobacco Use    Smoking status: Former Smoker     Packs/day: 1.00     Years: 10.00     Pack years: 10.00     Quit date: 1971     Years since quittin.4    Smokeless tobacco: Never Used   Substance and Sexual Activity    Alcohol use: No    Drug use: No    Sexual activity: Not Currently     Partners: Female     Comment:      Review of Systems   Respiratory: Positive for shortness of breath. Negative for cough.    All other systems reviewed and are negative.    Objective:     Vital Signs (Most Recent):  Temp: 98.3 °F (36.8 °C) (22 1146)  Pulse: 74 (22 1146)  Resp: 18 (22 1146)  BP: (!) 150/67 (22 1146)  SpO2: 95 % (22 1146)  O2 Device (Oxygen Therapy): nasal cannula (22 0515) Vital Signs (24h Range):  Temp:  [98.1 °F (36.7 °C)-98.6 °F (37 °C)] 98.3 °F (36.8 °C)  Pulse:  [65-82] 74  Resp:  [18-22] 18  SpO2:  [94 %-98 %] 95 %  BP: (122-150)/(58-70) 150/67     Weight: 56.2 kg (124 lb) (22 0515)  Body mass index is 20.63 kg/m².  Body surface area is 1.61 meters squared.    No intake/output data recorded.    Physical Exam  Vitals and nursing note reviewed.   Constitutional:       Appearance: Normal appearance. He is ill-appearing.   HENT:      Head: Normocephalic and atraumatic.      Nose: Nose normal.      Mouth/Throat:      Mouth: Mucous membranes are moist.   Eyes:      General: No scleral icterus.      Extraocular Movements: Extraocular movements intact.      Conjunctiva/sclera: Conjunctivae normal.   Cardiovascular:      Rate and Rhythm: Normal rate and regular rhythm.      Comments: 1+ BLE  Pulmonary:      Breath sounds: Rhonchi and rales present.      Comments: Dyspneic with speech, tachypneic   Abdominal:      General: There is no distension.      Palpations: Abdomen is soft.   Musculoskeletal:      Right lower leg: Edema present.      Left lower leg: Edema present.   Skin:     General: Skin is warm and dry.      Findings: No rash.   Neurological:      General: No focal deficit present.      Mental Status: He is alert and oriented to person, place, and time.   Psychiatric:         Behavior: Behavior normal.         Thought Content: Thought content normal.         Significant Labs:  Cardiac Markers: No results for input(s): CKMB, TROPONINT, MYOGLOBIN in the last 168 hours.  CBC:   Recent Labs   Lab 05/16/22  0537   WBC 7.69   RBC 3.17*   HGB 10.9*   HCT 33.0*   *   *   MCH 34.4*   MCHC 33.0     CMP:   Recent Labs   Lab 05/16/22  0537      CALCIUM 8.6*   ALBUMIN 3.0*   PROT 7.7      K 4.4   CO2 28      BUN 62*   CREATININE 7.8*   ALKPHOS 104   ALT 32   AST 34   BILITOT 0.9     All labs within the past 24 hours have been reviewed.    Significant Imaging:  Labs: Reviewed  X-Ray: Reviewed    Assessment/Plan:   ESRD - usual HD on MWF with Dr Henriquez at [facility]  - HD today and continue HD MWF while admitted unless otherwise necessary  - monitor daily weights, strict I&Os  - avoid nephrotoxic agents including NSAIDs, renally dose medications  - daily labs     Hypervolemia/ Pulmonary edema/ pleural effusions  - HD today with UF as tolerated  - consider UF only tx tomorrow   - may need consider thoracentesis   - continue diuretics- still makes urine  - daily wts, strict Is and Os      Anemia of chronic kidney disease   - Hgb at goal, monitor for need to start GREGORY  - continue to  monitor CBC      CKD MBD  - CCa wnl and phos is well controlled  - renal diet     Case discussed with Dr Baugh  Thank you for your consult. I will follow-up with patient. Please contact us if you have any additional questions.     MONSE Rodney   DOS: 05/16/2022  Nephrology  [facility] [place] - Intensive Care

## 2022-05-16 NOTE — ASSESSMENT & PLAN NOTE
Patient with Hypoxic Respiratory failure which is Acute.  he is not on home oxygen. Supplemental oxygen was provided and noted-  .   Signs/symptoms of respiratory failure include- tachypnea. Contributing diagnoses includes - Pulmonary edema Labs and images were reviewed. Patient Has not had a recent ABG. Will treat underlying causes and adjust management of respiratory failure as follows- volume overload.  Will treat with dialysis.  Placed on nasal cannula O2 in the ED, continue to maintain sats greater than 90. Chest x-ray showing cardiomegaly with bilateral opacities which in line with clinical picture represent pulmonary edema.  Wean O2 as tolerated.  Plan for dialysis and will reassess following dialysis.

## 2022-05-16 NOTE — SUBJECTIVE & OBJECTIVE
Past Medical History:   Diagnosis Date    ESRD (end stage renal disease)     HTN (hypertension)     Hyperlipidemia        Past Surgical History:   Procedure Laterality Date    BACK SURGERY  2005    DIALYSIS FISTULA CREATION  4/2012    left arm    FISTULOGRAM Left 3/22/2022    Procedure: Fistulogram, transradial;  Surgeon: Jude Ordaz MD;  Location: [facility];  Service: Vascular;  Laterality: Left;  RN Pre Op 3-17-22.  May need Chinese  day of procedure. CA    JOINT REPLACEMENT Right     hip       Review of patient's allergies indicates:  No Known Allergies    Current Facility-Administered Medications on File Prior to Encounter   Medication    cefazolin (ANCEF) 2 gram in dextrose 5% 50 mL IVPB (premix)     Current Outpatient Medications on File Prior to Encounter   Medication Sig    albuterol (PROAIR HFA) 90 mcg/actuation inhaler Inhale 2 puffs into the lungs every 4 (four) hours as needed for Wheezing or Shortness of Breath. Rescue    albuterol-ipratropium (DUO-NEB) 2.5 mg-0.5 mg/3 mL nebulizer solution NEBULIZE ONE vial (3ml) BY MOUTH EVERY 6 HOURS    amLODIPine (NORVASC) 5 MG tablet Take 1 tablet (5 mg total) by mouth once daily.    calcium acetate 667 mg (169 mg calcium)/5 mL Soln Take by mouth.    furosemide (LASIX) 20 MG tablet Take 1 tablet (20 mg total) by mouth once daily.    metoprolol succinate (TOPROL-XL) 100 MG 24 hr tablet Take 1 tablet (100 mg total) by mouth once daily.    multivitamin with minerals tablet Take 1 tablet by mouth once daily.    omega-3 acid ethyl esters (LOVAZA) 1 gram capsule Take 2 capsules (2 g total) by mouth 2 (two) times daily.    pravastatin (PRAVACHOL) 10 MG tablet Take 1 tablet (10 mg total) by mouth once daily.    tamsulosin (FLOMAX) 0.4 mg Cap Take 1 capsule (0.4 mg total) by mouth once daily.     Family History       Problem Relation (Age of Onset)    Arthritis Sister    Diabetes Sister    Hypertension Sister          Tobacco Use    Smoking status: Former  Smoker     Packs/day: 1.00     Years: 10.00     Pack years: 10.00     Quit date: 1971     Years since quittin.4    Smokeless tobacco: Never Used   Substance and Sexual Activity    Alcohol use: No    Drug use: No    Sexual activity: Not Currently     Partners: Female     Comment:      Review of Systems   Constitutional:  Positive for fatigue.   HENT: Negative.     Eyes: Negative.    Respiratory:  Positive for shortness of breath.    Cardiovascular:  Negative for chest pain.   Gastrointestinal: Negative.    Genitourinary: Negative.    Musculoskeletal: Negative.    Skin: Negative.    Neurological: Negative.    Psychiatric/Behavioral: Negative.     Objective:     Vital Signs (Most Recent):  Temp: 98.1 °F (36.7 °C) (22 08)  Pulse: 72 (22 08)  Resp: 18 (22 08)  BP: (!) 142/66 (22 08)  SpO2: 95 % (22)   Vital Signs (24h Range):  Temp:  [98.1 °F (36.7 °C)-98.6 °F (37 °C)] 98.1 °F (36.7 °C)  Pulse:  [65-82] 72  Resp:  [18-22] 18  SpO2:  [94 %-98 %] 95 %  BP: (122-150)/(58-70) 142/66     Weight: 56.2 kg (124 lb)  Body mass index is 20.63 kg/m².    Physical Exam  Vitals reviewed.   Constitutional:       Appearance: He is ill-appearing.      Comments: Frail appearance   HENT:      Head: Normocephalic and atraumatic.      Nose: Nose normal.      Mouth/Throat:      Mouth: Mucous membranes are moist.   Eyes:      Extraocular Movements: Extraocular movements intact.      Conjunctiva/sclera: Conjunctivae normal.   Cardiovascular:      Rate and Rhythm: Normal rate and regular rhythm.   Pulmonary:      Breath sounds: Rhonchi and rales present. No wheezing.      Comments: Supplemental nasal cannula O2  Abdominal:      General: There is no distension.      Palpations: Abdomen is soft.   Musculoskeletal:         General: No deformity.      Cervical back: Neck supple.   Skin:     General: Skin is warm and dry.   Neurological:      General: No focal deficit present.      Mental  Status: He is alert.           Significant Labs: All pertinent labs within the past 24 hours have been reviewed.    Significant Imaging: I have reviewed all pertinent imaging results/findings within the past 24 hours.

## 2022-05-16 NOTE — ED NOTES
Adult Physical Assessment  LOC: Mandeep Willams, 86 y.o. male verified via two identifiers.  The patient is awake, alert, oriented and speaking appropriately at this time.  APPEARANCE: Patient resting comfortably and appears to be in no acute distress at this time. Patient is clean and well groomed, patient's clothing is properly fastened.  SKIN:The skin is warm and dry, color consistent with ethnicity, patient has normal skin turgor and moist mucus membranes, skin intact, no breakdown or brusing noted.  MUSCULOSKELETAL: Patient moving all extremities well, no obvious swelling or deformities noted.  RESPIRATORY: Airway is open and patent, respirations are spontaneous, patient has a normal effort and rate, frequent, coarse cough, NPC.   CARDIAC: Patient in NSR on monitor. Patient has a normal rate and rhythm, no periphreal edema noted in any extremity, capillary refill < 3 seconds in all extremities  ABDOMEN: Soft and non tender to palpation, no abdominal distention noted. Bowel sounds present in all four quadrants.  NEUROLOGIC: Eyes open spontaneously, behavior appropriate to situation, follows commands, facial expression symmetrical, bilateral hand grasp equal and even, purposeful motor response noted, normal sensation in all extremities when touched with a finger.

## 2022-05-16 NOTE — NURSING
Pt arrived on the floor via stretcher from ER. He does not speak English will need the ScaleMP . He is on 02 4l per n/c. Hi flow tubing changed to normal flow n/c. Admission to be done by Choco LINDSEY

## 2022-05-16 NOTE — ED PROVIDER NOTES
Encounter Date: 5/16/2022       History     Chief Complaint   Patient presents with    Shortness of Breath     Pt here via EMS with c/o SOB x few hours. Pt is dialysis pt, M/W/F. Initial sats 91% on RA. Pt 94% on 4L NC.     87 yo male with ESRD on HD MWF presents via [facility] with acute severe shortness of breath without chest pain.  Patient had mild shortness of breath last night but it worsened significantly this morning.  Patient has also had a cough productive of mucous.  No fevers or sweats.  Patient suspects that his regular HD center did not take off enough fluid on Friday 5/13/22 because he was not dialyzed for the full amount of time.  Patient reports he still makes urine.  No nausea/vomiting.  EMS notes pulse ox 91% on room air on their arrival and 94% on 4L by NC.      Patient speaks Mandarin Chinese.  His nephew is at the bedside translating.      Nephrologist is Dr. Dr. Jaylyn Henriquez.      1/7/21  · The left ventricle is normal in size with mild concentric hypertrophy and normal systolic function. The estimated ejection fraction is 60%  · Normal right ventricular size with normal right ventricular systolic function.  · Moderate aortic regurgitation.  · There is severe aortic valve stenosis.  · Aortic valve area is 0.76 cm2; peak velocity is 4.00 m/s; mean gradient is 38 mmHg.  · Moderate mitral regurgitation.  · Mild tricuspid regurgitation.  · The estimated PA systolic pressure is 55 mmHg.  · There is pulmonary hypertension.    PMH: ESRD on HD MWF, nonrheumatic aortic stenosis, HTN, DLD, anemia, pleural effusion, pneumothorax  PSH: back, R hip, L AV fistula         Review of patient's allergies indicates:  No Known Allergies  Past Medical History:   Diagnosis Date    ESRD (end stage renal disease)     HTN (hypertension)     Hyperlipidemia      Past Surgical History:   Procedure Laterality Date    BACK SURGERY  2005    DIALYSIS FISTULA CREATION  4/2012    left arm    FISTULOGRAM Left  3/22/2022    Procedure: Fistulogram, transradial;  Surgeon: Jude Ordaz MD;  Location: [facility] OR;  Service: Vascular;  Laterality: Left;  RN Pre Op 3-17-22.  May need Chinese  day of procedure. CA    JOINT REPLACEMENT Right     hip     Family History   Problem Relation Age of Onset    Diabetes Sister     Hypertension Sister     Arthritis Sister     Cancer Neg Hx     Heart disease Neg Hx     Stroke Neg Hx     Amblyopia Neg Hx     Blindness Neg Hx     Cataracts Neg Hx     Glaucoma Neg Hx     Macular degeneration Neg Hx     Retinal detachment Neg Hx     Strabismus Neg Hx     Thyroid disease Neg Hx      Social History     Tobacco Use    Smoking status: Former Smoker     Packs/day: 1.00     Years: 10.00     Pack years: 10.00     Quit date: 1971     Years since quittin.4    Smokeless tobacco: Never Used   Substance Use Topics    Alcohol use: No    Drug use: No     Review of Systems   Constitutional: Negative for fever.   HENT: Negative for rhinorrhea and sore throat.    Eyes: Negative for visual disturbance.   Respiratory: Positive for cough and shortness of breath.    Cardiovascular: Negative for chest pain.   Gastrointestinal: Negative for abdominal pain.   Genitourinary: Negative for difficulty urinating.   Musculoskeletal: Positive for gait problem (chronic instability).   Skin: Negative for rash.   Neurological: Negative for syncope.       Physical Exam     Initial Vitals [22 0515]   BP Pulse Resp Temp SpO2   (!) 150/70 82 (!) 22 98.6 °F (37 °C) (!) 94 %      MAP       --         Physical Exam    Nursing note and vitals reviewed.  Constitutional: He appears well-developed and well-nourished. He is not diaphoretic.   Awake, alert, nontoxic, speaking in complete sentences.    HENT:   Head: Normocephalic and atraumatic.   Minimal facial edema   Eyes: Conjunctivae and EOM are normal. Pupils are equal, round, and reactive to light.   Neck: Neck supple.   Normal range of  motion.  Cardiovascular: Normal rate, regular rhythm and intact distal pulses.   Murmur heard.  Pulmonary/Chest: He is in respiratory distress (improved on NC). He has no wheezes. He has no rhonchi. He has rales (bilateral lower lobes).   Abdominal: Abdomen is soft. There is no abdominal tenderness.   Musculoskeletal:         General: Edema (trace bilat upper and lower extremity) present. No tenderness. Normal range of motion.      Cervical back: Normal range of motion and neck supple.      Comments: L upper arm AV fistula +thrill     Neurological: He is alert and oriented to person, place, and time. He has normal strength.   Moving all extremities   Skin: Skin is warm and dry. There is pallor (mild).   Psychiatric: His behavior is normal.         ED Course   Procedures  Labs Reviewed   CBC W/ AUTO DIFFERENTIAL - Abnormal; Notable for the following components:       Result Value    RBC 3.17 (*)     Hemoglobin 10.9 (*)     Hematocrit 33.0 (*)      (*)     MCH 34.4 (*)     RDW 14.9 (*)     Platelets 127 (*)     Gran % 73.2 (*)     Lymph % 16.5 (*)     All other components within normal limits   COMPREHENSIVE METABOLIC PANEL - Abnormal; Notable for the following components:    BUN 62 (*)     Creatinine 7.8 (*)     Calcium 8.6 (*)     Albumin 3.0 (*)     eGFR if  7 (*)     eGFR if non  6 (*)     All other components within normal limits   B-TYPE NATRIURETIC PEPTIDE - Abnormal; Notable for the following components:    BNP 3,276 (*)     All other components within normal limits   MAGNESIUM   TROPONIN I   PHOSPHORUS   SARS-COV-2 RDRP GENE   POCT INFLUENZA A/B MOLECULAR     EKG Readings: (Independently Interpreted)   05:36: NSR, HR 80. Normal axis. No ectopy. No STEMI.        Imaging Results          X-Ray Chest AP Portable (Final result)  Result time 05/16/22 06:20:06    Final result by Max Nicholson MD (05/16/22 06:20:06)                 Impression:      Cardiomegaly with right  "greater than left airspace opacities and pleural effusions, similar to mildly worse aeration when compared with 01/23/2022.  Findings could be related to underlying volume overload in this dialysis patient though correlation is needed.      Electronically signed by: Max Nicholson MD  Date:    05/16/2022  Time:    06:20             Narrative:    EXAMINATION:  XR CHEST AP PORTABLE    CLINICAL HISTORY:  Provided history is "shortness of breath;  ".    TECHNIQUE:  One view of the chest.    COMPARISON:  01/23/2022.    FINDINGS:  Cardiac wires overlie the chest.  Cardiomediastinal silhouette is magnified by technique, appears mildly enlarged and is stable.  Central vascular congestion with diffuse bilateral interstitial opacities.  Patchy airspace opacities throughout the right lung and in the left perihilar region.  Moderate right pleural effusion and possible small left pleural effusion.  Numerous surgical clips overlie the left upper extremity.  No distinct pneumothorax.                              X-Rays:   Independently Interpreted Readings:   Other Readings:  CXR +primarily right sided pulmonary edema    Medications   furosemide injection 40 mg (40 mg Intravenous Given 5/16/22 0621)     Medical Decision Making:   History:   Old Medical Records: I decided to obtain old medical records.  Old Records Summarized: records from previous admission(s).  Initial Assessment:   86 y.o. male with ESRD on HD here with shortness of breath and cough.  Differential Diagnosis:   Ddx includes COVID-19, influenza, fluid overload, pneumonia, ACS, other.  Independently Interpreted Test(s):   I have ordered and independently interpreted X-rays - see prior notes.  I have ordered and independently interpreted EKG Reading(s) - see prior notes  Clinical Tests:   Lab Tests: Ordered and Reviewed  Radiological Study: Ordered and Reviewed  Medical Tests: Ordered and Reviewed  ED Management:  EKG no STEMI.    CXR + primarily right sided " pulmonary edema.    Labs overall at baseline.    Patient requires OBS for fluid overload with hypoxia.  He will need dialysis prior to discharge.    I discussed patient with Dr. Nikki Baugh, on call for patient's nephrologist, Dr. Henriquez.  She will insure he is dialyzed today.     I then discussed patient with hospitalist Dr. Hermilo Reed who will write orders to OBS service.    Patient via relative updated as to plan of care.  Other:   I have discussed this case with another health care provider.                      Clinical Impression:   Final diagnoses:  [R06.02] Shortness of breath  [E87.70] Fluid overload  [N18.6, Z99.2] ESRD on dialysis (Primary)          ED Disposition Condition    Observation               Anny Cao MD  05/16/22 0796

## 2022-05-16 NOTE — ED TRIAGE NOTES
Mandeep Willams, a 86 y.o. male presents to the ED w/ complaint of SOB and cough since this AM. Reports using inhaler with little relief. Pt had dialysis MWF and reports compliance with treatments. Denies n/v/d, fever or any other symptoms at this time. Pt connected to cardiac monitor in NAD at this time.    Triage note:  Chief Complaint   Patient presents with    Shortness of Breath     Pt here via EMS with c/o SOB x few hours. Pt is dialysis pt, M/W/F. Initial sats 91% on RA. Pt 94% on 4L NC.     Review of patient's allergies indicates:  No Known Allergies  Past Medical History:   Diagnosis Date    ESRD (end stage renal disease)     HTN (hypertension)     Hyperlipidemia

## 2022-05-16 NOTE — NURSING
Dr. Robb at patient's bedside. He has a productive cough and appears to have fluid in his lungs. Pt is here for fluid overload. On l2 4l per n/c o2 sat 95%.

## 2022-05-16 NOTE — H&P
[facility] Medicine  History & Physical    Patient Name: Mandeep Willams  MRN: [id number]  Patient Class: OP- Observation  Admission Date: 5/16/2022  Attending Physician: Hermilo Reed MD   Primary Care Provider: Guy Mcclelland Jr, MD         Patient information was obtained from patient, past medical records and ER records.     Subjective:     Principal Problem:<principal problem not specified>    Chief Complaint:   Chief Complaint   Patient presents with    Shortness of Breath     Pt here via EMS with c/o SOB x few hours. Pt is dialysis pt, M/W/F. Initial sats 91% on RA. Pt 94% on 4L NC.        HPI: This is a 87 y/o male patient with a past medical history significant for end-stage renal disease dialysis dependent, hypertension, hyperlipidemia presents to the ED this morning due to dyspnea at rest.  His normal schedule is Monday, Wednesday, Friday but feels his time was cut short on Friday.  Endorses shortness of breath which began last night and has worsened this morning, denies any chest pain, no fever/chills, no productive cough, no abdominal pain, no nausea or vomiting.  Initial workup in the ED shows elevated BNP at 3,276 and a chest x-ray showing cardiomegaly with bilateral opacities concerning for volume overload.  In the ED he was placed on 4 L nasal cannula supplemental O2 secondary to hypoxia and was treated with IV Lasix with urinary output noted.  Placed in Obs with Nephrology consult with plans for dialysis this morning.  This information was obtained using  as well as chart review and discussion with ED physician.      Past Medical History:   Diagnosis Date    ESRD (end stage renal disease)     HTN (hypertension)     Hyperlipidemia        Past Surgical History:   Procedure Laterality Date    BACK SURGERY  2005    DIALYSIS FISTULA CREATION  4/2012    left arm    FISTULOGRAM Left 3/22/2022    Procedure: Fistulogram, transradial;  Surgeon: Jude Ordaz MD;  Location:  [facility] OR;  Service: Vascular;  Laterality: Left;  RN Pre Op 3-17-22.  May need Chinese  day of procedure. [place]    JOINT REPLACEMENT Right     hip       Review of patient's allergies indicates:  No Known Allergies    Current Facility-Administered Medications on File Prior to Encounter   Medication    cefazolin (ANCEF) 2 gram in dextrose 5% 50 mL IVPB (premix)     Current Outpatient Medications on File Prior to Encounter   Medication Sig    albuterol (PROAIR HFA) 90 mcg/actuation inhaler Inhale 2 puffs into the lungs every 4 (four) hours as needed for Wheezing or Shortness of Breath. Rescue    albuterol-ipratropium (DUO-NEB) 2.5 mg-0.5 mg/3 mL nebulizer solution NEBULIZE ONE vial (3ml) BY MOUTH EVERY 6 HOURS    amLODIPine (NORVASC) 5 MG tablet Take 1 tablet (5 mg total) by mouth once daily.    calcium acetate 667 mg (169 mg calcium)/5 mL Soln Take by mouth.    furosemide (LASIX) 20 MG tablet Take 1 tablet (20 mg total) by mouth once daily.    metoprolol succinate (TOPROL-XL) 100 MG 24 hr tablet Take 1 tablet (100 mg total) by mouth once daily.    multivitamin with minerals tablet Take 1 tablet by mouth once daily.    omega-3 acid ethyl esters (LOVAZA) 1 gram capsule Take 2 capsules (2 g total) by mouth 2 (two) times daily.    pravastatin (PRAVACHOL) 10 MG tablet Take 1 tablet (10 mg total) by mouth once daily.    tamsulosin (FLOMAX) 0.4 mg Cap Take 1 capsule (0.4 mg total) by mouth once daily.     Family History       Problem Relation (Age of Onset)    Arthritis Sister    Diabetes Sister    Hypertension Sister          Tobacco Use    Smoking status: Former Smoker     Packs/day: 1.00     Years: 10.00     Pack years: 10.00     Quit date: 1971     Years since quittin.4    Smokeless tobacco: Never Used   Substance and Sexual Activity    Alcohol use: No    Drug use: No    Sexual activity: Not Currently     Partners: Female     Comment:      Review of Systems   Constitutional:   Positive for fatigue.   HENT: Negative.     Eyes: Negative.    Respiratory:  Positive for shortness of breath.    Cardiovascular:  Negative for chest pain.   Gastrointestinal: Negative.    Genitourinary: Negative.    Musculoskeletal: Negative.    Skin: Negative.    Neurological: Negative.    Psychiatric/Behavioral: Negative.     Objective:     Vital Signs (Most Recent):  Temp: 98.1 °F (36.7 °C) (05/16/22 0825)  Pulse: 72 (05/16/22 0825)  Resp: 18 (05/16/22 0825)  BP: (!) 142/66 (05/16/22 0825)  SpO2: 95 % (05/16/22 0825)   Vital Signs (24h Range):  Temp:  [98.1 °F (36.7 °C)-98.6 °F (37 °C)] 98.1 °F (36.7 °C)  Pulse:  [65-82] 72  Resp:  [18-22] 18  SpO2:  [94 %-98 %] 95 %  BP: (122-150)/(58-70) 142/66     Weight: 56.2 kg (124 lb)  Body mass index is 20.63 kg/m².    Physical Exam  Vitals reviewed.   Constitutional:       Appearance: He is ill-appearing.      Comments: Frail appearance   HENT:      Head: Normocephalic and atraumatic.      Nose: Nose normal.      Mouth/Throat:      Mouth: Mucous membranes are moist.   Eyes:      Extraocular Movements: Extraocular movements intact.      Conjunctiva/sclera: Conjunctivae normal.   Cardiovascular:      Rate and Rhythm: Normal rate and regular rhythm.   Pulmonary:      Breath sounds: Rhonchi and rales present. No wheezing.      Comments: Supplemental nasal cannula O2  Abdominal:      General: There is no distension.      Palpations: Abdomen is soft.   Musculoskeletal:         General: No deformity.      Cervical back: Neck supple.   Skin:     General: Skin is warm and dry.   Neurological:      General: No focal deficit present.      Mental Status: He is alert.           Significant Labs: All pertinent labs within the past 24 hours have been reviewed.    Significant Imaging: I have reviewed all pertinent imaging results/findings within the past 24 hours.    Assessment/Plan:     Acute hypoxemic respiratory failure  Patient with Hypoxic Respiratory failure which is Acute.  he  is not on home oxygen. Supplemental oxygen was provided and noted-  .   Signs/symptoms of respiratory failure include- tachypnea. Contributing diagnoses includes - Pulmonary edema Labs and images were reviewed. Patient Has not had a recent ABG. Will treat underlying causes and adjust management of respiratory failure as follows- volume overload.  Will treat with dialysis.  Placed on nasal cannula O2 in the ED, continue to maintain sats greater than 90. Chest x-ray showing cardiomegaly with bilateral opacities which in line with clinical picture represent pulmonary edema.  Wean O2 as tolerated.  Plan for dialysis and will reassess following dialysis.    ESRD (end stage renal disease)  Nephrology consulted from ED with plans for dialysis this a.m.  Will recommend continuing Monday, Wednesday, Friday dialysis schedule at discharge.      HTN (hypertension)  Resume home med regimen  Prn agent made available with parameters during hospitalization        VTE Risk Mitigation (From admission, onward)         Ordered     heparin (porcine) injection 5,000 Units  Every 8 hours         05/16/22 1050     IP VTE HIGH RISK PATIENT  Once         05/16/22 1050     Place sequential compression device  Until discontinued         05/16/22 1050                   Hermilo Reed MD  Department of Hospital Medicine   [facility] - Telemetry

## 2022-05-16 NOTE — PROGRESS NOTES
WRITTEN HEALTHCARE DISCHARGE INFORMATION      Things that YOU are RESPONSIBLE for to Manage Your Care At Home:     1. Getting your prescriptions filled.  2. Taking you medications as directed. DO NOT MISS ANY DOSES!  3. Going to your follow-up doctor appointments. This is important because it allows the doctor to monitor your progress and to determine if any changes need to be made to your treatment plan.     If you are unable to make your follow up appointments, please call the number listed and reschedule this appointment.      ____________HELP AT HOME____________________     Experiencing any SIGNS or SYMPTOMS: YOU CAN     Schedule a same day appopintment with your Primary Care Doctor or  you can call [facility] On Call Nurse Care Line for 24/7 assistance at [phone]     If you are experience any signs or symptoms that have become severe, Call 911 and come to your nearest Emergency Room.     Thank you for choosing [facility] and allowing us to care for you.   From your care management team:      You should receive a call from [facility] Discharge Department within 48-72 hours to help manage your care after discharge. Please try to make sure that you answer your phone for this important phone call.       Follow-up Information     Jude Ordaz MD Follow up on 5/19/2022.    Specialties: Vascular Surgery, Surgery  Why: at 10:45am  Contact information:  [address]  [address]  [place] [id number]  [phone]             Guy Mcclelland Jr, MD Follow up.    Specialty: Family Medicine  Why: call to schedule follow up as needed post hospital discharge.  Contact information:  [address] [id number]  [phone]

## 2022-05-16 NOTE — PLAN OF CARE
05/16/22 0906   Discharge Planning   Assessment Type Discharge Planning Brief Assessment   Resource/Environmental Concerns none   Support Systems Family members   Equipment Currently Used at Home none   Current Living Arrangements home/apartment/condo   Patient/Family Anticipates Transition to home   Patient/Family Anticipated Services at Transition none   DME Needed Upon Discharge  none   Discharge Plan A Home with family  (with follow up)     [facility] - [place] - [address]  [address]  [place] [address]  Phone: [phone] Fax: [phone]    [facility] - [place]  [address]  [address]  [place] [address]  Phone: [phone] Fax: [phone]

## 2022-05-17 PROBLEM — J96.01 ACUTE HYPOXEMIC RESPIRATORY FAILURE: Status: RESOLVED | Noted: 2022-01-01 | Resolved: 2022-01-01

## 2022-05-17 NOTE — NURSING
Pt completed UF tx. Net 2 L fluid removed as tolerated. Tolerated well. VSS. NAD. Needles pulled and manual pressure held until hemostasis achieved. Report given to primary RN.

## 2022-05-17 NOTE — PLAN OF CARE
Brief Nephrology Note    Plan for additional UF only treatment today.  Full progress note to follow.    Thank you for allowing me to participate in the care of your patient.  Please call with any questions.    Angelia Baugh MD   Nephrology  [facility]  Office [phone]

## 2022-05-17 NOTE — PROGRESS NOTES
[facility]  Nephrology  Progress Note    Patient Name: Mandeep Willams  MRN: [id number]  Admission Date: 5/16/2022  Hospital Length of Stay: 0 days  Attending Provider: Malik Gracia MD   Primary Care Physician: Guy Mcclelland Jr, MD  Principal Problem:ESRD (end stage renal disease)  Date of service 5/17/2022  Consults Reason for consult: ESRD, dialysis  Subjective:     Interval History: Patient seen and examined on dialysis.  Tolerating dialysis with 2L UF without complication.      Review of patient's allergies indicates:  No Known Allergies  Current Facility-Administered Medications   Medication Frequency    0.9%  NaCl infusion PRN    0.9%  NaCl infusion Once    albuterol-ipratropium 2.5 mg-0.5 mg/3 mL nebulizer solution 3 mL Q6H PRN    amLODIPine tablet 5 mg Daily    furosemide tablet 20 mg Daily    heparin (porcine) injection 5,000 Units Q8H    hydrALAZINE injection 10 mg Q6H PRN    metoprolol succinate (TOPROL-XL) 24 hr tablet 100 mg Daily    mupirocin 2 % ointment BID    pravastatin tablet 10 mg Daily    sodium chloride 0.9% bolus 250 mL PRN    sodium chloride 0.9% flush 10 mL PRN    tamsulosin 24 hr capsule 0.4 mg Daily     Facility-Administered Medications Ordered in Other Encounters   Medication Frequency    cefazolin (ANCEF) 2 gram in dextrose 5% 50 mL IVPB (premix) Once       Objective:     Vital Signs (Most Recent):  Temp: 97.7 °F (36.5 °C) (05/17/22 0739)  Pulse: 68 (05/17/22 1310)  Resp: 16 (05/17/22 1000)  BP: (!) 109/46 (05/17/22 1310)  SpO2: 98 % (05/17/22 0739)  O2 Device (Oxygen Therapy): nasal cannula (05/16/22 1939) Vital Signs (24h Range):  Temp:  [97.7 °F (36.5 °C)-98.4 °F (36.9 °C)] 97.7 °F (36.5 °C)  Pulse:  [62-87] 68  Resp:  [16-19] 16  SpO2:  [94 %-98 %] 98 %  BP: (101-146)/(44-97) 109/46     Weight: 56.2 kg (124 lb) (05/16/22 0906)  Body mass index is 20.63 kg/m².  Body surface area is 1.61 meters squared.    I/O last 3 completed shifts:  In: 240 [P.O.:240]  Out:  2800 [Other:2800]    Physical Exam  Vitals and nursing note reviewed.   Constitutional:       General: He is not in acute distress.     Appearance: He is well-developed. He is not ill-appearing or diaphoretic.   HENT:      Head: Normocephalic and atraumatic.   Eyes:      General: No scleral icterus.        Right eye: No discharge.         Left eye: No discharge.      Extraocular Movements: Extraocular movements intact.   Cardiovascular:      Rate and Rhythm: Normal rate and regular rhythm.      Heart sounds: Normal heart sounds. No murmur heard.    No friction rub.      Comments: [place] AVF  Pulmonary:      Effort: Pulmonary effort is normal. No respiratory distress.      Breath sounds: Rales present. No wheezing or rhonchi.      Comments: O2 NC  Abdominal:      General: There is no distension.      Palpations: Abdomen is soft. There is no mass.      Tenderness: There is no abdominal tenderness.   Musculoskeletal:         General: No swelling or deformity.      Right lower leg: No edema.      Left lower leg: No edema.   Skin:     General: Skin is warm and dry.      Findings: No erythema or rash.   Neurological:      Mental Status: He is alert and oriented to person, place, and time.   Psychiatric:         Mood and Affect: Mood normal.         Behavior: Behavior normal.         Thought Content: Thought content normal.         Significant Labs:  CBC:   Recent Labs   Lab 05/17/22  0332   WBC 4.67   RBC 2.81*   HGB 9.6*   HCT 29.0*   PLT 93*   *   MCH 34.2*   MCHC 33.1     CMP:   Recent Labs   Lab 05/17/22  0332   GLU 94   CALCIUM 7.9*   ALBUMIN 2.6*   PROT 6.4      K 3.7   CO2 26      BUN 34*   CREATININE 5.1*   ALKPHOS 71   ALT 18   AST 19   BILITOT 1.5*     All labs within the past 24 hours have been reviewed.    Significant Imaging:  X-Ray: Reviewed    Assessment/Plan:     Active Diagnoses:    Diagnosis Date Noted POA    PRINCIPAL PROBLEM:  ESRD (end stage renal disease) [N18.6]  Yes    HTN  (hypertension) [I10]  Yes      Problems Resolved During this Admission:    Diagnosis Date Noted Date Resolved POA    Acute hypoxemic respiratory failure [J96.01] 05/16/2022 05/17/2022 Yes       ESRD - usual HD on MWF with Dr Henriquez at [facility]  - UF session today and resume HD MWF schedule  - monitor daily weights, strict I&Os  - avoid nephrotoxic agents including NSAIDs, renally dose medications  - daily labs     Hypervolemia/ Pulmonary edema/ pleural effusions  - UF session today, then plan for HD tomorrow  - continue diuretics- still makes urine  - daily wts, strict Is and Os      Anemia of chronic kidney disease   - Hgb below goal, start GREGORY qHD  - continue to monitor CBC      CKD MBD  - continue renal diet    Thank you for allowing me to participate in the care of your patient.  Please call with any questions.    Date of service: 5/17/2022  Angelia Baugh MD   Nephrology  [facility]  Office [phone]   [facility] - Telemetry

## 2022-05-17 NOTE — HOSPITAL COURSE
This is a 87 y/o male patient with a past medical history significant for end-stage renal disease dialysis dependent, hypertension, hyperlipidemia presents to the ED due to dyspnea at rest.  His normal schedule is Monday, Wednesday, Friday but feels his time was cut short on Friday.  He was admitted with fluid overload and Nephrology consulted.  Patient underwent HD with great improvement of symptoms.  He has remained afebrile and hemodynamically stable.  Nephrology plan on further ultrafiltration today and he will be discharged home afterwards.  Patient to follow up with PCP.

## 2022-05-17 NOTE — DISCHARGE SUMMARY
[facility] Medicine  Discharge Summary      Patient Name: Mandeep Willams  MRN: [id number]  Patient Class: OP- Observation  Admission Date: 5/16/2022  Hospital Length of Stay: 0 days  Discharge Date and Time:  05/17/2022 9:19 AM  Attending Physician: Malik Gracia MD   Discharging Provider: Malik Gracia MD  Primary Care Provider: Guy Mcclelland Jr, MD      HPI:   This is a 85 y/o male patient with a past medical history significant for end-stage renal disease dialysis dependent, hypertension, hyperlipidemia presents to the ED this morning due to dyspnea at rest.  His normal schedule is Monday, Wednesday, Friday but feels his time was cut short on Friday.  Endorses shortness of breath which began last night and has worsened this morning, denies any chest pain, no fever/chills, no productive cough, no abdominal pain, no nausea or vomiting.  Initial workup in the ED shows elevated BNP at 3,276 and a chest x-ray showing cardiomegaly with bilateral opacities concerning for volume overload.  In the ED he was placed on 4 L nasal cannula supplemental O2 secondary to hypoxia and was treated with IV Lasix with urinary output noted.  Placed in Obs with Nephrology consult with plans for dialysis this morning.  This information was obtained using  as well as chart review and discussion with ED physician.      * No surgery found *      Hospital Course:   This is a 85 y/o male patient with a past medical history significant for end-stage renal disease dialysis dependent, hypertension, hyperlipidemia presents to the ED due to dyspnea at rest.  His normal schedule is Monday, Wednesday, Friday but feels his time was cut short on Friday.  He was admitted with fluid overload and Nephrology consulted.  Patient underwent HD with great improvement of symptoms.  He has remained afebrile and hemodynamically stable.  Nephrology plan on further ultrafiltration today and he will be discharged home afterwards.   Patient to follow up with PCP.       Goals of Care Treatment Preferences:  Code Status: Full Code       LaPOST: Yes           Consults:   Consults (From admission, onward)        Status Ordering Provider     Inpatient consult to Nephrology  Once        Provider:  MD Lisseth Robles NINA J          No new Assessment & Plan notes have been filed under this hospital service since the last note was generated.  Service: Hospital Medicine    Final Active Diagnoses:    Diagnosis Date Noted POA    PRINCIPAL PROBLEM:  ESRD (end stage renal disease) [N18.6]  Yes    HTN (hypertension) [I10]  Yes      Problems Resolved During this Admission:    Diagnosis Date Noted Date Resolved POA    Acute hypoxemic respiratory failure [J96.01] 05/16/2022 05/17/2022 Yes       Discharged Condition: stable    Disposition: Home or Self Care    Follow Up:   Follow-up Information     Jude Ordaz MD Follow up on 5/19/2022.    Specialties: Vascular Surgery, Surgery  Why: at 10:45am  Contact information:  [address]  [address]  [facility] [address]  [phone]             Guy Mcclelland Jr, MD Follow up.    Specialty: Family Medicine  Why: call to schedule follow up as needed post hospital discharge.  Contact information:  [address] [facility] [address]  [phone]                       Patient Instructions:      Diet renal     Notify your health care provider if you experience any of the following:  temperature >100.4     Notify your health care provider if you experience any of the following:  persistent nausea and vomiting or diarrhea     Notify your health care provider if you experience any of the following:  severe uncontrolled pain     Notify your health care provider if you experience any of the following:  difficulty breathing or increased cough     Notify your health care provider if you experience any of the following:  persistent dizziness, light-headedness, or visual disturbances     Notify  your health care provider if you experience any of the following:  increased confusion or weakness     Activity as tolerated           Pending Diagnostic Studies:     Procedure Component Value Units Date/Time    Hepatitis panel, acute [[id number]] Collected: 05/16/22 1102    Order Status: Sent Lab Status: In process Updated: 05/16/22 1824    Specimen: Blood          Medications:  Reconciled Home Medications:      Medication List      CONTINUE taking these medications    albuterol 90 mcg/actuation inhaler  Commonly known as: PROAIR HFA  Inhale 2 puffs into the lungs every 4 (four) hours as needed for Wheezing or Shortness of Breath. Rescue     albuterol-ipratropium 2.5 mg-0.5 mg/3 mL nebulizer solution  Commonly known as: DUO-NEB  NEBULIZE ONE vial (3ml) BY MOUTH EVERY 6 HOURS     amLODIPine 5 MG tablet  Commonly known as: NORVASC  Take 1 tablet (5 mg total) by mouth once daily.     calcium acetate(phosphat bind) 667 mg (169 mg calcium)/5 mL Soln  Take by mouth.     furosemide 20 MG tablet  Commonly known as: LASIX  Take 1 tablet (20 mg total) by mouth once daily.     metoprolol succinate 100 MG 24 hr tablet  Commonly known as: TOPROL-XL  Take 1 tablet (100 mg total) by mouth once daily.     multivitamin with minerals tablet  Take 1 tablet by mouth once daily.     omega-3 acid ethyl esters 1 gram capsule  Commonly known as: LOVAZA  Take 2 capsules (2 g total) by mouth 2 (two) times daily.     pravastatin 10 MG tablet  Commonly known as: PRAVACHOL  Take 1 tablet (10 mg total) by mouth once daily.     tamsulosin 0.4 mg Cap  Commonly known as: FLOMAX  Take 1 capsule (0.4 mg total) by mouth once daily.            Indwelling Lines/Drains at time of discharge:   Lines/Drains/Airways     Drain  Duration                Hemodialysis AV Fistula Left upper arm -- days                Time spent on the discharge of patient: >30 minutes         Malik Gracia MD  Department of Hospital Medicine  [facility]

## 2022-05-17 NOTE — PLAN OF CARE
05/17/22 0938   Final Note   Assessment Type Final Discharge Note   Anticipated Discharge Disposition Home   Hospital Resources/Appts/Education Provided Appointments scheduled and added to AVS   Post-Acute Status   Post-Acute Authorization Other   Other Status No Post-Acute Service Needs   Pts nurse Mustapha notified via secure chat that the pt can d/c from CM standpoint

## 2022-05-17 NOTE — NURSING
Patient on the bed.  Not in any distress. IV site flushing well.  Assessment and vital signs are charted on the flow sheet.  Was nauseated and gave Inj Zofran once and pain medicine one time.  Safety maintained in every ways.  Medication given per order and charting done accordingly.  On O2 3L.

## 2022-05-18 NOTE — TELEPHONE ENCOUNTER
----- Message from Klaudia Wolfe sent at 5/18/2022 12:54 PM CDT -----  Type: RX Refill Request    Who Called:[facility]     Have you contacted your pharmacy: yes     Refill or New Rx: refill     RX Name and Strength: albuterol (PROAIR HFA) 90 mcg/actuation inhaler    Preferred Pharmacy with phone number:   [facility] [place] [address] [address]  Phone: [phone] Fax: [phone]    Local or Mail Order: local     Would the patient rather a call back or a response via [facility]? Call back     Best Call Back Number: Phone: [phone] Fax: [phone]

## 2022-05-18 NOTE — TELEPHONE ENCOUNTER
Care Due:                  Date            Visit Type   Department     Provider  --------------------------------------------------------------------------------                                [facility] FAMILY                              PRIMARY      MEDICINE/  Last Visit: 12-      CARE ([facility])   INTERNAL MED   Guy Mcclelland  Next Visit: None Scheduled  None         None Found                                                            Last  Test          Frequency    Reason                     Performed    Due Date  --------------------------------------------------------------------------------    Office Visit  12 months..  tamsulosin...............  12- 12-    [facility] Embedded Care Gaps. Reference number: [id number]. 5/18/2022   1:39:18 PM CDT

## 2022-05-19 NOTE — PROGRESS NOTES
Jude Ordaz MD [facility]                         05/19/2022    HPI:  Mandeep Willams is a 86 y.o. male with   Patient Active Problem List   Diagnosis    HTN (hypertension)    ESRD (end stage renal disease)    Hyperlipidemia    Transient vision disturbance, left    End stage renal disease on dialysis    Chest pain, atypical    Pleural effusion    Pneumothorax    Tobacco use disorder    Anemia of chronic disorder    Acute blood loss anemia    Urinary retention    Constipation    Suspected UTI    Nonrheumatic aortic valve stenosis    Goals of care, counseling/discussion    Debility    being managed by PCP and specialists who is in need for eval of long term dialysis access. Seen in hospital for pain during dialysis, resolved with Emla cream.    5/2022:  S/p Procedure(s) 3/2022  1. US guided L radial artery percutaneous access  2. LUE fistulogram  3. Central venogram  4. Moderate sedation  5. Balloon angioplasty of L subclavian vein with 8x40 balloon Purdy  6. Balloon angioplasty of proximal LUE AVF with 6x40 Purdy balloon    Past Medical History:   Diagnosis Date    ESRD (end stage renal disease)     HTN (hypertension)     Hyperlipidemia      Past Surgical History:   Procedure Laterality Date    BACK SURGERY  2005    DIALYSIS FISTULA CREATION  4/2012    left arm    FISTULOGRAM Left 3/22/2022    Procedure: Fistulogram, transradial;  Surgeon: Jude Ordaz MD;  Location: [facility] OR;  Service: Vascular;  Laterality: Left;  RN Pre Op 3-17-22.  May need Chinese  day of procedure. CA    JOINT REPLACEMENT Right     hip     Family History   Problem Relation Age of Onset    Diabetes Sister     Hypertension Sister     Arthritis Sister     Cancer Neg Hx     Heart disease Neg Hx     Stroke Neg Hx     Amblyopia Neg Hx     Blindness Neg Hx     Cataracts Neg Hx     Glaucoma Neg Hx     Macular degeneration Neg Hx     Retinal  detachment Neg Hx     Strabismus Neg Hx     Thyroid disease Neg Hx      Social History     Socioeconomic History    Marital status:    Occupational History     Employer: [employer]   Tobacco Use    Smoking status: Former Smoker     Packs/day: 1.00     Years: 10.00     Pack years: 10.00     Quit date: 1971     Years since quittin.4    Smokeless tobacco: Never Used   Substance and Sexual Activity    Alcohol use: No    Drug use: No    Sexual activity: Not Currently     Partners: Female     Comment:        Current Outpatient Medications:     albuterol (PROAIR HFA) 90 mcg/actuation inhaler, Inhale 2 puffs into the lungs every 4 (four) hours as needed for Wheezing or Shortness of Breath. Rescue, Disp: 8 g, Rfl: 5    albuterol-ipratropium (DUO-NEB) 2.5 mg-0.5 mg/3 mL nebulizer solution, NEBULIZE ONE vial (3ml) BY MOUTH EVERY 6 HOURS, Disp: 180 mL, Rfl: 0    amLODIPine (NORVASC) 5 MG tablet, Take 1 tablet (5 mg total) by mouth once daily., Disp: 90 tablet, Rfl: 3    calcium acetate 667 mg (169 mg calcium)/5 mL Soln, Take by mouth., Disp: , Rfl:     furosemide (LASIX) 20 MG tablet, Take 1 tablet (20 mg total) by mouth once daily., Disp: 90 tablet, Rfl: 3    metoprolol succinate (TOPROL-XL) 100 MG 24 hr tablet, Take 1 tablet (100 mg total) by mouth once daily., Disp: 90 tablet, Rfl: 3    multivitamin with minerals tablet, Take 1 tablet by mouth once daily., Disp: , Rfl:     omega-3 acid ethyl esters (LOVAZA) 1 gram capsule, Take 2 capsules (2 g total) by mouth 2 (two) times daily., Disp: 360 capsule, Rfl: 3    pravastatin (PRAVACHOL) 10 MG tablet, Take 1 tablet (10 mg total) by mouth once daily., Disp: 90 tablet, Rfl: 3    tamsulosin (FLOMAX) 0.4 mg Cap, Take 1 capsule (0.4 mg total) by mouth once daily., Disp: 90 capsule, Rfl: 0  No current facility-administered medications for this visit.    Facility-Administered Medications Ordered in Other Visits:     cefazolin  (ANCEF) 2 gram in dextrose 5% 50 mL IVPB (premix), 2 g, Intravenous, Once, Jude Ordaz MD    REVIEW OF SYSTEMS:  General: No fevers or chills; ENT: No sore throat; Allergy and Immunology: no persistent infections; Hematological and Lymphatic: No history of bleeding or easy bruising; Endocrine: negative; Respiratory: no cough, shortness of breath, or wheezing; Cardiovascular: no chest pain or dyspnea on exertion; Gastrointestinal: no abdominal pain/back, change in bowel habits, or bloody stools; Genito-Urinary: no dysuria, trouble voiding, or hematuria; Musculoskeletal: negative; Neurological: no TIA or stroke symptoms; Psychiatric: no nervousness, anxiety or depression.    PHYSICAL EXAM:                General appearance:  Alert, well-appearing, and in no distress.  Oriented to person, place, and time                    Neurological: Normal speech, no focal findings noted; CN II - XII grossly intact. BUE with sensation to light touch.            Musculoskeletal: Digits/nail without cyanosis/clubbing.  Strength 5/5 BUE.                    Neck: Supple                  Chest:  No use of accessory muscles               Cardiac: Normal rate              Abdomen: Soft         Extremities:   2 + R radial pulse, 2 + L radial pulse     2 + R brachial pulse, 2 + L brachial pulse     no RUE edema, no LUE edema         Skin: No tissue loss    LAB RESULTS:  No results found for: CBC  Lab Results   Component Value Date    LABPROT 12.1 01/24/2022    INR 1.1 01/24/2022     Lab Results   Component Value Date     05/17/2022    K 3.7 05/17/2022     05/17/2022    CO2 26 05/17/2022    GLU 94 05/17/2022    BUN 34 (H) 05/17/2022    CREATININE 5.1 (H) 05/17/2022    CALCIUM 7.9 (L) 05/17/2022    ANIONGAP 10 05/17/2022    EGFRNONAA 10 (A) 05/17/2022     Lab Results   Component Value Date    WBC 4.67 05/17/2022    RBC 2.81 (L) 05/17/2022    HGB 9.6 (L) 05/17/2022    HCT 29.0 (L) 05/17/2022     (H) 05/17/2022     MCH 34.2 (H) 05/17/2022    MCHC 33.1 05/17/2022    RDW 14.7 (H) 05/17/2022    PLT 93 (L) 05/17/2022    MPV 10.6 05/17/2022    GRAN 3.2 05/17/2022    GRAN 69.2 05/17/2022    LYMPH 0.8 (L) 05/17/2022    LYMPH 16.7 (L) 05/17/2022    MONO 0.5 05/17/2022    MONO 10.9 05/17/2022    EOS 0.1 05/17/2022    BASO 0.03 05/17/2022    EOSINOPHIL 2.4 05/17/2022    BASOPHIL 0.6 05/17/2022    DIFFMETHOD Automated 05/17/2022     .No results found for: HGBA1C    IMAGING:  All pertinent imaging has been reviewed and interpreted independently.    HD US 3/2022:  Proximal stenosis.    HD US 4/5/22  Left upper extremity dialysis ultrasound shows no hemodynamically significant stenosis.  Flow is 4042 ml/min.      IMP/PLAN:  86 y.o. male with   Patient Active Problem List   Diagnosis    HTN (hypertension)    ESRD (end stage renal disease)    Hyperlipidemia    Transient vision disturbance, left    End stage renal disease on dialysis    Chest pain, atypical    Pleural effusion    Pneumothorax    Tobacco use disorder    Anemia of chronic disorder    Acute blood loss anemia    Urinary retention    Constipation    Suspected UTI    Nonrheumatic aortic valve stenosis    Goals of care, counseling/discussion    Debility    being managed by PCP and specialists who is here today for evaluation of long term HD access.    -s/p AVF PTA 3/2022 - cont routine surveillance  -RTC 2 mo with HD US    I spent 12 minutes evaluating this patient and greater than 50% of the time was spent counseling, coordinator care and discussing the plan of care.  All questions were answered and patient stated understanding with agreement with the above treatment plan.    Jude Ordaz MD [facility]  Vascular and Endovascular Surgery

## 2022-05-19 NOTE — TELEPHONE ENCOUNTER
----- Message from Pura Curry sent at 5/19/2022  9:35 AM CDT -----  Regarding: refill  Type: RX Refill Request    Who Called: Fletcher (relative)     Refill or New Rx: refill    RX Name and Strength:albuterol (PROAIR HFA) 90 mcg/actuation inhaler    Is this a 30 day or 90 day RX: 30 day    Preferred Pharmacy with phone number: [facility] [place] [address] [facility]      Would the patient rather a call back or a response via My [facility]? Call back     Best Call Back Number:[phone]

## 2022-05-23 NOTE — TELEPHONE ENCOUNTER
----- Message from Shanika Schmidt sent at 5/23/2022  9:11 AM CDT -----  Type: RX Refill Request    Who Called: pt    Have you contacted your pharmacy:    Refill or New Rx:albuterol (PROAIR HFA) 90 mcg/actuation inhaler - needs pa    RX Name and Strength:    How is the patient currently taking it? (ex. 1XDay):    Is this a 30 day or 90 day RX:    [facility] with phone number[facility] [address]  Phone: [phone] Fax: [phone]    Local or Mail Order:    Ordering Provider:    Would the patient rather a call back or a response via My [facility]? call    Best Call Back Number:[phone] (home) [phone] (work)      Additional Information:

## 2022-05-26 NOTE — PROGRESS NOTES
Subjective:       Patient ID: [id number] Willams is a 86 y.o. male.    Chief Complaint: No chief complaint on file.     Lucille # [id number]  HPI   86 year old male with end stage renal disease, hypertension, pulmonary hypertension, aortic atherosclerosis, comes in for hospital follow up. He was admitted for fluid over load. He reports he is feeling much better. Hospital course was as follows:  HPI:   This is a 87 y/o male patient with a past medical history significant for end-stage renal disease dialysis dependent, hypertension, hyperlipidemia presents to the ED this morning due to dyspnea at rest.  His normal schedule is Monday, Wednesday, Friday but feels his time was cut short on Friday.  Endorses shortness of breath which began last night and has worsened this morning, denies any chest pain, no fever/chills, no productive cough, no abdominal pain, no nausea or vomiting.  Initial workup in the ED shows elevated BNP at 3,276 and a chest x-ray showing cardiomegaly with bilateral opacities concerning for volume overload.  In the ED he was placed on 4 L nasal cannula supplemental O2 secondary to hypoxia and was treated with IV Lasix with urinary output noted.  Placed in Obs with Nephrology consult with plans for dialysis this morning.  This information was obtained using  as well as chart review and discussion with ED physician.        * No surgery found *       Hospital Course:   This is a 87 y/o male patient with a past medical history significant for end-stage renal disease dialysis dependent, hypertension, hyperlipidemia presents to the ED due to dyspnea at rest.  His normal schedule is Monday, Wednesday, Friday but feels his time was cut short on Friday.  He was admitted with fluid overload and Nephrology consulted.  Patient underwent HD with great improvement of symptoms.  He has remained afebrile and hemodynamically stable.  Nephrology plan on further ultrafiltration today and he will be discharged  home afterwards.  Patient to follow up with PCP.       Review of Systems   Constitutional: Positive for fatigue. Negative for activity change, appetite change, diaphoresis and fever.   Respiratory: Positive for shortness of breath. Negative for wheezing.    Cardiovascular: Negative for chest pain, palpitations and leg swelling.   Gastrointestinal: Negative for change in bowel habit, constipation, diarrhea, nausea, vomiting and change in bowel habit.   Genitourinary: Negative for difficulty urinating, dysuria and hematuria.        Still makes urine even though on dialysis         Objective:      Physical Exam  Constitutional:       General: He is not in acute distress.     Appearance: He is not ill-appearing, toxic-appearing or diaphoretic.   HENT:      Head: Normocephalic and atraumatic.      Nose: Nose normal.      Mouth/Throat:      Mouth: Mucous membranes are moist.   Eyes:      Extraocular Movements: Extraocular movements intact.   Cardiovascular:      Rate and Rhythm: Normal rate.      Heart sounds: Murmur heard.   Pulmonary:      Effort: Pulmonary effort is normal. No respiratory distress.      Breath sounds: No wheezing, rhonchi or rales.   Abdominal:      General: Abdomen is flat. Bowel sounds are normal. There is no distension.      Tenderness: There is no abdominal tenderness.   Musculoskeletal:      Cervical back: Normal range of motion.   Neurological:      Mental Status: He is alert.         Assessment:       Problem List Items Addressed This Visit        Cardiac/Vascular    Hyperlipidemia    Relevant Medications    pravastatin (PRAVACHOL) 10 MG tablet       Hematology    Thrombocytopenia      Other Visit Diagnoses     ESRD (end stage renal disease) on dialysis    -  Primary    Shortness of breath        Relevant Medications    albuterol (PROAIR HFA) 90 mcg/actuation inhaler    Essential hypertension        Relevant Medications    amLODIPine (NORVASC) 5 MG tablet    metoprolol succinate (TOPROL-XL) 100  MG 24 hr tablet    Aortic atherosclerosis        Pulmonary hypertension        Benign prostatic hyperplasia, unspecified whether lower urinary tract symptoms present        Relevant Medications    tamsulosin (FLOMAX) 0.4 mg Cap          Plan:      Diagnoses and all orders for this visit:    ESRD (end stage renal disease) on dialysis  Management by nephro    Shortness of breath  -     albuterol (PROAIR HFA) 90 mcg/actuation inhaler; Inhale 2 puffs into the lungs every 4 (four) hours as needed for Wheezing or Shortness of Breath. Rescue  Patient educated on how to use inhaler    Essential hypertension  -     amLODIPine (NORVASC) 5 MG tablet; Take 1 tablet (5 mg total) by mouth once daily.  -     metoprolol succinate (TOPROL-XL) 100 MG 24 hr tablet; Take 1 tablet (100 mg total) by mouth once daily.  Current therapy effective, will continue    Mixed hyperlipidemia  -     pravastatin (PRAVACHOL) 10 MG tablet; Take 1 tablet (10 mg total) by mouth once daily.  Continue pravastatin    Aortic atherosclerosis  Continue pravastatin    Pulmonary hypertension  Continue current regimen    Thrombocytopenia  Continue to monitor    Benign prostatic hyperplasia, unspecified whether lower urinary tract symptoms present  -     tamsulosin (FLOMAX) 0.4 mg Cap; Take 1 capsule (0.4 mg total) by mouth once daily.

## 2022-05-29 PROBLEM — D69.6 THROMBOCYTOPENIA: Status: ACTIVE | Noted: 2022-01-01

## 2022-06-15 NOTE — DISCHARGE SUMMARY
UROLOGY CONSULTATION        I have been asked to see this patient by Sabina Alonzo MD for scrotal mass. A copy of this note has been sent to Sabina Alonzo MD.     I have reviewed the nurse/MA notes and assessment and agree. UROLOGY CHIEF COMPLAINT   Chief Complaint   Patient presents with   â¢ Penis/Scrotum Problem     Epididymitis   â¢ Office Visit       UROLOGY HISTORY OF PRESENT ILLNESS    Mr. Jaime Reyes is a 26year old male who presents with right epididymitis    INITIAL PRESENTATION 422:  Patient noted gradual onset of right lower quadrant and suprapubic abdominal pain. He had urgent care and then emergency room evaluation with diagnosis of epididymitis. He is not sexually active. He is a college student who is active with mixed martial arts but he denies any trauma. Abdominal and scrotal discomfort is almost completely resolved with Levaquin    Patient denies hematuria, flank or abdominal pain, bone pain, shortness of breath, cough, fever, weight loss and rash. Patient denies family history of urologic problems. Patient denies scrotal trauma. CURRENT INTERVAL HISTORY:  6/15/22 - Patient denies hematuria, flank pain, bone pain, shortness of breath, cough, fever, weight loss and rash. Scrotal pain is completely resolved    US Bladder Only, US Renal Complete (Comp Urinary System)    Result Date: 6/6/2022  Narrative: EXAM:  US RENAL COMPLETE (COMPLETE URINARY SYSTEM), US BLADDER ONLY HISTORY:  Microscopic hematuria COMPARISON:  None. TECHNIQUE:  Real-time ultrasound scanning was performed. FINDINGS:  The right kidney measures 10.5 x 6.5 x 4.8 cm in size, and the left kidney measures 10.9 x 5.3 x 5.0 cm in size. No renal mass lesions, renal calculi, hydronephrosis, or abnormal renal echogenicity. Imaging over the pelvis demonstrates a normal appearance to the bladder, with normal bilateral ureteral jets seen. No bladder mass lesions were seen.  The bladder wall thickness measured [facility]  Discharge Summary      Patient Name: Mandeep Willams  MRN: [id number]  Patient Class: IP- Inpatient  Admission Date: 1/23/2022  Hospital Length of Stay: 2 days  Discharge Date and Time:  01/26/2022 6:04 PM  Attending Physician: Yeni att. providers found   Discharging Provider: Kianna Byers MD  Primary Care Provider: Guy Mcclelland Jr, MD      HPI:     Mandeep Willams is a 86 y.o. male who  has a past medical history of ESRD, HTN, and Hyperlipidemia, presented to the ED with CC of SOB. Patient was unable to tolerate dialysis due to pain at fistula sight, per family he often has pain there. So did not get full HD session on Friday and is now coming in with flash pulmonary Edema. Anion Gap Metabolic Acidosis. . Cr 10.9. BNP 3,076 and Trop 0.048. Cardiomegaly with increase in bilateral pleural effusions and new bilateral ground-glass airspace opacities. The findings most likely represent worsening fluid overload state. SpO2 of 67% on RA, not on home O2. Placed on BiPAP in ED. Hb 6.7 with , however not far from baseline and in diluted state without signs of active bleeding. Relative states that the patient went to dialysis on 1/19/22 and again on 1/21/22 but that he was unable to receive treatment due to issues with his LUE fistula. Bipap was weaned from 100% down to 40% and patient resting comfortably. Nephrology and VasSx consulted for am, as well as US of fistula. HPI limited due to Bipap and acute state. Denies CP or hemoptysis/hematochezia/hematuria.     - Jatin Julian MD      * No surgery found *      Hospital Course:   Admitted for pulmonary edema in the setting of missed dialysis (unable to tolerate due to pain). Improved with initial dialysis treatment but still requiring supplemental oxygen. Repeat dialysis 1/25. Insertion site pain resolved with using EMLA cream with each dialysis. Did well overall with further HD. Discharged in stable condition. Home health set up as  2.9mm. Calculated prevoid bladder volume of 304 cc and postvoid bladder volume of 27 cc. Impression: IMPRESSION:  No renal calculi or hydronephrosis is seen. Recent Labs   Lab 04/23/22  1329 08/16/21  1216   Glucose 107* 93   Sodium 135 136   Potassium 3.9 3.9   Chloride 102 106   BUN 14 15   Creatinine 1.04 0.93   Calcium 9.8 9.0   Albumin  --  4.7   GOT/AST  --  25   Alkaline Phosphatase  --  45   GPT  --  34   Anion Gap 11 9*   BUN/ Creatinine Ratio 13 16   Globulin  --  3.0   A/G Ratio  --  1.6       WBC (K/mcL)   Date Value   04/23/2022 15.2 (H)     RBC (mil/mcL)   Date Value   04/23/2022 4.99     HCT (%)   Date Value   04/23/2022 44.2     HGB (g/dL)   Date Value   04/23/2022 14.6     PLT (K/mcL)   Date Value   04/23/2022 315           PAST MEDICAL HISTORY      PDD (pervasive developmental disorder)                        ADHD (attention deficit hyperactivity disorder)               PAST SURGICAL HISTORY      NO PAST SURGERIES                                             SOCIAL HISTORY  Social History     Tobacco Use   â¢ Smoking status: Never Smoker   â¢ Smokeless tobacco: Never Used   Substance Use Topics   â¢ Alcohol use: No     Alcohol/week: 0.0 standard drinks       Sexually Active: Never              FAMILY HISTORY  Family History   Problem Relation Age of Onset   â¢ Thyroid Maternal Grandmother    â¢ Diabetes Maternal Grandmother    â¢ Stroke Maternal Grandfather    â¢ Diabetes Maternal Grandfather    â¢ Diabetes Paternal Aunt    â¢ Diabetes Paternal Uncle    â¢ Diabetes Paternal Grandfather    â¢ Congestive Heart Failure Paternal Grandfather    â¢ Hyperlipidemia Father         managed by diet   â¢ Hypertension Paternal Grandmother    â¢ Arthritis Paternal Grandmother    â¢ Diabetes Paternal Grandmother    â¢ High blood pressure Paternal Grandmother    â¢ Arthritis Paternal Uncle         42's - back   â¢ Other Paternal Uncle         learning d/o, ? Asperger's Disorder   â¢ Other Other         paternal cousin   â¢ recommended by PT/OT. F/u with PCP. Renal diet.        Goals of Care Treatment Preferences:  Code Status: Full Code       LaPOST: Yes           Consults:   Consults (From admission, onward)        Status Ordering Provider     Inpatient consult to Nephrology  Once        Provider:  Angelia Baugh MD    Completed LAILA ACOSTA     Inpatient consult to Vascular Surgery  Once        Provider:  (Not yet assigned)    DWIGHT Anderson        Final Active Diagnoses:    Diagnosis Date Noted POA    Debility [R53.81] 01/25/2022 Yes    Goals of care, counseling/discussion [Z71.89] 06/15/2021 Not Applicable    End stage renal disease on dialysis [N18.6, Z99.2] 12/14/2013 Not Applicable    HTN (hypertension) [I10]  Yes    Hyperlipidemia [E78.5]  Yes      Problems Resolved During this Admission:    Diagnosis Date Noted Date Resolved POA    PRINCIPAL PROBLEM:  Flash pulmonary edema [J81.0] 01/24/2022 02/02/2022 Yes    Dialysis AV fistula malfunction, initial encounter [T82.590A]  02/02/2022 Yes       Discharged Condition: stable    [place] [facility] [place]:   Follow-up Information     Guy Mcclelland Jr, MD On 2/10/2022.    Specialty: Family Medicine  Why: February 10, 2022 @ 1:00 p.m. see Dr. Mcclelland for your hospital followup visit.  Contact information:  [address]  [place] [address]  [phone]             [facility] On 1/27/2022.    Why: Home Health  Contact information:  [facility]           [facility].    Specialty: Dialysis Center  Why: out patient services:  [facility]  Contact information:  [address]  [place] [address]  [phone]                       Medications:  Reconciled Home Medications:      Medication List      CONTINUE taking these medications    albuterol 90 mcg/actuation inhaler  Commonly known as: PROAIR HFA  Inhale 2 puffs into the lungs every 4 (four) hours as needed for Wheezing or Shortness of Breath.  Other Other         dyslexia   â¢ Other Other         severe autism       MEDICATIONS    Current Outpatient Medications   Medication Sig Dispense Refill   â¢ amphetamine-dextroamphetamine (Adderall XR) 25 MG 24 hr capsule Take 1 capsule by mouth daily. 30 capsule 0   â¢ Melatonin 10 MG Cap Take 10 mg by mouth nightly. No current facility-administered medications for this visit. ALLERGIES    ALLERGIES:   Allergen Reactions   â¢ Intuniv [Guanfacine] Other (See Comments)     Daytime fatigue/sleepiness even on low doses       REVIEW OF SYSTEMS        PHYSICAL EXAM    Vital Signs:  Blood pressure 122/78, pulse (!) 102, SpO2 98 %. General:  The patient is well developed, well nourished, in no acute distress, appears stated age. Neurologic:  Alert. Normal mood and affect. Skin:  Warm and dry. Neck:  Symmetric without swelling or tenderness. Respiratory:  Respiratory effort normal.     Back:  No costovertebral angle tenderness. Abdomen:  Bladder and kidneys are nonpalpable. There are no inguinal or ventral hernias present. There is no hepatosplenomegaly. There are no other abdominal masses present. Stool specimen not indicated. Extremities:  No swelling or tenderness. Genitourinary:  Scrotum without lesions. Bilateral cremasteric reflex intact. There is no further epididymal induration or tenderness    DATA REVIEWED    Creatinine (mg/dL)   Date Value   04/23/2022 1.04       GFR Estimate, Non  (no units)   Date Value   01/31/2020 >90     GFR Estimate,  (no units)   Date Value   01/31/2020 >90       No results found for: PSA      ASSESSMENT  Right-sided epididymitis resolved with Levaquin  Suprapubic and right lower quadrant abdominal pain which is also improving  Micro hematuria on presentation with epididymitis resolved on follow-up urinalysis.   Normal renal and bladder ultrasound  Negative urine culture and negative chlamydia and GC PCR testing    Lab Results Rescue     albuterol-ipratropium 2.5 mg-0.5 mg/3 mL nebulizer solution  Commonly known as: DUO-NEB  NEBULIZE ONE vial (3ml) BY MOUTH EVERY 6 HOURS     amLODIPine 5 MG tablet  Commonly known as: NORVASC  Take 1 tablet (5 mg total) by mouth once daily.     calcium acetate(phosphat bind) 667 mg (169 mg calcium)/5 mL Soln  Take by mouth.     furosemide 20 MG tablet  Commonly known as: LASIX  TAKE ONE TABLET BY MOUTH ONCE DAILY     metoprolol succinate 100 MG 24 hr tablet  Commonly known as: TOPROL-XL  Take 1 tablet (100 mg total) by mouth once daily.     multivitamin with minerals tablet  Take 1 tablet by mouth once daily.     omega-3 acid ethyl esters 1 gram capsule  Commonly known as: LOVAZA  Take 2 capsules (2 g total) by mouth 2 (two) times daily.     pravastatin 10 MG tablet  Commonly known as: PRAVACHOL  TAKE ONE TABLET BY MOUTH ONCE DAILY FOR cholesterol     tamsulosin 0.4 mg Cap  Commonly known as: FLOMAX  Take 1 capsule (0.4 mg total) by mouth once daily.            Indwelling Lines/Drains at time of discharge:   Lines/Drains/Airways     Drain                 Hemodialysis AV Fistula Left upper arm -- days                Time spent on the discharge of patient: > 35 minutes         Kianna Byers MD  Department of Hospital Medicine  [facility] - Telemetry   Component Value Date    5COL Yellow 06/14/2022    5UAPP Clear 06/14/2022    5UGLU Negative 06/14/2022    5UBILI Negative 06/14/2022    5UKET Negative 06/14/2022    5USPG 1.020 06/14/2022    5URBC Trace (A) 06/14/2022    5UPH 6.0 06/14/2022    5UPROT Negative 06/14/2022    5UURP 0.2 06/14/2022    POCTUNITR Negative 06/14/2022    5UWBC Negative 06/14/2022         Lab Results   Component Value Date    COL Yellow 04/23/2022    UAPP Hazy 04/23/2022    USPG >=1.030 04/23/2022    UPH 6.5 04/23/2022    UPROT 100  (A) 04/23/2022    UGLU Negative 04/23/2022    UKET 15   (A) 04/23/2022    UBILI Negative 04/23/2022    URBC Moderate (A) 04/23/2022    UNITR Negative 04/23/2022    UROB 0.2 04/23/2022    UWBC Trace (A) 04/23/2022          US Testicles and Scrotum W Duplex    Result Date: 4/23/2022  Narrative: US TESTICLES AND SCROTUM W DUPLEX HISTORY: right testicular swelling COMPARISON: None TECHNIQUE: Grayscale, color Doppler, and spectral duplex images of the testicles. FINDINGS: The right testicle measures 3.9 x 2.9 x 2.4 cm and the left testicle measures 3.9 x 2.7 x 2.1 cm. Both testicles demonstrate normal homogeneous echotexture. No evidence of testicular fracture or intratesticular lesions. Symmetric color Doppler flow is demonstrated on transverse view of the testicles. Bilateral low resistive arterial waveforms and normal venous Doppler waveforms are noted. 2.5 mm left epididymal head simple cyst. Diffusely heterogeneous and markedly enlarged right epididymis with diffuse hyperemia on color Doppler. Small bilateral hydroceles, right greater than left. No varicocele. Impression: IMPRESSION: Right-sided epididymitis.  I, Attending Radiologist Hubert Blackwood MD, have reviewed the images and report and concur with these findings interpreted by Resident Radiologist, Julio Gordon MD.       No results found for: [facility]        PLAN  Patient information for epididymitis and hematuria   Follow-up 6 weeks    Given symptoms have completely resolved and micro hematuria resolved I do not feel as though cystoscopy and/or CT urogram indicated.   Patient will call with any recurrence of epididymal discomfort

## 2022-07-11 NOTE — TELEPHONE ENCOUNTER
----- Message from Concha Lucas sent at 7/11/2022  8:45 AM CDT -----  Regarding: Sameday Appointment          Name of Who is Calling: Fletcher To (Relative)    Who Left The Message: Fletcher To (Relative)        What is the request in detail:   Patient's relative called requesting that the above patient is seen ONLY  on a  Tuesday or Thursday of this week. I did attempt to schedule per request but was unsuccessful, please further advise.   Thank you!    Reply by [facility]:  No      Preferred Call Back  :  [phone] (G)

## 2022-07-11 NOTE — TELEPHONE ENCOUNTER
Called patients relative Fletcher , he requested either a Tuesday or Thursday apt for Mr. Willams . Other days of the week patient is at dialysis . Asked what the apt was needed for . He said the patient has reported having s.o.b and fatigue sometimes . Explained Dr. Mcclelland next opening isn't until Sept . Offered sooner with NIES[facility] Baez on 7/21 , said he'll speak with Mr. Willams first then call back regarding his decision .

## 2022-08-25 NOTE — PROGRESS NOTES
Patient given Pneum 20 vaccine to right deltoid, no complaints or reactions noted. Vis given 2/4/22 to patient.

## 2022-08-25 NOTE — PATIENT INSTRUCTIONS
Counseling and Referral of Other Preventative  (Italic type indicates deductible and co-insurance are waived)    Patient Name: Mandeep Willams  Today's Date: 8/25/2022    Health Maintenance       Date Due Completion Date    TETANUS VACCINE Never done ---    Shingles Vaccine (1 of 2) Never done ---    Pneumococcal Vaccines (Age 65+) (3 - PCV) 04/20/2019 4/20/2018    COVID-19 Vaccine (4 - Booster for Pfizer series) 04/30/2022 12/30/2021    Influenza Vaccine (1) 09/01/2022 12/13/2021    Lipid Panel 12/03/2024 12/3/2019        No orders of the defined types were placed in this encounter.    The following information is provided to all patients.  This information is to help you find resources for any of the problems found today that may be affecting your health:                Living healthy guide: www.[facility].[place].gov      Understanding Diabetes: www.diabetes.org      Eating healthy: www.cdc.gov/healthyweight      CDC home safety checklist: www.cdc.gov/steadi/patient.html      Agency on Aging: www.goea.[place].[facility]      Alcoholics anonymous (AA): www.aa.org      Physical Activity: www.antwon.nih.gov/ne3csvw      Tobacco use: www.quitwithusla.org

## 2022-08-25 NOTE — PROGRESS NOTES
"Mandeep Willams presented for a  Medicare AWV and comprehensive Health Risk Assessment today. The following components were reviewed and updated:    · Medical history  · Family History  · Social history  · Allergies and Current Medications  · Health Risk Assessment  · Health Maintenance  · Care Team         ** See Completed Assessments for Annual Wellness Visit within the encounter summary.**         The following assessments were completed:  · Living Situation  · CAGE  · Depression Screening  · Timed Get Up and Go  · Whisper Test  · Cognitive Function Screening  · Nutrition Screening  · ADL Screening  · PAQ Screening        Vitals:    08/25/22 1441   BP: (!) 125/48   BP Location: Left arm   Patient Position: Sitting   BP Method: Medium (Manual)   Pulse: 61   Resp: 17   Temp: 97.9 °F (36.6 °C)   TempSrc: Oral   SpO2: 99%   Weight: 56.5 kg (124 lb 9 oz)   Height: 5' 5" (1.651 m)     Body mass index is 20.73 kg/m².  Physical Exam  Vitals reviewed.   Constitutional:       General: He is not in acute distress.     Appearance: Normal appearance. He is not ill-appearing, toxic-appearing or diaphoretic.   HENT:      Head: Normocephalic and atraumatic.   Cardiovascular:      Rate and Rhythm: Normal rate and regular rhythm.      Pulses: Normal pulses.      Heart sounds: Normal heart sounds.   Pulmonary:      Effort: Pulmonary effort is normal.      Breath sounds: Normal breath sounds.   Skin:     General: Skin is warm and dry.   Neurological:      General: No focal deficit present.      Mental Status: He is alert and oriented to person, place, and time.   Psychiatric:         Mood and Affect: Mood normal.         Behavior: Behavior normal.                   Diagnoses and health risks identified today and associated recommendations/orders:    1. Encounter for preventive health examination  Provided patient with a 5-10 year written screening schedule and personal prevention plan. Recommendations were developed using the USPSTF age " appropriate recommendations. Education, counseling, and referrals were provided as needed. After Visit Summary printed and given to patient which includes a list of additional screenings\tests needed.    2. Nonrheumatic aortic valve stenosis  No acute concerns    3. End stage renal disease on dialysis  No acute concerns    4. Need for vaccination for Strep pneumoniae  PCV to be given today  - (In Office Administered) Pneumococcal Conjugate Vaccine (20 Valent) (IM)    Recommended pt to get tetanus and shingles vaccine at pharmacy.    This visit was interpreted using [employer] software.  name Felicia ID [id number].    Provided Mandeep with a 5-10 year written screening schedule and personal prevention plan. Recommendations were developed using the USPSTF age appropriate recommendations. Education, counseling, and referrals were provided as needed. After Visit Summary printed and given to patient which includes a list of additional screenings\tests needed.    Follow up in about 1 year (around 8/25/2023) for AWV.    Karel Baez, NP  I offered to discuss advanced care planning, including how to pick a person who would make decisions for you if you were unable to make them for yourself, called a health care power of , and what kind of decisions you might make such as use of life sustaining treatments such as ventilators and tube feeding when faced with a life limiting illness recorded on a living will that they will need to know. (How you want to be cared for as you near the end of your natural life)     X Patient is interested in learning more about how to make advanced directives.  I provided them paperwork and offered to discuss this with them.

## 2022-09-22 NOTE — TELEPHONE ENCOUNTER
Called pt son Fletcher ng , explained pt annual with Dr. Mcclelland would have to be scheduled in Feb due that being his next opening . F/u for refills acn be scheduled with NIESHA Baez . Preferred 10/11 f/u Np Sasha due to pt dialysis and 2/13 3 pm for annual with Dr. Mcclelland .

## 2022-09-22 NOTE — TELEPHONE ENCOUNTER
----- Message from Concha Lucas sent at 9/22/2022  7:44 AM CDT -----  Regarding: Patient Advice              Name of Who is Calling:  Fletcher To (Relative)    Who Left The Message:  Fletcher To (Relative)      What is the request in detail:  Please give the patient's relative a call back regarding the scheduled appointment with this patient and also the annual nurse's visit that he missed.  Please give a call at your earliest convenience and further advise.   Thank you!      Reply by [facility]: No      Preferred Call Back  : [phone] (E)

## 2022-10-04 PROBLEM — I35.0 SEVERE AORTIC STENOSIS: Status: ACTIVE | Noted: 2022-01-01

## 2022-10-04 PROBLEM — R79.89 ELEVATED TROPONIN: Status: ACTIVE | Noted: 2022-01-01

## 2022-10-04 NOTE — ASSESSMENT & PLAN NOTE
ESRD Monday Wednesday Friday time 4 years via left upper arm fistula followed by Dr. Yang. Last dialysis was Monday 2.5-3 L removal  HD now

## 2022-10-04 NOTE — ASSESSMENT & PLAN NOTE
Last echo January 1, 2021 showed severe aortic stenosis.  This is likely also contributing to shortness of breath.  Volume control with hemodialysis

## 2022-10-04 NOTE — H&P
[facility]  [facility] Medicine  History & Physical    Patient Name: Mandeep Willams  MRN: [id number]  Patient Class: OP- Observation  Admission Date: 10/4/2022  Attending Physician: Malik Gracia MD   Primary Care Provider: Guy Mcclelland Jr, MD         Patient information was obtained from patient and ER records.     Subjective:     Principal Problem:Pleural effusion    Chief Complaint:   Chief Complaint   Patient presents with    Shortness of Breath     Presents with 3 day h/o SOBOE. Pt showing no signs of respiratory distress in triage. Pt states also feeling weak and having a dry cough. Denies CP, dizziness.         HPI: Mandeep Willams is 86-year-old male tiny speaking only with significant history for hypertension, hyperlipidemia, hx of right pleural effusion 2018 improved with HD, hx hydropneumothorax requesting chest tube in 12/2021 and end-stage renal disease Monday Wednesday Friday who presents to the ED for shortness of breath that has been persistent over a few weeks.  No aggravating or relieving factors.  Patient quit smoking long time ago.  Patient lives alone.  Last dialysis was yesterday Monday and 2.5 with 3 L removed.  Denies chest pain headache dizziness.  Chest x-ray showed slight diffuse edema and right lung base pleural effusion and atelectatic changes.  EKG normal sinus rhythm 666 with no evidence of ischemia .  BMP over 3000 and troponin 0.67.      Past Medical History:   Diagnosis Date    Dependence on renal dialysis     ESRD (end stage renal disease)     HTN (hypertension)     Hyperlipidemia        Past Surgical History:   Procedure Laterality Date    BACK SURGERY  2005    DIALYSIS FISTULA CREATION  4/2012    left arm    FISTULOGRAM Left 3/22/2022    Procedure: Fistulogram, transradial;  Surgeon: Jude Ordaz MD;  Location: [facility];  Service: Vascular;  Laterality: Left;  RN Pre Op 3-17-22.  May need Chinese  day of procedure. [place]    JOINT REPLACEMENT  Right     hip       Review of patient's allergies indicates:  No Known Allergies    No current facility-administered medications on file prior to encounter.     Current Outpatient Medications on File Prior to Encounter   Medication Sig    albuterol (PROAIR HFA) 90 mcg/actuation inhaler Inhale 2 puffs into the lungs every 4 (four) hours as needed for Wheezing or Shortness of Breath. Rescue    albuterol-ipratropium (DUO-NEB) 2.5 mg-0.5 mg/3 mL nebulizer solution NEBULIZE ONE vial (3ml) BY MOUTH EVERY 6 HOURS    amLODIPine (NORVASC) 5 MG tablet Take 1 tablet (5 mg total) by mouth once daily.    calcium acetate 667 mg (169 mg calcium)/5 mL Soln Take by mouth.    furosemide (LASIX) 20 MG tablet Take 1 tablet (20 mg total) by mouth once daily.    metoprolol succinate (TOPROL-XL) 100 MG 24 hr tablet Take 1 tablet (100 mg total) by mouth once daily.    multivitamin with minerals tablet Take 1 tablet by mouth once daily.    omega-3 acid ethyl esters (LOVAZA) 1 gram capsule Take 2 capsules (2 g total) by mouth 2 (two) times daily.    PFIZER COVID-19 CRISS VACCN,PF, 30 mcg/0.3 mL injection     pravastatin (PRAVACHOL) 10 MG tablet Take 1 tablet (10 mg total) by mouth once daily.    ELIE-KAUSHIK RX 1- mg-mg-mcg Tab Take 1 tablet by mouth once daily.    RENAL CAPS 1 mg Cap Take by mouth once daily.    tamsulosin (FLOMAX) 0.4 mg Cap Take 1 capsule (0.4 mg total) by mouth once daily.     Family History       Problem Relation (Age of Onset)    Arthritis Sister    Diabetes Sister    Hypertension Sister          Tobacco Use    Smoking status: Former     Packs/day: 1.00     Years: 10.00     Pack years: 10.00     Types: Cigarettes     Quit date: 1971     Years since quittin.8    Smokeless tobacco: Never   Substance and Sexual Activity    Alcohol use: No    Drug use: No    Sexual activity: Not Currently     Partners: Female     Comment:      Review of Systems   Constitutional:  Positive for activity  change and appetite change. Negative for diaphoresis, fatigue and fever.   HENT: Negative.     Eyes: Negative.    Respiratory:  Positive for shortness of breath. Negative for chest tightness and wheezing.    Cardiovascular:  Negative for chest pain and leg swelling.   Gastrointestinal: Negative.    Genitourinary: Negative.    Musculoskeletal: Negative.    Neurological: Negative.    Hematological: Negative.    Psychiatric/Behavioral: Negative.     Objective:     Vital Signs (Most Recent):  Temp: 98 °F (36.7 °C) (10/04/22 1046)  Pulse: 63 (10/04/22 1431)  Resp: 20 (10/04/22 1431)  BP: 127/62 (10/04/22 1431)  SpO2: 95 % (10/04/22 1431) Vital Signs (24h Range):  Temp:  [98 °F (36.7 °C)] 98 °F (36.7 °C)  Pulse:  [62-66] 63  Resp:  [17-20] 20  SpO2:  [95 %-98 %] 95 %  BP: (121-142)/(56-64) 127/62     Weight: 54.4 kg (120 lb)  Body mass index is 18.79 kg/m².    Physical Exam  Constitutional:       Appearance: Normal appearance. He is ill-appearing. He is not toxic-appearing.   HENT:      Head: Atraumatic.      Nose: Nose normal.      Mouth/Throat:      Mouth: Mucous membranes are dry.   Eyes:      Extraocular Movements: Extraocular movements intact.   Cardiovascular:      Rate and Rhythm: Normal rate.      Heart sounds: Murmur heard.   Pulmonary:      Comments: Diminished  Abdominal:      Palpations: Abdomen is soft.   Musculoskeletal:         General: Normal range of motion.      Cervical back: Normal range of motion.      Right lower leg: No edema.      Left lower leg: No edema.   Skin:     General: Skin is dry.      Comments: AUSTIN ARREDONDO good bruit and thrill   Neurological:      General: No focal deficit present.      Mental Status: He is oriented to person, place, and time. Mental status is at baseline.   Psychiatric:         Mood and Affect: Mood normal.         Significant Labs: All pertinent labs within the past 24 hours have been reviewed.    Significant Imaging: I have reviewed all pertinent imaging results/findings  within the past 24 hours.    Assessment/Plan:     * Pleural effusion  Patient history of right pleural effusion in 2018 then a hydropneumothorax in December 2021 requiring a chest tube.  Chest x-ray shows mild slight diffuse edema with right base pleural effusion patient breathing comfortably on room air at rest O2 saturation 95-95  Per record not a candidate for VATS due to severe aortic stenosis  HD now reassess tomorrow for UF versus thoracentesis      ESRD (end stage renal disease)  ESRD Monday Wednesday Friday time 4 years via left upper arm fistula followed by Dr. Yang. Last dialysis was Monday 2.5-3 L removal  HD now    Severe aortic stenosis  Last echo January 1, 2021 showed severe aortic stenosis.  This is likely also contributing to shortness of breath.  Volume control with hemodialysis      Elevated troponin  Likely due to ESRD.  Denies chest pain.  Repeat troponin      Hyperlipidemia  Lab Results   Component Value Date    LDLCALC 101.2 12/03/2019   Continue home pravastatin      HTN (hypertension)  Control.  Continue home antihypertensive amlodipine metoprolol with parameters      VTE Risk Mitigation (From admission, onward)         Ordered     Place YAZ hose  Until discontinued         10/04/22 1527     Place sequential compression device  Until discontinued         10/04/22 1527            As clarification on October 4, 2022 patient admit to observation under my care in collaboration with Dr. Malik Gracia.     Daily David NP  Department of Hospital Medicine   [facility] - Emergency Dept

## 2022-10-04 NOTE — SUBJECTIVE & OBJECTIVE
Past Medical History:   Diagnosis Date    Dependence on renal dialysis     ESRD (end stage renal disease)     HTN (hypertension)     Hyperlipidemia        Past Surgical History:   Procedure Laterality Date    BACK SURGERY  2005    DIALYSIS FISTULA CREATION  4/2012    left arm    FISTULOGRAM Left 3/22/2022    Procedure: Fistulogram, transradial;  Surgeon: Jude Ordaz MD;  Location: [facility];  Service: Vascular;  Laterality: Left;  RN Pre Op 3-17-22.  May need Chinese  day of procedure. CA    JOINT REPLACEMENT Right     hip       Review of patient's allergies indicates:  No Known Allergies    No current facility-administered medications on file prior to encounter.     Current Outpatient Medications on File Prior to Encounter   Medication Sig    albuterol (PROAIR HFA) 90 mcg/actuation inhaler Inhale 2 puffs into the lungs every 4 (four) hours as needed for Wheezing or Shortness of Breath. Rescue    albuterol-ipratropium (DUO-NEB) 2.5 mg-0.5 mg/3 mL nebulizer solution NEBULIZE ONE vial (3ml) BY MOUTH EVERY 6 HOURS    amLODIPine (NORVASC) 5 MG tablet Take 1 tablet (5 mg total) by mouth once daily.    calcium acetate 667 mg (169 mg calcium)/5 mL Soln Take by mouth.    furosemide (LASIX) 20 MG tablet Take 1 tablet (20 mg total) by mouth once daily.    metoprolol succinate (TOPROL-XL) 100 MG 24 hr tablet Take 1 tablet (100 mg total) by mouth once daily.    multivitamin with minerals tablet Take 1 tablet by mouth once daily.    omega-3 acid ethyl esters (LOVAZA) 1 gram capsule Take 2 capsules (2 g total) by mouth 2 (two) times daily.    PFIZER COVID-19 CRISS VACCN,PF, 30 mcg/0.3 mL injection     pravastatin (PRAVACHOL) 10 MG tablet Take 1 tablet (10 mg total) by mouth once daily.    ELIE-KAUSHIK RX 1- mg-mg-mcg Tab Take 1 tablet by mouth once daily.    RENAL CAPS 1 mg Cap Take by mouth once daily.    tamsulosin (FLOMAX) 0.4 mg Cap Take 1 capsule (0.4 mg total) by mouth once daily.     Family History        Problem Relation (Age of Onset)    Arthritis Sister    Diabetes Sister    Hypertension Sister          Tobacco Use    Smoking status: Former     Packs/day: 1.00     Years: 10.00     Pack years: 10.00     Types: Cigarettes     Quit date: 1971     Years since quittin.8    Smokeless tobacco: Never   Substance and Sexual Activity    Alcohol use: No    Drug use: No    Sexual activity: Not Currently     Partners: Female     Comment:      Review of Systems   Constitutional:  Positive for activity change and appetite change. Negative for diaphoresis, fatigue and fever.   HENT: Negative.     Eyes: Negative.    Respiratory:  Positive for shortness of breath. Negative for chest tightness and wheezing.    Cardiovascular:  Negative for chest pain and leg swelling.   Gastrointestinal: Negative.    Genitourinary: Negative.    Musculoskeletal: Negative.    Neurological: Negative.    Hematological: Negative.    Psychiatric/Behavioral: Negative.     Objective:     Vital Signs (Most Recent):  Temp: 98 °F (36.7 °C) (10/04/22 1046)  Pulse: 63 (10/04/22 1431)  Resp: 20 (10/04/22 1431)  BP: 127/62 (10/04/22 1431)  SpO2: 95 % (10/04/22 1431) Vital Signs (24h Range):  Temp:  [98 °F (36.7 °C)] 98 °F (36.7 °C)  Pulse:  [62-66] 63  Resp:  [17-20] 20  SpO2:  [95 %-98 %] 95 %  BP: (121-142)/(56-64) 127/62     Weight: 54.4 kg (120 lb)  Body mass index is 18.79 kg/m².    Physical Exam  Constitutional:       Appearance: Normal appearance. He is ill-appearing. He is not toxic-appearing.   HENT:      Head: Atraumatic.      Nose: Nose normal.      Mouth/Throat:      Mouth: Mucous membranes are dry.   Eyes:      Extraocular Movements: Extraocular movements intact.   Cardiovascular:      Rate and Rhythm: Normal rate.      Heart sounds: Murmur heard.   Pulmonary:      Comments: Diminished  Abdominal:      Palpations: Abdomen is soft.   Musculoskeletal:         General: Normal range of motion.      Cervical back: Normal range of  motion.      Right lower leg: No edema.      Left lower leg: No edema.   Skin:     General: Skin is dry.      Comments: AUSTIN ARREDONDO good bruit and thrill   Neurological:      General: No focal deficit present.      Mental Status: He is oriented to person, place, and time. Mental status is at baseline.   Psychiatric:         Mood and Affect: Mood normal.         Significant Labs: All pertinent labs within the past 24 hours have been reviewed.    Significant Imaging: I have reviewed all pertinent imaging results/findings within the past 24 hours.

## 2022-10-04 NOTE — HPI
Mandeep Willams is 86-year-old male tiny speaking only with significant history for hypertension, hyperlipidemia, hx of right pleural effusion 2018 improved with HD, hx hydropneumothorax requesting chest tube in 12/2021 and end-stage renal disease Monday Wednesday Friday who presents to the ED for shortness of breath that has been persistent over a few weeks.  No aggravating or relieving factors.  Patient quit smoking long time ago.  Patient lives alone.  Last dialysis was yesterday Monday and 2.5 with 3 L removed.  Denies chest pain headache dizziness.  Chest x-ray showed slight diffuse edema and right lung base pleural effusion and atelectatic changes.  EKG normal sinus rhythm 666 with no evidence of ischemia .  BMP over 3000 and troponin 0.67.

## 2022-10-04 NOTE — ED PROVIDER NOTES
"Encounter Date: 10/4/2022    SCRIBE #1 NOTE: I, Cami China, am scribing for, and in the presence of,  Ly Cabral MD.     History     Chief Complaint   Patient presents with    Shortness of Breath     Presents with 3 day h/o SOBOE. Pt showing no signs of respiratory distress in triage. Pt states also feeling weak and having a dry cough. Denies CP, dizziness.      Mandeep Willams is a 86 y.o. male, with a PMHx of ESRD on HD (M/W/F), HTN, who presents to the ED with shortness of breath. Patient reports persistent, moderate shortness of breath for 1 week. He notes orthopnea. He also notes associated fatigue. Family reports some bilateral leg swelling. Family notes he's had similar sx in the past due to "fluid in the lungs". No other exacerbating or alleviating factors. Denies chest pain, vomiting, fever, cough, or other associated symptoms. Patient goes to [facility] for his dialysis, unsure who his nephrologist is (appears to be Dr. Henriquez per chart review). States he was able to complete a full session of dialysis yesterday. He still makes some urine. Of note, he is vaccinated against COVID-19.     The history is provided by the patient and a significant other. The history is limited by a language barrier. A  was used (ID [id number]).   Review of patient's allergies indicates:  No Known Allergies  Past Medical History:   Diagnosis Date    Dependence on renal dialysis     ESRD (end stage renal disease)     HTN (hypertension)     Hyperlipidemia      Past Surgical History:   Procedure Laterality Date    BACK SURGERY  2005    DIALYSIS FISTULA CREATION  4/2012    left arm    FISTULOGRAM Left 3/22/2022    Procedure: Fistulogram, transradial;  Surgeon: Jude Ordaz MD;  Location: [facility];  Service: Vascular;  Laterality: Left;  RN Pre Op 3-17-22.  May need Chinese  day of procedure. [place]    JOINT REPLACEMENT Right     hip     Family History   Problem Relation Age of Onset    Diabetes Sister "     Hypertension Sister     Arthritis Sister     Cancer Neg Hx     Heart disease Neg Hx     Stroke Neg Hx     Amblyopia Neg Hx     Blindness Neg Hx     Cataracts Neg Hx     Glaucoma Neg Hx     Macular degeneration Neg Hx     Retinal detachment Neg Hx     Strabismus Neg Hx     Thyroid disease Neg Hx      Social History     Tobacco Use    Smoking status: Former     Packs/day: 1.00     Years: 10.00     Pack years: 10.00     Types: Cigarettes     Quit date: 1971     Years since quittin.8    Smokeless tobacco: Never   Substance Use Topics    Alcohol use: No    Drug use: No     Review of Systems   Constitutional:  Positive for fatigue. Negative for chills and fever.   HENT:  Negative for congestion and sore throat.    Eyes:  Negative for visual disturbance.   Respiratory:  Positive for shortness of breath. Negative for cough.    Cardiovascular:  Positive for leg swelling. Negative for chest pain.   Gastrointestinal:  Negative for abdominal pain, nausea and vomiting.   Genitourinary:  Negative for dysuria.   Skin:  Negative for rash.   Neurological:  Negative for headaches.   Psychiatric/Behavioral:  Negative for confusion.      Physical Exam     Initial Vitals [10/04/22 1046]   BP Pulse Resp Temp SpO2   (!) 131/56 63 18 98 °F (36.7 °C) 98 %      MAP       --         Physical Exam    Nursing note and vitals reviewed.  Constitutional: He appears well-developed. He is not diaphoretic. No distress.   Body mass index is 18.79 kg/m².     HENT:   Head: Normocephalic and atraumatic.   Eyes: Conjunctivae are normal. Pupils are equal, round, and reactive to light.   Neck: Neck supple. No JVD present.   Cardiovascular:  Normal rate and regular rhythm.           Murmur heard.  Systolic murmur is present.  Pulmonary/Chest: No respiratory distress. He has rales (bibasilar).   Abdominal: Abdomen is soft. Bowel sounds are normal.   Musculoskeletal:         General: No edema (peripheral).      Cervical back: Neck supple.       Comments: Fistula to the left upper arm with good thrill     Neurological: He is alert. GCS score is 15. GCS eye subscore is 4. GCS verbal subscore is 5. GCS motor subscore is 6.   Skin: Skin is warm and dry.   Psychiatric: He has a normal mood and affect.       ED Course   Procedures  Labs Reviewed   CBC W/ AUTO DIFFERENTIAL - Abnormal; Notable for the following components:       Result Value    WBC 3.11 (*)     RBC 3.42 (*)     Hemoglobin 11.6 (*)     Hematocrit 34.1 (*)      (*)     MCH 33.9 (*)     RDW 16.2 (*)     Platelets 101 (*)     Gran # (ANC) 1.7 (*)     Lymph # 0.7 (*)     Eosinophil % 10.9 (*)     All other components within normal limits   COMPREHENSIVE METABOLIC PANEL - Abnormal; Notable for the following components:    CO2 32 (*)     Glucose 126 (*)     BUN 39 (*)     Creatinine 6.3 (*)     Albumin 3.0 (*)     eGFR 8 (*)     All other components within normal limits   TROPONIN I - Abnormal; Notable for the following components:    Troponin I 0.067 (*)     All other components within normal limits   B-TYPE NATRIURETIC PEPTIDE - Abnormal; Notable for the following components:    BNP 3,036 (*)     All other components within normal limits   SARS-COV-2 RDRP GENE        ECG Results              EKG 12-lead (Preliminary result)  Result time 10/04/22 12:14:53      ED Interpretation by Ly Cabral MD (10/04/22 12:14:53, [facility], Emergency Medicine)    Normal sinus rhythm, rate 66 beats per minute, normal KS interval,  milliseconds, no STEMI.                                  Imaging Results              X-Ray Chest AP Portable (Final result)  Result time 10/04/22 13:01:37      Final result by Kirill Pascual MD (10/04/22 13:01:37)                   Impression:      See above      Electronically signed by: Kirill Pascual MD  Date:    10/04/2022  Time:    13:01               Narrative:    EXAMINATION:  XR CHEST AP PORTABLE    CLINICAL HISTORY:  CHF;    TECHNIQUE:  Single  frontal view of the chest was performed.    COMPARISON:  No 05/16/2022 ne    FINDINGS:  Mild cardiomegaly.  Slight diffuse  edema.  Significant opacification at the right lung base probably a combination of pleural effusion and atelectatic changes.  Blunting the right lung apex also identified as before.                                       Medications   mupirocin 2 % ointment (has no administration in time range)   pravastatin tablet 10 mg (has no administration in time range)   furosemide injection 40 mg (40 mg Intravenous Given 10/4/22 1421)   aspirin tablet 325 mg (325 mg Oral Given 10/4/22 1420)     Medical Decision Making:   History:   Old Medical Records: I decided to obtain old medical records.  Old Records Summarized: records from previous admission(s).       <> Summary of Records: 12/2020 Echo:  · The left ventricle is normal in size with mild concentric hypertrophy and normal systolic function. The estimated ejection fraction is 60%  · Normal right ventricular size with normal right ventricular systolic function.  · Moderate aortic regurgitation.  · There is severe aortic valve stenosis.  · Aortic valve area is 0.76 cm2; peak velocity is 4.00 m/s; mean gradient is 38 mmHg.  · Moderate mitral regurgitation.  · Mild tricuspid regurgitation.  · The estimated PA systolic pressure is 55 mmHg.  · There is pulmonary hypertension.    Initial Assessment:   86-year-old male with history of end-stage renal disease, hypertension, hyperlipidemia presents with several day history of shortness of breath, orthopnea.  He reports compliance with his Monday Wednesday Friday dialysis.  He denies any chest pain, cough, fever, chills or leg swelling.  Exam notable for elderly male, no respiratory distress, rales heard in the lower fields of the lungs bilaterally, no peripheral edema.  He has a systolic murmur.  He has a fistula to his left upper extremity with good thrill.  I suspect volume overload versus symptomatic aortic  stenosis, lower suspicion for pneumonia, ACS, dysrhythmia.  Considered PE but felt less likely as there is no hypoxia at rest or tachycardia.  The patient also denies any pleuritic type chest pain.  Workup initiated with labs including cardiac enzymes, chest x-ray.    Independently Interpreted Test(s):   I have ordered and independently interpreted EKG Reading(s) - see prior notes  Clinical Tests:   Lab Tests: Ordered and Reviewed  Radiological Study: Ordered and Reviewed  Medical Tests: Ordered and Reviewed        Scribe Attestation:   Scribe #1: I performed the above scribed service and the documentation accurately describes the services I performed. I attest to the accuracy of the note.      ED Course as of 10/04/22 1509   Tue Oct 04, 2022   1410 Patient's labs show elevated BNP, mildly elevated troponin, potassium level within acceptable limits.  Chest x-ray does show pulmonary edema with possible right pleural effusion.  Patient has had similar presentation in the past with improvement with dialysis.  Case reviewed with Dr. Baugh (nephrology) who recommends HD today for symptoms.  [LH]   1430 Patient updated regarding test results and care plan using .  Case discussed with Hayes Denton for observation. [LH]      ED Course User Index  [LH] Ly Cabral MD                 Clinical Impression:   Final diagnoses:  [R06.02] Shortness of breath  [J81.0] Acute pulmonary edema (Primary)  [J90] Pleural effusion  [N18.6] ESRD (end stage renal disease)  [R77.8] Elevated troponin      ED Disposition Condition    Observation Stable        I, Ly Cabral MD, personally performed the services described in this documentation. All medical record entries made by the scribe were at my direction and in my presence. I have reviewed the chart and agree that the record reflects my personal performance and is accurate and complete.    This dictation has been generated using M-Modal Fluency Direct dictation; some  phonetic errors may occur.         Ly Cabral MD  10/04/22 0932

## 2022-10-04 NOTE — ED NOTES
Patient ambulated around the ED unit w/ walker. Oxygen saturation fluctuated in the % range on room ir. No respiratory distress noted.

## 2022-10-04 NOTE — ASSESSMENT & PLAN NOTE
Patient history of right pleural effusion in 2018 then a hydropneumothorax in December 2021 requiring a chest tube.  Chest x-ray shows mild slight diffuse edema with right base pleural effusion patient breathing comfortably on room air at rest O2 saturation 95-95  Per record not a candidate for VATS due to severe aortic stenosis  HD now reassess tomorrow for UF versus thoracentesis

## 2022-10-05 NOTE — PT/OT/SLP EVAL
Physical Therapy Evaluation    Patient Name:  Mandeep Willams   MRN:  [id number]    Recommendations:     Discharge Recommendations:  home health PT (with family assistance)   Discharge Equipment Recommendations: none   Barriers to discharge: Decreased caregiver support    Assessment:     Mandeep Willams is a 86 y.o. male admitted with a medical diagnosis of Pleural effusion.  He presents with the following impairments/functional limitations:  weakness, impaired endurance, impaired functional mobility, gait instability, impaired balance, decreased upper extremity function, decreased lower extremity function.    Rehab Prognosis: Good; patient would benefit from acute skilled PT services to address these deficits and reach maximum level of function.    Recent Surgery: * No surgery found *      Plan:     During this hospitalization, patient to be seen 2 x/week to address the identified rehab impairments via gait training, therapeutic activities, therapeutic exercises and progress toward the following goals:    Plan of Care Expires:  10/19/22    Subjective     Chief Complaint: Pt reported LE weakness and fatigue.  Patient/Family Comments/goals: Pt agreeable to ambulation in the hallway.   Pain/Comfort:  Pain Rating 1: 0/10      Living Environment:  Pt lives alone in a [facility] with no concerns.  Prior to admission, patients level of function was mod I with ambulation using rollator.  Pt uses insurance transportation to get to dialysis.  Equipment at home: rollator.  Upon discharge, patient will have assistance from grand-daughter.    Objective:     Communicated with nurse Fiore prior to session.  Patient found HOB elevated with peripheral IV, telemetry  upon PT entry to room.    General Precautions: Standard, fall (ESRD on HD MWF)   Orthopedic Precautions:N/A   Braces: N/A  Respiratory Status: Room air    Exams:  Cognitive Exam:  Patient was able to follow 2 step commands.   Gross Motor Coordination:  WFL  Postural Exam:  Patient  presented with the following abnormalities:    -       No postural abnormalities identified  Sensation:    -       Intact  light/touch BLE  Skin Integrity/Edema:      -       Skin integrity: Visible skin intact  -       Edema: None noted BLE  BLE ROM: WFL  BLE Strength: WFL    Functional Mobility: Pt speaks Chinese only, : [place] #[id number].  Pt's rollator present in room.  No family at this time.    Bed Mobility:     Scooting: contact guard assistance  Supine to Sit: contact guard assistance with HOB elevated   Transfers:     Sit to Stand:  stand by assistance and contact guard assistance with 4 wheeled walker  Bed to Chair: stand by assistance and contact guard assistance with  4 wheeled walker  using  Step Transfer  Gait: Pt ambulated ~200 ft with CGA-SBA using rollator.  spO2 on RA ~95% and HR 65 bpm.  Pt with decreased step length and segundo.  Pt c/o being tired.    Balance: Pt with fair dynamic standing balance.     Therapeutic Activities and Exercises:  Pt educated on acute skilled PT services and goals.  Pt also educated on calling for nursing assistance with OOB activities.  Pt verbalized good understanding.      AM-PAC 6 CLICK MOBILITY  Total Score:20     Patient left up in chair with all lines intact, call button in reach, and SELENA Modi present.  Nurse Macarena notified.  Tray table close by.     GOALS:   Multidisciplinary Problems       Physical Therapy Goals          Problem: Physical Therapy    Goal Priority Disciplines Outcome Goal Variances Interventions   Physical Therapy Goal     PT, PT/OT Ongoing, Progressing     Description: Goals to be met by: 10/19/22     Patient will increase functional independence with mobility by performin. Supine to sit with Modified [place]  2. Rolling to Left and Right with Modified [place]  3. Sit to stand transfer with Modified [place]  4. Bed to chair transfer with Modified [place]   5. Gait >500 feet with Modified [place] using  Rollator  6. Lower extremity exercise program 2 sets x15 reps per handout, with independence                         History:     Past Medical History:   Diagnosis Date    Dependence on renal dialysis     ESRD (end stage renal disease)     HTN (hypertension)     Hyperlipidemia        Past Surgical History:   Procedure Laterality Date    BACK SURGERY  2005    DIALYSIS FISTULA CREATION  4/2012    left arm    FISTULOGRAM Left 3/22/2022    Procedure: Fistulogram, transradial;  Surgeon: Jude Ordaz MD;  Location: [facility];  Service: Vascular;  Laterality: Left;  RN Pre Op 3-17-22.  May need Chinese  day of procedure. [place]    JOINT REPLACEMENT Right     hip       Time Tracking:     PT Received On: 10/05/22  PT Start Time: 1032     PT Stop Time: 1047  PT Total Time (min): 15 min     Billable Minutes: Evaluation 15 min co-eval with OT      10/05/2022

## 2022-10-05 NOTE — PLAN OF CARE
10/05/22 1236   Discharge Planning   Assessment Type Discharge Planning Brief Assessment   Resource/Environmental Concerns none   Support Systems Family members   Equipment Currently Used at Home rollator   Current Living Arrangements home/apartment/condo   Patient/Family Anticipates Transition to home with family   Patient/Family Anticipated Services at Transition home health care   DME Needed Upon Discharge  none   Discharge Plan A Home with family  (with instructions to follow up)     [facility] - [place] - [address]  [address]  [place] [address]  Phone: [phone] Fax: [phone]    [facility] - [place]  [address]  [address]  [place] [address]  Phone: [phone] Fax: [phone]

## 2022-10-05 NOTE — NURSING
Upon arrival to floor:  patient oriented to room, call bell in reach and bed in lowest position. No apparent distress noted.

## 2022-10-05 NOTE — BRIEF OP NOTE
Radiology Post-Procedure Note    Pre Op Diagnosis: Acute onset of SOB/ROMAN 2/2 Lt > Rt layering pleural effusions  Post Op Diagnosis: Same    Procedure: 1. US-guided percutaneous Lt posterolateral-approach therapeutic thoracentesis    Procedure performed by: Patrick Bustamante MD    Written Informed Consent Obtained: Yes  Specimen Removed: YES, 600-cc of thin, straw-colored pleural fluid  Estimated Blood Loss: none    Findings:   Successful US-guided percutaneous Lt posterolateral-approach therapeutic thoracentesis with local anesthetic only. Patient tolerated the procedure well. No immediate post-procedural complications noted.     4 hour post-op CXR (or sooner/immediate should pt develop symptoms of worsening SOB or new Lt CP) to exclude potential for post-procedural complications.    Thank you for considering IR for the care of your patient.     Patrick Bustamante MD  Interventional Radiology

## 2022-10-05 NOTE — PT/OT/SLP EVAL
Occupational Therapy   Evaluation    Name: Mandeep Willams  MRN: [id number]  Admitting Diagnosis: Pleural effusion  Recent Surgery: * No surgery found *      Recommendations:     Discharge Recommendations: home health OT, other (see comments) (with assistance from family prn)  Discharge Equipment Recommendations: none, other (see comments) (Patient may benefit from a shower chair for home use, to be determined by HHOT.  Patient declines a shower chair at this time.)  Barriers to Discharge: Other (Comment) (Patient has not yet fully regained his PLOF.)    Assessment:     Mandeep Willams is an 86 y.o. male with a medical diagnosis of pleural effusion.  He presents with strong motivation/good participation.  Performance deficits affecting function are weakness, impaired endurance, impaired functional mobility, impaired self-care skills, gait instability, impaired balance, and decreased lower extremity function.      Rehab Prognosis: Good; patient would benefit from acute skilled OT services to address these deficits and reach maximum level of function.       Plan:     Patient to be seen 2 x/week, 3 x/week to address the above listed problems via self-care/home management, therapeutic activities, and therapeutic exercises.  Plan of Care Expires: 10/19/22  Plan of Care Reviewed with: patient    Subjective     Chief Complaint: BLE weakness   Patient/Family Comments/Goals: To regain his PLOF and return home     Occupational Profile:  Living Environment: The patient lives at home alone in a H with no steps to enter.   Previous Level of Function: Patient was Mod I for functional mob's and ADL's with use of his rollator.   Roles and Routines: Patient uses an insurance transportation service to get to dialysis.   Equipment Used at Home: Rollator  Assistance upon Discharge: Patient will have assistance from his granddaughter upon D/C.     Pain/Comfort:  Pain Rating 1: 0/10  Pain Rating Post-Intervention 1: 0/10    Patients cultural,  Spoke to patient informed her that there is a note from the end of July that Dr. Layo Henry is no longer in her network for insurance. Patient states she switched insurance again and it kicks in October 1 - so Dr. Layo Henry can remain her PCP.   She has an appointment scheduled 10/14/19 for CPE     Disp Refills Start End    traMADol (ULTRAM) 50 MG tablet 120 tablet 0 7/25/2019     Sig: TAKE 1 TABLET BY MOUTH FOUR TIMES A DAY        Last visit 2/5/19  Next vist 10/14/19  PDMP reviewed 9/9/19  PDMP dispensed 7/25/2019    RX prepped and routed to MD for approval spiritual, Restoration conflicts discussed given the current situation: Yes    Objective:     Communicated with: Nurse, Macarena, prior to session.  Patient found in bed with HOB elevated with telemetry and IV site at E (not connected) upon OT entry to room.    General Precautions: Standard and fall precautions apply.  (Patient attends HD each M/W/F.)   Orthopedic Precautions: N/A   Braces: N/A  Respiratory Status: Room air    Occupational Performance:    Bed Mobility:    Patient required CGA for supine to sitting at EOB and to scoot to EOB.     Functional Mobility/Transfers:  Transfers: SBA to CGA to stand from bed with use of rollator; SBA to CGA to transfer to armchair with use of rollator    Functional Mobility: Patient ambulated within room with CGA/SBA and use of rollator.      Activities of Daily Living:  Hygiene/Grooming: Set-Up A for washing/drying face while seated  UE Dressing: SBA/Set-Up A to thelma gown to back from EOB  LE Dressing: Min A to Mod A to thelma bilateral  socks from EOB     Cognitive/Visual Perceptual:  Cognitive/Psychosocial Skills:     -       Oriented to: Person, Place, Time, and Situation   -       Follows Commands/Attention: Follows two-step commands  -       Communication: Clear/Fluent but Chinese-speaking only,  used  -       Memory: No deficits noted  -       Safety Awareness/Insight to Disability: Intact   -       Mood/Affect/Coping Skills/Emotional Control: Appropriate to situation, Cooperative, Pleasant, and Motivated  Visual/Perceptual:      -Intact     Physical Exam:  Balance:    -       Sitting balance is intact.  Patient requires SBA to safely maintain standing balance with use of rollator.   Postural Examination:     -       No postural abnormalities identified  Skin Integrity: Visible skin intact  Edema: None noted  Sensation:    -       Intact  Upper Extremity Range of Motion:     -       Right Upper Extremity: WNL  -       Left Upper Extremity: WNL  Upper  Extremity Strength:    -       Right Upper Extremity: 4-/5  -       Left Upper Extremity: 4-/5  Gross Motor Coordination: WFL    AMPAC 6 Click ADL:  AMPAC Total Score: 21    Treatment & Education:  Patient co-evaluated today by PT and OT.  Patient to be treated 2-3 x's per week and discharged to home with home health and assistance from family as needed.  DME Needs: None.  Patient may benefit from use of a shower chair if recommended by HHOT; patient declines one at this time.     Please see per above for details/functional levels.      (Ms. Verduzco, #[id number]) was used as the patient speaks only a few words of English; his native language is Chinese.      SpO2 on room air was 95% and HR was 65 bpm.      Patient left upright in armchair with all lines intact, call button within his reach, and nurse notified.     GOALS:   Multidisciplinary Problems       Occupational Therapy Goals          Problem: Occupational Therapy    Goal Priority Disciplines Outcome Interventions   Occupational Therapy Goal     OT, PT/OT Ongoing, Progressing    Description: Goals to be met by: 10/19/2022     Patient will increase functional independence with ADL's by performing:    LE Dressing with Modified [place].  Grooming while standing at sink with Modified [place].  Toileting from toilet with Modified [place] for hygiene and clothing management.   Bathing with Modified [place].  Step transfer with Modified [place].  Toilet transfer to toilet with Modified [place].  Increase functional strength to 4/5 for improved performance of LE dressing skills.                         History:     Past Medical History:   Diagnosis Date    Dependence on renal dialysis     ESRD (end stage renal disease)     HTN (hypertension)     Hyperlipidemia          Past Surgical History:   Procedure Laterality Date    BACK SURGERY  2005    DIALYSIS FISTULA CREATION  4/2012    left arm    FISTULOGRAM Left 3/22/2022    Procedure:  Fistulogram, transradial;  Surgeon: Jude Ordaz MD;  Location: [facility];  Service: Vascular;  Laterality: Left;  RN Pre Op 3-17-22.  May need Chinese  day of procedure. CA    JOINT REPLACEMENT Right     hip       Time Tracking:     OT Date of Treatment: 10/05/22  OT Start Time: 1032  OT Stop Time: 1055  OT Total Time (min): 23 min    Billable Minutes:Evaluation 15 mins co-eval with PT  Self Care/Home Management 8 mins     10/5/2022

## 2022-10-05 NOTE — NURSING
Patient off the floor to IR, used the martti and explained where he was going and what they where going to do, patient has no questions or concerns at this time, CHELLY

## 2022-10-05 NOTE — NURSING
Pt discharged per MD order. IV removed. Catheter tip intact. No distress noted. Discharge instructions explained. AVS given to patient and placed in Blue Folder. Pt verbalized understanding. VSS. Afebrile. No complaints of pain, N/V, diarrhea, or SOB. Rx sent to [facility]. Pt has all belongings packed and waiting for family to come get him.

## 2022-10-05 NOTE — PLAN OF CARE
[facility]      HOME HEALTH ORDERS  FACE TO FACE ENCOUNTER    Patient Name: Mandeep Willams  YOB: 1935    PCP: Guy Mcclelland Jr, MD   PCP Address: [address] / [id number]  PCP Phone Number: [phone]  PCP Fax: [phone]    Encounter Date: 10/4/22    Admit to Home Health    Diagnoses:  Active Hospital Problems    Diagnosis  POA    *Pleural effusion [J90]  Yes     Priority: 1 - High     Present since 2018.  Bilateral with right larger than left.  Chemistry suggestive of exudative nature.  +elevated bnp.  Still suspect volume overload + trapped lung physiology.  Not candidate for vats due to severe AS.        ESRD (end stage renal disease) [N18.6]  Yes     Priority: 2      HD M-W-F.  Currently follow by Dr. Cross.  Will need more aggressive ultrafiltration.        Elevated troponin [R77.8]  Yes    Severe aortic stenosis [I35.0]  Yes    HTN (hypertension) [I10]  Yes    Hyperlipidemia [E78.5]  Yes      Resolved Hospital Problems   No resolved problems to display.       Follow Up Appointments:  Future Appointments   Date Time Provider Department Center   10/11/2022  9:30 AM Karel Baez NP [facility]   2/13/2023  3:00 PM Guy Mcclelland Jr., MD [facility]       Allergies:Review of patient's allergies indicates:  No Known Allergies    Medications: Review discharge medications with patient and family and provide education.    Current Facility-Administered Medications   Medication Dose Route Frequency Provider Last Rate Last Admin    albuterol-ipratropium 2.5 mg-0.5 mg/3 mL nebulizer solution 3 mL  3 mL Nebulization Q6H WAKE Daily David NP        mupirocin 2 % ointment   Nasal BID MONSE Rodney   Given at 10/04/22 2130    pravastatin tablet 10 mg  10 mg Oral Daily Daily David NP   10 mg at 10/05/22 0932     Current Discharge Medication List        CONTINUE these medications which have CHANGED    Details   metoprolol succinate  (TOPROL-XL) 100 MG 24 hr tablet Take 1 tablet (100 mg total) by mouth once daily. Hold SBP <120  Qty: 90 tablet, Refills: 2    Comments: .  Associated Diagnoses: Essential hypertension           CONTINUE these medications which have NOT CHANGED    Details   albuterol (PROAIR HFA) 90 mcg/actuation inhaler Inhale 2 puffs into the lungs every 4 (four) hours as needed for Wheezing or Shortness of Breath. Rescue  Qty: 8 g, Refills: 6    Associated Diagnoses: Shortness of breath      albuterol-ipratropium (DUO-NEB) 2.5 mg-0.5 mg/3 mL nebulizer solution NEBULIZE ONE vial (3ml) BY MOUTH EVERY 6 HOURS  Qty: 180 mL, Refills: 0    Associated Diagnoses: Pleural effusion; Congestive heart failure, unspecified HF chronicity, unspecified heart failure type; Shortness of breath      amLODIPine (NORVASC) 5 MG tablet Take 1 tablet (5 mg total) by mouth once daily.  Qty: 90 tablet, Refills: 2    Comments: .  Associated Diagnoses: Essential hypertension      calcium acetate 667 mg (169 mg calcium)/5 mL Soln Take by mouth.      furosemide (LASIX) 20 MG tablet Take 1 tablet (20 mg total) by mouth once daily.  Qty: 90 tablet, Refills: 3    Associated Diagnoses: Essential hypertension      multivitamin with minerals tablet Take 1 tablet by mouth once daily.      omega-3 acid ethyl esters (LOVAZA) 1 gram capsule Take 2 capsules (2 g total) by mouth 2 (two) times daily.  Qty: 360 capsule, Refills: 3    Associated Diagnoses: Mixed hyperlipidemia      PFIZER COVID-19 CRISS VACCN,PF, 30 mcg/0.3 mL injection       pravastatin (PRAVACHOL) 10 MG tablet Take 1 tablet (10 mg total) by mouth once daily.  Qty: 90 tablet, Refills: 2    Associated Diagnoses: Mixed hyperlipidemia      ELIE-KAUSHIK RX 1- mg-mg-mcg Tab Take 1 tablet by mouth once daily.      RENAL CAPS 1 mg Cap Take by mouth once daily.      tamsulosin (FLOMAX) 0.4 mg Cap Take 1 capsule (0.4 mg total) by mouth once daily.  Qty: 90 capsule, Refills: 2    Associated Diagnoses: Benign  prostatic hyperplasia, unspecified whether lower urinary tract symptoms present               I have seen and examined this patient within the last 30 days. My clinical findings that support the need for the home health skilled services and home bound status are the following:no   Weakness/numbness causing balance and gait disturbance due to Weakness/Debility making it taxing to leave home.  Requiring assistive device to leave home due to unsteady gait caused by  Weakness/Debility.     Diet:   cardiac diet, renal diet, and 2 gram sodium diet    Labs:      Referrals/ Consults  Physical Therapy to evaluate and treat. Evaluate for home safety and equipment needs; Establish/upgrade home exercise program. Perform / instruct on therapeutic exercises, gait training, transfer training, and Range of Motion.  Occupational Therapy to evaluate and treat. Evaluate home environment for safety and equipment needs. Perform/Instruct on transfers, ADL training, ROM, and therapeutic exercises.   to evaluate for community resources/long-range planning.  Aide to provide assistance with personal care, ADLs, and vital signs.    Activities:   activity as tolerated    Nursing:   Agency to admit patient within 24 hours of hospital discharge unless specified on physician order or at patient request    SN to complete comprehensive assessment including routine vital signs. Instruct on disease process and s/s of complications to report to MD. Review/verify medication list sent home with the patient at time of discharge  and instruct patient/caregiver as needed. Frequency may be adjusted depending on start of care date.     Skilled nurse to perform up to 3 visits PRN for symptoms related to diagnosis    Notify MD if SBP > 160 or < 90; DBP > 90 or < 50; HR > 120 or < 50; Temp > 101; O2 < 88%; Other:       Ok to schedule additional visits based on staff availability and patient request on consecutive days within the home health  episode.    When multiple disciplines ordered:    Start of Care occurs on Sunday - Wednesday schedule remaining discipline evaluations as ordered on separate consecutive days following the start of care.    Thursday SOC -schedule subsequent evaluations Friday and Monday the following week.     Friday - Saturday SOC - schedule subsequent discipline evaluations on consecutive days starting Monday of the following week.    For all post-discharge communication and subsequent orders please contact patient's primary care physician. I  Miscellaneous       Home Health Aide:  Nursing Other: evaluate and treat  , Physical Therapy Other: evaluate and treat , Occupational Therapy Other: evaluate and treat , Medical Social Work Other: evaluate and treat , and Home Health Aide Other: evaluate and treat    Wound Care Orders      I certify that this patient is confined to his home and needs intermittent skilled nursing care, physical therapy, and occupational therapy.  intermittent skilled nursing care, physical therapy, and occupational therapy

## 2022-10-05 NOTE — CONSULTS
Inpatient Radiology Pre-procedure Note    History of Present Illness:  Mandeep Willams is a 86 y.o. male with pertinent PMHx of severe AS, ESRD on HD via LUE brachio-basilic AVF MWF and prior Rt-sided pleural effusion 2018 who is admitted to [facility] with acute onset of SOB/ROMAN 3 days prior with new imaging revealing new Lt > Rt-sided small-to-moderate sized layering pleural effusions which are likely contributing to the pts presenting symptoms requiring image-guided decompression for symptom relief.    A new inpatient IR consult received for US-guided percutaneous Lt posterolateral-approach therapeutic thoracentesis.    Admission H&P reviewed.  Past Medical History:   Diagnosis Date    Dependence on renal dialysis     ESRD (end stage renal disease)     HTN (hypertension)     Hyperlipidemia      Past Surgical History:   Procedure Laterality Date    BACK SURGERY  2005    DIALYSIS FISTULA CREATION  4/2012    left arm    FISTULOGRAM Left 3/22/2022    Procedure: Fistulogram, transradial;  Surgeon: Jude Ordaz MD;  Location: [facility] OR;  Service: Vascular;  Laterality: Left;  RN Pre Op 3-17-22.  May need Chinese  day of procedure. [place]    JOINT REPLACEMENT Right     hip     Review of Systems:   As documented in primary team H&P    Home Meds:   Prior to Admission medications    Medication Sig Start Date End Date Taking? Authorizing Provider   albuterol (PROAIR HFA) 90 mcg/actuation inhaler Inhale 2 puffs into the lungs every 4 (four) hours as needed for Wheezing or Shortness of Breath. Rescue 8/25/22   Karel Baez NP   albuterol-ipratropium (DUO-NEB) 2.5 mg-0.5 mg/3 mL nebulizer solution NEBULIZE ONE vial (3ml) BY MOUTH EVERY 6 HOURS 10/2/21   Guy Mcclelland Jr., MD   amLODIPine (NORVASC) 5 MG tablet Take 1 tablet (5 mg total) by mouth once daily. 5/26/22   Guy Mcclelland Jr., MD   calcium acetate 667 mg (169 mg calcium)/5 mL Soln Take by mouth.    Historical Provider   furosemide (LASIX) 20 MG tablet Take 1  tablet (20 mg total) by mouth once daily. 2/24/22   Ayo Orellana MD   metoprolol succinate (TOPROL-XL) 100 MG 24 hr tablet Take 1 tablet (100 mg total) by mouth once daily. 5/26/22   Guy Mcclelland Jr., MD   multivitamin with minerals tablet Take 1 tablet by mouth once daily.    Historical Provider   omega-3 acid ethyl esters (LOVAZA) 1 gram capsule Take 2 capsules (2 g total) by mouth 2 (two) times daily. 4/2/19   Guy Mcclelland Jr., MD   PFIZER COVID-19 CRISS VACCN,PF, 30 mcg/0.3 mL injection  7/28/22   Historical Provider   pravastatin (PRAVACHOL) 10 MG tablet Take 1 tablet (10 mg total) by mouth once daily. 5/26/22   Guy Mcclelland Jr., MD   ELIE-KAUSHIK RX 1- mg-mg-mcg Tab Take 1 tablet by mouth once daily. 4/14/22   Historical Provider   RENAL CAPS 1 mg Cap Take by mouth once daily. 9/15/22   Historical Provider   tamsulosin (FLOMAX) 0.4 mg Cap Take 1 capsule (0.4 mg total) by mouth once daily. 5/26/22 5/26/23  Guy Mcclelland Jr., MD     Scheduled Meds:    albuterol-ipratropium  3 mL Nebulization Q6H WAKE    mupirocin   Nasal BID    pravastatin  10 mg Oral Daily     Continuous Infusions:   PRN Meds:  Anticoagulants/Antiplatelets: no anticoagulation    Allergies: Review of patient's allergies indicates:  No Known Allergies    Sedation Hx: have not been any systemic reactions    Labs:  No results for input(s): INR in the last 168 hours.    Invalid input(s):  PT,  PTT    Recent Labs   Lab 10/04/22  1132   WBC 3.11*   HGB 11.6*   HCT 34.1*   *   *      Recent Labs   Lab 10/05/22  0518   GLU 85      K 3.8      CO2 27   BUN 29*   CREATININE 5.0*   CALCIUM 8.7   ALT 12   AST 15   ALBUMIN 2.9*   BILITOT 0.9     Vitals:  Temp: 98 °F (36.7 °C) (10/05/22 1055)  Pulse: 60 (10/05/22 1055)  Resp: 16 (10/05/22 1055)  BP: (!) 122/53 (10/05/22 1055)  SpO2: 100 % (10/05/22 1055)     Physical Exam:  General: no acute distress  Mental Status: alert and oriented to person, place and time  HEENT:  normocephalic, atraumatic  Chest: +labored breathing  Heart: regular heart rate  Abdomen: nondistended  Extremity: moves all extremities    A/P:  86 y.o. male with pertinent PMHx of severe AS, ESRD on HD via LUE brachio-basilic AVF MWF and prior Rt-sided pleural effusion 2018 who is admitted to [facility] with acute onset of SOB/ROMAN 3 days prior with new imaging revealing new Lt > Rt-sided small-to-moderate sized layering pleural effusions which are likely contributing to the pts presenting symptoms requiring image-guided decompression for symptom relief.    Acute onset of SOB/ROMAN 2/2 Lt > Rt layering pleural effusions - Will attempt US-guided percutaneous Lt posterolateral-approach therapeutic thoracentesis with local anesthetic only.    Risks (including, but not limited to, pain, bleeding, infection, damage to nearby structures, failure to obtain sufficient material for a diagnosis, the need for additional procedures, and death), benefits, and alternatives were discussed with the patient. All questions were answered to the best of my abilities. The patient wishes to proceed with the procedure. Written informed consent was obtained. DANIEL  utilized given pt reportedly speaks Mandarin only.    Thank you for considering IR for the care of your patient.     Patrick Bustamante MD  Interventional Radiology

## 2022-10-05 NOTE — PLAN OF CARE
Problem: Physical Therapy  Goal: Physical Therapy Goal  Description: Goals to be met by: 10/19/22     Patient will increase functional independence with mobility by performin. Supine to sit with Modified [place]  2. Rolling to Left and Right with Modified [place]  3. Sit to stand transfer with Modified [place]  4. Bed to chair transfer with Modified [place]   5. Gait >500 feet with Modified [place] using Rollator  6. Lower extremity exercise program 2 sets x15 reps per handout, with independence    Outcome: Ongoing, Progressing     Pt ambulated ~200 ft with CGA-SBA using rollator.

## 2022-10-05 NOTE — PLAN OF CARE
10/05/22 1516   Final Note   Assessment Type Final Discharge Note   Anticipated Discharge Disposition Home-Health   Hospital Resources/Appts/Education Provided Post-Acute resouces added to AVS;Appointments scheduled and added to AVS   Post-Acute Status   Post-Acute Authorization Home Health   Home Health Status Set-up Complete/Auth obtained   Pts nurse Macarena notified that the pt can d/c from CM standpoint

## 2022-10-05 NOTE — NURSING
Patient back on floor from IR, sitting up in bed, no complaint of pain, no SOB, family at bedside.

## 2022-10-05 NOTE — PLAN OF CARE
Problem: Occupational Therapy  Goal: Occupational Therapy Goal  Description: Goals to be met by: 10/19/2022     Patient will increase functional independence with ADL's by performing:    LE Dressing with Modified [place].  Grooming while standing at sink with Modified [place].  Toileting from toilet with Modified [place] for hygiene and clothing management.   Bathing with Modified [place].  Step transfer with Modified [place].  Toilet transfer to toilet with Modified [place].  Increase functional strength to 4/5 for improved performance of LE dressing skills.    Outcome: Ongoing, Progressing     Patient co-evaluated today by PT and OT.  Patient to be treated 2-3 x's per week and discharged to home with home health and assistance from family as needed.  DME Needs: None.  Patient may benefit from use of a shower chair if recommended by HHOT; patient declines one at this time.

## 2022-10-05 NOTE — PROGRESS NOTES
HH orders printed and faxed to [facility] for HH to be assigned. TN indicated that the pt was with [facility] in earlier this year. TN entered PHN contact information and advised to call on tomorrow if not contacted by HH agency by then.

## 2022-10-05 NOTE — HOSPITAL COURSE
Admission to observation for shortness of breath. Shortness multifactorial. Pulmonary edema, bilateral pleural effusion, pulmonary hypertension and aortic stenosis.  HD with 2 L removed with some improvement.  CT chest showed bilateral pleural effusion left more than right. 10/5 IR therapeutic thoracentesis 600 mL of thin straw colored fluid removal. Patient seen again after thoracentesis. Repeat CXR showed mod right pleural effusion decreased since yesterday and lungs are better aerated.  Patient felt breathing at baseline after thoracentesis.  PT OT recommended home health. Iwht family support. Niece, Daughter and Son in Law lives in [place] and often check up on patient. Above discussed with son-in-law via phone.  Resume outpatient HD on Friday.

## 2022-10-06 NOTE — CONSULTS
[facility]  Nephrology  Consult Note    Patient Name: Mandeep Willams  MRN: [id number]  Admission Date: 10/4/2022  Hospital Length of Stay: 0 days  Attending Provider: No att. providers found   Primary Care Physician: Guy Mcclelland Jr, MD  Principal Problem:Pleural effusion    Consults  Subjective:     HPI:  Mr Willams is an 86 y.o male with hx of esRD on HD, HTN, HLD, who presented to [facility] 10/4 with chief complaint of SOB. Admitted for volume overload. Nephrology consulted for ESRD on HD/ hypervolemia. Chart reviewed and records obtained. Follows with Dr Henriquez, usual   HD MWF. Reportedly dialyzed full treatment Monday PTA. HE was noted to have large L pleural effusion and IR performed thoracentesis today. Patient ambulating in hallway at time of my exam, feeling much better. Family is with him, reports he is no longer SOB and feeling well. Patient appears in good spirits. He is being discharged today.     Past Medical History:   Diagnosis Date    Dependence on renal dialysis     ESRD (end stage renal disease)     HTN (hypertension)     Hyperlipidemia        Past Surgical History:   Procedure Laterality Date    BACK SURGERY  2005    DIALYSIS FISTULA CREATION  4/2012    left arm    FISTULOGRAM Left 3/22/2022    Procedure: Fistulogram, transradial;  Surgeon: Jude Ordaz MD;  Location: [facility];  Service: Vascular;  Laterality: Left;  RN Pre Op 3-17-22.  May need Chinese  day of procedure. [place]    JOINT REPLACEMENT Right     hip       Review of patient's allergies indicates:  No Known Allergies  No current facility-administered medications for this encounter.     Current Outpatient Medications   Medication    albuterol (PROAIR HFA) 90 mcg/actuation inhaler    albuterol-ipratropium (DUO-NEB) 2.5 mg-0.5 mg/3 mL nebulizer solution    amLODIPine (NORVASC) 5 MG tablet    calcium acetate 667 mg (169 mg calcium)/5 mL Soln    furosemide (LASIX) 20 MG tablet    metoprolol succinate (TOPROL-XL) 100 MG 24 hr  tablet    multivitamin with minerals tablet    omega-3 acid ethyl esters (LOVAZA) 1 gram capsule    PFIZER COVID-19 CRISS VACCN,PF, 30 mcg/0.3 mL injection    pravastatin (PRAVACHOL) 10 MG tablet    ELIE-KAUSHIK RX 1- mg-mg-mcg Tab    RENAL CAPS 1 mg Cap    tamsulosin (FLOMAX) 0.4 mg Cap     Family History       Problem Relation (Age of Onset)    Arthritis Sister    Diabetes Sister    Hypertension Sister          Tobacco Use    Smoking status: Former     Packs/day: 1.00     Years: 10.00     Pack years: 10.00     Types: Cigarettes     Quit date: 1971     Years since quittin.8    Smokeless tobacco: Never   Substance and Sexual Activity    Alcohol use: No    Drug use: No    Sexual activity: Not Currently     Partners: Female     Comment:      Review of Systems   Respiratory:  Negative for shortness of breath.    All other systems reviewed and are negative.  Objective:     Vital Signs (Most Recent):  Temp: 98 °F (36.7 °C) (10/05/22 1551)  Pulse: 67 (10/05/22 1551)  Resp: 16 (10/05/22 1551)  BP: (!) 141/61 (10/05/22 1551)  SpO2: 100 % (10/05/22 155)  O2 Device (Oxygen Therapy): room air (10/05/22 1428)   Vital Signs (24h Range):  Temp:  [97.4 °F (36.3 °C)-98 °F (36.7 °C)] 98 °F (36.7 °C)  Pulse:  [55-68] 67  Resp:  [16-20] 16  SpO2:  [96 %-100 %] 100 %  BP: (103-141)/(53-61) 141/61     Weight: 54.4 kg (120 lb) (10/04/22 1046)  Body mass index is 18.79 kg/m².  Body surface area is 1.6 meters squared.    I/O last 3 completed shifts:  In: 1200 [P.O.:600; Other:600]  Out: 2700 [Other:2700]    Physical Exam  Vitals and nursing note reviewed.   Constitutional:       Appearance: Normal appearance.   HENT:      Head: Normocephalic and atraumatic.      Nose: Nose normal.      Mouth/Throat:      Mouth: Mucous membranes are moist.   Eyes:      General: No scleral icterus.     Extraocular Movements: Extraocular movements intact.      Conjunctiva/sclera: Conjunctivae normal.   Pulmonary:      Effort: Pulmonary  effort is normal.   Abdominal:      General: Abdomen is flat. There is no distension.   Musculoskeletal:      Cervical back: Normal range of motion.      Comments: Ambulating with walker   Skin:     General: Skin is warm and dry.      Findings: No rash.   Neurological:      Mental Status: He is alert.      Comments: Ambulating with walker, speech normal   Psychiatric:         Mood and Affect: Mood normal.         Behavior: Behavior normal.       Significant Labs:  CBC:   Recent Labs   Lab 10/04/22  1132   WBC 3.11*   RBC 3.42*   HGB 11.6*   HCT 34.1*   *   *   MCH 33.9*   MCHC 34.0     CMP:   Recent Labs   Lab 10/05/22  0518   GLU 85   CALCIUM 8.7   ALBUMIN 2.9*   PROT 7.5      K 3.8   CO2 27      BUN 29*   CREATININE 5.0*   ALKPHOS 77   ALT 12   AST 15   BILITOT 0.9     All labs within the past 24 hours have been reviewed.    Significant Imaging:  Labs: Reviewed  X-Ray: Reviewed    Assessment/Plan:   ESRD on HD  - HD yesterday with net 1.5L net UF fluid removal   - resume usual MWF schedule once discharged   - renally dose meds for ESRD    Hypervolemia   - s/p thoracentesis today, clinically improved  - fluid removal with HD as tolerated  - fluid restriction     Anemia of CKD   - No indicatio for Seun, Hgb at goal      Thank you for your consult. I will follow-up with patient. Please contact us if you have any additional questions.    MONSE Rodney  DOS: 10/05/2022  Nephrology  [facility] [place] - Med Surg

## 2022-10-06 NOTE — DISCHARGE SUMMARY
[facility] Medicine  Discharge Summary      Patient Name: Mandeep Willams  MRN: [id number]  Patient Class: OP- Observation  Admission Date: 10/4/2022  Hospital Length of Stay: 0 days  Discharge Date and Time:  10/05/2022 7:09 PM  Attending Physician: No att. providers found   Discharging Provider: Daily David NP  Primary Care Provider: Guy Mcclelland Jr, MD      HPI:   Mandeep Willams is 86-year-old male tiny speaking only with significant history for hypertension, hyperlipidemia, hx of right pleural effusion 2018 improved with HD, hx hydropneumothorax requesting chest tube in 12/2021 and end-stage renal disease Monday Wednesday Friday who presents to the ED for shortness of breath that has been persistent over a few weeks.  No aggravating or relieving factors.  Patient quit smoking long time ago.  Patient lives alone.  Last dialysis was yesterday Monday and 2.5 with 3 L removed.  Denies chest pain headache dizziness.  Chest x-ray showed slight diffuse edema and right lung base pleural effusion and atelectatic changes.  EKG normal sinus rhythm 666 with no evidence of ischemia .  BMP over 3000 and troponin 0.67.      * No surgery found *      Hospital Course:   Admission to observation for shortness of breath. Shortness multifactorial. Pulmonary edema, bilateral pleural effusion, pulmonary hypertension and aortic stenosis.  HD with 2 L removed with some improvement.  CT chest showed bilateral pleural effusion left more than right. 10/5 IR therapeutic thoracentesis 600 mL of thin straw colored fluid removal. Patient seen again after thoracentesis. Repeat CXR showed mod right pleural effusion decreased since yesterday and lungs are better aerated.  Patient felt breathing at baseline after thoracentesis.  PT OT recommended home health. Iwht family support. Niece, Daughter and Son in Law lives in [place] and often check up on patient. Above discussed with son-in-law via phone.  Resume outpatient HD on  Friday.       Goals of Care Treatment Preferences:  Code Status: Full Code       LaPOST: Yes           Consults:   Consults (From admission, onward)        Status Ordering Provider     Inpatient consult to Interventional Radiology  Once        Provider:  Patrick Bustamante MD    Completed DINESH JEFFERSON          No new Assessment & Plan notes have been filed under this hospital service since the last note was generated.  Service: Hospital Medicine    Final Active Diagnoses:    Diagnosis Date Noted POA    PRINCIPAL PROBLEM:  Pleural effusion [J90] 12/04/2018 Yes    ESRD (end stage renal disease) [N18.6]  Yes    Elevated troponin [R77.8] 10/04/2022 Yes    Severe aortic stenosis [I35.0] 10/04/2022 Yes    HTN (hypertension) [I10]  Yes    Hyperlipidemia [E78.5]  Yes      Problems Resolved During this Admission:       Discharged Condition: good    Disposition: Home or Self Care    Follow Up:   Follow-up Information     [facility] Follow up.    Why: Home Health- you will be contacted with assigned agency when completed by your insurance company  Contact information:  [address]  [place] [facility] [address]  [phone]                     Patient Instructions:      Diet renal     Notify your health care provider if you experience any of the following:  temperature >100.4     Notify your health care provider if you experience any of the following:  difficulty breathing or increased cough     Notify your health care provider if you experience any of the following:  increased confusion or weakness     Activity as tolerated       Significant Diagnostic Studies: Labs: All labs within the past 24 hours have been reviewed    Pending Diagnostic Studies:     None         Medications:  Reconciled Home Medications:      Medication List      CHANGE how you take these medications    metoprolol succinate 100 MG 24 hr tablet  Commonly known as: TOPROL-XL  Take 1 tablet (100 mg total) by mouth once  daily. Hold SBP <120  What changed: additional instructions        CONTINUE taking these medications    albuterol 90 mcg/actuation inhaler  Commonly known as: PROAIR HFA  Inhale 2 puffs into the lungs every 4 (four) hours as needed for Wheezing or Shortness of Breath. Rescue     albuterol-ipratropium 2.5 mg-0.5 mg/3 mL nebulizer solution  Commonly known as: DUO-NEB  NEBULIZE ONE vial (3ml) BY MOUTH EVERY 6 HOURS     amLODIPine 5 MG tablet  Commonly known as: NORVASC  Take 1 tablet (5 mg total) by mouth once daily.     calcium acetate(phosphat bind) 667 mg (169 mg calcium)/5 mL Soln  Take by mouth.     furosemide 20 MG tablet  Commonly known as: LASIX  Take 1 tablet (20 mg total) by mouth once daily.     multivitamin with minerals tablet  Take 1 tablet by mouth once daily.     omega-3 acid ethyl esters 1 gram capsule  Commonly known as: LOVAZA  Take 2 capsules (2 g total) by mouth 2 (two) times daily.     PFIZER COVID-19 CRISS VACCN(PF) 30 mcg/0.3 mL injection  Generic drug: COVID-19 vac, criss(Pfizer)(PF)     pravastatin 10 MG tablet  Commonly known as: PRAVACHOL  Take 1 tablet (10 mg total) by mouth once daily.     ELIE-KAUSHIK RX 1- mg-mg-mcg Tab  Generic drug: vit b cmplx 3-fa-vit c-biotin 1- mg-mg-mcg  Take 1 tablet by mouth once daily.     RENAL CAPS 1 mg Cap  Generic drug: vitamin renal formula (B-complex-vitamin c-folic acid)  Take by mouth once daily.     tamsulosin 0.4 mg Cap  Commonly known as: FLOMAX  Take 1 capsule (0.4 mg total) by mouth once daily.            Indwelling Lines/Drains at time of discharge:   Lines/Drains/Airways     Drain  Duration                Hemodialysis AV Fistula Left upper arm -- days                Time spent on the discharge of patient: 35 minutes         Daily David NP  Department of Hospital Medicine  [facility] Surg

## 2022-10-11 NOTE — PROGRESS NOTES
Routine Office Visit    Patient Name: Mandeep Willams    : 1935  MRN: [id number]    Chief Complaint:  Hospital Follow Up    Subjective:  Mandeep is a 86 y.o. male who presents today for hospital follow-up.  Discharge summary is as follows in bold:    [facility] Medicine  Discharge Summary        Patient Name: Mandeep Willams  MRN: [id number]  Patient Class: OP- Observation  Admission Date: 10/4/2022  Hospital Length of Stay: 0 days  Discharge Date and Time:  10/05/2022 7:09 PM  Attending Physician: No att. providers found   Discharging Provider: Daily David NP  Primary Care Provider: Guy Mcclelland Jr, MD        HPI:   Mandeep Willams is 86-year-old male tiny speaking only with significant history for hypertension, hyperlipidemia, hx of right pleural effusion 2018 improved with HD, hx hydropneumothorax requesting chest tube in 2021 and end-stage renal disease  who presents to the ED for shortness of breath that has been persistent over a few weeks.  No aggravating or relieving factors.  Patient quit smoking long time ago.  Patient lives alone.  Last dialysis was yesterday Monday and 2.5 with 3 L removed.  Denies chest pain headache dizziness.  Chest x-ray showed slight diffuse edema and right lung base pleural effusion and atelectatic changes.  EKG normal sinus rhythm 666 with no evidence of ischemia .  BMP over 3000 and troponin 0.67.        * No surgery found *       Hospital Course:   Admission to observation for shortness of breath. Shortness multifactorial. Pulmonary edema, bilateral pleural effusion, pulmonary hypertension and aortic stenosis.  HD with 2 L removed with some improvement.  CT chest showed bilateral pleural effusion left more than right. 10/5 IR therapeutic thoracentesis 600 mL of thin straw colored fluid removal. Patient seen again after thoracentesis. Repeat CXR showed mod right pleural effusion decreased since yesterday and lungs are better aerated.   Patient felt breathing at baseline after thoracentesis.  PT OT recommended home health. Iw family support. Niece, Daughter and Son in Law lives in [place] and often check up on patient. Above discussed with son-in-law via phone.  Resume outpatient HD on Friday.     He presents today with his son-in-law Fletcher for hospital follow-up.  Language line interpretation used, name Eva id [id number].  Since discharge, the patient has still been feeling significantly short of breath at rest and on exertion.  He is compliant with his medications and needs a refill of them.  Had dialysis yesterday and will be getting another round tomorrow (Monday, Wednesday, and Friday).  He does not report any chest pain or palpitations nor leg swelling.  He is wondering if he could take a medicine to help with the symptoms.    Past Medical History  Past Medical History:   Diagnosis Date    Dependence on renal dialysis     ESRD (end stage renal disease)     HTN (hypertension)     Hyperlipidemia        Past Surgical History  Past Surgical History:   Procedure Laterality Date    BACK SURGERY  2005    DIALYSIS FISTULA CREATION  4/2012    left arm    FISTULOGRAM Left 3/22/2022    Procedure: Fistulogram, transradial;  Surgeon: Jude Ordaz MD;  Location: [facility];  Service: Vascular;  Laterality: Left;  RN Pre Op 3-17-22.  May need Chinese  day of procedure. [place]    JOINT REPLACEMENT Right     hip       Family History  Family History   Problem Relation Age of Onset    Diabetes Sister     Hypertension Sister     Arthritis Sister     Cancer Neg Hx     Heart disease Neg Hx     Stroke Neg Hx     Amblyopia Neg Hx     Blindness Neg Hx     Cataracts Neg Hx     Glaucoma Neg Hx     Macular degeneration Neg Hx     Retinal detachment Neg Hx     Strabismus Neg Hx     Thyroid disease Neg Hx        Social History  Social History     Socioeconomic History    Marital status:    Occupational History     Employer: [employer]    Tobacco Use    Smoking status: Former     Packs/day: 1.00     Years: 10.00     Pack years: 10.00     Types: Cigarettes     Quit date: 1971     Years since quittin.8    Smokeless tobacco: Never   Substance and Sexual Activity    Alcohol use: No    Drug use: No    Sexual activity: Not Currently     Partners: Female     Comment:      Social Determinants of Health     Financial Resource Strain: Low Risk     Difficulty of Paying Living Expenses: Not hard at all   Food Insecurity: No Food Insecurity    Worried About Running Out of Food in the Last Year: Never true    Ran Out of Food in the Last Year: Never true   Transportation Needs: No Transportation Needs    Lack of Transportation (Medical): No    Lack of Transportation (Non-Medical): No   Physical Activity: Inactive    Days of Exercise per Week: 0 days    Minutes of Exercise per Session: 0 min   Stress: No Stress Concern Present    Feeling of Stress : Not at all   Social Connections: Socially Isolated    Frequency of Communication with Friends and Family: More than three times a week    Frequency of Social Gatherings with Friends and Family: More than three times a week    Attends Hinduism Services: Never    Active Member of Clubs or Organizations: No    Attends Club or Organization Meetings: Never    Marital Status:    Housing Stability: Low Risk     Unable to Pay for Housing in the Last Year: No    Number of Places Lived in the Last Year: 1    Unstable Housing in the Last Year: No       Current Medications  Current Outpatient Medications on File Prior to Visit   Medication Sig Dispense Refill    albuterol-ipratropium (DUO-NEB) 2.5 mg-0.5 mg/3 mL nebulizer solution NEBULIZE ONE vial (3ml) BY MOUTH EVERY 6 HOURS 180 mL 0    calcium acetate 667 mg (169 mg calcium)/5 mL Soln Take by mouth.      multivitamin with minerals tablet Take 1 tablet by mouth once daily.      omega-3 acid ethyl esters (LOVAZA) 1 gram capsule Take 2 capsules (2 g  total) by mouth 2 (two) times daily. 360 capsule 3    PFIZER COVID-19 CRISS VACCN,PF, 30 mcg/0.3 mL injection       ELIE-KAUSHIK RX 1- mg-mg-mcg Tab Take 1 tablet by mouth once daily.      RENAL CAPS 1 mg Cap Take by mouth once daily.      [DISCONTINUED] albuterol (PROAIR HFA) 90 mcg/actuation inhaler Inhale 2 puffs into the lungs every 4 (four) hours as needed for Wheezing or Shortness of Breath. Rescue 8 g 6    [DISCONTINUED] amLODIPine (NORVASC) 5 MG tablet Take 1 tablet (5 mg total) by mouth once daily. 90 tablet 2    [DISCONTINUED] furosemide (LASIX) 20 MG tablet Take 1 tablet (20 mg total) by mouth once daily. 90 tablet 3    [DISCONTINUED] metoprolol succinate (TOPROL-XL) 100 MG 24 hr tablet Take 1 tablet (100 mg total) by mouth once daily. Hold SBP <120 90 tablet 2    [DISCONTINUED] pravastatin (PRAVACHOL) 10 MG tablet Take 1 tablet (10 mg total) by mouth once daily. 90 tablet 2    [DISCONTINUED] tamsulosin (FLOMAX) 0.4 mg Cap Take 1 capsule (0.4 mg total) by mouth once daily. 90 capsule 2     No current facility-administered medications on file prior to visit.       Allergies   Review of patient's allergies indicates:  No Known Allergies    Review of Systems (Pertinent positives)  Review of Systems   Constitutional:  Negative for chills, diaphoresis, fever, malaise/fatigue and weight loss.   HENT: Negative.     Eyes: Negative.    Respiratory:  Positive for shortness of breath. Negative for cough, hemoptysis, sputum production and wheezing.    Cardiovascular:  Negative for chest pain, palpitations, orthopnea, claudication, leg swelling and PND.   Gastrointestinal: Negative.    Genitourinary: Negative.    Musculoskeletal: Negative.    Skin: Negative.    Neurological: Negative.    Endo/Heme/Allergies: Negative.    Psychiatric/Behavioral: Negative.       BP (!) 120/40 (BP Location: Right arm, Patient Position: Sitting, BP Method: Medium (Manual))   Pulse 64   Temp 97.8 °F (36.6 °C) (Oral)   Resp 18   Ht 5'  "7" (1.702 m)   Wt 56.3 kg (124 lb 1.9 oz)   SpO2 98%   BMI 19.44 kg/m²     Physical Exam  Constitutional:       General: He is not in acute distress.     Appearance: Normal appearance. He is not ill-appearing, toxic-appearing or diaphoretic.   HENT:      Head: Normocephalic and atraumatic.   Eyes:      Extraocular Movements: Extraocular movements intact.      Conjunctiva/sclera: Conjunctivae normal.      Pupils: Pupils are equal, round, and reactive to light.   Cardiovascular:      Rate and Rhythm: Normal rate and regular rhythm.      Pulses: Normal pulses.      Heart sounds: Normal heart sounds.   Pulmonary:      Effort: Pulmonary effort is normal. No tachypnea or accessory muscle usage.      Breath sounds: Examination of the right-upper field reveals decreased breath sounds. Examination of the right-middle field reveals decreased breath sounds. Examination of the right-lower field reveals decreased breath sounds and rales. Decreased breath sounds and rales present. No wheezing or rhonchi.      Comments: Decreased breath sounds noted on whole of right lung fields; some mild crackles noted lower right lung field  Musculoskeletal:         General: No swelling or tenderness. Normal range of motion.   Skin:     General: Skin is warm and dry.      Capillary Refill: Capillary refill takes less than 2 seconds.          Neurological:      General: No focal deficit present.      Mental Status: He is alert and oriented to person, place, and time.   Psychiatric:         Mood and Affect: Mood normal.         Behavior: Behavior normal.        Assessment/Plan:  Mandeep Willams is a 86 y.o. male who presents today for :    Diagnoses and all orders for this visit:    Pleural effusion on right  -     X-Ray Chest PA And Lateral; Future  -     furosemide (LASIX) 20 MG tablet; Take 2 tablets (40 mg total) by mouth once daily.  -     Ambulatory referral/consult to Pulmonology; Future    Essential hypertension  -     furosemide (LASIX) 20 " MG tablet; Take 2 tablets (40 mg total) by mouth once daily.  -     metoprolol succinate (TOPROL-XL) 100 MG 24 hr tablet; Take 1 tablet (100 mg total) by mouth once daily. Hold SBP <120  -     amLODIPine (NORVASC) 5 MG tablet; Take 1 tablet (5 mg total) by mouth once daily.    Shortness of breath  -     furosemide (LASIX) 20 MG tablet; Take 2 tablets (40 mg total) by mouth once daily.  -     Ambulatory referral/consult to Pulmonology; Future  -     Ambulatory referral/consult to Cardiology; Future  -     albuterol (PROAIR HFA) 90 mcg/actuation inhaler; Inhale 2 puffs into the lungs every 4 (four) hours as needed for Wheezing or Shortness of Breath. Rescue    ESRD (end stage renal disease)    Mixed hyperlipidemia  -     pravastatin (PRAVACHOL) 10 MG tablet; Take 1 tablet (10 mg total) by mouth once daily.    Benign prostatic hyperplasia, unspecified whether lower urinary tract symptoms present  -     tamsulosin (FLOMAX) 0.4 mg Cap; Take 1 capsule (0.4 mg total) by mouth once daily.    I had a lengthy discussion with the patient and his son-in-law regarding his symptoms.    Concern for recurring pleural effusion.  Will recheck x-ray today.  Increase Lasix dose.  Will consult cardiology and pulmonology.  Patient is resting comfortably with normal oxygenation.  No need for emergent evaluation today.    Will have patient follow-up in 1 month to reassess symptoms.  Will need to closely monitor blood pressure while on the increase Lasix dose.  Patient states that he does still make urine.    Albuterol refilled for patient as well.    All questions answered.        This office note has been dictated.  This dictation has been generated using M-Modal Fluency Direct dictation; some phonetic errors may occur.   My collaborating physician is Dr. Osiel White.

## 2022-10-12 NOTE — TELEPHONE ENCOUNTER
Spoke with patient's relative, Fletcher, and relayed patient's x-ray results. Instructed to keep his follow-up appointment and he verbalized understanding. No further questions or concerns.

## 2022-10-12 NOTE — TELEPHONE ENCOUNTER
----- Message from Karel Baez NP sent at 10/12/2022  9:44 AM CDT -----  Please call patient and let him know his x-ray does not show any acute problems, however it does showed continued fluid buildup around the right lung.  Continue plan of care.  Follow-up as scheduled thank you

## 2022-11-01 NOTE — PROGRESS NOTES
CARDIOLOGY CLINIC VISIT        HISTORY OF PRESENT ILLNESS:     Mandeep Willams presents for continued care. Last seen 06/24/2021.    Seen 12/08/2020 for evaluation of pleural effusion/edema.  Patient had complaints of shortness of breath over the preceding few days.  Echocardiogram showed normal left ventricular systolic function.  Severe aortic valve stenosis.  Moderate aortic regurgitation.  Mean gradient across aortic valve was 38 mm Hg.  Moderate pulmonary hypertension.  CT of the chest showed a 40% right pneumothorax with moderate right pleural effusion.  We sent him to the hospital for further evaluation.  Pneumothorax persisted after drainage of pleural effusion and insertion of chest tube.  Patient was not a surgical candidate.  Surgery placed a heimlich valve.  Still with persistent shortness of breath.  Blood pressure is controlled.  Anemic.  Pulmonary pressure noted to be moderate.    11/01/2022:  Recent admission with shortness of breath.  Recurrent bilateral pleural effusions.  Therapeutic thoracentesis.  Last echocardiogram showed an ejection fraction of 55%.  Moderate to severe aortic stenosis.  Moderate pulmonary hypertension. Since leaving the hospital his shortness of breath has return.  Worsening.  Symptoms with minimal exertion.  EKG today shows normal sinus rhythm.        Rosita [id number]  CARDIOVASCULAR HISTORY:     Grade 2 diastolic dysfunction  Aortic stenosis    PAST MEDICAL HISTORY:     Past Medical History:   Diagnosis Date    Dependence on renal dialysis     ESRD (end stage renal disease)     HTN (hypertension)     Hyperlipidemia        PAST SURGICAL HISTORY:     Past Surgical History:   Procedure Laterality Date    BACK SURGERY  2005    DIALYSIS FISTULA CREATION  4/2012    left arm    FISTULOGRAM Left 3/22/2022    Procedure: Fistulogram, transradial;  Surgeon: Jude Ordaz MD;  Location: [facility];  Service: Vascular;  Laterality: Left;  RN Pre Op 3-17-22.  May need Chinese   day of procedure. [place]    JOINT REPLACEMENT Right     hip       ALLERGIES AND MEDICATION:   Review of patient's allergies indicates:  No Known Allergies     Medication List            Accurate as of November 1, 2022  9:52 AM. If you have any questions, ask your nurse or doctor.                CONTINUE taking these medications      albuterol 90 mcg/actuation inhaler  Commonly known as: PROAIR HFA  Inhale 2 puffs into the lungs every 4 (four) hours as needed for Wheezing or Shortness of Breath. Rescue     albuterol-ipratropium 2.5 mg-0.5 mg/3 mL nebulizer solution  Commonly known as: DUO-NEB  NEBULIZE ONE vial (3ml) BY MOUTH EVERY 6 HOURS     amLODIPine 5 MG tablet  Commonly known as: NORVASC  Take 1 tablet (5 mg total) by mouth once daily.     calcium acetate(phosphat bind) 667 mg (169 mg calcium)/5 mL Soln     furosemide 20 MG tablet  Commonly known as: LASIX  Take 2 tablets (40 mg total) by mouth once daily.     metoprolol succinate 100 MG 24 hr tablet  Commonly known as: TOPROL-XL  Take 1 tablet (100 mg total) by mouth once daily. Hold SBP <120     multivitamin with minerals tablet     omega-3 acid ethyl esters 1 gram capsule  Commonly known as: LOVAZA  Take 2 capsules (2 g total) by mouth 2 (two) times daily.     PFIZER COVID-19 CRISS VACCN(PF) 30 mcg/0.3 mL injection  Generic drug: COVID-19 vac, criss(Pfizer)(PF)     pravastatin 10 MG tablet  Commonly known as: PRAVACHOL  Take 1 tablet (10 mg total) by mouth once daily.     ELIE-KAUSHIK RX 1- mg-mg-mcg Tab  Generic drug: vit b cmplx 3-fa-vit c-biotin 1- mg-mg-mcg     RENAL CAPS 1 mg Cap  Generic drug: vitamin renal formula (B-complex-vitamin c-folic acid)     tamsulosin 0.4 mg Cap  Commonly known as: FLOMAX  Take 1 capsule (0.4 mg total) by mouth once daily.              SOCIAL HISTORY:     Social History     Socioeconomic History    Marital status:    Occupational History     Employer: [employer]   Tobacco Use    Smoking  status: Former     Packs/day: 1.00     Years: 10.00     Pack years: 10.00     Types: Cigarettes     Quit date: 1971     Years since quittin.9    Smokeless tobacco: Never   Substance and Sexual Activity    Alcohol use: No    Drug use: No    Sexual activity: Not Currently     Partners: Female     Comment:      Social Determinants of Health     Financial Resource Strain: Low Risk     Difficulty of Paying Living Expenses: Not hard at all   Food Insecurity: No Food Insecurity    Worried About Running Out of Food in the Last Year: Never true    Ran Out of Food in the Last Year: Never true   Transportation Needs: No Transportation Needs    Lack of Transportation (Medical): No    Lack of Transportation (Non-Medical): No   Physical Activity: Inactive    Days of Exercise per Week: 0 days    Minutes of Exercise per Session: 0 min   Stress: No Stress Concern Present    Feeling of Stress : Not at all   Social Connections: Socially Isolated    Frequency of Communication with Friends and Family: More than three times a week    Frequency of Social Gatherings with Friends and Family: More than three times a week    Attends Congregational Services: Never    Active Member of Clubs or Organizations: No    Attends Club or Organization Meetings: Never    Marital Status:    Housing Stability: Low Risk     Unable to Pay for Housing in the Last Year: No    Number of Places Lived in the Last Year: 1    Unstable Housing in the Last Year: No       FAMILY HISTORY:     Family History   Problem Relation Age of Onset    Diabetes Sister     Hypertension Sister     Arthritis Sister     Cancer Neg Hx     Heart disease Neg Hx     Stroke Neg Hx     Amblyopia Neg Hx     Blindness Neg Hx     Cataracts Neg Hx     Glaucoma Neg Hx     Macular degeneration Neg Hx     Retinal detachment Neg Hx     Strabismus Neg Hx     Thyroid disease Neg Hx        REVIEW OF SYSTEMS:   Review of Systems   Constitutional:  Positive for malaise/fatigue.  "Negative for chills, diaphoresis, fever and weight loss.   Eyes:  Negative for blurred vision and pain.   Respiratory:  Positive for shortness of breath. Negative for sputum production and wheezing.    Cardiovascular:  Negative for chest pain, palpitations, orthopnea, claudication, leg swelling and PND.   Gastrointestinal:  Negative for abdominal pain, heartburn, nausea and vomiting.   Musculoskeletal:  Negative for back pain, falls, joint pain, myalgias and neck pain.   Neurological:  Positive for weakness. Negative for dizziness, tingling, tremors, speech change, focal weakness, loss of consciousness and headaches.   Endo/Heme/Allergies:  Does not bruise/bleed easily.   Psychiatric/Behavioral:  Negative for depression, memory loss and substance abuse. The patient is not nervous/anxious.      PHYSICAL EXAM:     Vitals:    11/01/22 0924   BP: (!) 122/48   Pulse: 66   Resp: 19    Body mass index is 19.22 kg/m².  Weight: 55.7 kg (122 lb 11 oz)   Height: 5' 7" (170.2 cm)     Physical Exam  Vitals reviewed.   Constitutional:       General: He is not in acute distress.     Appearance: He is not diaphoretic.   Neck:      Vascular: No carotid bruit or JVD.   Cardiovascular:      Rate and Rhythm: Normal rate and regular rhythm.      Pulses: Normal pulses.      Heart sounds: Murmur heard.   Crescendo systolic murmur is present with a grade of 3/6.   Pulmonary:      Effort: Pulmonary effort is normal.      Breath sounds: Examination of the right-lower field reveals decreased breath sounds. Examination of the left-lower field reveals decreased breath sounds. Decreased breath sounds present.   Abdominal:      General: Bowel sounds are normal.      Palpations: Abdomen is soft.      Tenderness: There is no abdominal tenderness.   Musculoskeletal:      Right lower leg: No edema.      Left lower leg: No edema.   Neurological:      Mental Status: He is alert.       DATA:     EKG (personally reviewed tracing):  11/1/2022 - NSR, poor " quality    Laboratory:  CBC:  Recent Labs   Lab 05/16/22  0537 05/17/22  0332 10/04/22  1132   WBC 7.69 4.67 3.11 L   Hemoglobin 10.9 L 9.6 L 11.6 L   Hematocrit 33.0 L 29.0 L 34.1 L   Platelets 127 L 93 L 101 L       CHEMISTRIES:  Recent Labs   Lab 01/23/22 2229 01/24/22  0540 01/26/22  0922 05/16/22  0537 05/17/22  0332 10/04/22  1132 10/05/22  0518   Glucose 155 H   < > 96 109 94 126 H 85   Sodium 140   < > 135 L 143 136 141 136   Potassium 4.4   < > 3.9 4.4 3.7 3.7 3.8    H   < > 59 H 62 H 34 H 39 H 29 H   Creatinine 10.9 H   < > 6.0 H 7.8 H 5.1 H 6.3 H 5.0 H   eGFR if  4 A   < > 9 A 7 A 11 A  --   --    eGFR if non  4 A   < > 8 A 6 A 10 A  --   --    Calcium 8.0 L   < > 8.4 L 8.6 L 7.9 L 9.0 8.7   Magnesium 2.5  --  2.4 2.2  --   --   --     < > = values in this interval not displayed.       CARDIAC BIOMARKERS:  Recent Labs   Lab 05/16/22 0537 10/04/22  1132 10/04/22  2151   Troponin I 0.023 0.067 H 0.054 H       COAGS:  Recent Labs   Lab 12/05/20  0936 01/07/21  1859 01/24/22  0104   INR 1.0 1.0 1.1       LIPIDS/LFTS:  Recent Labs   Lab 12/03/19  1120 12/14/19  1128 05/17/22  0332 10/04/22  1132 10/05/22  0518   Cholesterol 184  --   --   --   --    Triglycerides 149  --   --   --   --    HDL 53  --   --   --   --    LDL Cholesterol 101.2  --   --   --   --    Non-HDL Cholesterol 131  --   --   --   --    AST 20   < > 19 15 15   ALT 19   < > 18 13 12    < > = values in this interval not displayed.       Cardiovascular Testing:      Echocardiogram 10/05/2022:    The left ventricle is normal in size with normal systolic function.  The estimated ejection fraction is 55%.  Grade II left ventricular diastolic dysfunction.  Normal right ventricular size with normal right ventricular systolic function.  Moderate left atrial enlargement.  Moderate right atrial enlargement.  There is moderate-to-severe aortic valve stenosis.  Aortic valve area is 1.32 cm2; peak velocity is 4.08  m/s; mean gradient is 42 mmHg.  Mild-to-moderate aortic regurgitation.  Moderate mitral regurgitation.  Moderate tricuspid regurgitation.  Normal central venous pressure (3 mmHg).  The estimated PA systolic pressure is 59 mmHg.  There is pulmonary hypertension.    Echo 12/11/18  Normal left ventricular systolic function. The estimated ejection fraction is 65%  No wall motion abnormalities.  Grade II (moderate) left ventricular diastolic dysfunction consistent with pseudonormalization.  Mild aortic regurgitation.  Moderate aortic valve stenosis.  Aortic valve area is 1.25 cm2; peak velocity is 2.68 m/s; mean gradient is 18.63 mmHg.  Mild mitral regurgitation.  Trace tricuspid regurgitation.  The estimated PA systolic pressure is 29.90 mm Hg     Stress test 12/11/18  Normal Kayleigh MPI  The perfusion scan is free of evidence from myocardial ischemia or injury.  There is a mild intensity fixed defect in the inferobasilar wall of the left ventricle secondary to diaphragm attenuation.  An ejection fraction of 51 % at rest  LV cavity size at rest is normal.  Resting wall motion is physiologic.    ASSESSMENT:     1.  Shortness of breath  2.  Mod/Severe aortic stenosis  3.  Pulmonary hypertension  4.  End-stage renal disease  5.  Hypertension  6.  Hyperlipidemia  7.  Coronary calcifications seen on CT scan  8.  Aortic atherosclerosis  9.  Pleural effusions  10. Anemia    PLAN:     1.  Shortness of breath: Hx of pleural effusions. Thoracentesis last month. Check CXR.  2.  Aortic stenosis:  Mod/severe by last echo  3.  Hypertension:  Continue current management  4.  Hyperlipidemia:  Continue current management  5.  Return to clinic 6 weeks.           Lupillo Marrero MD, MPH, FACC, [facility]

## 2022-11-05 PROBLEM — E87.6 HYPOKALEMIA: Status: ACTIVE | Noted: 2022-01-01

## 2022-11-05 PROBLEM — R09.02 HYPOXIA: Status: ACTIVE | Noted: 2022-01-01

## 2022-11-05 PROBLEM — I27.20 PULMONARY HYPERTENSION: Status: ACTIVE | Noted: 2022-01-01

## 2022-11-05 NOTE — CONSULTS
86 y o m with hx of esrd who dialyzes MWF [facility] under Dr Humberto Cross. Ahs hx of PA htn HTN anemia 2nd hyeperpth and Ao stenosis     Admitted with dyspnea x3 days     Renal consult requested to help with care.    Pt tells me that he does not feel like he needs HD today.    Denies [facility] ENT GI  RHEUM OR DERM SX    Past Medical History:   Diagnosis Date    Dependence on renal dialysis     ESRD (end stage renal disease)     HTN (hypertension)     Hyperlipidemia      Past Surgical History:   Procedure Laterality Date    BACK SURGERY      DIALYSIS FISTULA CREATION  2012    left arm    FISTULOGRAM Left 3/22/2022    Procedure: Fistulogram, transradial;  Surgeon: Jude Ordaz MD;  Location: [facility] OR;  Service: Vascular;  Laterality: Left;  RN Pre Op 3-17-22.  May need Chinese  day of procedure. [place]    JOINT REPLACEMENT Right     hip     Review of patient's allergies indicates:  No Known Allergies    Social History     Socioeconomic History    Marital status:    Occupational History     Employer: [employer]   Tobacco Use    Smoking status: Former     Packs/day: 1.00     Years: 10.00     Pack years: 10.00     Types: Cigarettes     Quit date: 1971     Years since quittin.9    Smokeless tobacco: Never   Substance and Sexual Activity    Alcohol use: No    Drug use: No    Sexual activity: Not Currently     Partners: Female     Comment:      Social Determinants of Health     Financial Resource Strain: Low Risk     Difficulty of Paying Living Expenses: Not hard at all   Food Insecurity: No Food Insecurity    Worried About Running Out of Food in the Last Year: Never true    Ran Out of Food in the Last Year: Never true   Transportation Needs: No Transportation Needs    Lack of Transportation (Medical): No    Lack of Transportation (Non-Medical): No   Physical Activity: Inactive    Days of Exercise per Week: 0 days    Minutes of Exercise per Session: 0 min   Stress:  No Stress Concern Present    Feeling of Stress : Not at all   Social Connections: Socially Isolated    Frequency of Communication with Friends and Family: More than three times a week    Frequency of Social Gatherings with Friends and Family: More than three times a week    Attends Christian Services: Never    Active Member of Clubs or Organizations: No    Attends Club or Organization Meetings: Never    Marital Status:    Housing Stability: Low Risk     Unable to Pay for Housing in the Last Year: No    Number of Places Lived in the Last Year: 1    Unstable Housing in the Last Year: No     Family History   Problem Relation Age of Onset    Diabetes Sister     Hypertension Sister     Arthritis Sister     Cancer Neg Hx     Heart disease Neg Hx     Stroke Neg Hx     Amblyopia Neg Hx     Blindness Neg Hx     Cataracts Neg Hx     Glaucoma Neg Hx     Macular degeneration Neg Hx     Retinal detachment Neg Hx     Strabismus Neg Hx     Thyroid disease Neg Hx        Current Facility-Administered Medications   Medication    amLODIPine tablet 5 mg    furosemide injection 40 mg    heparin (porcine) injection 5,000 Units    metoprolol succinate (TOPROL-XL) 24 hr tablet 100 mg    pravastatin tablet 10 mg    tamsulosin 24 hr capsule 0.4 mg       LABS    Recent Results (from the past 24 hour(s))   ISTAT PROCEDURE    Collection Time: 11/05/22  9:39 AM   Result Value Ref Range    POC PH 7.512 (H) 7.35 - 7.45    POC PCO2 50.0 (H) 35 - 45 mmHg    POC PO2 21 (L) 40 - 60 mmHg    POC HCO3 40.0 (H) 24 - 28 mmol/L    POC BE 15 -2 to 2 mmol/L    POC SATURATED O2 39 (L) 95 - 100 %    POC TCO2 42 (H) 24 - 29 mmol/L    Sample VENOUS     Site RR     Allens Test N/A    CBC auto differential    Collection Time: 11/05/22  9:42 AM   Result Value Ref Range    WBC 3.09 (L) 3.90 - 12.70 K/uL    RBC 3.37 (L) 4.60 - 6.20 M/uL    Hemoglobin 11.4 (L) 14.0 - 18.0 g/dL    Hematocrit 34.0 (L) 40.0 - 54.0 %     (H) 82 - 98 fL    MCH 33.8 (H) 27.0 -  31.0 pg    MCHC 33.5 32.0 - 36.0 g/dL    RDW 16.6 (H) 11.5 - 14.5 %    Platelets 98 (L) 150 - 450 K/uL    MPV 10.4 9.2 - 12.9 fL    Immature Granulocytes 0.0 0.0 - 0.5 %    Gran # (ANC) 1.6 (L) 1.8 - 7.7 K/uL    Immature Grans (Abs) 0.00 0.00 - 0.04 K/uL    Lymph # 0.9 (L) 1.0 - 4.8 K/uL    Mono # 0.3 0.3 - 1.0 K/uL    Eos # 0.3 0.0 - 0.5 K/uL    Baso # 0.03 0.00 - 0.20 K/uL    nRBC 0 0 /100 WBC    Gran % 51.4 38.0 - 73.0 %    Lymph % 28.5 18.0 - 48.0 %    Mono % 11.0 4.0 - 15.0 %    Eosinophil % 8.1 (H) 0.0 - 8.0 %    Basophil % 1.0 0.0 - 1.9 %    Differential Method Automated    Comprehensive metabolic panel    Collection Time: 11/05/22  9:42 AM   Result Value Ref Range    Sodium 143 136 - 145 mmol/L    Potassium 3.2 (L) 3.5 - 5.1 mmol/L    Chloride 97 95 - 110 mmol/L    CO2 34 (H) 23 - 29 mmol/L    Glucose 102 70 - 110 mg/dL    BUN 28 (H) 8 - 23 mg/dL    Creatinine 5.7 (H) 0.5 - 1.4 mg/dL    Calcium 9.1 8.7 - 10.5 mg/dL    Total Protein 7.8 6.0 - 8.4 g/dL    Albumin 3.0 (L) 3.5 - 5.2 g/dL    Total Bilirubin 1.0 0.1 - 1.0 mg/dL    Alkaline Phosphatase 72 55 - 135 U/L    AST 15 10 - 40 U/L    ALT 15 10 - 44 U/L    Anion Gap 12 8 - 16 mmol/L    eGFR 9 (A) >60 mL/min/1.73 m^2   Troponin I #1    Collection Time: 11/05/22  9:42 AM   Result Value Ref Range    Troponin I 0.040 (H) 0.000 - 0.026 ng/mL   B-Type natriuretic peptide (BNP)    Collection Time: 11/05/22  9:42 AM   Result Value Ref Range    BNP 2,947 (H) 0 - 99 pg/mL   Magnesium    Collection Time: 11/05/22  9:42 AM   Result Value Ref Range    Magnesium 2.0 1.6 - 2.6 mg/dL   Phosphorus    Collection Time: 11/05/22  9:42 AM   Result Value Ref Range    Phosphorus 2.6 (L) 2.7 - 4.5 mg/dL   Troponin I    Collection Time: 11/05/22  1:14 PM   Result Value Ref Range    Troponin I 0.035 (H) 0.000 - 0.026 ng/mL   ]    No intake/output data recorded.    Vitals:    11/05/22 0952 11/05/22 1010 11/05/22 1124 11/05/22 1220   BP:   (!) 112/54 (!) 142/63   Pulse: 72 71 64 67    Resp: 16 14 12 18   Temp:       TempSrc:    Oral   SpO2: (!) 92% 100% 100% 96%   Weight:       Height:           No Jvd, Thyromegaly or Lymphadenopathy  Lungs: Fairly clear anteriorly and laterally  Cor: RRR no G or rubs  Abd: Soft benign good bowel sounds non tender  Ext: No E C C  L arm avf with fair p-b-t     A)  ESRD seems stable and well dialyzed   Hypok Likely to have had HD yesterday and not eating much  R Pleural effusion seems chronic  Ao stenosis  Pulm art htn   Anemia of ckd  2nd hyperpth   Low-shani WBC       P)    Renal Diet  Home meds  Protect access  HD again on monday  EPO prn   Binders prn   Adjust all meds to the degree of renal fx  Close follow up I/O and weights  Maintain Hydration

## 2022-11-05 NOTE — Clinical Note
Diagnosis: Chest pain [245111]   Admitting Provider:: PABLO DRAPER [[id number]]   Future Attending Provider: PABLO DRAPER [[id number]]   Reason for IP Medical Treatment  (Clinical interventions that can only be accomplished in the IP setting? ) :: hypoxia   Estimated Length of Stay:: 2 midnights   I certify that Inpatient services for greater than or equal to 2 midnights are medically necessary:: Yes   Plans for Post-Acute care--if anticipated (pick the single best option):: A. No post acute care anticipated at this time

## 2022-11-05 NOTE — NURSING
Received report from CÉSAR Aranda in ER. Patient is AAOx3 and skin intact. Patient is on hemodialysis MWF. /54, HR 64, O2 100 on 2L NC. Awaiting patient's arrival to floor.

## 2022-11-05 NOTE — H&P
[facility] Medicine  History & Physical    Patient Name: Mandeep Willams  MRN: [id number]  Patient Class: IP- Inpatient  Admission Date: 11/5/2022  Attending Physician: Al Allison MD   Primary Care Provider: Guy Mcclelland Jr, MD         Patient information was obtained from patient and ER records.     Subjective:     Principal Problem:Hypoxia    Chief Complaint:   Chief Complaint   Patient presents with    Shortness of Breath     EMS called to 87yo male that has been SOB all night. Had dialysis yesterday. RA spo2 was 90% on Ems arrival. 99% on 3L NC at triage        HPI: Mr. Willams is a 85 yo M who presents with a CC of SOB for 3 days. EMS was called and he was found to have an 02 sat of 90% on RA. Patient is an ESRD patient with a history of mod-severe AS, pleural effusions as well as pulmonary hypertension.  He lives at home by himself and does not wear 02.  He is very comfortable on NC and has no other complaints. He was in observation here early last month for the same.        Past Medical History:   Diagnosis Date    Dependence on renal dialysis     ESRD (end stage renal disease)     HTN (hypertension)     Hyperlipidemia        Past Surgical History:   Procedure Laterality Date    BACK SURGERY  2005    DIALYSIS FISTULA CREATION  4/2012    left arm    FISTULOGRAM Left 3/22/2022    Procedure: Fistulogram, transradial;  Surgeon: Jude Ordaz MD;  Location: [facility];  Service: Vascular;  Laterality: Left;  RN Pre Op 3-17-22.  May need Chinese  day of procedure. CA    JOINT REPLACEMENT Right     hip       Review of patient's allergies indicates:  No Known Allergies    No current facility-administered medications on file prior to encounter.     Current Outpatient Medications on File Prior to Encounter   Medication Sig    albuterol (PROAIR HFA) 90 mcg/actuation inhaler Inhale 2 puffs into the lungs every 4 (four) hours as needed for Wheezing or Shortness of Breath. Rescue     albuterol-ipratropium (DUO-NEB) 2.5 mg-0.5 mg/3 mL nebulizer solution NEBULIZE ONE vial (3ml) BY MOUTH EVERY 6 HOURS    amLODIPine (NORVASC) 5 MG tablet Take 1 tablet (5 mg total) by mouth once daily.    furosemide (LASIX) 20 MG tablet Take 2 tablets (40 mg total) by mouth once daily.    metoprolol succinate (TOPROL-XL) 100 MG 24 hr tablet Take 1 tablet (100 mg total) by mouth once daily. Hold SBP <120    pravastatin (PRAVACHOL) 10 MG tablet Take 1 tablet (10 mg total) by mouth once daily.    RENAL CAPS 1 mg Cap Take by mouth once daily.    tamsulosin (FLOMAX) 0.4 mg Cap Take 1 capsule (0.4 mg total) by mouth once daily.    calcium acetate 667 mg (169 mg calcium)/5 mL Soln Take by mouth.    multivitamin with minerals tablet Take 1 tablet by mouth once daily.    omega-3 acid ethyl esters (LOVAZA) 1 gram capsule Take 2 capsules (2 g total) by mouth 2 (two) times daily.    PFIZER COVID-19 CRISS VACCN,PF, 30 mcg/0.3 mL injection     ELIE-KAUSHIK RX 1- mg-mg-mcg Tab Take 1 tablet by mouth once daily.     Family History       Problem Relation (Age of Onset)    Arthritis Sister    Diabetes Sister    Hypertension Sister          Tobacco Use    Smoking status: Former     Packs/day: 1.00     Years: 10.00     Pack years: 10.00     Types: Cigarettes     Quit date: 1971     Years since quittin.9    Smokeless tobacco: Never   Substance and Sexual Activity    Alcohol use: No    Drug use: No    Sexual activity: Not Currently     Partners: Female     Comment:      Review of Systems   Constitutional:  Negative for activity change and appetite change.   HENT:  Negative for congestion and dental problem.    Eyes:  Negative for pain, discharge and itching.   Respiratory:  Positive for shortness of breath. Negative for apnea, choking, chest tightness and wheezing.    Cardiovascular:  Negative for chest pain.   Gastrointestinal:  Negative for abdominal pain.   Endocrine: Negative for cold  intolerance.   Genitourinary:  Negative for difficulty urinating and dysuria.   Musculoskeletal:  Negative for arthralgias.   Neurological:  Negative for dizziness and facial asymmetry.   Psychiatric/Behavioral:  Negative for agitation and behavioral problems.    Objective:     Vital Signs (Most Recent):  Temp: 97.9 °F (36.6 °C) (11/05/22 0915)  Pulse: 67 (11/05/22 1220)  Resp: 18 (11/05/22 1220)  BP: (!) 142/63 (11/05/22 1220)  SpO2: 96 % (11/05/22 1220)   Vital Signs (24h Range):  Temp:  [97.9 °F (36.6 °C)] 97.9 °F (36.6 °C)  Pulse:  [64-75] 67  Resp:  [12-23] 18  SpO2:  [88 %-100 %] 96 %  BP: (112-143)/(54-65) 142/63     Weight: 52.2 kg (115 lb)  Body mass index is 21.03 kg/m².    Physical Exam  Vitals and nursing note reviewed.   Constitutional:       General: He is not in acute distress.     Appearance: Normal appearance. He is not ill-appearing, toxic-appearing or diaphoretic.   HENT:      Head: Normocephalic and atraumatic.      Nose: No congestion.      Mouth/Throat:      Pharynx: No oropharyngeal exudate.   Eyes:      Conjunctiva/sclera: Conjunctivae normal.   Cardiovascular:      Rate and Rhythm: Normal rate and regular rhythm.   Pulmonary:      Effort: Pulmonary effort is normal. No respiratory distress.   Abdominal:      General: There is no distension.      Tenderness: There is no abdominal tenderness.   Skin:     General: Skin is warm and dry.   Neurological:      Mental Status: He is alert and oriented to person, place, and time.   Psychiatric:         Mood and Affect: Mood normal.         Behavior: Behavior normal.           Significant Labs: All pertinent labs within the past 24 hours have been reviewed.  BMP:   Recent Labs   Lab 11/05/22  0942         K 3.2*   CL 97   CO2 34*   BUN 28*   CREATININE 5.7*   CALCIUM 9.1   MG 2.0     CBC:   Recent Labs   Lab 11/05/22  0942   WBC 3.09*   HGB 11.4*   HCT 34.0*   PLT 98*       Significant Imaging: CXR- possible edema. Bilateral  effusions.    Assessment/Plan:     * Hypoxia  No respiratory distress or failure. Patient 90% on RA.  Now 96 on NC. Very comfortable. Cause of hypoxia multi-factorial including mod- sev AS, pulmonary edema, bilateral effusions as well as pulmonary hypertension.  Lasix for now. Will consider thoracentesis.      Pulmonary hypertension  PA pressure of 59        Hypokalemia  Will replace       Severe aortic stenosis  As discussed above.  Recent echo      Elevated troponin  Likely elevated in setting of renal failure.  Will trend out       Thrombocytopenia  No acute issues       Debility  May benefit from H/H. Uses some kind of rolling walker at home       Anemia of chronic disorder  No acute issues      Tobacco use disorder        Pleural effusion  May consider thora      Hyperlipidemia  Resume statin      ESRD (end stage renal disease)  Completed dialysis yesterday.  Will consult nephrology      HTN (hypertension)  Restarted home meds         VTE Risk Mitigation (From admission, onward)    None             Al Rosales MD  Department of Hospital Medicine   [facility] - Telemetry

## 2022-11-05 NOTE — PHARMACY MED REC
"Admission Medication History     The home medication history was taken by Tara Castro.    You may go to "Admission" then "Reconcile Home Medications" tabs to review and/or act upon these items.     The home medication list has been updated by the Pharmacy department.   Please read ALL comments highlighted in yellow.   Please address this information as you see fit.    Feel free to contact us if you have any questions or require assistance.    Medications listed below were obtained from: Patient/family and Analytic software- [employer]    The medication reconciliation was completed using [employer] with assistance of Tomasa Riana ID#[id number] by the patient's bedside.  Patient is unfamiliar with medication and therefore unable to acquire accurate information.      Tara Castro  [phone]                .        "

## 2022-11-05 NOTE — LETTER
November 10, 2022         [place] [id number]  Phone: [phone]       Patient: Mandeep Willams   YOB: 1935  Date of Visit: 11/10/2022    To Whom It May Concern:    Riri Willams  was at [facility] on 11/10/2022. The patient has had change in medical condition and is in need of continuous care after discharge from the hospital and has limited support at home.  Any assistance that could be provided to have his family return to his home to provide care would benefit the patient's recovery and improve his medical condition.  If you have any questions or concerns, or if I can be of further assistance, please do not hesitate to contact me.    Sincerely,        Pam Tracey RN   Case

## 2022-11-05 NOTE — ASSESSMENT & PLAN NOTE
Completed dialysis yesterday.  Will consult nephrology     Per Patient's request,  completed and faxed [employer] [facility] [employer] request for lodging dates 1/15-1/16. [employer] [facility] [employer] will contact Patient for confirmation of reservation.  will continue to provide support as needed.    Soo Yeon Han, Northern [facility] Inland HospitalSW  Pager:  [phone]

## 2022-11-05 NOTE — NURSING
Patient arrived to floor in stable condition. Upon arrival to floor, cardiac monitor was applied, patient oriented to room, call bell within reach and bed in lowest position. Visualized and assessed patient's overall condition and appearance. No complaints and acute distress noted. Will continue to monitor throughout shift.

## 2022-11-05 NOTE — PLAN OF CARE
Problem: Adult Inpatient Plan of Care  Goal: Plan of Care Review  Outcome: Ongoing, Progressing  Goal: Optimal Comfort and Wellbeing  Outcome: Ongoing, Progressing  Intervention: Monitor Pain and Promote Comfort  Flowsheets (Taken 11/5/2022 1820)  Pain Management Interventions:   care clustered   pillow support provided   position adjusted

## 2022-11-05 NOTE — ED PROVIDER NOTES
"Encounter Date: 11/5/2022       History     Chief Complaint   Patient presents with    Shortness of Breath     EMS called to 85yo male that has been SOB all night. Had dialysis yesterday. RA spo2 was 90% on Ems arrival. 99% on 3L NC at triage     86-year-old male, Mandarin speaker, presents for evaluation of shortness of breath and a feeling of "a rock on my chest "located on the left side, constant, ongoing since all night, nonradiating, nothing makes it better or worse.  He has a history of end-stage renal disease on dialysis, last dialysis yesterday was normal.  He also has a history of recurrent pleural effusion on the left side. his pulmonologist ordered a chest x-ray recently but does not have results from that exam.  Patient denies fevers or chills.  He denies  nausea or vomiting.    Review of patient's allergies indicates:  No Known Allergies  Past Medical History:   Diagnosis Date    Dependence on renal dialysis     ESRD (end stage renal disease)     HTN (hypertension)     Hyperlipidemia      Past Surgical History:   Procedure Laterality Date    BACK SURGERY  2005    DIALYSIS FISTULA CREATION  4/2012    left arm    FISTULOGRAM Left 3/22/2022    Procedure: Fistulogram, transradial;  Surgeon: Jude Ordaz MD;  Location: [facility];  Service: Vascular;  Laterality: Left;  RN Pre Op 3-17-22.  May need Chinese  day of procedure. CA    JOINT REPLACEMENT Right     hip     Family History   Problem Relation Age of Onset    Diabetes Sister     Hypertension Sister     Arthritis Sister     Cancer Neg Hx     Heart disease Neg Hx     Stroke Neg Hx     Amblyopia Neg Hx     Blindness Neg Hx     Cataracts Neg Hx     Glaucoma Neg Hx     Macular degeneration Neg Hx     Retinal detachment Neg Hx     Strabismus Neg Hx     Thyroid disease Neg Hx      Social History     Tobacco Use    Smoking status: Former     Packs/day: 1.00     Years: 10.00     Pack years: 10.00     Types: Cigarettes     Quit date: 12/4/1971    "  Years since quittin.9    Smokeless tobacco: Never   Substance Use Topics    Alcohol use: No    Drug use: No     Review of Systems   Constitutional:  Negative for activity change and appetite change.   HENT:  Negative for congestion and drooling.    Eyes:  Negative for discharge and itching.   Respiratory:  Positive for shortness of breath. Negative for cough and chest tightness.    Cardiovascular:  Positive for chest pain. Negative for leg swelling.   Gastrointestinal:  Negative for abdominal distention and abdominal pain.   Genitourinary:  Negative for difficulty urinating and dysuria.   Musculoskeletal:  Negative for arthralgias.   Skin:  Negative for color change and pallor.   Neurological:  Negative for dizziness and facial asymmetry.   Psychiatric/Behavioral:  Negative for agitation and behavioral problems.      Physical Exam     Initial Vitals [22 0915]   BP Pulse Resp Temp SpO2   139/65 72 20 97.9 °F (36.6 °C) 99 %      MAP       --         Physical Exam    Nursing note and vitals reviewed.  Constitutional: He appears well-developed and well-nourished.   HENT:   Head: Normocephalic and atraumatic.   Mouth/Throat: Oropharynx is clear and moist.   Eyes: Conjunctivae and EOM are normal. Pupils are equal, round, and reactive to light.   Neck: No thyromegaly present.   Normal range of motion.  Cardiovascular:  Normal rate, regular rhythm and intact distal pulses.           Pulmonary/Chest: No respiratory distress. He has no wheezes. He has rales.   Patient requires 3 L nasal cannula to maintain oxygen sats of 94%  Breath sounds decreased on the left   Abdominal: Abdomen is soft. Bowel sounds are normal. He exhibits no distension. There is no abdominal tenderness.   Musculoskeletal:         General: No tenderness or edema. Normal range of motion.      Cervical back: Normal range of motion.     Neurological: He is alert and oriented to person, place, and time. He has normal strength. No cranial nerve  deficit.   Skin: Skin is warm and dry. No rash noted.   Psychiatric: He has a normal mood and affect. His behavior is normal. Thought content normal.       ED Course   Procedures  Labs Reviewed   CBC W/ AUTO DIFFERENTIAL - Abnormal; Notable for the following components:       Result Value    WBC 3.09 (*)     RBC 3.37 (*)     Hemoglobin 11.4 (*)     Hematocrit 34.0 (*)      (*)     MCH 33.8 (*)     RDW 16.6 (*)     Platelets 98 (*)     Gran # (ANC) 1.6 (*)     Lymph # 0.9 (*)     Eosinophil % 8.1 (*)     All other components within normal limits   COMPREHENSIVE METABOLIC PANEL - Abnormal; Notable for the following components:    Potassium 3.2 (*)     CO2 34 (*)     BUN 28 (*)     Creatinine 5.7 (*)     Albumin 3.0 (*)     eGFR 9 (*)     All other components within normal limits   TROPONIN I - Abnormal; Notable for the following components:    Troponin I 0.040 (*)     All other components within normal limits   B-TYPE NATRIURETIC PEPTIDE - Abnormal; Notable for the following components:    BNP 2,947 (*)     All other components within normal limits   PHOSPHORUS - Abnormal; Notable for the following components:    Phosphorus 2.6 (*)     All other components within normal limits   ISTAT PROCEDURE - Abnormal; Notable for the following components:    POC PH 7.512 (*)     POC PCO2 50.0 (*)     POC PO2 21 (*)     POC HCO3 40.0 (*)     POC SATURATED O2 39 (*)     POC TCO2 42 (*)     All other components within normal limits   MAGNESIUM          Imaging Results              X-Ray Chest AP Portable (Final result)  Result time 11/05/22 09:54:09      Final result by Luisito Florentino MD (11/05/22 09:54:09)                   Impression:      Persistent bilateral interstitial and alveolar opacities which could relate to edema versus infectious or inflammatory process.    More dense bibasilar retrocardiac opacities could relate to atelectasis, noting that aspiration or pneumonia not excluded.    Relatively similar bilateral  pleural effusions to 11/01/2022.      Electronically signed by: Luisito Florentino  Date:    11/05/2022  Time:    09:54               Narrative:    EXAMINATION:  XR CHEST AP PORTABLE    CLINICAL HISTORY:  Chest Pain;    TECHNIQUE:  Single frontal view of the chest was performed.    COMPARISON:  Chest radiograph 11/01/2022, 10:14 hours    FINDINGS:  Monitoring leads over the chest.  Enlargement the cardiac silhouette is again noted.  Prominence indistinctness of central pulmonary vasculature.    Interstitial and alveolar opacities noted bilaterally.  More dense bibasilar retrocardiac opacities could relate to atelectasis, noting that aspiration or pneumonia not excluded.    Small to moderate bilateral pleural effusions.  No definite pneumothorax.    No acute findings in the visualized abdomen.  Osseous and soft tissue structures appear without definite acute change.                                       Medications   amLODIPine tablet 5 mg (5 mg Oral Given 11/5/22 1318)   metoprolol succinate (TOPROL-XL) 24 hr tablet 100 mg (100 mg Oral Given 11/5/22 1318)   pravastatin tablet 10 mg (10 mg Oral Given 11/5/22 1318)   tamsulosin 24 hr capsule 0.4 mg (0.4 mg Oral Given 11/5/22 1318)   furosemide injection 40 mg (40 mg Intravenous Given 11/5/22 1342)   heparin (porcine) injection 5,000 Units (5,000 Units Subcutaneous Given 11/5/22 1318)   aspirin tablet 325 mg (325 mg Oral Given 11/5/22 0950)   potassium chloride SA CR tablet 50 mEq (50 mEq Oral Given 11/5/22 1318)     Medical Decision Making:   Initial Assessment:   86-year-old male with ESRD and recurrent pleural effusion presents for shortness of breath and chest pain  Differential Diagnosis:   Volume overload, CHF, ACS, aortic stenosis, recurrent pleural effusion  ED Management:  Chest x-ray with evidence of her chronic pleural effusion.  BNP elevated as well as troponin likely stress of volume overload and pleural effusion.  Antibiotics given to cover in case this is  parapneumonic.  Patient to be admitted to Hospital Medicine for further management.           ED Course as of 11/05/22 1912   Sat Nov 05, 2022   0937 Mandarin  [id number] used [BS]   0958 CXR with persistent bilateral interstitial and alveolar opacities most consistent with volume overload from  which could relate to edema versus infectious or inflammatory process. More dense bibasilar retrocardiac opacities could relate to atelectasis, noting that aspiration or pneumonia not excluded.  [BS]   0959 EKG shows sinus rhythm, normal HI, QRS and QT intervals, no acute ST elevations or depressions, no evidence of hyperkalemia, no peaked T-waves.  [BS]   1033 Troponin I #1(!) [BS]   1033 B-Type natriuretic peptide (BNP)(!) [BS]   1033 Phosphorus(!) [BS]   1033 Magnesium [BS]   1033 Comprehensive metabolic panel(!) [BS]   1033 CBC auto differential(!) [BS]   1033 ISTAT PROCEDURE(!) [BS]   1033 Patient has respiratory alkalosis likely from hypoxia secondary to recurrent left sided pleural effusion.  [BS]   1033 Elevated troponin likely secondary to hypoxia and strain from heart failure from aortic stenosis. EKG w/o acute ischemic changes, clinical picture less c/w ACS [BS]   1034 B-Type natriuretic peptide (BNP)(!)  Elevated BNP 2/2 aortic stenosis causing heart failure. [BS]      ED Course User Index  [BS] Dwaine Glasgow MD                   Clinical Impression:   Final diagnoses:  [R07.9] Chest pain  [I35.0] Aortic valve stenosis, etiology of cardiac valve disease unspecified (Primary)  [I50.9] Congestive heart failure, unspecified HF chronicity, unspecified heart failure type        ED Disposition Condition    Admit Stable                Dwaine Glasgow MD  11/05/22 1912

## 2022-11-05 NOTE — ASSESSMENT & PLAN NOTE
No respiratory distress or failure. Patient 90% on RA.  Now 96 on NC. Very comfortable. Cause of hypoxia multi-factorial including mod- sev AS, pulmonary edema, bilateral effusions as well as pulmonary hypertension.  Lasix for now. Will consider thoracentesis.

## 2022-11-05 NOTE — HPI
Mr. Willams is a 87 yo M who presents with a CC of SOB for 3 days. EMS was called and he was found to have an 02 sat of 90% on RA. Patient is an ESRD patient with a history of mod-severe AS, pleural effusions as well as pulmonary hypertension.  He lives at home by himself and does not wear 02.  He is very comfortable on NC and has no other complaints. He was in observation here early last month for the same.

## 2022-11-05 NOTE — SUBJECTIVE & OBJECTIVE
Past Medical History:   Diagnosis Date    Dependence on renal dialysis     ESRD (end stage renal disease)     HTN (hypertension)     Hyperlipidemia        Past Surgical History:   Procedure Laterality Date    BACK SURGERY  2005    DIALYSIS FISTULA CREATION  4/2012    left arm    FISTULOGRAM Left 3/22/2022    Procedure: Fistulogram, transradial;  Surgeon: Jude Ordaz MD;  Location: [facility];  Service: Vascular;  Laterality: Left;  RN Pre Op 3-17-22.  May need Chinese  day of procedure. CA    JOINT REPLACEMENT Right     hip       Review of patient's allergies indicates:  No Known Allergies    No current facility-administered medications on file prior to encounter.     Current Outpatient Medications on File Prior to Encounter   Medication Sig    albuterol (PROAIR HFA) 90 mcg/actuation inhaler Inhale 2 puffs into the lungs every 4 (four) hours as needed for Wheezing or Shortness of Breath. Rescue    albuterol-ipratropium (DUO-NEB) 2.5 mg-0.5 mg/3 mL nebulizer solution NEBULIZE ONE vial (3ml) BY MOUTH EVERY 6 HOURS    amLODIPine (NORVASC) 5 MG tablet Take 1 tablet (5 mg total) by mouth once daily.    furosemide (LASIX) 20 MG tablet Take 2 tablets (40 mg total) by mouth once daily.    metoprolol succinate (TOPROL-XL) 100 MG 24 hr tablet Take 1 tablet (100 mg total) by mouth once daily. Hold SBP <120    pravastatin (PRAVACHOL) 10 MG tablet Take 1 tablet (10 mg total) by mouth once daily.    RENAL CAPS 1 mg Cap Take by mouth once daily.    tamsulosin (FLOMAX) 0.4 mg Cap Take 1 capsule (0.4 mg total) by mouth once daily.    calcium acetate 667 mg (169 mg calcium)/5 mL Soln Take by mouth.    multivitamin with minerals tablet Take 1 tablet by mouth once daily.    omega-3 acid ethyl esters (LOVAZA) 1 gram capsule Take 2 capsules (2 g total) by mouth 2 (two) times daily.    PFIZER COVID-19 CRISS VACCN,PF, 30 mcg/0.3 mL injection     ELIE-KAUSHIK RX 1- mg-mg-mcg Tab Take 1 tablet by mouth once daily.      Family History       Problem Relation (Age of Onset)    Arthritis Sister    Diabetes Sister    Hypertension Sister          Tobacco Use    Smoking status: Former     Packs/day: 1.00     Years: 10.00     Pack years: 10.00     Types: Cigarettes     Quit date: 1971     Years since quittin.9    Smokeless tobacco: Never   Substance and Sexual Activity    Alcohol use: No    Drug use: No    Sexual activity: Not Currently     Partners: Female     Comment:      Review of Systems   Constitutional:  Negative for activity change and appetite change.   HENT:  Negative for congestion and dental problem.    Eyes:  Negative for pain, discharge and itching.   Respiratory:  Positive for shortness of breath. Negative for apnea, choking, chest tightness and wheezing.    Cardiovascular:  Negative for chest pain.   Gastrointestinal:  Negative for abdominal pain.   Endocrine: Negative for cold intolerance.   Genitourinary:  Negative for difficulty urinating and dysuria.   Musculoskeletal:  Negative for arthralgias.   Neurological:  Negative for dizziness and facial asymmetry.   Psychiatric/Behavioral:  Negative for agitation and behavioral problems.    Objective:     Vital Signs (Most Recent):  Temp: 97.9 °F (36.6 °C) (22 0915)  Pulse: 67 (22 1220)  Resp: 18 (22 1220)  BP: (!) 142/63 (22 1220)  SpO2: 96 % (22 1220)   Vital Signs (24h Range):  Temp:  [97.9 °F (36.6 °C)] 97.9 °F (36.6 °C)  Pulse:  [64-75] 67  Resp:  [12-23] 18  SpO2:  [88 %-100 %] 96 %  BP: (112-143)/(54-65) 142/63     Weight: 52.2 kg (115 lb)  Body mass index is 21.03 kg/m².    Physical Exam  Vitals and nursing note reviewed.   Constitutional:       General: He is not in acute distress.     Appearance: Normal appearance. He is not ill-appearing, toxic-appearing or diaphoretic.   HENT:      Head: Normocephalic and atraumatic.      Nose: No congestion.      Mouth/Throat:      Pharynx: No oropharyngeal exudate.   Eyes:       Conjunctiva/sclera: Conjunctivae normal.   Cardiovascular:      Rate and Rhythm: Normal rate and regular rhythm.   Pulmonary:      Effort: Pulmonary effort is normal. No respiratory distress.   Abdominal:      General: There is no distension.      Tenderness: There is no abdominal tenderness.   Skin:     General: Skin is warm and dry.   Neurological:      Mental Status: He is alert and oriented to person, place, and time.   Psychiatric:         Mood and Affect: Mood normal.         Behavior: Behavior normal.           Significant Labs: All pertinent labs within the past 24 hours have been reviewed.  BMP:   Recent Labs   Lab 11/05/22  0942         K 3.2*   CL 97   CO2 34*   BUN 28*   CREATININE 5.7*   CALCIUM 9.1   MG 2.0     CBC:   Recent Labs   Lab 11/05/22  0942   WBC 3.09*   HGB 11.4*   HCT 34.0*   PLT 98*       Significant Imaging: CXR- possible edema. Bilateral effusions.

## 2022-11-06 NOTE — NURSING
Bedside report given to night nurse CÉSAR Quevedo. Walking rounds completed. Visualized and assessed patient. NAD noted. Safety precautions maintained and call light within reach.    Chart check completed.

## 2022-11-06 NOTE — PLAN OF CARE
Problem: Adult Inpatient Plan of Care  Goal: Plan of Care Review  11/6/2022 0432 by Emy Beckwith RN  Outcome: Ongoing, Progressing  11/6/2022 0432 by Emy Beckwith RN  Outcome: Ongoing, Progressing  Goal: Patient-Specific Goal (Individualized)  11/6/2022 0432 by Emy Beckwith RN  Outcome: Ongoing, Progressing  11/6/2022 0432 by Emy Beckwith RN  Outcome: Ongoing, Progressing  Goal: Absence of Hospital-Acquired Illness or Injury  11/6/2022 0432 by Emy Beckwith RN  Outcome: Ongoing, Progressing  11/6/2022 0432 by Emy Beckwith RN  Outcome: Ongoing, Progressing  Intervention: Identify and Manage Fall Risk  Flowsheets (Taken 11/6/2022 0432)  Safety Promotion/Fall Prevention:   assistive device/personal item within reach   side rails raised x 3   room near unit station   nonskid shoes/socks when out of bed   instructed to call staff for mobility  Intervention: Prevent Skin Injury  Flowsheets (Taken 11/6/2022 0432)  Body Position: position maintained  Skin Protection: incontinence pads utilized  Intervention: Prevent Infection  Flowsheets (Taken 11/6/2022 0432)  Infection Prevention:   hand hygiene promoted   rest/sleep promoted   single patient room provided  Goal: Optimal Comfort and Wellbeing  11/6/2022 0432 by Emy Beckwith RN  Outcome: Ongoing, Progressing  11/6/2022 0432 by Emy Beckwith RN  Outcome: Ongoing, Progressing  Intervention: Monitor Pain and Promote Comfort  Flowsheets (Taken 11/6/2022 0432)  Pain Management Interventions:   pillow support provided   care clustered  Goal: Readiness for Transition of Care  11/6/2022 0432 by Emy Beckwith RN  Outcome: Ongoing, Progressing  11/6/2022 0432 by Emy Beckwith RN  Outcome: Ongoing, Progressing     Problem: Adult Inpatient Plan of Care  Goal: Optimal Comfort and Wellbeing  11/6/2022 0432 by Emy Beckwith RN  Outcome: Ongoing, Progressing  11/6/2022 0432 by Emy Beckwith RN  Outcome: Ongoing, Progressing  Intervention: Monitor Pain and  Promote Comfort  Flowsheets (Taken 11/6/2022 0432)  Pain Management Interventions:   pillow support provided   care clustered     Problem: Skin Injury Risk Increased  Goal: Skin Health and Integrity  11/6/2022 0432 by Emy Beckwith RN  Outcome: Ongoing, Progressing  11/6/2022 0432 by Emy Beckwith RN  Outcome: Ongoing, Progressing  Intervention: Optimize Skin Protection  Flowsheets (Taken 11/6/2022 0432)  Pressure Reduction Techniques: weight shift assistance provided  Pressure Reduction Devices: positioning supports utilized  Skin Protection: incontinence pads utilized  Head of Bed (HOB) Positioning: HOB at 30 degrees

## 2022-11-06 NOTE — PLAN OF CARE
11/06/22 1235   Discharge Assessment   Assessment Type Discharge Planning Assessment   Confirmed/corrected address, phone number and insurance Yes   Confirmed Demographics Correct on Facesheet   Source of Information family;health record   Reason For Admission Chest pain   Lives With alone   Facility Arrived From: Home   Do you expect to return to your current living situation? Yes   Do you have help at home or someone to help you manage your care at home? Yes   Who are your caregiver(s) and their phone number(s)? Fletcher-relative: [phone]   Unable to contact Fletcher To, relative but unable contact; CM will follow-up to complete assessment

## 2022-11-06 NOTE — PROGRESS NOTES
[facility]  Progress Note    Patient Name: Mandeep Willams  MRN: [id number]  Patient Class: IP- Inpatient   Admission Date: 11/5/2022  Length of Stay: 1 days  Attending Physician: Al Allison MD  Primary Care Provider: Guy Mcclelland Jr, MD        Subjective:     Principal Problem:Hypoxia        HPI:  Mr. Willams is a 85 yo M who presents with a CC of SOB for 3 days. EMS was called and he was found to have an 02 sat of 90% on RA. Patient is an ESRD patient with a history of mod-severe AS, pleural effusions as well as pulmonary hypertension.  He lives at home by himself and does not wear 02.  He is very comfortable on NC and has no other complaints. He was in observation here early last month for the same.        Overview/Hospital Course:  Patient admitted to the hospital for hypoxia- likely multifactorial including mod-severe AS, pulmonary hypertension and pleural effusions.  He was started on Lasix       Interval History: No new issues     Review of Systems  Objective:     Vital Signs (Most Recent):  Temp: 98.2 °F (36.8 °C) (11/06/22 0848)  Pulse: 61 (11/06/22 0848)  Resp: 20 (11/06/22 0848)  BP: (!) 165/70 (11/06/22 0848)  SpO2: 96 % (11/06/22 0848)   Vital Signs (24h Range):  Temp:  [97.4 °F (36.3 °C)-98.3 °F (36.8 °C)] 98.2 °F (36.8 °C)  Pulse:  [58-71] 61  Resp:  [12-20] 20  SpO2:  [95 %-100 %] 96 %  BP: (106-165)/(49-70) 165/70     Weight: 118.1 kg (260 lb 5.8 oz)  Body mass index is 47.62 kg/m².    Intake/Output Summary (Last 24 hours) at 11/6/2022 0952  Last data filed at 11/5/2022 2114  Gross per 24 hour   Intake 600 ml   Output 100 ml   Net 500 ml      Physical Exam    Significant Labs: All pertinent labs within the past 24 hours have been reviewed.  BMP:   Recent Labs   Lab 11/05/22  0942 11/06/22  0132    86    143   K 3.2* 4.1   CL 97 102   CO2 34* 31*   BUN 28* 38*   CREATININE 5.7* 6.5*   CALCIUM 9.1 8.2*   MG 2.0  --      CBC:   Recent Labs   Lab 11/05/22  0997    WBC 3.09*   HGB 11.4*   HCT 34.0*   PLT 98*       Significant Imaging:       Assessment/Plan:      * Hypoxia  No respiratory distress or failure. Patient 90% on RA.  Now 96 on NC. Very comfortable. Cause of hypoxia multi-factorial including mod- sev AS, pulmonary edema, bilateral effusions as well as pulmonary hypertension.  Lasix for now. Will consider thoracentesis.      Pulmonary hypertension  PA pressure of 59        Hypokalemia  Will replace       Severe aortic stenosis  As discussed above.  Recent echo      Elevated troponin  Likely elevated in setting of renal failure.  Will trend out       Thrombocytopenia  No acute issues       Debility  May benefit from H/H. Uses some kind of rolling walker at home       Anemia of chronic disorder  No acute issues      Tobacco use disorder        Pleural effusion  May consider thora      Hyperlipidemia  Resume statin      ESRD (end stage renal disease)  Completed dialysis yesterday.  Will consult nephrology      HTN (hypertension)  Restarted home meds         VTE Risk Mitigation (From admission, onward)           Ordered     heparin (porcine) injection 5,000 Units  Every 8 hours         11/05/22 1238                    Discharge Planning   BONILLA:      Code Status: Prior   Is the patient medically ready for discharge?:     Reason for patient still in hospital (select all that apply): Patient unstable    Addendum 1:19  Came to evaluate patient with increasing 02 requirements.  Discussed with family and patient previous DNR. Given extra lasix/breathing treatment and stat CXR. Patient now comfortable. Will follow closely                      Al Rosales MD  Department of Hospital Medicine   [facility]

## 2022-11-06 NOTE — PLAN OF CARE
[facility] - Telemetry  Initial Discharge Assessment    Patient is reportedly independent at home alone and relatives help out and transport. Relatives are concerned about fluid build-ups and shortness of breath; concerns that patient may need a higher level of care.     Primary Care Provider: Guy Mcclelland Jr, MD    Admission Diagnosis: Chest pain [R07.9]    Admission Date: 11/5/2022  Expected Discharge Date:     Discharge Barriers Identified: None    Payor: [employer]ARE / Plan: [employer] / Product Type: Medicare Advantage /     Extended Emergency Contact Information  Primary Emergency Contact: cruzito bruner  Mobile Phone: [phone]  Relation: Relative  Preferred language: English   needed? No  Secondary Emergency Contact: Kathi Bruner  Mobile Phone: [phone]  Relation: Relative   needed? No    Discharge Plan A: Home, Home Health (Continue O/P-H/D; follow-up)  Discharge Plan B: Other (TBD)      [facility] - [place], [place] - [address]  [address]  [place] [address]  Phone: [phone] Fax: [phone]    [facility] - [place], [place] - [address]  [address]  [place] [address]  Phone: [phone] Fax: [phone]      Initial Assessment (most recent)       Adult Discharge Assessment - 11/06/22 1311          Discharge Assessment    Assessment Type Discharge Planning Assessment     Confirmed/corrected address, phone number and insurance Yes     Confirmed Demographics Correct on Facesheet     Source of Information patient;health record     If unable to respond/provide information was family/caregiver contacted? Yes     Contact Name/Number Patient requested that I call contact: Cruzito To-relative: [phone]     Reason For Admission Chest pain     Lives With alone     Facility Arrived From: Home     Do you expect to return to your current living situation? Yes     Do you have help at home or someone to help you manage your  care at home? Yes     Who are your caregiver(s) and their phone number(s)? Fletcher-relative: [phone]; Kathi-relative: [phone]     Prior to hospitilization cognitive status: Alert/Oriented     Current cognitive status: Alert/Oriented     Walking or Climbing Stairs Difficulty ambulation difficulty, requires equipment     Mobility Management RW     Dressing/Bathing Difficulty none     Do you have any problems with: Errands/Grocery;Needs other help     Specify other help Relatives assist as needed and transport     Home Accessibility wheelchair accessible     Home Layout Able to live on 1st floor     Equipment Currently Used at Home walker, rolling     Readmission within 30 days? No     Patient currently being followed by outpatient case management? No     Do you currently have service(s) that help you manage your care at home? Yes     How Many hours does patient receive services 10     Name and Contact number of agency [facility]     Is the pt/caregiver preference to resume services with current agency Yes     Do you take prescription medications? Yes     Do you have prescription coverage? Yes     Coverage PHN     Do you have any problems affording any of your prescribed medications? No     Is the patient taking medications as prescribed? yes     Who is going to help you get home at discharge? Relatives     How do you get to doctors appointments? family or friend will provide     Are you on dialysis? Yes     Dialysis Name and Scheduled days [facility]: M/W/F     Do you take coumadin? No     Discharge Plan A Home;Home Health   Continue O/P-H/D; follow-up    Discharge Plan B Other   TBD    DME Needed Upon Discharge  other (see comments)   TBD    Discharge Plan discussed with: Patient   Relatives    Discharge Barriers Identified None                 SW Role explained to patient; two patient identifiers recognized; SW contact information placed on Communication board. Discussed patient managing health care at home and  discussed discharge plans A and B; determined who would be helping patient at home with recovery: Fletcher, relative, will help with recovery.

## 2022-11-06 NOTE — NURSING
Report received from night nurse CÉSAR Quevedo. Visualized patient and assessed patient's overall condition and appearance. No acute distress noted. Will continue to monitor

## 2022-11-06 NOTE — NURSING
Patient sitting on edge of bed eating lunch, called for assistance. Found patients nasal cannula around his neck, not in proper position.Stated he feels short of breath, increase work of breathing noted.  Placed nasal cannula back on patient and assisted back into bed. Positioned bed with head of bed elevated. Pulse ox reading shows 88% on 4 L nasal cannula. Encouraged slow deep breathing. Patient continues to sat 88-89%. Complains of shortness of breath and dry cough noted. IV lasix and cough medication provided. Patient noted to be breathing through mouth. Venti mask initiated, titrated to maintain sats >90%. Patient verbalized improvement in SOB with venti mask. Sats 94-95% on 40% venti mask.     Notified Dr. Allison- new orders for additional 40mg of IV lasix (80mg total), stat chest xray, and nebulizer treatments. Notified respiratory of neb treatments.   Called x-ray for STAT x-ray.   Patient appears more comfortable with interventions. Will continue to monitor.

## 2022-11-06 NOTE — SUBJECTIVE & OBJECTIVE
Interval History: No new issues     Review of Systems  Objective:     Vital Signs (Most Recent):  Temp: 98.2 °F (36.8 °C) (11/06/22 0848)  Pulse: 61 (11/06/22 0848)  Resp: 20 (11/06/22 0848)  BP: (!) 165/70 (11/06/22 0848)  SpO2: 96 % (11/06/22 0848)   Vital Signs (24h Range):  Temp:  [97.4 °F (36.3 °C)-98.3 °F (36.8 °C)] 98.2 °F (36.8 °C)  Pulse:  [58-71] 61  Resp:  [12-20] 20  SpO2:  [95 %-100 %] 96 %  BP: (106-165)/(49-70) 165/70     Weight: 118.1 kg (260 lb 5.8 oz)  Body mass index is 47.62 kg/m².    Intake/Output Summary (Last 24 hours) at 11/6/2022 0952  Last data filed at 11/5/2022 2114  Gross per 24 hour   Intake 600 ml   Output 100 ml   Net 500 ml      Physical Exam    Significant Labs: All pertinent labs within the past 24 hours have been reviewed.  BMP:   Recent Labs   Lab 11/05/22 0942 11/06/22  0132    86    143   K 3.2* 4.1   CL 97 102   CO2 34* 31*   BUN 28* 38*   CREATININE 5.7* 6.5*   CALCIUM 9.1 8.2*   MG 2.0  --      CBC:   Recent Labs   Lab 11/05/22 0942   WBC 3.09*   HGB 11.4*   HCT 34.0*   PLT 98*       Significant Imaging:

## 2022-11-06 NOTE — ACP (ADVANCE CARE PLANNING)
Advance Care Planning     Date: 11/06/2022    Today a meeting took place: bedside    Patient Participation: Patient is unable to participate     Attendees (Name and  Relationship to patient):     Staff attendees (Name and  Role): Dr. Rosales    ACP Conversation (General):  Code status    Code Status: DNR; status confirmed/order placed in chart     ACP Documents:    Goals of care: The family endorses that what is most important right now is to focus on improvement in condition but with limits to invasive therapies    Accordingly, we have decided that the best plan to meet the patient's goals includes continuing with treatment      Recommendations/  Follow-up tasks:       Length of ACP   conversation in minutes: 15 minutes

## 2022-11-06 NOTE — HOSPITAL COURSE
Patient admitted to the hospital for hypoxia- likely multifactorial including mod-severe AS, pulmonary hypertension and pleural effusions.  He was started on Lasix. Nephrology consulted for dialysis, underwent HD on . Pulmonary consulted and agreed that hypoxia likely from pulmonary edema. Patient went multiple dialysis rounds without much improvement in his hypoxia.  Palliative care was consulted. Patient sent for CTA of chest on 22.  CTA showed no PE but did show large Left pleural effusion. Patient sent to IR for therapeutic thoracentesis. Palliative care consulted and patient now is DNR.  PT/OT were consulted and rec: SNF. Patient's hypoxia improved and was weaned to RA. SLP was consulted for concerns of aspiration. Patient is unfortunately aspirating all consistencies. MBSS concerning. Patient has [place] that reads he does not want artificial nutrition. There was a trial of NGT but patient has been consistently stating he does not want the NGT. Ongoing conversations with family including Xochitl and Kathi - patient's 2 grandnieces. Patient has expressed that he still wants to continue to eat despite risk of aspiration/respiratory distress/death. Palliative has been following along. He has worsening respiratory failure and distress associated. Dialysis needed to be stopped early due to worsening respiratory failure and agitation. This is most likely due to ongoing aspiration. Used dilaudid to assist with air hunger symptoms. Family aware. Developed fever, worsening hypoxia, and hypotension. He was pronounced  on 22 at 8:07PM. Cause of death= aspiration into airway with coinciding conditions ESRD and aortic stenosis.

## 2022-11-06 NOTE — NURSING
Patient was complaining of SOB, patient's O2 was 85% on 2L NC. Patient oxygen was increased to 4L, respiratory notified and will evaluate patient. Dr. Allison notified at 7:32am, awaiting response.

## 2022-11-07 NOTE — NURSING
Patient has arrived back on the unit, on Venti Mask via bed, no distress noted. Awake and Alert. Bed in lowest position, wheels locked, call light in reach. Will continue to monitor.

## 2022-11-07 NOTE — PLAN OF CARE
Problem: Adult Inpatient Plan of Care  Goal: Plan of Care Review  Outcome: Ongoing, Progressing  Goal: Optimal Comfort and Wellbeing  Outcome: Ongoing, Progressing  Intervention: Monitor Pain and Promote Comfort  Flowsheets (Taken 11/6/2022 1830)  Pain Management Interventions:   care clustered   pillow support provided   position adjusted

## 2022-11-07 NOTE — CARE UPDATE
Upon arrival pt venti mask was low, only covering the mouth. Pt had 40% diluter attached with reports of shortness of breath, SpO2 82%.  Mask was tightened and placed over pt's nose and mouth, 50% diluter was placed and SpO2 improved to 90%. Pt remains on 15L/50% venti mask.

## 2022-11-07 NOTE — SUBJECTIVE & OBJECTIVE
Interval History: seen after HD, feeling tired. Family member and  used.    Review of Systems   Constitutional:  Negative for fatigue.   Respiratory:  Positive for shortness of breath.    Neurological:  Negative for weakness.   Objective:     Vital Signs (Most Recent):  Temp: 98.5 °F (36.9 °C) (11/07/22 1627)  Pulse: 75 (11/07/22 1627)  Resp: 17 (11/07/22 1627)  BP: (!) 130/59 (11/07/22 1627)  SpO2: 98 % (11/07/22 1627)   Vital Signs (24h Range):  Temp:  [98.3 °F (36.8 °C)-98.6 °F (37 °C)] 98.5 °F (36.9 °C)  Pulse:  [64-93] 75  Resp:  [17-30] 17  SpO2:  [82 %-98 %] 98 %  BP: (101-154)/(43-74) 130/59     Weight: 118.1 kg (260 lb 5.8 oz)  Body mass index is 47.62 kg/m².    Intake/Output Summary (Last 24 hours) at 11/7/2022 1755  Last data filed at 11/7/2022 1505  Gross per 24 hour   Intake 120 ml   Output 2500 ml   Net -2380 ml        Physical Exam  Vitals and nursing note reviewed.   Constitutional:       General: He is not in acute distress.     Appearance: He is cachectic.   HENT:      Head: Normocephalic and atraumatic.      Nose: Nose normal. No congestion or rhinorrhea.      Mouth/Throat:      Mouth: Mucous membranes are moist.   Eyes:      General: No scleral icterus.     Conjunctiva/sclera: Conjunctivae normal.   Cardiovascular:      Rate and Rhythm: Normal rate and regular rhythm.      Pulses: Normal pulses.      Heart sounds: Normal heart sounds. No murmur heard.  Pulmonary:      Effort: No respiratory distress.      Breath sounds: Normal breath sounds. No wheezing, rhonchi or rales.   Abdominal:      General: Bowel sounds are normal. There is no distension.      Palpations: Abdomen is soft.      Tenderness: There is no abdominal tenderness. There is no guarding.   Musculoskeletal:         General: No swelling or tenderness. Normal range of motion.      Cervical back: Neck supple.      Right lower leg: No edema.      Left lower leg: No edema.   Lymphadenopathy:      Cervical: No cervical  adenopathy.   Skin:     General: Skin is warm and dry.      Capillary Refill: Capillary refill takes less than 2 seconds.      Coloration: Skin is not jaundiced or pale.   Neurological:      General: No focal deficit present.      Mental Status: He is alert and oriented to person, place, and time. Mental status is at baseline.      Sensory: No sensory deficit.      Motor: No weakness.   Psychiatric:         Mood and Affect: Mood normal.         Behavior: Behavior normal.         Thought Content: Thought content normal.         Judgment: Judgment normal.       Significant Labs: All pertinent labs within the past 24 hours have been reviewed.  BMP:   Recent Labs   Lab 11/06/22  0132   GLU 86      K 4.1      CO2 31*   BUN 38*   CREATININE 6.5*   CALCIUM 8.2*       CBC:   No results for input(s): WBC, HGB, HCT, PLT in the last 48 hours.      Significant Imaging:

## 2022-11-08 NOTE — PLAN OF CARE
[facility] - Telemetry  Discharge Reassessment    Primary Care Provider: Guy Mcclelland Jr, MD    Expected Discharge Date: 11/8/2022  CM met with pt and pt's relative, Elisa to follow up on dc needs. CM informed pt's relative that HH was recommended and CM will send to [employer] for approval.CM will follow up.    Reassessment (most recent)       Discharge Reassessment - 11/08/22 1443          Discharge Reassessment    Assessment Type Discharge Planning Reassessment     Did the patient's condition or plan change since previous assessment? Yes     Discharge Plan discussed with: Caregiver     Name(s) and Number(s) cruzito bruner (Relative)   [phone]     Discharge Plan A Home Health     Discharge Plan B Home with family     DME Needed Upon Discharge  other (see comments)   TBD    Discharge Barriers Identified None        Post-Acute Status    Post-Acute Authorization Home Health (P)      Home Health Status Referrals Sent (P)      Coverage PHN (P)      Discharge Delays Post-Acute Set-up (P)

## 2022-11-08 NOTE — PROGRESS NOTES
"[facility]  Nephrology  Progress Note    Patient Name: Mandeep Willams  MRN: [id number]  Admission Date: 11/5/2022  Hospital Length of Stay: 3 days  Attending Provider: Al Allison MD   Primary Care Physician: Guy Mcclelland Jr, MD  Principal Problem:Hypoxia  Consults Reason for consult: ESRD, dialysis  Subjective:     Interval History: Had HD yesterday with 2L net UF. Awaiting UF only session today. He reports breathing is "so so."  assistance utilized.     Review of patient's allergies indicates:  No Known Allergies  Current Facility-Administered Medications   Medication Frequency    0.9%  NaCl infusion PRN    0.9%  NaCl infusion Once    albuterol sulfate nebulizer solution 2.5 mg Q4H PRN    amLODIPine tablet 5 mg Daily    furosemide injection 40 mg Q12H    guaiFENesin 100 mg/5 ml syrup 200 mg Q4H PRN    heparin (porcine) injection 5,000 Units Q8H    metoprolol succinate (TOPROL-XL) 24 hr tablet 100 mg Daily    mupirocin 2 % ointment BID    pravastatin tablet 10 mg Daily    sodium chloride 0.9% bolus 250 mL PRN    tamsulosin 24 hr capsule 0.4 mg Daily       Objective:     Vital Signs (Most Recent):  Temp: 97.7 °F (36.5 °C) (11/08/22 1153)  Pulse: 65 (11/08/22 1153)  Resp: 19 (11/08/22 1153)  BP: 126/62 (11/08/22 1153)  SpO2: 99 % (11/08/22 1153)  O2 Device (Oxygen Therapy): venti mask (11/08/22 0945)   Vital Signs (24h Range):  Temp:  [97.7 °F (36.5 °C)-98.5 °F (36.9 °C)] 97.7 °F (36.5 °C)  Pulse:  [60-86] 65  Resp:  [17-19] 19  SpO2:  [96 %-99 %] 99 %  BP: (101-138)/(43-69) 126/62     Weight: 118.5 kg (261 lb 3.9 oz) (11/08/22 0356)  Body mass index is 47.78 kg/m².  Body surface area is 2.28 meters squared.    I/O last 3 completed shifts:  In: 320 [P.O.:320]  Out: 2700 [Urine:200; Other:2500]    Physical Exam    Significant Labs:CBC:   Recent Labs   Lab 11/05/22  0942   WBC 3.09*   RBC 3.37*   HGB 11.4*   HCT 34.0*   PLT 98*   *   MCH 33.8*   MCHC 33.5       CMP:   Recent Labs   Lab " 11/05/22  0942 11/06/22  0132 11/08/22  0836      < > 113*   CALCIUM 9.1   < > 8.5*   ALBUMIN 3.0*  --  2.5*   PROT 7.8  --   --       < > 139   K 3.2*   < > 4.9   CO2 34*   < > 26   CL 97   < > 102   BUN 28*   < > 48*   CREATININE 5.7*   < > 6.4*   ALKPHOS 72  --   --    ALT 15  --   --    AST 15  --   --    BILITOT 1.0  --   --     < > = values in this interval not displayed.       All labs within the past 24 hours have been reviewed.    Significant Imaging:  X-Ray: Reviewed    Assessment/Plan:     Active Diagnoses:    Diagnosis Date Noted POA    PRINCIPAL PROBLEM:  Hypoxia [R09.02] 11/05/2022 Yes    Hypokalemia [E87.6] 11/05/2022 Yes    Pulmonary hypertension [I27.20] 11/05/2022 Yes    Elevated troponin [R77.8] 10/04/2022 Yes    Severe aortic stenosis [I35.0] 10/04/2022 Yes    Thrombocytopenia [D69.6] 05/29/2022 Yes    Debility [R53.81] 01/25/2022 Yes    Anemia of chronic disorder [D63.8] 01/09/2021 Yes     Chronic    Tobacco use disorder [F17.200] 01/08/2021 Yes    Pleural effusion [J90] 12/04/2018 Yes    ESRD (end stage renal disease) [N18.6]  Yes    HTN (hypertension) [I10]  Yes    Hyperlipidemia [E78.5]  Yes      Problems Resolved During this Admission:       1. ESRD - usual HD w/ Dr Henriquez  - HD tomorrow, UF today, continue MWF HD schedule while admitted  - renally dose medications for dialysis  - hypervolemia, pulm edema, pleural effusion - UF only session today then resume usual HD MWF schedule tomorrow  2. HTN - some hypotension on HD  - hold BP meds prior to HD, albumin bolus w/ session tomorrow  - UF as tolerated on dialysis  3. Anemia of chronic kidney disease - no indication for Epo with HD, continue to monitor CBC  4. MBD/secondary hyperparathyroidism - phos is elevated, restart home phos-lo    Thank you for your consult. I will follow-up with patient. Please contact us if you have any additional questions.    MONSE Rodney  DOS:11/08/2022  Nephrology  [facility] - [place] [facility]

## 2022-11-08 NOTE — PROGRESS NOTES
[facility] Medicine  Progress Note    Patient Name: Mandeep Willams  MRN: [id number]  Patient Class: IP- Inpatient   Admission Date: 11/5/2022  Length of Stay: 3 days  Attending Physician: Al Allison MD  Primary Care Provider: Guy Mcclelland Jr, MD        Subjective:     Principal Problem:Hypoxia        HPI:  Mr. Willams is a 87 yo M who presents with a CC of SOB for 3 days. EMS was called and he was found to have an 02 sat of 90% on RA. Patient is an ESRD patient with a history of mod-severe AS, pleural effusions as well as pulmonary hypertension.  He lives at home by himself and does not wear 02.  He is very comfortable on NC and has no other complaints. He was in observation here early last month for the same.        Overview/Hospital Course:  Patient admitted to the hospital for hypoxia- likely multifactorial including mod-severe AS, pulmonary hypertension and pleural effusions.  He was started on Lasix. Nephrology consulted for dialysis, underwent HD on 11/7. Pulmonary consulted and agreed that hypoxia likely from edema.       Interval History: comfortable     Review of Systems   Constitutional:  Negative for activity change.   HENT:  Negative for congestion and dental problem.    Eyes:  Negative for discharge.   Respiratory:  Negative for apnea and chest tightness.    Cardiovascular:  Negative for chest pain.   Gastrointestinal:  Negative for abdominal distention and abdominal pain.   Endocrine: Negative for cold intolerance.   Genitourinary:  Negative for difficulty urinating and dysuria.   Musculoskeletal:  Negative for arthralgias.   Allergic/Immunologic: Negative for environmental allergies.   Neurological:  Negative for dizziness and facial asymmetry.   Psychiatric/Behavioral:  Negative for agitation and behavioral problems.    Objective:     Vital Signs (Most Recent):  Temp: 97.9 °F (36.6 °C) (11/08/22 0740)  Pulse: 66 (11/08/22 0740)  Resp: 18 (11/08/22 0740)  BP: (!) 126/59 (11/08/22  0740)  SpO2: 97 % (11/08/22 0945)   Vital Signs (24h Range):  Temp:  [97.9 °F (36.6 °C)-98.5 °F (36.9 °C)] 97.9 °F (36.6 °C)  Pulse:  [60-93] 66  Resp:  [17-18] 18  SpO2:  [96 %-98 %] 97 %  BP: (101-145)/(43-74) 126/59     Weight: 118.5 kg (261 lb 3.9 oz)  Body mass index is 47.78 kg/m².    Intake/Output Summary (Last 24 hours) at 11/8/2022 1039  Last data filed at 11/8/2022 0400  Gross per 24 hour   Intake 320 ml   Output 2700 ml   Net -2380 ml      Physical Exam  Vitals and nursing note reviewed.   Constitutional:       Appearance: Normal appearance.   HENT:      Head: Normocephalic and atraumatic.      Nose: Nose normal.   Eyes:      Conjunctiva/sclera: Conjunctivae normal.   Cardiovascular:      Rate and Rhythm: Normal rate and regular rhythm.   Pulmonary:      Effort: Pulmonary effort is normal. No respiratory distress.   Musculoskeletal:         General: No swelling.   Skin:     General: Skin is dry.   Neurological:      Mental Status: He is alert. Mental status is at baseline.   Psychiatric:         Mood and Affect: Mood normal.         Behavior: Behavior normal.       Significant Labs: All pertinent labs within the past 24 hours have been reviewed.  BMP:   Recent Labs   Lab 11/08/22  0836   *      K 4.9      CO2 26   BUN 48*   CREATININE 6.4*   CALCIUM 8.5*     CBC: No results for input(s): WBC, HGB, HCT, PLT in the last 48 hours.    Significant Imaging:       Assessment/Plan:      * Hypoxia  No respiratory distress or failure. Patient 90% on RA.  Now 96 on NC. Very comfortable. Cause of hypoxia multi-factorial including mod- sev AS, pulmonary edema, bilateral effusions as well as pulmonary hypertension.  Lasix for now. Will consider thoracentesis.    UF today with dialysis. Pulmonary consulted as well      Pulmonary hypertension  PA pressure of 59        Hypokalemia  Will replace       Severe aortic stenosis  As discussed above.  Recent echo      Elevated troponin  Likely elevated in  setting of renal failure.  Trending down      Thrombocytopenia  No acute issues       Debility  May benefit from H/H. Uses some kind of rolling walker at home       Anemia of chronic disorder  No acute issues      Tobacco use disorder        Pleural effusion  May consider thora      Hyperlipidemia  Resume statin      ESRD (end stage renal disease)  Completed dialysis yesterday.  Will consult nephrology  - HD today      HTN (hypertension)  Restarted home meds         VTE Risk Mitigation (From admission, onward)         Ordered     heparin (porcine) injection 5,000 Units  Every 8 hours         11/05/22 1238                Discharge Planning   BONILLA: 11/8/2022     Code Status: DNR   Is the patient medically ready for discharge?:     Reason for patient still in hospital (select all that apply): Patient unstable  Discharge Plan A: Home, Home Health (Continue O/P-H/D; follow-up)                  Al Rosales MD  Department of Hospital Medicine   [facility] - Telemetry

## 2022-11-08 NOTE — SUBJECTIVE & OBJECTIVE
Past Medical History:   Diagnosis Date    Dependence on renal dialysis     ESRD (end stage renal disease)     HTN (hypertension)     Hyperlipidemia        Past Surgical History:   Procedure Laterality Date    BACK SURGERY      DIALYSIS FISTULA CREATION  2012    left arm    FISTULOGRAM Left 3/22/2022    Procedure: Fistulogram, transradial;  Surgeon: Jude Ordaz MD;  Location: [facility];  Service: Vascular;  Laterality: Left;  RN Pre Op 3-17-22.  May need Chinese  day of procedure. [place]    JOINT REPLACEMENT Right     hip       Review of patient's allergies indicates:  No Known Allergies    Family History       Problem Relation (Age of Onset)    Arthritis Sister    Diabetes Sister    Hypertension Sister          Tobacco Use    Smoking status: Former     Packs/day: 1.00     Years: 10.00     Pack years: 10.00     Types: Cigarettes     Quit date: 1971     Years since quittin.9    Smokeless tobacco: Never   Substance and Sexual Activity    Alcohol use: No    Drug use: No    Sexual activity: Not Currently     Partners: Female     Comment:          Review of Systems   Constitutional:  Positive for activity change and fatigue.   HENT:  Negative for congestion and rhinorrhea.    Eyes: Negative.    Respiratory:  Positive for cough and shortness of breath.    Cardiovascular: Negative.    Gastrointestinal: Negative.    Endocrine: Negative.    Genitourinary: Negative.    Musculoskeletal: Negative.    Allergic/Immunologic: Negative.    Neurological: Negative.    Hematological: Negative.    All other systems reviewed and are negative.  Objective:     Vital Signs (Most Recent):  Temp: 98.3 °F (36.8 °C) (22 1432)  Pulse: 66 (22 1432)  Resp: 19 (22 1432)  BP: (!) 143/64 (22 1432)  SpO2: 98 % (22 1432)   Vital Signs (24h Range):  Temp:  [97.7 °F (36.5 °C)-98.5 °F (36.9 °C)] 98.3 °F (36.8 °C)  Pulse:  [60-75] 66  Resp:  [17-19] 19  SpO2:  [96 %-99 %] 98 %  BP:  (118-143)/(56-64) 143/64     Weight: 118.5 kg (261 lb 3.9 oz)  Body mass index is 47.78 kg/m².      Intake/Output Summary (Last 24 hours) at 11/8/2022 1524  Last data filed at 11/8/2022 1354  Gross per 24 hour   Intake 350 ml   Output 200 ml   Net 150 ml       Physical Exam  Vitals reviewed.   Constitutional:       General: He is not in acute distress.     Appearance: Normal appearance. He is ill-appearing.   HENT:      Head: Normocephalic and atraumatic.      Nose: Nose normal.      Mouth/Throat:      Mouth: Mucous membranes are moist.      Pharynx: Oropharynx is clear.   Eyes:      Extraocular Movements: Extraocular movements intact.      Conjunctiva/sclera: Conjunctivae normal.      Pupils: Pupils are equal, round, and reactive to light.   Cardiovascular:      Rate and Rhythm: Regular rhythm. Tachycardia present.      Pulses: Normal pulses.      Heart sounds: Murmur heard.   Pulmonary:      Effort: Respiratory distress present.      Breath sounds: Rales present.   Abdominal:      General: Abdomen is flat. Bowel sounds are normal.      Palpations: Abdomen is soft.   Musculoskeletal:         General: No swelling or tenderness.   Skin:     General: Skin is warm and dry.      Capillary Refill: Capillary refill takes less than 2 seconds.   Neurological:      Mental Status: He is alert. Mental status is at baseline.   Psychiatric:         Mood and Affect: Mood normal.       Vents:  Oxygen Concentration (%): 50 (11/07/22 1938)    Lines/Drains/Airways       Drain  Duration                  Hemodialysis AV Fistula Left upper arm -- days              Peripheral Intravenous Line  Duration                  Peripheral IV - Single Lumen 11/05/22 1200 20 G Anterior;Distal;Right Upper Arm 3 days                    Significant Labs:    CBC/Anemia Profile:  No results for input(s): WBC, HGB, HCT, PLT, MCV, RDW, IRON, FERRITIN, RETIC, FOLATE, [id number], OCCULTBLOOD in the last 48 hours.     Chemistries:  Recent Labs   Lab  11/08/22  0836      K 4.9      CO2 26   BUN 48*   CREATININE 6.4*   CALCIUM 8.5*   ALBUMIN 2.5*   PHOS 5.9*       All pertinent labs within the past 24 hours have been reviewed.    Significant Imaging:   I have reviewed all pertinent imaging results/findings within the past 24 hours.

## 2022-11-08 NOTE — CONSULTS
[facility] - [place] - Telemetry  Pulmonology  Consult Note    Patient Name: Mandeep Willams  MRN: [id number]  Admission Date: 2022  Hospital Length of Stay: 3 days  Code Status: DNR  Attending Physician: Al Allison MD  Primary Care Provider: Guy Mcclelland Jr, MD   Principal Problem: Hypoxia    [unfilled]  Subjective:     HPI:  Mr. Willams is 87 yo with ESRD, HTN, and frailty that presented with shortness of breath. He does have severe AS and chronic pleural effusions and PH. He had HD yesterday and tolerated it well and is scheduled to get UF only today. He is feeling short of breath without his oxygen on but is able to eat and communicate with the . No known sick contacts prior to discharge.         Past Medical History:   Diagnosis Date    Dependence on renal dialysis     ESRD (end stage renal disease)     HTN (hypertension)     Hyperlipidemia        Past Surgical History:   Procedure Laterality Date    BACK SURGERY      DIALYSIS FISTULA CREATION  2012    left arm    FISTULOGRAM Left 3/22/2022    Procedure: Fistulogram, transradial;  Surgeon: Jude Ordaz MD;  Location: [facility];  Service: Vascular;  Laterality: Left;  RN Pre Op 3-17-22.  May need Chinese  day of procedure. [place]    JOINT REPLACEMENT Right     hip       Review of patient's allergies indicates:  No Known Allergies    Family History       Problem Relation (Age of Onset)    Arthritis Sister    Diabetes Sister    Hypertension Sister          Tobacco Use    Smoking status: Former     Packs/day: 1.00     Years: 10.00     Pack years: 10.00     Types: Cigarettes     Quit date: 1971     Years since quittin.9    Smokeless tobacco: Never   Substance and Sexual Activity    Alcohol use: No    Drug use: No    Sexual activity: Not Currently     Partners: Female     Comment:          Review of Systems   Constitutional:  Positive for activity change and fatigue.   HENT:  Negative for congestion and  rhinorrhea.    Eyes: Negative.    Respiratory:  Positive for cough and shortness of breath.    Cardiovascular: Negative.    Gastrointestinal: Negative.    Endocrine: Negative.    Genitourinary: Negative.    Musculoskeletal: Negative.    Allergic/Immunologic: Negative.    Neurological: Negative.    Hematological: Negative.    All other systems reviewed and are negative.  Objective:     Vital Signs (Most Recent):  Temp: 98.3 °F (36.8 °C) (11/08/22 1432)  Pulse: 66 (11/08/22 1432)  Resp: 19 (11/08/22 1432)  BP: (!) 143/64 (11/08/22 1432)  SpO2: 98 % (11/08/22 1432)   Vital Signs (24h Range):  Temp:  [97.7 °F (36.5 °C)-98.5 °F (36.9 °C)] 98.3 °F (36.8 °C)  Pulse:  [60-75] 66  Resp:  [17-19] 19  SpO2:  [96 %-99 %] 98 %  BP: (118-143)/(56-64) 143/64     Weight: 118.5 kg (261 lb 3.9 oz)  Body mass index is 47.78 kg/m².      Intake/Output Summary (Last 24 hours) at 11/8/2022 1524  Last data filed at 11/8/2022 1354  Gross per 24 hour   Intake 350 ml   Output 200 ml   Net 150 ml       Physical Exam  Vitals reviewed.   Constitutional:       General: He is not in acute distress.     Appearance: Normal appearance. He is ill-appearing.   HENT:      Head: Normocephalic and atraumatic.      Nose: Nose normal.      Mouth/Throat:      Mouth: Mucous membranes are moist.      Pharynx: Oropharynx is clear.   Eyes:      Extraocular Movements: Extraocular movements intact.      Conjunctiva/sclera: Conjunctivae normal.      Pupils: Pupils are equal, round, and reactive to light.   Cardiovascular:      Rate and Rhythm: Regular rhythm. Tachycardia present.      Pulses: Normal pulses.      Heart sounds: Murmur heard.   Pulmonary:      Effort: Respiratory distress present.      Breath sounds: Rales present.   Abdominal:      General: Abdomen is flat. Bowel sounds are normal.      Palpations: Abdomen is soft.   Musculoskeletal:         General: No swelling or tenderness.   Skin:     General: Skin is warm and dry.      Capillary Refill:  Capillary refill takes less than 2 seconds.   Neurological:      Mental Status: He is alert. Mental status is at baseline.   Psychiatric:         Mood and Affect: Mood normal.       Vents:  Oxygen Concentration (%): 50 (11/07/22 1938)    Lines/Drains/Airways       Drain  Duration                  Hemodialysis AV Fistula Left upper arm -- days              Peripheral Intravenous Line  Duration                  Peripheral IV - Single Lumen 11/05/22 1200 20 G Anterior;Distal;Right Upper Arm 3 days                    Significant Labs:    CBC/Anemia Profile:  No results for input(s): WBC, HGB, HCT, PLT, MCV, RDW, IRON, FERRITIN, RETIC, FOLATE, [id number], OCCULTBLOOD in the last 48 hours.     Chemistries:  Recent Labs   Lab 11/08/22  0836      K 4.9      CO2 26   BUN 48*   CREATININE 6.4*   CALCIUM 8.5*   ALBUMIN 2.5*   PHOS 5.9*       All pertinent labs within the past 24 hours have been reviewed.    Significant Imaging:   I have reviewed all pertinent imaging results/findings within the past 24 hours.      ABG  Recent Labs   Lab 11/05/22  0939   PH 7.512*   PO2 21*   PCO2 50.0*   HCO3 40.0*   BE 15     Assessment/Plan:     * Hypoxia  Likely secondary to volume overload, AS, and PH  - agree with fluid removal as much as tolerated   Oxygen support for Spo2 of 88-92%  - would swap to high flow instead of venti mask so he can continue to easily eat.    Overall, chronically ill patient with recurrent hospital visits- would consult palliative care to assist in long term planning.     Pulmonary hypertension  Aggressive fluid removal as above.     Severe aortic stenosis  definitely contributing to his overall health status  And complicating his pulmonary and cardiac disease    Pleural effusion  Chronic effusions, continue to optimize fluid status     ESRD (end stage renal disease)  RRT per nephrology          Thank you for your consult. I will follow-up with patient. Please contact us if you have any additional  questions.     Petra Castelan MD  Pulmonology  [facility] - [place] - Telemetry

## 2022-11-08 NOTE — ASSESSMENT & PLAN NOTE
definitely contributing to his overall health status  And complicating his pulmonary and cardiac disease

## 2022-11-08 NOTE — NURSING
Report given to oncoming nurse Netta LINDSEY, Patient is awake and alert. On Venti Mask. Bed is in lowest position, wheels locked, call light is in reach.

## 2022-11-08 NOTE — ASSESSMENT & PLAN NOTE
No respiratory distress or failure. Patient 90% on RA.  Now 96 on NC. Very comfortable. Cause of hypoxia multi-factorial including mod- sev AS, pulmonary edema, bilateral effusions as well as pulmonary hypertension.  Lasix for now. Will consider thoracentesis.    UF today with dialysis. Pulmonary consulted as well

## 2022-11-08 NOTE — SUBJECTIVE & OBJECTIVE
Interval History: comfortable     Review of Systems   Constitutional:  Negative for activity change.   HENT:  Negative for congestion and dental problem.    Eyes:  Negative for discharge.   Respiratory:  Negative for apnea and chest tightness.    Cardiovascular:  Negative for chest pain.   Gastrointestinal:  Negative for abdominal distention and abdominal pain.   Endocrine: Negative for cold intolerance.   Genitourinary:  Negative for difficulty urinating and dysuria.   Musculoskeletal:  Negative for arthralgias.   Allergic/Immunologic: Negative for environmental allergies.   Neurological:  Negative for dizziness and facial asymmetry.   Psychiatric/Behavioral:  Negative for agitation and behavioral problems.    Objective:     Vital Signs (Most Recent):  Temp: 97.9 °F (36.6 °C) (11/08/22 0740)  Pulse: 66 (11/08/22 0740)  Resp: 18 (11/08/22 0740)  BP: (!) 126/59 (11/08/22 0740)  SpO2: 97 % (11/08/22 0945)   Vital Signs (24h Range):  Temp:  [97.9 °F (36.6 °C)-98.5 °F (36.9 °C)] 97.9 °F (36.6 °C)  Pulse:  [60-93] 66  Resp:  [17-18] 18  SpO2:  [96 %-98 %] 97 %  BP: (101-145)/(43-74) 126/59     Weight: 118.5 kg (261 lb 3.9 oz)  Body mass index is 47.78 kg/m².    Intake/Output Summary (Last 24 hours) at 11/8/2022 1039  Last data filed at 11/8/2022 0400  Gross per 24 hour   Intake 320 ml   Output 2700 ml   Net -2380 ml      Physical Exam  Vitals and nursing note reviewed.   Constitutional:       Appearance: Normal appearance.   HENT:      Head: Normocephalic and atraumatic.      Nose: Nose normal.   Eyes:      Conjunctiva/sclera: Conjunctivae normal.   Cardiovascular:      Rate and Rhythm: Normal rate and regular rhythm.   Pulmonary:      Effort: Pulmonary effort is normal. No respiratory distress.   Musculoskeletal:         General: No swelling.   Skin:     General: Skin is dry.   Neurological:      Mental Status: He is alert. Mental status is at baseline.   Psychiatric:         Mood and Affect: Mood normal.          Behavior: Behavior normal.       Significant Labs: All pertinent labs within the past 24 hours have been reviewed.  BMP:   Recent Labs   Lab 11/08/22  0836   *      K 4.9      CO2 26   BUN 48*   CREATININE 6.4*   CALCIUM 8.5*     CBC: No results for input(s): WBC, HGB, HCT, PLT in the last 48 hours.    Significant Imaging:

## 2022-11-08 NOTE — ASSESSMENT & PLAN NOTE
Likely secondary to volume overload, AS, and PH  - agree with fluid removal as much as tolerated   Oxygen support for Spo2 of 88-92%  - would swap to high flow instead of venti mask so he can continue to easily eat.    Overall, chronically ill patient with recurrent hospital visits- would consult palliative care to assist in long term planning.

## 2022-11-08 NOTE — HPI
Mr. Willams is 87 yo with ESRD, HTN, and frailty that presented with shortness of breath. He does have severe AS and chronic pleural effusions and PH. He had HD yesterday and tolerated it well and is scheduled to get UF only today. He is feeling short of breath without his oxygen on but is able to eat and communicate with the . No known sick contacts prior to discharge.

## 2022-11-08 NOTE — PLAN OF CARE
Problem: Adult Inpatient Plan of Care  Goal: Plan of Care Review  Flowsheets (Taken 11/8/2022 0440)  Plan of Care Reviewed With: patient  Goal: Absence of Hospital-Acquired Illness or Injury  Intervention: Identify and Manage Fall Risk  Flowsheets (Taken 11/8/2022 0440)  Safety Promotion/Fall Prevention: bed alarm set  Intervention: Prevent and Manage VTE (Venous Thromboembolism) Risk  Flowsheets (Taken 11/8/2022 0440)  VTE Prevention/Management: ROM (active) performed  Range of Motion: active ROM (range of motion) encouraged  Goal: Optimal Comfort and Wellbeing  Intervention: Monitor Pain and Promote Comfort  Flowsheets (Taken 11/8/2022 0440)  Pain Management Interventions:   care clustered   pain management plan reviewed with patient/caregiver  Intervention: Provide Person-Centered Care  Flowsheets (Taken 11/8/2022 0440)  Trust Relationship/Rapport: thoughts/feelings acknowledged     Problem: Skin Injury Risk Increased  Goal: Skin Health and Integrity  Intervention: Optimize Skin Protection  Flowsheets (Taken 11/8/2022 0440)  Head of Bed (HOB) Positioning: HOB at 30-45 degrees     Problem: Fall Injury Risk  Goal: Absence of Fall and Fall-Related Injury  Intervention: Identify and Manage Contributors  Flowsheets (Taken 11/8/2022 0440)  Self-Care Promotion: independence encouraged  Medication Review/Management: medications reviewed  Intervention: Promote Injury-Free Environment  Flowsheets (Taken 11/8/2022 0440)  Safety Promotion/Fall Prevention: bed alarm set     Problem: Device-Related Complication Risk (Hemodialysis)  Goal: Safe, Effective Therapy Delivery  Intervention: Optimize Device Care and Function  Flowsheets (Taken 11/8/2022 0440)  Medication Review/Management: medications reviewed

## 2022-11-08 NOTE — CONSULTS
Patient in HD when I went to evaluate. Agree with optimizing volume status, wean Fio2 as tolerated.  Full consult note to follow tomorrow.     Petra Castelan M.D.  Pulmonary/Critical Care

## 2022-11-08 NOTE — PROGRESS NOTES
[facility] Medicine  Progress Note    Patient Name: Mandeep Willams  MRN: [id number]  Patient Class: IP- Inpatient   Admission Date: 11/5/2022  Length of Stay: 2 days  Attending Physician: Akhil Benoit MD  Primary Care Provider: Guy Mcclelland Jr, MD        Subjective:     Principal Problem:Hypoxia        HPI:  Mr. Willams is a 85 yo M who presents with a CC of SOB for 3 days. EMS was called and he was found to have an 02 sat of 90% on RA. Patient is an ESRD patient with a history of mod-severe AS, pleural effusions as well as pulmonary hypertension.  He lives at home by himself and does not wear 02.  He is very comfortable on NC and has no other complaints. He was in observation here early last month for the same.        Overview/Hospital Course:  Patient admitted to the hospital for hypoxia- likely multifactorial including mod-severe AS, pulmonary hypertension and pleural effusions.  He was started on Lasix. Nephrology consulted for dialysis, underwent HD on 11/7.       Interval History: seen after HD, feeling tired. Family member and  used.    Review of Systems   Constitutional:  Negative for fatigue.   Respiratory:  Positive for shortness of breath.    Neurological:  Negative for weakness.   Objective:     Vital Signs (Most Recent):  Temp: 98.5 °F (36.9 °C) (11/07/22 1627)  Pulse: 75 (11/07/22 1627)  Resp: 17 (11/07/22 1627)  BP: (!) 130/59 (11/07/22 1627)  SpO2: 98 % (11/07/22 1627)   Vital Signs (24h Range):  Temp:  [98.3 °F (36.8 °C)-98.6 °F (37 °C)] 98.5 °F (36.9 °C)  Pulse:  [64-93] 75  Resp:  [17-30] 17  SpO2:  [82 %-98 %] 98 %  BP: (101-154)/(43-74) 130/59     Weight: 118.1 kg (260 lb 5.8 oz)  Body mass index is 47.62 kg/m².    Intake/Output Summary (Last 24 hours) at 11/7/2022 1755  Last data filed at 11/7/2022 1505  Gross per 24 hour   Intake 120 ml   Output 2500 ml   Net -2380 ml        Physical Exam  Vitals and nursing note reviewed.   Constitutional:       General: He is not in  acute distress.     Appearance: He is cachectic.   HENT:      Head: Normocephalic and atraumatic.      Nose: Nose normal. No congestion or rhinorrhea.      Mouth/Throat:      Mouth: Mucous membranes are moist.   Eyes:      General: No scleral icterus.     Conjunctiva/sclera: Conjunctivae normal.   Cardiovascular:      Rate and Rhythm: Normal rate and regular rhythm.      Pulses: Normal pulses.      Heart sounds: Normal heart sounds. No murmur heard.  Pulmonary:      Effort: No respiratory distress.      Breath sounds: Normal breath sounds. No wheezing, rhonchi or rales.   Abdominal:      General: Bowel sounds are normal. There is no distension.      Palpations: Abdomen is soft.      Tenderness: There is no abdominal tenderness. There is no guarding.   Musculoskeletal:         General: No swelling or tenderness. Normal range of motion.      Cervical back: Neck supple.      Right lower leg: No edema.      Left lower leg: No edema.   Lymphadenopathy:      Cervical: No cervical adenopathy.   Skin:     General: Skin is warm and dry.      Capillary Refill: Capillary refill takes less than 2 seconds.      Coloration: Skin is not jaundiced or pale.   Neurological:      General: No focal deficit present.      Mental Status: He is alert and oriented to person, place, and time. Mental status is at baseline.      Sensory: No sensory deficit.      Motor: No weakness.   Psychiatric:         Mood and Affect: Mood normal.         Behavior: Behavior normal.         Thought Content: Thought content normal.         Judgment: Judgment normal.       Significant Labs: All pertinent labs within the past 24 hours have been reviewed.  BMP:   Recent Labs   Lab 11/06/22  0132   GLU 86      K 4.1      CO2 31*   BUN 38*   CREATININE 6.5*   CALCIUM 8.2*       CBC:   No results for input(s): WBC, HGB, HCT, PLT in the last 48 hours.      Significant Imaging:       Assessment/Plan:      * Hypoxia  No respiratory distress or failure.  Patient 90% on RA.  Now 96 on NC. Very comfortable. Cause of hypoxia multi-factorial including mod- sev AS, pulmonary edema, bilateral effusions as well as pulmonary hypertension.  Lasix for now. Will consider thoracentesis.      Pulmonary hypertension  PA pressure of 59        Hypokalemia  Will replace       Severe aortic stenosis  As discussed above.  Recent echo      Elevated troponin  Likely elevated in setting of renal failure.  Trending down      Thrombocytopenia  No acute issues       Debility  May benefit from H/H. Uses some kind of rolling walker at home       Anemia of chronic disorder  No acute issues      Tobacco use disorder        Pleural effusion  May consider thora      Hyperlipidemia  Resume statin      ESRD (end stage renal disease)  Completed dialysis yesterday.  Will consult nephrology  - HD today      HTN (hypertension)  Restarted home meds         VTE Risk Mitigation (From admission, onward)         Ordered     heparin (porcine) injection 5,000 Units  Every 8 hours         11/05/22 1238                Discharge Planning   BONILLA: 11/8/2022     Code Status: DNR   Is the patient medically ready for discharge?:     Reason for patient still in hospital (select all that apply): Treatment  Discharge Plan A: Home, Home Health (Continue O/P-H/D; follow-up)                  Akhil Benoit MD  Department of Hospital Medicine   [facility] [place] - Telemetry

## 2022-11-08 NOTE — NURSING
Per handoff received from Ly NEWTON LPN. Patient care assumed. Patients overall condition assessed and patient appears to be in NAD with no complaints of pain. 20g VALERIE PIV as well as AUSTIN fistula is clean, dry, and intact. Bed alarm maintained to ensure patient safety. Call light in reach and patient instructed to inform the nurse if anything is needed. Patient stable and will continue to be monitored.

## 2022-11-08 NOTE — PROGRESS NOTES
[facility] [place] [place]  Nephrology  Progress Note    Patient Name: Mandeep Willams  MRN: [id number]  Admission Date: 11/5/2022  Hospital Length of Stay: 3 days  Attending Provider: Al Allison MD   Primary Care Physician: Guy Mcclelland Jr, MD  Principal Problem:Hypoxia  Date of service 11/7/22  Inpatient consult to Nephrology  Consult performed by: Angelia Baugh MD  Consult ordered by: Akhil Benoit MD     Reason for consult: ESRD, dialysis  Subjective:     Interval History: c/o sob, is currently on VM    Review of patient's allergies indicates:  No Known Allergies  Current Facility-Administered Medications   Medication Frequency    albuterol sulfate nebulizer solution 2.5 mg Q4H PRN    amLODIPine tablet 5 mg Daily    furosemide injection 40 mg Q12H    guaiFENesin 100 mg/5 ml syrup 200 mg Q4H PRN    heparin (porcine) injection 5,000 Units Q8H    metoprolol succinate (TOPROL-XL) 24 hr tablet 100 mg Daily    mupirocin 2 % ointment BID    pravastatin tablet 10 mg Daily    tamsulosin 24 hr capsule 0.4 mg Daily       Objective:     Vital Signs (Most Recent):  Temp: 97.9 °F (36.6 °C) (11/08/22 0740)  Pulse: 66 (11/08/22 0740)  Resp: 18 (11/08/22 0740)  BP: (!) 126/59 (11/08/22 0740)  SpO2: 96 % (11/08/22 0740)  O2 Device (Oxygen Therapy): venti mask (11/07/22 1938)   Vital Signs (24h Range):  Temp:  [97.9 °F (36.6 °C)-98.5 °F (36.9 °C)] 97.9 °F (36.6 °C)  Pulse:  [60-93] 66  Resp:  [17-30] 18  SpO2:  [82 %-98 %] 96 %  BP: (101-145)/(43-74) 126/59     Weight: 118.5 kg (261 lb 3.9 oz) (11/08/22 0356)  Body mass index is 47.78 kg/m².  Body surface area is 2.28 meters squared.    I/O last 3 completed shifts:  In: 320 [P.O.:320]  Out: 2700 [Urine:200; Other:2500]    Physical Exam    Significant Labs:CBC:   Recent Labs   Lab 11/05/22 0942   WBC 3.09*   RBC 3.37*   HGB 11.4*   HCT 34.0*   PLT 98*   *   MCH 33.8*   MCHC 33.5     CMP:   Recent Labs   Lab 11/05/22 0942 11/06/22  0132    86   CALCIUM 9.1 8.2*    ALBUMIN 3.0*  --    PROT 7.8  --     143   K 3.2* 4.1   CO2 34* 31*   CL 97 102   BUN 28* 38*   CREATININE 5.7* 6.5*   ALKPHOS 72  --    ALT 15  --    AST 15  --    BILITOT 1.0  --      All labs within the past 24 hours have been reviewed.    Significant Imaging:  X-Ray: Reviewed    Assessment/Plan:     Active Diagnoses:    Diagnosis Date Noted POA    PRINCIPAL PROBLEM:  Hypoxia [R09.02] 11/05/2022 Yes    Hypokalemia [E87.6] 11/05/2022 Yes    Pulmonary hypertension [I27.20] 11/05/2022 Yes    Elevated troponin [R77.8] 10/04/2022 Yes    Severe aortic stenosis [I35.0] 10/04/2022 Yes    Thrombocytopenia [D69.6] 05/29/2022 Yes    Debility [R53.81] 01/25/2022 Yes    Anemia of chronic disorder [D63.8] 01/09/2021 Yes     Chronic    Tobacco use disorder [F17.200] 01/08/2021 Yes    Pleural effusion [J90] 12/04/2018 Yes    ESRD (end stage renal disease) [N18.6]  Yes    HTN (hypertension) [I10]  Yes    Hyperlipidemia [E78.5]  Yes      Problems Resolved During this Admission:       1. ESRD - usual HD w/ Dr Henriquez  - HD today, UF tomorrow, then continue MWF HD schedule while admitted  - renally dose medications for dialysis  - hypervolemia, pulm edema, pleural effusion - UF as tolerated w/ HD today, plan for UF only session tomorrow for additional UF  2. HTN - some hypotension on HD  - hold BP meds prior to HD, albumin bolus w/ session tomorrow  - UF as tolerated on dialysis  3. Anemia of chronic kidney disease - no indication for Epo with HD, continue to monitor CBC  4. MBD/secondary hyperparathyroidism - phos is low on no binders, continue to monitor phos    Thank you for your consult. I will follow-up with patient. Please contact us if you have any additional questions.    Lavinia Baugh MD  Nephrology  [facility] - Telemetry

## 2022-11-08 NOTE — PLAN OF CARE
11/08/22 0941   Medicare Message   Important Message from Medicare regarding Discharge Appeal Rights Given to patient/caregiver;Explained to patient/caregiver;Signed/date by patient/caregiver   Date IMM was signed 11/08/22   Time IMM was signed 0941

## 2022-11-08 NOTE — PLAN OF CARE
Problem: Adult Inpatient Plan of Care  Goal: Plan of Care Review  Outcome: Ongoing, Progressing  Flowsheets (Taken 11/7/2022 1916)  Plan of Care Reviewed With: patient     Problem: Fall Injury Risk  Goal: Absence of Fall and Fall-Related Injury  Outcome: Ongoing, Progressing  Intervention: Identify and Manage Contributors  Flowsheets (Taken 11/7/2022 1916)  Self-Care Promotion:   BADL personal objects within reach   BADL personal routines maintained   safe use of adaptive equipment encouraged  Medication Review/Management: medications reviewed  Intervention: Promote Injury-Free Environment  Flowsheets (Taken 11/7/2022 1916)  Safety Promotion/Fall Prevention:   assistive device/personal item within reach   family to remain at bedside   Fall Risk signage in place   Fall Risk reviewed with patient/family   instructed to call staff for mobility   side rails raised x 2   room near unit station   nonskid shoes/socks when out of bed   medications reviewed

## 2022-11-09 PROBLEM — Z71.89 ADVANCED CARE PLANNING/COUNSELING DISCUSSION: Status: ACTIVE | Noted: 2022-01-01
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
## 2022-11-09 NOTE — ASSESSMENT & PLAN NOTE
No respiratory distress or failure. Patient 90% on RA.  Now 96 on NC. Very comfortable. Cause of hypoxia multi-factorial including mod- sev AS, pulmonary edema, bilateral effusions as well as pulmonary hypertension.  Lasix for now. Will consider thoracentesis.    UF today with dialysis. Pulmonary consulted as well    Not improving despite multiple dialysis sessions.  Will check CTA

## 2022-11-09 NOTE — SUBJECTIVE & OBJECTIVE
Interval History: No new issues     Review of Systems   Constitutional:  Negative for activity change.   HENT:  Negative for congestion and dental problem.    Eyes:  Negative for discharge.   Respiratory:  Negative for apnea and chest tightness.    Cardiovascular:  Negative for chest pain.   Gastrointestinal:  Negative for abdominal distention and abdominal pain.   Endocrine: Negative for cold intolerance.   Genitourinary:  Negative for difficulty urinating and dysuria.   Musculoskeletal:  Negative for arthralgias.   Allergic/Immunologic: Negative for environmental allergies.   Neurological:  Negative for dizziness and facial asymmetry.   Psychiatric/Behavioral:  Negative for agitation and behavioral problems.    Objective:     Vital Signs (Most Recent):  Temp: 97.4 °F (36.3 °C) (11/09/22 0728)  Pulse: 67 (11/09/22 0728)  Resp: 18 (11/09/22 0728)  BP: (!) 150/63 (11/09/22 0728)  SpO2: 100 % (11/09/22 0728)   Vital Signs (24h Range):  Temp:  [97.1 °F (36.2 °C)-98.3 °F (36.8 °C)] 97.4 °F (36.3 °C)  Pulse:  [58-74] 67  Resp:  [16-20] 18  SpO2:  [95 %-100 %] 100 %  BP: (108-156)/(45-64) 150/63     Weight: 118.5 kg (261 lb 3.9 oz)  Body mass index is 47.78 kg/m².    Intake/Output Summary (Last 24 hours) at 11/9/2022 1030  Last data filed at 11/9/2022 0623  Gross per 24 hour   Intake 100 ml   Output 2654 ml   Net -2554 ml      Physical Exam  Vitals and nursing note reviewed.   Constitutional:       Appearance: Normal appearance.   HENT:      Head: Normocephalic and atraumatic.      Nose: Nose normal.   Eyes:      Conjunctiva/sclera: Conjunctivae normal.   Cardiovascular:      Rate and Rhythm: Normal rate and regular rhythm.   Pulmonary:      Effort: Pulmonary effort is normal. No respiratory distress.   Musculoskeletal:         General: No swelling.   Skin:     General: Skin is dry.   Neurological:      Mental Status: He is alert. Mental status is at baseline.   Psychiatric:         Mood and Affect: Mood normal.          Behavior: Behavior normal.       Significant Labs: All pertinent labs within the past 24 hours have been reviewed.  BMP:   Recent Labs   Lab 11/09/22 0358   GLU 96      K 4.9      CO2 21*   BUN 67*   CREATININE 7.3*   CALCIUM 8.6*     CBC:   Recent Labs   Lab 11/09/22 0358   WBC 4.51   HGB 10.9*   HCT 32.8*   *       Significant Imaging:

## 2022-11-09 NOTE — SUBJECTIVE & OBJECTIVE
Interval History/Subjective:     - Seen in dialysis, using the language line video  (2000).   - he denied any current pain, stated his breathing is stable.   - Extensive discussion had with patient who then allowed me to talk with his family further.     Past Medical History:   Diagnosis Date    Dependence on renal dialysis     ESRD (end stage renal disease)     HTN (hypertension)     Hyperlipidemia        Past Surgical History:   Procedure Laterality Date    BACK SURGERY      DIALYSIS FISTULA CREATION  2012    left arm    FISTULOGRAM Left 3/22/2022    Procedure: Fistulogram, transradial;  Surgeon: Jude Ordaz MD;  Location: [facility];  Service: Vascular;  Laterality: Left;  RN Pre Op 3-17-22.  May need Chinese  day of procedure. CA    JOINT REPLACEMENT Right     hip       Review of patient's allergies indicates:  No Known Allergies    Medications:  Continuous Infusions:  Scheduled Meds:   sodium chloride 0.9%   Intravenous Once    amLODIPine  5 mg Oral Daily    calcium acetate(phosphat bind)  667 mg Oral TID WM    furosemide (LASIX) injection  80 mg Intravenous Q12H    heparin (porcine)  5,000 Units Subcutaneous Q8H    metoprolol succinate  100 mg Oral Daily    mupirocin   Nasal BID    pravastatin  10 mg Oral Daily    tamsulosin  0.4 mg Oral Daily     PRN Meds:sodium chloride 0.9%, albuterol sulfate, guaiFENesin 100 mg/5 ml, sodium chloride 0.9%    Family History       Problem Relation (Age of Onset)    Arthritis Sister    Diabetes Sister    Hypertension Sister          Tobacco Use    Smoking status: Former     Packs/day: 1.00     Years: 10.00     Pack years: 10.00     Types: Cigarettes     Quit date: 1971     Years since quittin.9    Smokeless tobacco: Never   Substance and Sexual Activity    Alcohol use: No    Drug use: No    Sexual activity: Not Currently     Partners: Female     Comment:        Review of Systems: Per HPI  Objective:     Vital Signs (Most  Recent):  Temp: (!) 95.6 °F (35.3 °C) (11/09/22 0932)  Pulse: 74 (11/09/22 1153)  Resp: 20 (11/09/22 0932)  BP: (!) 90/45 (11/09/22 1153)  SpO2: 100 % (11/09/22 0932)   Vital Signs (24h Range):  Temp:  [95.6 °F (35.3 °C)-98.3 °F (36.8 °C)] 95.6 °F (35.3 °C)  Pulse:  [58-74] 74  Resp:  [16-20] 20  SpO2:  [95 %-100 %] 100 %  BP: ()/(45-64) 90/45     Weight: 118.5 kg (261 lb 3.9 oz)  Body mass index is 47.78 kg/m².    Physical Exam  Constitutional:       General: He is not in acute distress.  HENT:      Head: Normocephalic and atraumatic.      Nose:      Comments: NC present  Eyes:      Extraocular Movements: Extraocular movements intact.   Cardiovascular:      Rate and Rhythm: Normal rate.   Pulmonary:      Effort: Pulmonary effort is normal.   Skin:     General: Skin is warm and dry.   Neurological:      Mental Status: He is alert.      Comments: Awake and alert, good insight       Review of Symptoms      Symptom Assessment (ESAS 0-10 Scale)  Pain:  0  Dyspnea:  0  Anxiety:  0  Nausea:  0  Depression:  0  Anorexia:  0  Fatigue:  0  Insomnia:  0  Restlessness:  0  Agitation:  0         Living Arrangements:  Lives alone    Psychosocial/Cultural: - lives alone  - has a friend named Fletcher who lives nearby and a grandniece named Kathi ho        Advance Care Planning   Advance Directives:   LaPOST: Yes    Do Not Resuscitate Status: Yes      Decision Making:  Patient answered questions       Significant Labs: reviewed  CBC:   Recent Labs   Lab 11/09/22 0358   WBC 4.51   HGB 10.9*   HCT 32.8*   *   *     BMP:  Recent Labs   Lab 11/09/22 0358   GLU 96      K 4.9      CO2 21*   BUN 67*   CREATININE 7.3*   CALCIUM 8.6*     LFT:  Lab Results   Component Value Date    AST 15 11/05/2022    ALKPHOS 72 11/05/2022    BILITOT 1.0 11/05/2022     Albumin:   Albumin   Date Value Ref Range Status   11/09/2022 2.6 (L) 3.5 - 5.2 g/dL Final     Protein:   Total Protein   Date Value Ref Range Status    11/05/2022 7.8 6.0 - 8.4 g/dL Final     Lactic acid:   Lab Results   Component Value Date    LACTATE 1.0 10/22/2018    LACTATE 1.2 10/22/2018       Significant Imaging: I have reviewed all pertinent imaging results/findings within the past 24 hours.

## 2022-11-09 NOTE — PT/OT/SLP EVAL
"Physical Therapy Evaluation    Patient Name:  Mandeep Willams   MRN:  [id number]    Recommendations:     Discharge Recommendations:   (TBD)   Discharge Equipment Recommendations:  (TBD)       Assessment:     Mandeep Willams is a 86 y.o. male admitted with a medical diagnosis of Hypoxia.  He presents with the following impairments/functional limitations:  impaired endurance, impaired functional mobility, gait instability, weakness .  Pt declined functional assessment due to fatigue from dialysis.     Rehab Prognosis: Good; patient would benefit from acute skilled PT services to address these deficits and reach maximum level of function.    Recent Surgery: * No surgery found *      Plan:     During this hospitalization, patient to be seen 5 x/week to address the identified rehab impairments via gait training, therapeutic activities, therapeutic exercises and progress toward the following goals:    Plan of Care Expires:  11/16/22    Subjective     Chief Complaint: "I can't, I'm too weak..."  Patient/Family Comments/goals: Pt agreeable to PT  Pain/Comfort:  Pain Rating 1: 0/10    Patients cultural, spiritual, Lutheran conflicts given the current situation: no    Living Environment:  PTA pt lived alone in a 1 story house with no steps to enter.  Prior to admission, patients level of function was mod I.  Equipment used at home: rollator.  DME owned (not currently used): none.  Upon discharge, patient will have assistance from unknown.    Objective:     Communicated with nurseErica prior to session.  Patient found  supine eating a snack  with oxygen, PureWick, telemetry  upon PT entry to room.    General Precautions: Standard,     Orthopedic Precautions:N/A   Braces: N/A  Respiratory Status: Nasal cannula, flow 5 L/min    Exams:  Cognitive Exam:  Patient is oriented to Person, Place, and Situation  RLE ROM: WFL  RLE Strength: unable to MMT  LLE ROM: WFL  LLE Strength: unable to MMT    Functional Mobility:  Pt declined mobility " reporting he feels weak; assisted to HOB with total assist via nursing assistance.      AM-PAC 6 CLICK MOBILITY  Total Score:12       Treatment & Education:  Educated on role of PT and POC via  service (Mandarin).     Patient left HOB elevated with call button in reach, nurse notified, and all needs in reach .    GOALS:   Multidisciplinary Problems       Physical Therapy Goals          Problem: Physical Therapy    Goal Priority Disciplines Outcome Goal Variances Interventions   Physical Therapy Goal     PT, PT/OT Ongoing, Progressing     Description: Goals to be met by: 22     Patient will increase functional independence with mobility by performin. Pt to be mod I with bed mobility.  2. Pt to transfer with supervision.  3. Pt to ambulate 50' w/RW SBA.  4. Pt to be (I) with HEP.                         History:     Past Medical History:   Diagnosis Date    Dependence on renal dialysis     ESRD (end stage renal disease)     HTN (hypertension)     Hyperlipidemia        Past Surgical History:   Procedure Laterality Date    BACK SURGERY      DIALYSIS FISTULA CREATION  2012    left arm    FISTULOGRAM Left 3/22/2022    Procedure: Fistulogram, transradial;  Surgeon: Jude Ordaz MD;  Location: [facility];  Service: Vascular;  Laterality: Left;  RN Pre Op 3-17-22.  May need Chinese  day of procedure. CA    JOINT REPLACEMENT Right     hip       Time Tracking:     PT Received On: 22  PT Start Time: 1657     PT Stop Time: 1712  PT Total Time (min): 15 min     Billable Minutes: Evaluation 15      2022

## 2022-11-09 NOTE — PLAN OF CARE
Problem: Physical Therapy  Goal: Physical Therapy Goal  Description: Goals to be met by: 22     Patient will increase functional independence with mobility by performin. Pt to be mod I with bed mobility.  2. Pt to transfer with supervision.  3. Pt to ambulate 50' w/RW SBA.  4. Pt to be (I) with HEP.    Outcome: Ongoing, Progressing   Eval initiated however, pt reports unable to sit or stand due to feeling weak; had dialysis today.

## 2022-11-09 NOTE — PROGRESS NOTES
[facility] Medicine  Progress Note    Patient Name: Mandeep Willams  MRN: [id number]  Patient Class: IP- Inpatient   Admission Date: 11/5/2022  Length of Stay: 4 days  Attending Physician: Al Allison MD  Primary Care Provider: Guy Mcclelland Jr, MD        Subjective:     Principal Problem:Hypoxia        HPI:  Mr. Willams is a 85 yo M who presents with a CC of SOB for 3 days. EMS was called and he was found to have an 02 sat of 90% on RA. Patient is an ESRD patient with a history of mod-severe AS, pleural effusions as well as pulmonary hypertension.  He lives at home by himself and does not wear 02.  He is very comfortable on NC and has no other complaints. He was in observation here early last month for the same.        Overview/Hospital Course:  Patient admitted to the hospital for hypoxia- likely multifactorial including mod-severe AS, pulmonary hypertension and pleural effusions.  He was started on Lasix. Nephrology consulted for dialysis, underwent HD on 11/7. Pulmonary consulted and agreed that hypoxia likely from edema. Patient went multiple dialysis rounds without much improvement in his hypoxia.  Palliative care was consulted. Patient sent for CTA of chest on 11/9/22.      Interval History: No new issues     Review of Systems   Constitutional:  Negative for activity change.   HENT:  Negative for congestion and dental problem.    Eyes:  Negative for discharge.   Respiratory:  Negative for apnea and chest tightness.    Cardiovascular:  Negative for chest pain.   Gastrointestinal:  Negative for abdominal distention and abdominal pain.   Endocrine: Negative for cold intolerance.   Genitourinary:  Negative for difficulty urinating and dysuria.   Musculoskeletal:  Negative for arthralgias.   Allergic/Immunologic: Negative for environmental allergies.   Neurological:  Negative for dizziness and facial asymmetry.   Psychiatric/Behavioral:  Negative for agitation and behavioral problems.     Objective:     Vital Signs (Most Recent):  Temp: 97.4 °F (36.3 °C) (11/09/22 0728)  Pulse: 67 (11/09/22 0728)  Resp: 18 (11/09/22 0728)  BP: (!) 150/63 (11/09/22 0728)  SpO2: 100 % (11/09/22 0728)   Vital Signs (24h Range):  Temp:  [97.1 °F (36.2 °C)-98.3 °F (36.8 °C)] 97.4 °F (36.3 °C)  Pulse:  [58-74] 67  Resp:  [16-20] 18  SpO2:  [95 %-100 %] 100 %  BP: (108-156)/(45-64) 150/63     Weight: 118.5 kg (261 lb 3.9 oz)  Body mass index is 47.78 kg/m².    Intake/Output Summary (Last 24 hours) at 11/9/2022 1030  Last data filed at 11/9/2022 0623  Gross per 24 hour   Intake 100 ml   Output 2654 ml   Net -2554 ml      Physical Exam  Vitals and nursing note reviewed.   Constitutional:       Appearance: Normal appearance.   HENT:      Head: Normocephalic and atraumatic.      Nose: Nose normal.   Eyes:      Conjunctiva/sclera: Conjunctivae normal.   Cardiovascular:      Rate and Rhythm: Normal rate and regular rhythm.   Pulmonary:      Effort: Pulmonary effort is normal. No respiratory distress.   Musculoskeletal:         General: No swelling.   Skin:     General: Skin is dry.   Neurological:      Mental Status: He is alert. Mental status is at baseline.   Psychiatric:         Mood and Affect: Mood normal.         Behavior: Behavior normal.       Significant Labs: All pertinent labs within the past 24 hours have been reviewed.  BMP:   Recent Labs   Lab 11/09/22 0358   GLU 96      K 4.9      CO2 21*   BUN 67*   CREATININE 7.3*   CALCIUM 8.6*     CBC:   Recent Labs   Lab 11/09/22 0358   WBC 4.51   HGB 10.9*   HCT 32.8*   *       Significant Imaging:       Assessment/Plan:      * Hypoxia  No respiratory distress or failure. Patient 90% on RA.  Now 96 on NC. Very comfortable. Cause of hypoxia multi-factorial including mod- sev AS, pulmonary edema, bilateral effusions as well as pulmonary hypertension.  Lasix for now. Will consider thoracentesis.    UF today with dialysis. Pulmonary consulted as well    Not  improving despite multiple dialysis sessions.  Will check CTA       Pulmonary hypertension  PA pressure of 59        Hypokalemia  Will replace       Severe aortic stenosis  As discussed above.  Recent echo      Elevated troponin  Likely elevated in setting of renal failure.  Trending down      Thrombocytopenia  No acute issues       Debility  May benefit from H/H. Uses some kind of rolling walker at home       Anemia of chronic disorder  No acute issues      Tobacco use disorder        Pleural effusion  May consider thora      Hyperlipidemia  Resume statin      ESRD (end stage renal disease)  Completed dialysis yesterday.  Will consult nephrology  - HD today      HTN (hypertension)  Restarted home meds         VTE Risk Mitigation (From admission, onward)         Ordered     heparin (porcine) injection 5,000 Units  Every 8 hours         11/05/22 1238                Discharge Planning   BONILLA: 11/8/2022     Code Status: DNR   Is the patient medically ready for discharge?:     Reason for patient still in hospital (select all that apply): Patient unstable  Discharge Plan A: Home Health   Discharge Delays: (!) Post-Acute Set-up              Al Rosales MD  Department of Hospital Medicine   [facility]

## 2022-11-09 NOTE — PROGRESS NOTES
[facility]  Nephrology  Progress Note    Patient Name: Mandeep Willams  MRN: [id number]  Admission Date: 11/5/2022  Hospital Length of Stay: 4 days  Attending Provider: Al Allison MD   Primary Care Physician: Guy Mcclelland Jr, MD  Principal Problem:Hypoxia  Consults Reason for consult: ESRD, dialysis  Subjective:     Interval History: Seen and examined during HD today tolerating well. /50 HR 59. Reports that he feels his SOB is unchanged from yesterday, despite 1.8L off with HD.  assistance utilized.     Review of patient's allergies indicates:  No Known Allergies  Current Facility-Administered Medications   Medication Frequency    0.9%  NaCl infusion PRN    0.9%  NaCl infusion Once    albuterol sulfate nebulizer solution 2.5 mg Q4H PRN    amLODIPine tablet 5 mg Daily    calcium acetate(phosphat bind) capsule 667 mg TID WM    furosemide injection 80 mg Q12H    guaiFENesin 100 mg/5 ml syrup 200 mg Q4H PRN    heparin (porcine) injection 5,000 Units Q8H    metoprolol succinate (TOPROL-XL) 24 hr tablet 100 mg Daily    mupirocin 2 % ointment BID    pravastatin tablet 10 mg Daily    sodium chloride 0.9% bolus 250 mL PRN    tamsulosin 24 hr capsule 0.4 mg Daily       Objective:     Vital Signs (Most Recent):  Temp: 97.4 °F (36.3 °C) (11/09/22 0728)  Pulse: 67 (11/09/22 0728)  Resp: 18 (11/09/22 0728)  BP: (!) 150/63 (11/09/22 0728)  SpO2: 100 % (11/09/22 0728)  O2 Device (Oxygen Therapy): High Flow nasal Cannula (11/09/22 0724)   Vital Signs (24h Range):  Temp:  [97.1 °F (36.2 °C)-98.3 °F (36.8 °C)] 97.4 °F (36.3 °C)  Pulse:  [58-74] 67  Resp:  [16-20] 18  SpO2:  [95 %-100 %] 100 %  BP: (108-156)/(45-64) 150/63     Weight: 118.5 kg (261 lb 3.9 oz) (11/08/22 0356)  Body mass index is 47.78 kg/m².  Body surface area is 2.28 meters squared.    I/O last 3 completed shifts:  In: 230 [P.O.:230]  Out: 2854 [Urine:550; Other:2304]    Physical Exam  Constitutional:       General: He is not in acute  distress.     Appearance: Normal appearance. He is not toxic-appearing.      Comments: Chronically ill, frail appearing elderly male   HENT:      Head: Normocephalic and atraumatic.      Mouth/Throat:      Mouth: Mucous membranes are moist.   Eyes:      Extraocular Movements: Extraocular movements intact.      Conjunctiva/sclera: Conjunctivae normal.   Cardiovascular:      Rate and Rhythm: Normal rate and regular rhythm.      Heart sounds: Murmur heard.   Pulmonary:      Breath sounds: No wheezing, rhonchi or rales.      Comments: On 6L NC  Musculoskeletal:         General: No deformity.      Right lower leg: No edema.      Left lower leg: No edema.   Skin:     General: Skin is warm and dry.      Findings: No rash.   Neurological:      General: No focal deficit present.      Mental Status: He is alert and oriented to person, place, and time.   Psychiatric:         Mood and Affect: Mood normal.         Behavior: Behavior normal.       Significant Labs:CBC:   Recent Labs   Lab 11/09/22  0358   WBC 4.51   RBC 3.29*   HGB 10.9*   HCT 32.8*   *   *   MCH 33.1*   MCHC 33.2       CMP:   Recent Labs   Lab 11/05/22  0942 11/06/22  0132 11/09/22  0358      < > 96   CALCIUM 9.1   < > 8.6*   ALBUMIN 3.0*   < > 2.6*   PROT 7.8  --   --       < > 137   K 3.2*   < > 4.9   CO2 34*   < > 21*   CL 97   < > 101   BUN 28*   < > 67*   CREATININE 5.7*   < > 7.3*   ALKPHOS 72  --   --    ALT 15  --   --    AST 15  --   --    BILITOT 1.0  --   --     < > = values in this interval not displayed.       All labs within the past 24 hours have been reviewed.    Significant Imaging:  X-Ray: Reviewed    Assessment/Plan:     1. ESRD - usual HD w/ Dr Henriquez  - HD today, continue MWF HD schedule while admitted  - renally dose medications for dialysis  Acute hypoxic respiratory failure, hypervolemia, pulm edema, pleural effusion - continues to require substantial amounts of O2 despite consecutive HD treatments. Going for  CTA today. May benefit from repeat thoracentesis  2. HTN - some hypotension on HD  - hold BP meds prior to HD, albumin bolus w/ session tomorrow  - UF as tolerated on dialysis  3. Anemia of chronic kidney disease - no indication for Epo with HD, continue to monitor CBC  4. MBD/secondary hyperparathyroidism - phos is elevated, continue home phos-lo    Thank you for your consult. I will follow-up with patient. Please contact us if you have any additional questions.    MONSE Rodney  DOS:11/09/2022  Nephrology  [facility] - Telemetry

## 2022-11-09 NOTE — NURSING
Report received from night nurse CÉSAR Chery. Visualized patient and assessed patient's overall condition and appearance. No acute distress noted. Will continue to monitor

## 2022-11-09 NOTE — HPI
"From H&P: "Mr. Willams is a 87 yo M who presents with a CC of SOB for 3 days. EMS was called and he was found to have an 02 sat of 90% on RA. Patient is an ESRD patient with a history of mod-severe AS, pleural effusions as well as pulmonary hypertension.  He lives at home by himself and does not wear 02.  He is very comfortable on NC and has no other complaints. He was in observation here early last month for the same.          Overview/Hospital Course:  Patient admitted to the hospital for hypoxia- likely multifactorial including mod-severe AS, pulmonary hypertension and pleural effusions.  He was started on Lasix. Nephrology consulted for dialysis, underwent HD on 11/7. Pulmonary consulted and agreed that hypoxia likely from edema. Patient went multiple dialysis rounds without much improvement in his hypoxia.  Palliative care was consulted. Patient sent for CTA of chest on 11/9/22."  "

## 2022-11-09 NOTE — CONSULTS
[facility] - Telemetry  Palliative Medicine  Consult Note    Patient Name: Mandeep Willams  MRN: [id number]  Admission Date: 11/5/2022  Hospital Length of Stay: 4 days  Code Status: DNR   Attending Provider: Al Allison MD  Consulting Provider: Ced Welch MD  Primary Care Physician: Guy Mcclelland Jr, MD  Principal Problem:Hypoxia    Patient information was obtained from patient, relative(s), past medical records and primary team.      Inpatient consult to Palliative Care  Consult performed by: Ced Welch MD  Consult ordered by: Petra Castelan MD  Reason for consult: ACP, support      Assessment/Plan:     Advanced care planning/counseling discussion  - talked with Mr. Willams using Language line  video   - He stated [place]. He has 4 children (2 daughters and 2 sons) who are all back in [place]. He used to be an  in [place]  - He feels his oldest daughter Marcelina Zelaya is the one who is most involved out of them and wants her to come to him in [place]  - He lives alone currently and feels like it is becoming more difficult to manage. Legs more tired and harder to get around  - he also has a nephew (not biological) named Fletcher nearby and grand nieces kathi is nearby too and Xochitl who is a grand niece in [place] who all help him.   - He is open to getting PT to work with him to get stronger and help him get the equipment he needs to safety. He is also aware that a nursing care facility might be of benefit to him to ensure he gets the support he needs.   - He is aware of his esrd and wants his dialysis because it keeps him feeling better  - I asked him for permission to talk with Fletcher and his local family, he said that is ok.     - I then called and talked with Kathi who doesn't speak great english and said to call Xochitl first and she will loop her in  - Called Xochitl who is in [place] ([phone])  - Xochitl was very insightful and stated that the except the  "patients oldest daughter Marcelina Zelaya who he adopted at a young age, his others children do not seem to have a desire to be involved in his care or even aware of his medical ongoings.   - Xochitl stated it is her sister Kathi who really is always there anytime Mandeep Willams needs anything.   - I updated them regarding the medical ongoings and his overall medical condition with his severe aortic stenosis, pulmonary htn and ESRD with dialysis. They understood the severity of the situation overall.   - Xochitl wants to be able to help get Mr. Willams's oldest daughter over here and requested a letter to help do so. I made her aware I will discuss with Flavia at case management  - Xochitl also relayed a terrible incident she had at dialysis outpatient where  is rarely used and Mr. Willams is often neglected. She would like his dialysis chair location to be changed. I also made her aware I would pass this on to Flavia.   - Xochitl and Kathi were very grateful for the discussion    - I then called Flavia and updated her regarding the above. She is going to work on getting a letter from her supervisor explaining Mr. Willams ongoing medical condition and provide that to Mr. Willams family to help expedite his oldest daughter coming here from [place] to help see him and care for him.     [] Mr. Willams will work with PT/OT and get a better idea of his abilities and then will go from there  [] Code: DNR and has LaPOST on file already too    Severe aortic stenosis  - ongoing    ESRD (end stage renal disease)  - currently on HD MWF  - wants the HD as it helps him feel better        Thank you for your consult. I will follow-up with patient. Please contact us if you have any additional questions.    Subjective:     HPI:   From H&P: "Mr. Willams is a 85 yo M who presents with a CC of SOB for 3 days. EMS was called and he was found to have an 02 sat of 90% on RA. Patient is an ESRD patient with a history of mod-severe AS, pleural effusions as well as " "pulmonary hypertension.  He lives at home by himself and does not wear 02.  He is very comfortable on NC and has no other complaints. He was in observation here early last month for the same.          Overview/Hospital Course:  Patient admitted to the hospital for hypoxia- likely multifactorial including mod-severe AS, pulmonary hypertension and pleural effusions.  He was started on Lasix. Nephrology consulted for dialysis, underwent HD on 11/7. Pulmonary consulted and agreed that hypoxia likely from edema. Patient went multiple dialysis rounds without much improvement in his hypoxia.  Palliative care was consulted. Patient sent for CTA of chest on 11/9/22."      Hospital Course:  No notes on file    Interval History/Subjective:     - Seen in dialysis, using the language line video  ([id number]).   - he denied any current pain, stated his breathing is stable.   - Extensive discussion had with patient who then allowed me to talk with his family further.     Past Medical History:   Diagnosis Date    Dependence on renal dialysis     ESRD (end stage renal disease)     HTN (hypertension)     Hyperlipidemia        Past Surgical History:   Procedure Laterality Date    BACK SURGERY  2005    DIALYSIS FISTULA CREATION  4/2012    left arm    FISTULOGRAM Left 3/22/2022    Procedure: Fistulogram, transradial;  Surgeon: Jude Ordaz MD;  Location: [facility];  Service: Vascular;  Laterality: Left;  RN Pre Op 3-17-22.  May need Chinese  day of procedure. [place]    JOINT REPLACEMENT Right     hip       Review of patient's allergies indicates:  No Known Allergies    Medications:  Continuous Infusions:  Scheduled Meds:   sodium chloride 0.9%   Intravenous Once    amLODIPine  5 mg Oral Daily    calcium acetate(phosphat bind)  667 mg Oral TID WM    furosemide (LASIX) injection  80 mg Intravenous Q12H    heparin (porcine)  5,000 Units Subcutaneous Q8H    metoprolol succinate  100 mg Oral Daily    " mupirocin   Nasal BID    pravastatin  10 mg Oral Daily    tamsulosin  0.4 mg Oral Daily     PRN Meds:sodium chloride 0.9%, albuterol sulfate, guaiFENesin 100 mg/5 ml, sodium chloride 0.9%    Family History       Problem Relation (Age of Onset)    Arthritis Sister    Diabetes Sister    Hypertension Sister          Tobacco Use    Smoking status: Former     Packs/day: 1.00     Years: 10.00     Pack years: 10.00     Types: Cigarettes     Quit date: 1971     Years since quittin.9    Smokeless tobacco: Never   Substance and Sexual Activity    Alcohol use: No    Drug use: No    Sexual activity: Not Currently     Partners: Female     Comment:        Review of Systems: Per HPI  Objective:     Vital Signs (Most Recent):  Temp: (!) 95.6 °F (35.3 °C) (22)  Pulse: 74 (22 1153)  Resp: 20 (22)  BP: (!) 90/45 (22 1153)  SpO2: 100 % (22)   Vital Signs (24h Range):  Temp:  [95.6 °F (35.3 °C)-98.3 °F (36.8 °C)] 95.6 °F (35.3 °C)  Pulse:  [58-74] 74  Resp:  [16-20] 20  SpO2:  [95 %-100 %] 100 %  BP: ()/(45-64) 90/45     Weight: 118.5 kg (261 lb 3.9 oz)  Body mass index is 47.78 kg/m².    Physical Exam  Constitutional:       General: He is not in acute distress.  HENT:      Head: Normocephalic and atraumatic.      Nose:      Comments: NC present  Eyes:      Extraocular Movements: Extraocular movements intact.   Cardiovascular:      Rate and Rhythm: Normal rate.   Pulmonary:      Effort: Pulmonary effort is normal.   Skin:     General: Skin is warm and dry.   Neurological:      Mental Status: He is alert.      Comments: Awake and alert, good insight       Review of Symptoms      Symptom Assessment (ESAS 0-10 Scale)  Pain:  0  Dyspnea:  0  Anxiety:  0  Nausea:  0  Depression:  0  Anorexia:  0  Fatigue:  0  Insomnia:  0  Restlessness:  0  Agitation:  0         Living Arrangements:  Lives alone    Psychosocial/Cultural: - lives alone  - has a friend named Fletcher who  lives nearby and a grandniece named Kathi ho        Advance Care Planning   Advance Directives:   LaPOST: Yes    Do Not Resuscitate Status: Yes      Decision Making:  Patient answered questions       Significant Labs: reviewed  CBC:   Recent Labs   Lab 11/09/22 0358   WBC 4.51   HGB 10.9*   HCT 32.8*   *   *     BMP:  Recent Labs   Lab 11/09/22 0358   GLU 96      K 4.9      CO2 21*   BUN 67*   CREATININE 7.3*   CALCIUM 8.6*     LFT:  Lab Results   Component Value Date    AST 15 11/05/2022    ALKPHOS 72 11/05/2022    BILITOT 1.0 11/05/2022     Albumin:   Albumin   Date Value Ref Range Status   11/09/2022 2.6 (L) 3.5 - 5.2 g/dL Final     Protein:   Total Protein   Date Value Ref Range Status   11/05/2022 7.8 6.0 - 8.4 g/dL Final     Lactic acid:   Lab Results   Component Value Date    LACTATE 1.0 10/22/2018    LACTATE 1.2 10/22/2018       Significant Imaging: I have reviewed all pertinent imaging results/findings within the past 24 hours.        > 50% of 70 min visit spent in chart review, face to face discussion of goals of care,  symptom assessment, coordination of care and emotional support.    Ced Welch MD  Palliative Medicine  [facility]

## 2022-11-09 NOTE — NURSING
Received report from Fay in Dialysis. 700ml was removed and patient tolerated well. /50, HR 74, O2 95% 6L NC, Temp 97.1, . Awaiting patients arrival to floor.

## 2022-11-10 NOTE — NURSING
Bedside report given to night nurse CÉSAR Chery. Walking rounds completed. Visualized and assessed patient. NAD noted. Safety precautions maintained and call light within reach.    Chart check completed.

## 2022-11-10 NOTE — PROCEDURES
Radiology Post-Procedure Note    Pre Op Diagnosis: ESRD, left pleural effusion  Post Op Diagnosis: Same    Procedure: Left thoracentesis    Procedure performed by: Gerald Jo MD    Written Informed Consent Obtained: Yes  Specimen Removed: YES 1.0 L thin yellow fluid  Estimated Blood Loss: Minimal    Findings:   Successful left thoracentesis.      Patient tolerated procedure well.    Gerald Jo MD  Staff Radiologist  Department of Radiology  Pager: [phone]

## 2022-11-10 NOTE — PLAN OF CARE
Problem: Adult Inpatient Plan of Care  Goal: Plan of Care Review  Outcome: Ongoing, Progressing  Goal: Patient-Specific Goal (Individualized)  Outcome: Ongoing, Progressing  Goal: Absence of Hospital-Acquired Illness or Injury  Outcome: Ongoing, Progressing  Goal: Optimal Comfort and Wellbeing  Outcome: Ongoing, Progressing  Goal: Readiness for Transition of Care  Outcome: Ongoing, Progressing     Problem: Skin Injury Risk Increased  Goal: Skin Health and Integrity  Outcome: Ongoing, Progressing     Problem: Bariatric Environmental Safety  Goal: Safety Maintained with Care  Outcome: Ongoing, Progressing     Problem: Device-Related Complication Risk (Hemodialysis)  Goal: Safe, Effective Therapy Delivery  Outcome: Ongoing, Progressing     Problem: Fall Injury Risk  Goal: Absence of Fall and Fall-Related Injury  Outcome: Ongoing, Progressing

## 2022-11-10 NOTE — NURSING
Received report from CÉSAR Smith from IR. Patient had left thoracentesis. Vaseline gauze and Tegaderm in place. 1L was removed.

## 2022-11-10 NOTE — PROGRESS NOTES
[facility] Medicine  Progress Note    Patient Name: Mandeep Willams  MRN: [id number]  Patient Class: IP- Inpatient   Admission Date: 11/5/2022  Length of Stay: 5 days  Attending Physician: Al Allison MD  Primary Care Provider: Guy Mcclelland Jr, MD        Subjective:     Principal Problem:Hypoxia        HPI:  Mr. Willams is a 87 yo M who presents with a CC of SOB for 3 days. EMS was called and he was found to have an 02 sat of 90% on RA. Patient is an ESRD patient with a history of mod-severe AS, pleural effusions as well as pulmonary hypertension.  He lives at home by himself and does not wear 02.  He is very comfortable on NC and has no other complaints. He was in observation here early last month for the same.        Overview/Hospital Course:  Patient admitted to the hospital for hypoxia- likely multifactorial including mod-severe AS, pulmonary hypertension and pleural effusions.  He was started on Lasix. Nephrology consulted for dialysis, underwent HD on 11/7. Pulmonary consulted and agreed that hypoxia likely from edema. Patient went multiple dialysis rounds without much improvement in his hypoxia.  Palliative care was consulted. Patient sent for CTA of chest on 11/9/22.  CTA showed no PE but did show large Left pleural effusion. Patient sent to IR for therapeutic thoracentesis. Palliative care consulted and patient now is DNR       Interval History: No new issues     Review of Systems   Constitutional:  Negative for activity change.   HENT:  Negative for congestion and dental problem.    Eyes:  Negative for discharge.   Respiratory:  Negative for apnea and chest tightness.    Cardiovascular:  Negative for chest pain.   Gastrointestinal:  Negative for abdominal distention and abdominal pain.   Endocrine: Negative for cold intolerance.   Genitourinary:  Negative for difficulty urinating and dysuria.   Musculoskeletal:  Negative for arthralgias.   Allergic/Immunologic: Negative for environmental  allergies.   Neurological:  Negative for dizziness and facial asymmetry.   Psychiatric/Behavioral:  Negative for agitation and behavioral problems.    Objective:     Vital Signs (Most Recent):  Temp: 97.8 °F (36.6 °C) (11/10/22 0508)  Pulse: (!) 54 (11/10/22 0508)  Resp: 18 (11/10/22 0508)  BP: (!) 110/53 (11/10/22 0508)  SpO2: 100 % (11/10/22 0508)   Vital Signs (24h Range):  Temp:  [95.6 °F (35.3 °C)-97.8 °F (36.6 °C)] 97.8 °F (36.6 °C)  Pulse:  [54-77] 54  Resp:  [16-20] 18  SpO2:  [100 %] 100 %  BP: ()/(44-65) 110/53     Weight: 118.5 kg (261 lb 3.9 oz)  Body mass index is 47.78 kg/m².    Intake/Output Summary (Last 24 hours) at 11/10/2022 0640  Last data filed at 11/10/2022 0635  Gross per 24 hour   Intake 100 ml   Output 1362 ml   Net -1262 ml      Physical Exam  Vitals and nursing note reviewed.   Constitutional:       Appearance: Normal appearance.   HENT:      Head: Normocephalic and atraumatic.      Nose: Nose normal.   Eyes:      Conjunctiva/sclera: Conjunctivae normal.   Cardiovascular:      Rate and Rhythm: Normal rate and regular rhythm.   Pulmonary:      Effort: Pulmonary effort is normal. No respiratory distress.   Musculoskeletal:         General: No swelling.   Skin:     General: Skin is dry.   Neurological:      Mental Status: He is alert. Mental status is at baseline.   Psychiatric:         Mood and Affect: Mood normal.         Behavior: Behavior normal.       Significant Labs: All pertinent labs within the past 24 hours have been reviewed.  BMP:   Recent Labs   Lab 11/09/22 0358   GLU 96      K 4.9      CO2 21*   BUN 67*   CREATININE 7.3*   CALCIUM 8.6*     CBC:   Recent Labs   Lab 11/09/22 0358   WBC 4.51   HGB 10.9*   HCT 32.8*   *       Significant Imaging:       Assessment/Plan:      * Hypoxia  No respiratory distress or failure. Patient 90% on RA.  Now 96 on NC. Very comfortable. Cause of hypoxia multi-factorial including mod- sev AS, pulmonary edema, bilateral  effusions as well as pulmonary hypertension.  Lasix for now. Will consider thoracentesis.    UF today with dialysis. Pulmonary consulted as well    Not improving despite multiple dialysis sessions.  Will check CTA     Large L pleural effusion.  IR consulted for thora       Pulmonary hypertension  PA pressure of 59        Hypokalemia  Will replace       Severe aortic stenosis  As discussed above.  Recent echo      Elevated troponin  Likely elevated in setting of renal failure.  Trending down      Thrombocytopenia  No acute issues       Debility  May benefit from H/H. Uses some kind of rolling walker at home     PT/OT recs TBD      Anemia of chronic disorder  No acute issues      Tobacco use disorder        Pleural effusion  May consider thora      Hyperlipidemia  Resume statin      ESRD (end stage renal disease)  Completed dialysis yesterday.  Will consult nephrology  - HD today      HTN (hypertension)  Restarted home meds         VTE Risk Mitigation (From admission, onward)         Ordered     heparin (porcine) injection 5,000 Units  Every 8 hours         11/05/22 1238                Discharge Planning   BONILLA: 11/8/2022     Code Status: DNR   Is the patient medically ready for discharge?:     Reason for patient still in hospital (select all that apply): Patient unstable  Discharge Plan A: Home Health   Discharge Delays: (!) Post-Acute Set-up              Al Rosales MD  Department of Hospital Medicine   [facility] [place] [facility]

## 2022-11-10 NOTE — PT/OT/SLP PROGRESS
Occupational Therapy      Patient Name:  Mandeep Willams   MRN:  [id number]    Patient not seen today secondary to Patient unwilling to participate (The patient refused OT eval despite education and encouragement. The pateint c/o feeling too weak and requests OT to return tomorrow.).   Language line was use for assist Verna Montezin #[id number])     Will follow-up tomorrow.    11/10/2022

## 2022-11-10 NOTE — SUBJECTIVE & OBJECTIVE
Interval History: No new issues     Review of Systems   Constitutional:  Negative for activity change.   HENT:  Negative for congestion and dental problem.    Eyes:  Negative for discharge.   Respiratory:  Negative for apnea and chest tightness.    Cardiovascular:  Negative for chest pain.   Gastrointestinal:  Negative for abdominal distention and abdominal pain.   Endocrine: Negative for cold intolerance.   Genitourinary:  Negative for difficulty urinating and dysuria.   Musculoskeletal:  Negative for arthralgias.   Allergic/Immunologic: Negative for environmental allergies.   Neurological:  Negative for dizziness and facial asymmetry.   Psychiatric/Behavioral:  Negative for agitation and behavioral problems.    Objective:     Vital Signs (Most Recent):  Temp: 97.8 °F (36.6 °C) (11/10/22 0508)  Pulse: (!) 54 (11/10/22 0508)  Resp: 18 (11/10/22 0508)  BP: (!) 110/53 (11/10/22 0508)  SpO2: 100 % (11/10/22 0508)   Vital Signs (24h Range):  Temp:  [95.6 °F (35.3 °C)-97.8 °F (36.6 °C)] 97.8 °F (36.6 °C)  Pulse:  [54-77] 54  Resp:  [16-20] 18  SpO2:  [100 %] 100 %  BP: ()/(44-65) 110/53     Weight: 118.5 kg (261 lb 3.9 oz)  Body mass index is 47.78 kg/m².    Intake/Output Summary (Last 24 hours) at 11/10/2022 0640  Last data filed at 11/10/2022 0635  Gross per 24 hour   Intake 100 ml   Output 1362 ml   Net -1262 ml      Physical Exam  Vitals and nursing note reviewed.   Constitutional:       Appearance: Normal appearance.   HENT:      Head: Normocephalic and atraumatic.      Nose: Nose normal.   Eyes:      Conjunctiva/sclera: Conjunctivae normal.   Cardiovascular:      Rate and Rhythm: Normal rate and regular rhythm.   Pulmonary:      Effort: Pulmonary effort is normal. No respiratory distress.   Musculoskeletal:         General: No swelling.   Skin:     General: Skin is dry.   Neurological:      Mental Status: He is alert. Mental status is at baseline.   Psychiatric:         Mood and Affect: Mood normal.          Behavior: Behavior normal.       Significant Labs: All pertinent labs within the past 24 hours have been reviewed.  BMP:   Recent Labs   Lab 11/09/22 0358   GLU 96      K 4.9      CO2 21*   BUN 67*   CREATININE 7.3*   CALCIUM 8.6*     CBC:   Recent Labs   Lab 11/09/22 0358   WBC 4.51   HGB 10.9*   HCT 32.8*   *       Significant Imaging:

## 2022-11-10 NOTE — PT/OT/SLP PROGRESS
Physical Therapy      Patient Name:  Mandeep Willams   MRN:  [id number]    [id number] (10 min) Patient not seen today for PT tx secondary to Patient unwilling to participate. The patient refused PT tx despite max education and encouragement. The pateint c/o feeling too weak and requested PT to return tomorrow. Language line was use for assist, Mandarin Chinese, Marielena #[id number]. Will follow-up tomorrow.

## 2022-11-10 NOTE — PROGRESS NOTES
[facility]  Nephrology  Progress Note    Patient Name: Mandeep Willams  MRN: [id number]  Admission Date: 11/5/2022  Hospital Length of Stay: 5 days  Attending Provider: Al Allison MD   Primary Care Physician: Guy Mcclelland Jr, MD  Principal Problem:Hypoxia  Consults Reason for consult: ESRD, dialysis  Subjective:     Interval History: additional 700 ml net fluid removal with HD yesterday. Had thoracentesis with 1L removed today. Now on 2L NC.      Review of patient's allergies indicates:  No Known Allergies  Current Facility-Administered Medications   Medication Frequency    0.9%  NaCl infusion PRN    0.9%  NaCl infusion Once    albuterol sulfate nebulizer solution 2.5 mg Q4H PRN    amLODIPine tablet 5 mg Daily    calcium acetate(phosphat bind) capsule 667 mg TID WM    furosemide injection 80 mg Q12H    guaiFENesin 100 mg/5 ml syrup 200 mg Q4H PRN    heparin (porcine) injection 5,000 Units Q8H    metoprolol succinate (TOPROL-XL) 24 hr tablet 100 mg Daily    mupirocin 2 % ointment BID    pravastatin tablet 10 mg Daily    sodium chloride 0.9% bolus 250 mL PRN    tamsulosin 24 hr capsule 0.4 mg Daily       Objective:     Vital Signs (Most Recent):  Temp: 97.8 °F (36.6 °C) (11/10/22 1127)  Pulse: (!) 55 (11/10/22 1127)  Resp: 18 (11/10/22 1127)  BP: (!) 112/51 (11/10/22 1127)  SpO2: 100 % (11/10/22 1127)  O2 Device (Oxygen Therapy): nasal cannula w/ humidification (11/10/22 0943)   Vital Signs (24h Range):  Temp:  [97.5 °F (36.4 °C)-97.8 °F (36.6 °C)] 97.8 °F (36.6 °C)  Pulse:  [54-58] 55  Resp:  [16-18] 18  SpO2:  [100 %] 100 %  BP: (103-121)/(49-54) 112/51     Weight: 118.5 kg (261 lb 3.9 oz) (11/08/22 0356)  Body mass index is 47.78 kg/m².  Body surface area is 2.28 meters squared.    I/O last 3 completed shifts:  In: 100 [Other:100]  Out: 4016 [Urine:450; Other:3566]    Physical Exam  Constitutional:       General: He is not in acute distress.     Appearance: Normal appearance. He is not  toxic-appearing.      Comments: Chronically ill, frail appearing elderly male   HENT:      Head: Normocephalic and atraumatic.      Mouth/Throat:      Mouth: Mucous membranes are moist.   Eyes:      Extraocular Movements: Extraocular movements intact.      Conjunctiva/sclera: Conjunctivae normal.   Cardiovascular:      Rate and Rhythm: Normal rate and regular rhythm.      Heart sounds: Murmur heard.   Pulmonary:      Breath sounds: No wheezing, rhonchi or rales.      Comments: On 6L NC  Musculoskeletal:         General: No deformity.      Right lower leg: No edema.      Left lower leg: No edema.   Skin:     General: Skin is warm and dry.      Findings: No rash.   Neurological:      General: No focal deficit present.      Mental Status: He is alert and oriented to person, place, and time.   Psychiatric:         Mood and Affect: Mood normal.         Behavior: Behavior normal.       Significant Labs:CBC:   Recent Labs   Lab 11/09/22  0358   WBC 4.51   RBC 3.29*   HGB 10.9*   HCT 32.8*   *   *   MCH 33.1*   MCHC 33.2       CMP:   Recent Labs   Lab 11/05/22  0942 11/06/22  0132 11/09/22  0358      < > 96   CALCIUM 9.1   < > 8.6*   ALBUMIN 3.0*   < > 2.6*   PROT 7.8  --   --       < > 137   K 3.2*   < > 4.9   CO2 34*   < > 21*   CL 97   < > 101   BUN 28*   < > 67*   CREATININE 5.7*   < > 7.3*   ALKPHOS 72  --   --    ALT 15  --   --    AST 15  --   --    BILITOT 1.0  --   --     < > = values in this interval not displayed.       All labs within the past 24 hours have been reviewed.    Significant Imaging:  X-Ray: Reviewed    Assessment/Plan:     1. ESRD - usual HD w/ Dr Henriquez  - HD tomorrow, continue MWF HD schedule while admitted  - renally dose medications for dialysis  Acute hypoxic respiratory failure, hypervolemia, pulm edema, pleural effusion - O2 requirements are decreasing s/p consecutive HD days + thoracentesis today.   2. HTN - some hypotension on HD  - hold BP meds prior to HD,  albumin PRN  - UF as tolerated on dialysis  3. Anemia of chronic kidney disease - no indication for Epo with HD, continue to monitor CBC  4. MBD/secondary hyperparathyroidism - phos is elevated, continue home phos-lo, monitor for need to increase  5. Hypoalbuminemia- start Nepro supplement    Thank you for your consult. I will follow-up with patient. Please contact us if you have any additional questions.    MONSE Rodney  DOS:11/10/2022  Nephrology  [facility] - Telemetry

## 2022-11-10 NOTE — ASSESSMENT & PLAN NOTE
No respiratory distress or failure. Patient 90% on RA.  Now 96 on NC. Very comfortable. Cause of hypoxia multi-factorial including mod- sev AS, pulmonary edema, bilateral effusions as well as pulmonary hypertension.  Lasix for now. Will consider thoracentesis.    UF today with dialysis. Pulmonary consulted as well    Not improving despite multiple dialysis sessions.  Will check CTA     Large L pleural effusion.  IR consulted for brandon

## 2022-11-11 NOTE — SUBJECTIVE & OBJECTIVE
Interval History: doing better   Review of Systems   Constitutional:  Negative for activity change.   HENT:  Negative for congestion and dental problem.    Eyes:  Negative for discharge.   Respiratory:  Negative for apnea and chest tightness.    Cardiovascular:  Negative for chest pain.   Gastrointestinal:  Negative for abdominal distention and abdominal pain.   Endocrine: Negative for cold intolerance.   Genitourinary:  Negative for difficulty urinating and dysuria.   Musculoskeletal:  Negative for arthralgias.   Allergic/Immunologic: Negative for environmental allergies.   Neurological:  Negative for dizziness and facial asymmetry.   Psychiatric/Behavioral:  Negative for agitation and behavioral problems.    Objective:     Vital Signs (Most Recent):  Temp: 98.2 °F (36.8 °C) (11/11/22 0526)  Pulse: (!) 58 (11/11/22 0526)  Resp: (!) 21 (11/11/22 0526)  BP: (!) 118/56 (11/11/22 0526)  SpO2: 99 % (11/11/22 0526)   Vital Signs (24h Range):  Temp:  [97.8 °F (36.6 °C)-98.4 °F (36.9 °C)] 98.2 °F (36.8 °C)  Pulse:  [55-59] 58  Resp:  [16-21] 21  SpO2:  [98 %-100 %] 99 %  BP: (106-124)/(51-56) 118/56     Weight: 118.5 kg (261 lb 3.9 oz)  Body mass index is 47.78 kg/m².    Intake/Output Summary (Last 24 hours) at 11/11/2022 1025  Last data filed at 11/11/2022 0645  Gross per 24 hour   Intake 120 ml   Output 1250 ml   Net -1130 ml      Physical Exam  Vitals and nursing note reviewed.   Constitutional:       Appearance: Normal appearance.   HENT:      Head: Normocephalic and atraumatic.      Nose: Nose normal.   Eyes:      Conjunctiva/sclera: Conjunctivae normal.   Cardiovascular:      Rate and Rhythm: Normal rate and regular rhythm.   Pulmonary:      Effort: Pulmonary effort is normal. No respiratory distress.   Musculoskeletal:         General: No swelling.   Skin:     General: Skin is dry.   Neurological:      Mental Status: He is alert. Mental status is at baseline.   Psychiatric:         Mood and Affect: Mood normal.          Behavior: Behavior normal.       Significant Labs: All pertinent labs within the past 24 hours have been reviewed.  BMP:   Recent Labs   Lab 11/11/22  0413   GLU 98   *   K 4.6      CO2 22*   BUN 62*   CREATININE 7.0*   CALCIUM 8.5*     CBC: No results for input(s): WBC, HGB, HCT, PLT in the last 48 hours.

## 2022-11-11 NOTE — ASSESSMENT & PLAN NOTE
No respiratory distress or failure. Patient 90% on RA.  Now 96 on NC. Very comfortable. Cause of hypoxia multi-factorial including mod- sev AS, pulmonary edema, bilateral effusions as well as pulmonary hypertension.  Lasix for now. Will consider thoracentesis.    UF today with dialysis. Pulmonary consulted as well    Not improving despite multiple dialysis sessions.  Will check CTA     Large L pleural effusion.  IR consulted for thora     Much improved after one liter off.

## 2022-11-11 NOTE — PROGRESS NOTES
[facility] - Telemetry  Palliative Medicine  Progress Note    Patient Name: Mandeep Willams  MRN: [id number]  Admission Date: 11/5/2022  Hospital Length of Stay: 6 days  Code Status: DNR   Attending Provider: Al Allison MD  Consulting Provider: Ced Welch MD  Primary Care Physician: Guy Mcclelland Jr, MD  Principal Problem:Hypoxia    Patient information was obtained from patient, past medical records and primary team.      Assessment/Plan:     Advanced care planning/counseling discussion  11/11/2022:  - seen while at dialysis  - in good spirits  - he did not have any questions or concerns  - updated him that I had talked with case management and they were working on getting a letter done that his grand nieces Kathi and Xochitl might be able to use to expedite having his eldest daughter come here from [place].   - He was very appreciative of the update  - I encouraged him to work with PT/OT so we can have the best idea of what level of support he needs for his well being. He stated he understood    11/9/2022:  - talked with Mr. Willams using Language line  video   - He stated [place]. He has 4 children (2 daughters and 2 sons) who are all back in [place]. He used to be an  in [place]  - He feels his oldest daughter Marcelina Zelaya is the one who is most involved out of them and wants her to come to him in [place]  - He lives alone currently and feels like it is becoming more difficult to manage. Legs more tired and harder to get around  - he also has a nephew (not biological) named Fletcher nearby and grand nieces kathi is nearby too and Xochitl who is a grand niece in [place] who all help him.   - He is open to getting PT to work with him to get stronger and help him get the equipment he needs to safety. He is also aware that a nursing care facility might be of benefit to him to ensure he gets the support he needs.   - He is aware of his esrd and wants his dialysis because  it keeps him feeling better  - I asked him for permission to talk with Fletcher and his local family, he said that is ok.     - I then called and talked with Kathi who doesn't speak great english and said to call Xochitl first and she will loop her in  - Called Xochitl who is in [place] ([phone])  - Xochitl was very insightful and stated that the except the patients oldest daughter Marcelina Zelaya who he adopted at a young age, his others children do not seem to have a desire to be involved in his care or even aware of his medical ongoings.   - Xochitl stated it is her sister Kathi who really is always there anytime Mandeep Willams needs anything.   - I updated them regarding the medical ongoings and his overall medical condition with his severe aortic stenosis, pulmonary htn and ESRD with dialysis. They understood the severity of the situation overall.   - Xochitl wants to be able to help get Mr. Willams's oldest daughter over here and requested a letter to help do so. I made her aware I will discuss with Flavia at case management  - Xochitl also relayed a terrible incident she had at dialysis outpatient where  is rarely used and Mr. Willams is often neglected. She would like his dialysis chair location to be changed. I also made her aware I would pass this on to Flavia.   - Xochitl and Kathi were very grateful for the discussion    - I then called Flavia and updated her regarding the above. She is going to work on getting a letter from her supervisor explaining Mr. Willams ongoing medical condition and provide that to Mr. Willams family to help expedite his oldest daughter coming here from [place] to help see him and care for him.     [] Mr. Willams will work with PT/OT and get a better idea of his abilities and then will go from there  [] Code: DNR and has LaPOST on file already too          Subjective:     Chief Complaint:   Chief Complaint   Patient presents with    Shortness of Breath     EMS called to 87yo male that has been  "SOB all night. Had dialysis yesterday. RA spo2 was 90% on Ems arrival. 99% on 3L NC at triage       HPI:   From H&P: "Mr. Willams is a 85 yo M who presents with a CC of SOB for 3 days. EMS was called and he was found to have an 02 sat of 90% on RA. Patient is an ESRD patient with a history of mod-severe AS, pleural effusions as well as pulmonary hypertension.  He lives at home by himself and does not wear 02.  He is very comfortable on NC and has no other complaints. He was in observation here early last month for the same.          Overview/Hospital Course:  Patient admitted to the hospital for hypoxia- likely multifactorial including mod-severe AS, pulmonary hypertension and pleural effusions.  He was started on Lasix. Nephrology consulted for dialysis, underwent HD on 11/7. Pulmonary consulted and agreed that hypoxia likely from edema. Patient went multiple dialysis rounds without much improvement in his hypoxia.  Palliative care was consulted. Patient sent for CTA of chest on 11/9/22."      Hospital Course:  No notes on file    Interval History:     Seen in dialysis. Used [employer]  ([id number])    Denies any pain or concerns at this time.     Spirits are good.     Medications:  Continuous Infusions:  Scheduled Meds:   sodium chloride 0.9%   Intravenous Once    albumin human 25%  25 g Intravenous Once    amLODIPine  5 mg Oral Daily    calcium acetate(phosphat bind)  667 mg Oral TID WM    furosemide (LASIX) injection  80 mg Intravenous Q12H    heparin (porcine)  5,000 Units Subcutaneous Q8H    metoprolol succinate  100 mg Oral Daily    mupirocin   Nasal BID    pravastatin  10 mg Oral Daily    tamsulosin  0.4 mg Oral Daily     PRN Meds:sodium chloride 0.9%, acetaminophen, albuterol sulfate, guaiFENesin 100 mg/5 ml, sodium chloride 0.9%    Objective:     Vital Signs (Most Recent):  Temp: 98.2 °F (36.8 °C) (11/11/22 0526)  Pulse: (!) 58 (11/11/22 0526)  Resp: (!) 21 (11/11/22 0526)  BP: (!) 118/56 " (11/11/22 0526)  SpO2: 99 % (11/11/22 0526)   Vital Signs (24h Range):  Temp:  [97.8 °F (36.6 °C)-98.4 °F (36.9 °C)] 98.2 °F (36.8 °C)  Pulse:  [55-59] 58  Resp:  [16-21] 21  SpO2:  [98 %-100 %] 99 %  BP: (106-124)/(51-56) 118/56     Weight: 118.5 kg (261 lb 3.9 oz)  Body mass index is 47.78 kg/m².    Physical Exam  Constitutional:       General: He is not in acute distress.  HENT:      Head: Normocephalic and atraumatic.   Eyes:      Extraocular Movements: Extraocular movements intact.   Cardiovascular:      Rate and Rhythm: Normal rate.   Pulmonary:      Effort: Pulmonary effort is normal.   Abdominal:      General: Abdomen is flat.   Skin:     General: Skin is warm and dry.   Neurological:      Mental Status: He is alert.      Comments: Awake and alert       Review of Symptoms      Symptom Assessment (ESAS 0-10 Scale)  Pain:  0  Dyspnea:  0  Anxiety:  0  Nausea:  0  Depression:  0  Anorexia:  0  Fatigue:  0  Insomnia:  0  Restlessness:  0  Agitation:  0         Living Arrangements:  Lives alone    Psychosocial/Cultural: - lives alone  - has a friend named Fletcher who lives nearby and a grandniece named Kathi too. Another grandniece in [place] named Xochitl (she speaks the best english and helps rest of family)        Advance Care Planning   Advance Directives:   LaPOST: Yes    Do Not Resuscitate Status: Yes         Significant Labs: Reviewed   CBC:   Recent Labs   Lab 11/09/22  0358   WBC 4.51   HGB 10.9*   HCT 32.8*   *   *     BMP:  Recent Labs   Lab 11/11/22  0413   GLU 98   *   K 4.6      CO2 22*   BUN 62*   CREATININE 7.0*   CALCIUM 8.5*     LFT:  Lab Results   Component Value Date    AST 15 11/05/2022    ALKPHOS 72 11/05/2022    BILITOT 1.0 11/05/2022     Albumin:   Albumin   Date Value Ref Range Status   11/11/2022 2.5 (L) 3.5 - 5.2 g/dL Final     Protein:   Total Protein   Date Value Ref Range Status   11/05/2022 7.8 6.0 - 8.4 g/dL Final     Lactic acid:   Lab Results   Component  Value Date    LACTATE 1.0 10/22/2018    LACTATE 1.2 10/22/2018       Significant Imaging: Reviewed      > 50% of 35 min visit spent in chart review, face to face discussion of updates and d/c planning      Ced Welch MD  Palliative Medicine  [facility]

## 2022-11-11 NOTE — PROGRESS NOTES
[facility] Medicine  Progress Note    Patient Name: Mandeep Willams  MRN: [id number]  Patient Class: IP- Inpatient   Admission Date: 11/5/2022  Length of Stay: 6 days  Attending Physician: Al Allison MD  Primary Care Provider: Guy Mcclelland Jr, MD        Subjective:     Principal Problem:Hypoxia        HPI:  Mr. Willams is a 87 yo M who presents with a CC of SOB for 3 days. EMS was called and he was found to have an 02 sat of 90% on RA. Patient is an ESRD patient with a history of mod-severe AS, pleural effusions as well as pulmonary hypertension.  He lives at home by himself and does not wear 02.  He is very comfortable on NC and has no other complaints. He was in observation here early last month for the same.        Overview/Hospital Course:  Patient admitted to the hospital for hypoxia- likely multifactorial including mod-severe AS, pulmonary hypertension and pleural effusions.  He was started on Lasix. Nephrology consulted for dialysis, underwent HD on 11/7. Pulmonary consulted and agreed that hypoxia likely from edema. Patient went multiple dialysis rounds without much improvement in his hypoxia.  Palliative care was consulted. Patient sent for CTA of chest on 11/9/22.  CTA showed no PE but did show large Left pleural effusion. Patient sent to IR for therapeutic thoracentesis. Palliative care consulted and patient now is DNR.  Patient had one liter of fluid taken off by thoracentesis. PT/OT were consulted       Interval History: doing better   Review of Systems   Constitutional:  Negative for activity change.   HENT:  Negative for congestion and dental problem.    Eyes:  Negative for discharge.   Respiratory:  Negative for apnea and chest tightness.    Cardiovascular:  Negative for chest pain.   Gastrointestinal:  Negative for abdominal distention and abdominal pain.   Endocrine: Negative for cold intolerance.   Genitourinary:  Negative for difficulty urinating and dysuria.   Musculoskeletal:   Negative for arthralgias.   Allergic/Immunologic: Negative for environmental allergies.   Neurological:  Negative for dizziness and facial asymmetry.   Psychiatric/Behavioral:  Negative for agitation and behavioral problems.    Objective:     Vital Signs (Most Recent):  Temp: 98.2 °F (36.8 °C) (11/11/22 0526)  Pulse: (!) 58 (11/11/22 0526)  Resp: (!) 21 (11/11/22 0526)  BP: (!) 118/56 (11/11/22 0526)  SpO2: 99 % (11/11/22 0526)   Vital Signs (24h Range):  Temp:  [97.8 °F (36.6 °C)-98.4 °F (36.9 °C)] 98.2 °F (36.8 °C)  Pulse:  [55-59] 58  Resp:  [16-21] 21  SpO2:  [98 %-100 %] 99 %  BP: (106-124)/(51-56) 118/56     Weight: 118.5 kg (261 lb 3.9 oz)  Body mass index is 47.78 kg/m².    Intake/Output Summary (Last 24 hours) at 11/11/2022 1025  Last data filed at 11/11/2022 0645  Gross per 24 hour   Intake 120 ml   Output 1250 ml   Net -1130 ml      Physical Exam  Vitals and nursing note reviewed.   Constitutional:       Appearance: Normal appearance.   HENT:      Head: Normocephalic and atraumatic.      Nose: Nose normal.   Eyes:      Conjunctiva/sclera: Conjunctivae normal.   Cardiovascular:      Rate and Rhythm: Normal rate and regular rhythm.   Pulmonary:      Effort: Pulmonary effort is normal. No respiratory distress.   Musculoskeletal:         General: No swelling.   Skin:     General: Skin is dry.   Neurological:      Mental Status: He is alert. Mental status is at baseline.   Psychiatric:         Mood and Affect: Mood normal.         Behavior: Behavior normal.       Significant Labs: All pertinent labs within the past 24 hours have been reviewed.  BMP:   Recent Labs   Lab 11/11/22  0413   GLU 98   *   K 4.6      CO2 22*   BUN 62*   CREATININE 7.0*   CALCIUM 8.5*     CBC: No results for input(s): WBC, HGB, HCT, PLT in the last 48 hours.          Assessment/Plan:      * Hypoxia  No respiratory distress or failure. Patient 90% on RA.  Now 96 on NC. Very comfortable. Cause of hypoxia multi-factorial  including mod- sev AS, pulmonary edema, bilateral effusions as well as pulmonary hypertension.  Lasix for now. Will consider thoracentesis.    UF today with dialysis. Pulmonary consulted as well    Not improving despite multiple dialysis sessions.  Will check CTA     Large L pleural effusion.  IR consulted for thora     Much improved after one liter off.      Pulmonary hypertension  PA pressure of 59        Hypokalemia  Will replace       Severe aortic stenosis  As discussed above.  Recent echo      Elevated troponin  Likely elevated in setting of renal failure.  Trending down      Thrombocytopenia  No acute issues       Debility  May benefit from H/H. Uses some kind of rolling walker at home     PT/OT recs TBD      Anemia of chronic disorder  No acute issues      Tobacco use disorder        Pleural effusion  May consider thora      Hyperlipidemia  Resume statin      ESRD (end stage renal disease)  Completed dialysis yesterday.  Will consult nephrology  - HD today      HTN (hypertension)  Restarted home meds       debility- PT/OT     VTE Risk Mitigation (From admission, onward)         Ordered     heparin (porcine) injection 5,000 Units  Every 8 hours         11/05/22 1238                Discharge Planning   BONILLA: 11/8/2022     Code Status: DNR   Is the patient medically ready for discharge?:     Reason for patient still in hospital (select all that apply): Patient unstable  Discharge Plan A: Home Health   Discharge Delays: (!) Post-Acute Set-up    Home likely tomorrow.          Al Rosales MD  Department of Hospital Medicine   [facility]

## 2022-11-11 NOTE — PT/OT/SLP PROGRESS
Physical Therapy      Patient Name:  Mandeep Willams   MRN:  [id number]    Patient not seen today secondary to Dialysis. Will follow-up as able.

## 2022-11-11 NOTE — PT/OT/SLP PROGRESS
Physical Therapy      Patient Name:  Mandeep Willams   MRN:  [id number]    Patient not seen today secondary to Dialysis. Will follow-up tomorrow.

## 2022-11-11 NOTE — ASSESSMENT & PLAN NOTE
11/11/2022:  - seen while at dialysis  - in good spirits  - he did not have any questions or concerns  - updated him that I had talked with case management and they were working on getting a letter done that his grand nieces Kathi and Xochitl might be able to use to expedite having his eldest daughter come here from [place].   - He was very appreciative of the update  - I encouraged him to work with PT/OT so we can have the best idea of what level of support he needs for his well being. He stated he understood    11/9/2022:  - talked with Mr. Willams using Language line  video   - He stated [place]. He has 4 children (2 daughters and 2 sons) who are all back in [place]. He used to be an  in [place]  - He feels his oldest daughter Marcelina Zelaya is the one who is most involved out of them and wants her to come to him in [place]  - He lives alone currently and feels like it is becoming more difficult to manage. Legs more tired and harder to get around  - he also has a nephew (not biological) named Fletcher nearby and grand nieces kathi is nearby too and Xochitl who is a grand niece in [place] who all help him.   - He is open to getting PT to work with him to get stronger and help him get the equipment he needs to safety. He is also aware that a nursing care facility might be of benefit to him to ensure he gets the support he needs.   - He is aware of his esrd and wants his dialysis because it keeps him feeling better  - I asked him for permission to talk with Fletcher and his local family, he said that is ok.     - I then called and talked with Kathi who doesn't speak great english and said to call Xochitl first and she will loop her in  - Called Xochitl who is in [place] ([phone])  - Xochitl was very insightful and stated that the except the patients oldest daughter Marcelina Zelaya who he adopted at a young age, his others children do not seem to have a desire to be involved in his  care or even aware of his medical ongoings.   - Xochitl stated it is her sister Kathi who really is always there anytime Mandeep Willams needs anything.   - I updated them regarding the medical ongoings and his overall medical condition with his severe aortic stenosis, pulmonary htn and ESRD with dialysis. They understood the severity of the situation overall.   - Xochitl wants to be able to help get Mr. Willams's oldest daughter over here and requested a letter to help do so. I made her aware I will discuss with Flavia at case management  - Xochitl also relayed a terrible incident she had at dialysis outpatient where  is rarely used and Mr. Willams is often neglected. She would like his dialysis chair location to be changed. I also made her aware I would pass this on to Flavia.   - Xochitl and Kathi were very grateful for the discussion    - I then called Flavia and updated her regarding the above. She is going to work on getting a letter from her supervisor explaining Mr. Willams ongoing medical condition and provide that to Mr. Willams family to help expedite his oldest daughter coming here from [place] to help see him and care for him.     [] Mr. Willams will work with PT/OT and get a better idea of his abilities and then will go from there  [] Code: DNR and has LaPOST on file already too

## 2022-11-11 NOTE — SUBJECTIVE & OBJECTIVE
Interval History:     Seen in dialysis. Used [employer]  ([id number])    Denies any pain or concerns at this time.     Spirits are good.     Medications:  Continuous Infusions:  Scheduled Meds:   sodium chloride 0.9%   Intravenous Once    albumin human 25%  25 g Intravenous Once    amLODIPine  5 mg Oral Daily    calcium acetate(phosphat bind)  667 mg Oral TID WM    furosemide (LASIX) injection  80 mg Intravenous Q12H    heparin (porcine)  5,000 Units Subcutaneous Q8H    metoprolol succinate  100 mg Oral Daily    mupirocin   Nasal BID    pravastatin  10 mg Oral Daily    tamsulosin  0.4 mg Oral Daily     PRN Meds:sodium chloride 0.9%, acetaminophen, albuterol sulfate, guaiFENesin 100 mg/5 ml, sodium chloride 0.9%    Objective:     Vital Signs (Most Recent):  Temp: 98.2 °F (36.8 °C) (11/11/22 0526)  Pulse: (!) 58 (11/11/22 0526)  Resp: (!) 21 (11/11/22 0526)  BP: (!) 118/56 (11/11/22 0526)  SpO2: 99 % (11/11/22 0526)   Vital Signs (24h Range):  Temp:  [97.8 °F (36.6 °C)-98.4 °F (36.9 °C)] 98.2 °F (36.8 °C)  Pulse:  [55-59] 58  Resp:  [16-21] 21  SpO2:  [98 %-100 %] 99 %  BP: (106-124)/(51-56) 118/56     Weight: 118.5 kg (261 lb 3.9 oz)  Body mass index is 47.78 kg/m².    Physical Exam  Constitutional:       General: He is not in acute distress.  HENT:      Head: Normocephalic and atraumatic.   Eyes:      Extraocular Movements: Extraocular movements intact.   Cardiovascular:      Rate and Rhythm: Normal rate.   Pulmonary:      Effort: Pulmonary effort is normal.   Abdominal:      General: Abdomen is flat.   Skin:     General: Skin is warm and dry.   Neurological:      Mental Status: He is alert.      Comments: Awake and alert       Review of Symptoms      Symptom Assessment (ESAS 0-10 Scale)  Pain:  0  Dyspnea:  0  Anxiety:  0  Nausea:  0  Depression:  0  Anorexia:  0  Fatigue:  0  Insomnia:  0  Restlessness:  0  Agitation:  0         Living Arrangements:  Lives alone    Psychosocial/Cultural: - lives alone  - has  a friend named Fletcher who lives nearby and a grandniece named Kathi too. Another grandniece in [place] named Xochitl (she speaks the best english and helps rest of family)        Advance Care Planning   Advance Directives:   LaPOST: Yes    Do Not Resuscitate Status: Yes         Significant Labs: Reviewed   CBC:   Recent Labs   Lab 11/09/22  0358   WBC 4.51   HGB 10.9*   HCT 32.8*   *   *     BMP:  Recent Labs   Lab 11/11/22  0413   GLU 98   *   K 4.6      CO2 22*   BUN 62*   CREATININE 7.0*   CALCIUM 8.5*     LFT:  Lab Results   Component Value Date    AST 15 11/05/2022    ALKPHOS 72 11/05/2022    BILITOT 1.0 11/05/2022     Albumin:   Albumin   Date Value Ref Range Status   11/11/2022 2.5 (L) 3.5 - 5.2 g/dL Final     Protein:   Total Protein   Date Value Ref Range Status   11/05/2022 7.8 6.0 - 8.4 g/dL Final     Lactic acid:   Lab Results   Component Value Date    LACTATE 1.0 10/22/2018    LACTATE 1.2 10/22/2018       Significant Imaging: Reviewed

## 2022-11-12 NOTE — PLAN OF CARE
Problem: SLP  Goal: SLP Goal  Description: 1. Pt will complete PO trials for identification of LRD and liquids w/o overt s/s of aspiration.   Outcome: Ongoing, Not Progressing       B/s swallow evaluation initiated. ST recs pt remain NPO, may have small ice chips and tsp sips TL. ST will follow.

## 2022-11-12 NOTE — PT/OT/SLP PROGRESS
Physical Therapy Treatment    Patient Name:  Mandeep Willams   MRN:  [id number]    Recommendations:     Discharge Recommendations:  nursing facility, skilled   Discharge Equipment Recommendations: none   Barriers to discharge: None    Assessment:     Mandeep Willams is a 86 y.o. male admitted with a medical diagnosis of Hypoxia.  He presents with the following impairments/functional limitations:  weakness, impaired endurance, impaired functional mobility, gait instability, decreased lower extremity function, decreased safety awareness, pain, impaired cardiopulmonary response to activity .    Rehab Prognosis: Good; patient would benefit from acute skilled PT services to address these deficits and reach maximum level of function.    Recent Surgery: * No surgery found *      Plan:     During this hospitalization, patient to be seen 5 x/week to address the identified rehab impairments via gait training, therapeutic activities, therapeutic exercises and progress toward the following goals:    Plan of Care Expires:  11/18/22    Subjective     Chief Complaint: weakness in R LE  Patient/Family Comments/goals: To return to PLOF  Pain/Comfort:  Pain Rating 1: 0/10    Entire treatment performed with  Service (in Mandarin) Session # [id number] German.     Objective:     Communicated with Nurse Melton prior to session.  Patient found supine with johnson catheter, peripheral IV, bed alarm, telemetry, oxygen upon PT entry to room.     General Precautions: Standard, fall   Orthopedic Precautions:Full weight bearing   Braces: N/A  Respiratory Status: Nasal cannula, flow 2.0 L/min     Functional Mobility: Patient required increase time to complete task.      Bed Mobility:     Supine to Sit: Min A with Assistance to shift legs towards the EOB and to lift Trunk off bed.    B UE support was required without any additional Assistance after positional change to prevent any (sitting) LOB.  Sit to Supine: Min A with Assistance to lift legs onto  "EOB.    Transfers:     Sit to Stand:  Mod A with RW and B UE support with positional change.    After transfer, Patient required VC's to maintain an "Upright Posture" to increase standing tolerance.  A "Standing Pause" was required to prevent any symptoms of lightheaded or dizziness.        Ambulate:   Weigth-Shifting:  Standing EOB, Patient performed weight-shifting 5 x's with RW and Min A.  Patient required B UE support and (external) weight-bearing support for increase stability and maintaining an upright posture.      AM-PAC 6 CLICK MOBILITY  Turning over in bed (including adjusting bedclothes, sheets and blankets)?: 3  Sitting down on and standing up from a chair with arms (e.g., wheelchair, bedside commode, etc.): 2  Moving from lying on back to sitting on the side of the bed?: 3  Moving to and from a bed to a chair (including a wheelchair)?: 2  Need to walk in hospital room?: 2  Climbing 3-5 steps with a railing?: 1  Basic Mobility Total Score: 13       Treatment & Education:  Lower Extremity Exercises.   Patient educated on the purpose of therapeutic exercise.    Patient verbalized acceptance/understanding of instructions, expectations, and limitations(for safety).  Patient performed: 2 sets of 10 reps (each) of B LE There Ex: AP, LAQ, Hip abd/add, Hip flexion while sitting up on EOB.       Patient required still requires verbal cues/tactile cues to ensure correct sequence, to maintain proper form, and to allow for self-correction.  Pt reported no complaints or problems with exercise activity.      Patient left supine with all lines intact and call button in reach..    GOALS:   Multidisciplinary Problems       Physical Therapy Goals          Problem: Physical Therapy    Goal Priority Disciplines Outcome Goal Variances Interventions   Physical Therapy Goal     PT, PT/OT Ongoing, Progressing     Description: Goals to be met by: 11/16/22     Patient will increase functional independence with mobility by " performin. Pt to be mod I with bed mobility.  2. Pt to transfer with supervision.  3. Pt to ambulate 50' w/RW SBA.  4. Pt to be (I) with HEP.                         Time Tracking:     PT Received On: 22  PT Start Time: 930     PT Stop Time: 1000  PT Total Time (min): 30 min     Billable Minutes: Gait Training 15 min and Therapeutic Exercise 15 min    Treatment Type: Treatment  PT/PTA: PT     PTA Visit Number: 0     Kota Leiva DPT, PT, CIDN  2022

## 2022-11-12 NOTE — NURSING
Pt choked on breakfast while being fed by aid. Was able to clear airway with yankauer suction and encouraging the pt to cough. Dr. Allison notified.

## 2022-11-12 NOTE — PROGRESS NOTES
[facility] Medicine  Progress Note    Patient Name: Mandeep Willams  MRN: [id number]  Patient Class: IP- Inpatient   Admission Date: 11/5/2022  Length of Stay: 7 days  Attending Physician: Al Allison MD  Primary Care Provider: Guy Mcclelland Jr, MD        Subjective:     Principal Problem:Hypoxia        HPI:  Mr. Willams is a 85 yo M who presents with a CC of SOB for 3 days. EMS was called and he was found to have an 02 sat of 90% on RA. Patient is an ESRD patient with a history of mod-severe AS, pleural effusions as well as pulmonary hypertension.  He lives at home by himself and does not wear 02.  He is very comfortable on NC and has no other complaints. He was in observation here early last month for the same.        Overview/Hospital Course:  Patient admitted to the hospital for hypoxia- likely multifactorial including mod-severe AS, pulmonary hypertension and pleural effusions.  He was started on Lasix. Nephrology consulted for dialysis, underwent HD on 11/7. Pulmonary consulted and agreed that hypoxia likely from edema. Patient went multiple dialysis rounds without much improvement in his hypoxia.  Palliative care was consulted. Patient sent for CTA of chest on 11/9/22.  CTA showed no PE but did show large Left pleural effusion. Patient sent to IR for therapeutic thoracentesis. Palliative care consulted and patient now is DNR.  Patient had one liter of fluid taken off by thoracentesis. PT/OT were consulted and rec: SNF      Interval History: No new issues     Review of Systems   Constitutional:  Negative for activity change.   HENT:  Negative for congestion and dental problem.    Eyes:  Negative for discharge.   Respiratory:  Negative for apnea and chest tightness.    Cardiovascular:  Negative for chest pain.   Gastrointestinal:  Negative for abdominal distention and abdominal pain.   Endocrine: Negative for cold intolerance.   Genitourinary:  Negative for difficulty urinating and dysuria.    Musculoskeletal:  Negative for arthralgias.   Allergic/Immunologic: Negative for environmental allergies.   Neurological:  Negative for dizziness and facial asymmetry.   Psychiatric/Behavioral:  Negative for agitation and behavioral problems.    Objective:     Vital Signs (Most Recent):  Temp: 99.9 °F (37.7 °C) (11/12/22 0739)  Pulse: 74 (11/12/22 0739)  Resp: 18 (11/12/22 0739)  BP: (!) 129/57 (11/12/22 0739)  SpO2: 100 % (11/12/22 0739)   Vital Signs (24h Range):  Temp:  [97.6 °F (36.4 °C)-99.9 °F (37.7 °C)] 99.9 °F (37.7 °C)  Pulse:  [60-80] 74  Resp:  [18-20] 18  SpO2:  [95 %-100 %] 100 %  BP: (100-131)/(34-85) 129/57     Weight: 53.8 kg (118 lb 8 oz)  Body mass index is 21.67 kg/m².    Intake/Output Summary (Last 24 hours) at 11/12/2022 1035  Last data filed at 11/11/2022 1404  Gross per 24 hour   Intake 200 ml   Output 1001 ml   Net -801 ml      Physical Exam  Vitals and nursing note reviewed.   Constitutional:       Appearance: Normal appearance.   HENT:      Head: Normocephalic and atraumatic.      Nose: Nose normal.   Eyes:      Conjunctiva/sclera: Conjunctivae normal.   Cardiovascular:      Rate and Rhythm: Normal rate and regular rhythm.   Pulmonary:      Effort: Pulmonary effort is normal. No respiratory distress.   Musculoskeletal:         General: No swelling.   Skin:     General: Skin is dry.   Neurological:      Mental Status: He is alert. Mental status is at baseline.   Psychiatric:         Mood and Affect: Mood normal.         Behavior: Behavior normal.       Significant Labs: All pertinent labs within the past 24 hours have been reviewed.  BMP:   Recent Labs   Lab 11/11/22  0413   GLU 98   *   K 4.6      CO2 22*   BUN 62*   CREATININE 7.0*   CALCIUM 8.5*     CBC: No results for input(s): WBC, HGB, HCT, PLT in the last 48 hours.    Significant Imaging:       Assessment/Plan:      * Hypoxia  No respiratory distress or failure. Patient 90% on RA.  Now 96 on NC. Very comfortable. Cause  of hypoxia multi-factorial including mod- sev AS, pulmonary edema, bilateral effusions as well as pulmonary hypertension.  Lasix for now. Will consider thoracentesis.    UF today with dialysis. Pulmonary consulted as well    Not improving despite multiple dialysis sessions.  Will check CTA     Large L pleural effusion.  IR consulted for thora     Much improved after one liter off.      Pulmonary hypertension  PA pressure of 59        Hypokalemia  Will replace       Severe aortic stenosis  As discussed above.  Recent echo      Elevated troponin  Likely elevated in setting of renal failure.  Trending down      Thrombocytopenia  No acute issues       Debility  May benefit from H/H. Uses some kind of rolling walker at home     PT/OT recs TBD- rec SNF       Anemia of chronic disorder  No acute issues      Tobacco use disorder        Pleural effusion  May consider thora      Hyperlipidemia  Resume statin      ESRD (end stage renal disease)  Completed dialysis yesterday.  Will consult nephrology  - HD today      HTN (hypertension)  Restarted home meds         VTE Risk Mitigation (From admission, onward)         Ordered     heparin (porcine) injection 5,000 Units  Every 8 hours         11/05/22 1238                Discharge Planning   BONILLA: 11/14/2022     Code Status: DNR   Is the patient medically ready for discharge?:     Reason for patient still in hospital (select all that apply  Discharge Plan A: Home Health   Discharge Delays: (!) Post-Acute Set-up      Awaiting SNF         Al Rosales MD  Department of Hospital Medicine   [facility]

## 2022-11-12 NOTE — PT/OT/SLP EVAL
Speech Language Pathology Evaluation  Bedside Swallow    Patient Name:  Mandeep Willams   MRN:  [id number]  Admitting Diagnosis: Hypoxia    Recommendations:                 General Recommendations:  Dysphagia therapy and Re-evaluation of Swallow Function  Diet recommendations:  NPO, NPO, Other (Comment) (Pt may have ice chips and small sips TL via tsp)   Aspiration Precautions: Frequent oral care and HOB to 90 degrees for small ice chips and tsp sips TL  General Precautions: Standard, NPO, respiratory, aspiration  Communication strategies:   Pt requires Chinese     History:     Past Medical History:   Diagnosis Date    Dependence on renal dialysis     ESRD (end stage renal disease)     HTN (hypertension)     Hyperlipidemia        Past Surgical History:   Procedure Laterality Date    BACK SURGERY  2005    DIALYSIS FISTULA CREATION  4/2012    left arm    FISTULOGRAM Left 3/22/2022    Procedure: Fistulogram, transradial;  Surgeon: Jude Ordaz MD;  Location: [facility];  Service: Vascular;  Laterality: Left;  RN Pre Op 3-17-22.  May need Chinese  day of procedure. CA    JOINT REPLACEMENT Right     hip       Chest X-Rays: Most recent CXR 11/10/22    COMPARISON:  11/06/2022     FINDINGS:  The patient is immediately status post left thoracentesis.  Cardiac silhouette is magnified by portable AP technique.  The heart appears enlarged but unchanged in size and configuration.  Tortuous thoracic aorta and brachiocephalic vessels.  Pulmonary vascularity appears improved.  Interval resolution of the previously demonstrated left-sided pleural effusion.  Improved aeration on the left.  No definite pneumothorax is seen.  Apparent skin fold at the lateral aspect of the left hemithorax.  Continued right-sided pleural effusion with associated volume loss/consolidation.  Skeletal structures appear intact.  Surgical clips at the left upper arm.       Prior diet: Unknown at this time.    Subjective     Pt will open  mouth posture at rest. Responded to VVT stimuli, sustained ARAMIS at onset of session, stated he was tired after several trials and discontinued participation.     Pain/Comfort:  Pain Rating 1: 0/10    Respiratory Status: Nasal cannula, flow 2 L/min    Objective:     Oral Musculature Evaluation  Oral Musculature: unable to assess due to poor participation/comprehension  Dentition: upper and lower dentures  Volitional Cough: Weak  Volitional Swallow: Delayed  Voice Prior to PO Intake: Clear    Bedside Swallow Eval:   Consistencies Assessed:  Ice chips x2  Thin liquids approx - 10-20mL via tsp, straw    Pt refused additional PO trials    Oral Phase:   Unable to assess fully d/t limited presentation of trials.      Pharyngeal Phase:   coughing/choking - across TL trials presented x3 via tsp and straw  decreased hyolaryngeal excursion to palpation  delayed swallow initiation  Unable to assess fully d/t limited presentation of trials.    Compensatory Strategies  None    Treatment: B/s swallow evaluation completed, pt demo impaired swallowing function.     Silent aspiration cannot be r/o at b/s.     Assessment:     Mandeep Willams is a 86 y.o. male with a medical diagnosis of Hypoxia. He presents with dysphagia of a yet to be determined severity. Across limited PO trials at b/s today, pt demo overt s/s of aspiration. ST will follow for ongoing assessment of swallowing function.     Goals:   Multidisciplinary Problems       SLP Goals          Problem: SLP    Goal Priority Disciplines Outcome   SLP Goal     SLP Ongoing, Not Progressing   Description: 1. Pt will complete PO trials for identification of LRD and liquids w/o overt s/s of aspiration.                          Plan:     Patient to be seen:  5 x/week   Plan of Care expires:     Plan of Care reviewed with:  patient, other (see comments) (Pt's nurse)   SLP Follow-Up:  Yes       Discharge recommendations:  other (see comments) (TBD)   Barriers to Discharge:  None    Time  Tracking:     SLP Treatment Date:   11/12/22  Speech Start Time:  1118  Speech Stop Time:  1132     Speech Total Time (min):  14 min    Billable Minutes: Eval Swallow and Oral Function 14min    11/12/2022

## 2022-11-12 NOTE — PLAN OF CARE
Problem: Occupational Therapy  Goal: Occupational Therapy Goal  Description: Goals to be met by: 11/25/22     Patient will increase functional independence with ADLs by performing:    Feeding with Modified [place].  UE Dressing with Modified [place].  LE Dressing with Stand-by Assistance.  Grooming while standing at sink with Stand-by Assistance.  Toileting from bedside commode with Supervision for hygiene and clothing management.   Supine to sit with Supervision.  Step transfer with Stand-by Assistance  Toilet transfer to bedside commode with Stand-by Assistance.  Upper extremity exercise program x15 reps per handout, with independence.    Outcome: Ongoing, Progressing     MAX A supine>sit; MOD A for sit<>stand with RW, MIN-MOD A for 3 small sidesteps at EOB using RW on 2L O2.     OT rec SNF at d/c in order to increase safety and independence with ADLs and all aspects of functional mobility.

## 2022-11-12 NOTE — PROGRESS NOTES
[facility] [place] [facility]  [facility]phrology  Progress Note    Patient Name: Mandeep Willams  MRN: [id number]  Admission Date: 11/5/2022  Attending Provider: Al Allison MD   Primary Care Physician: Guy Mcclelland Jr, MD  Principal Problem:Hypoxia  Consults Reason for consult: ESRD, dialysis    Subjective:     Interval History:  HD yesterday with 800 ml net UF. Remains on 2L NC. Feels his breathing is the same. Says his bottom is hurting. Pending SNF placement    Review of patient's allergies indicates:  No Known Allergies  Current Facility-Administered Medications   Medication Frequency    0.9%  NaCl infusion PRN    0.9%  NaCl infusion Once    acetaminophen tablet 650 mg Q6H PRN    albuterol sulfate nebulizer solution 2.5 mg Q4H PRN    amLODIPine tablet 5 mg Daily    calcium acetate(phosphat bind) capsule 667 mg TID WM    furosemide injection 80 mg Q12H    guaiFENesin 100 mg/5 ml syrup 200 mg Q4H PRN    heparin (porcine) injection 5,000 Units Q8H    metoprolol succinate (TOPROL-XL) 24 hr tablet 100 mg Daily    pravastatin tablet 10 mg Daily    sodium chloride 0.9% bolus 250 mL PRN    tamsulosin 24 hr capsule 0.4 mg Daily       Objective:     Vital Signs (Most Recent):  Temp: 98.3 °F (36.8 °C) (11/12/22 1133)  Pulse: 74 (11/12/22 1133)  Resp: 20 (11/12/22 1133)  BP: (!) 127/58 (11/12/22 1133)  SpO2: 100 % (11/12/22 1133)  O2 Device (Oxygen Therapy): nasal cannula w/ humidification (11/11/22 2047)   Vital Signs (24h Range):  Temp:  [97.6 °F (36.4 °C)-99.9 °F (37.7 °C)] 98.3 °F (36.8 °C)  Pulse:  [62-80] 74  Resp:  [18-20] 20  SpO2:  [95 %-100 %] 100 %  BP: (107-131)/(44-85) 127/58     Weight: 53.8 kg (118 lb 8 oz) (11/11/22 1948)  Body mass index is 21.67 kg/m².  Body surface area is 1.53 meters squared.    I/O last 3 completed shifts:  In: 200 [Other:200]  Out: 1251 [Urine:250; Other:1001]    Physical Exam  Constitutional:       General: He is not in acute distress.     Appearance: Normal appearance. He is not toxic-appearing.       Comments: Chronically ill, frail appearing elderly male   HENT:      Head: Normocephalic and atraumatic.      Mouth/Throat:      Mouth: Mucous membranes are moist.   Eyes:      Extraocular Movements: Extraocular movements intact.      Conjunctiva/sclera: Conjunctivae normal.   Cardiovascular:      Rate and Rhythm: Normal rate and regular rhythm.      Heart sounds: Murmur heard.   Pulmonary:      Breath sounds: No wheezing, rhonchi or rales.      Comments: 2L NC O2  Musculoskeletal:         General: No deformity.      Right lower leg: No edema.      Left lower leg: No edema.   Skin:     General: Skin is warm and dry.      Findings: No rash.   Neurological:      General: No focal deficit present.      Mental Status: He is alert and oriented to person, place, and time.   Psychiatric:         Mood and Affect: Mood normal.         Behavior: Behavior normal.       Significant Labs:CBC:   Recent Labs   Lab 11/09/22  0358   WBC 4.51   RBC 3.29*   HGB 10.9*   HCT 32.8*   *   *   MCH 33.1*   MCHC 33.2       CMP:   Recent Labs   Lab 11/11/22  0413   GLU 98   CALCIUM 8.5*   ALBUMIN 2.5*   *   K 4.6   CO2 22*      BUN 62*   CREATININE 7.0*       All labs within the past 24 hours have been reviewed.    Significant Imaging:  X-Ray: Reviewed    Assessment/Plan:     1. ESRD - usual HD w/ Dr Henriquez  - Hold HD today, continue MWF HD schedule while admitted  - renally dose medications for dialysis  - check labs tomorrow  Acute hypoxic respiratory failure, hypervolemia, pulm edema, pleural effusion - O2 requirements are decreasing s/p consecutive HD days + thoracentesis.   2. HTN - Intradialytic hypotension  - recommend to hold Norvasc altogether  - hold BP meds prior to HD, albumin PRN  - UF as tolerated on dialysis  3. Anemia of chronic kidney disease - no indication for Epo with HD, continue to monitor CBC  4. MBD/secondary hyperparathyroidism - phos is down, continue phos-lo, monitor levels  5.  Hypoalbuminemia- Nepro supplement    Thank you for your consult. I will follow-up with patient. Please contact us if you have any additional questions.    MONSE Rodney  DOS: 11/12/2022  Nephrology  [facility] [place] [facility]

## 2022-11-12 NOTE — PLAN OF CARE
RICARDO spoke with patient's relative (Fletcher) via phone to discuss PT/OT recommendation for SNF placement. RICARDO explained SNF placement to patient's relative (Fletcher) who stated he will have to discuss with his niece. RICARDO voiced understanding and also asked if patient's relative had an email address to where SW can email a list of [facility] SNF list for SNF choices. Patient's relative provided RICARDO with [email or web address]. RICARDO explained he will sent N SNF list to patient relative. Patient's relative (Fletcher) asked would the SNF bring patient to dialysis? RICARDO explained to patient's relative (Fletcher) that the accepting SNF would have to provide transportation to the dialysis center. Patient's relative (Fletcher) voiced understanding. RICARDO also provided Fletcher with SW) name for contact and #.    RICARDO emailed N SNF list to [email or web address]. RICARDO provided SW contact # and SW that covers unit on Monday name and # in email for patient to contact with SNF choices.

## 2022-11-12 NOTE — PLAN OF CARE
[facility] - Telemetry  Discharge Reassessment    Primary Care Provider: Guy Mcclelland Jr, MD    Expected Discharge Date: 11/14/2022    Reassessment (most recent)       Discharge Reassessment - 11/12/22 1340          Discharge Reassessment    Assessment Type Discharge Planning Reassessment     Discharge Plan discussed with: --   Relative: Fletcher    Name(s) and Number(s) to,fletcher (Relative)   [phone] (Mobile     Communicated BONILLA with patient/caregiver Date not available/Unable to determine     Discharge Plan A Skilled Nursing Facility     Discharge Plan B Skilled Nursing Facility     DME Needed Upon Discharge  other (see comments)   TBD    Discharge Barriers Identified None     Why the patient remains in the hospital Placement issues;Requires continued medical care        Post-Acute Status    Home Health Status Discharge Plan Changed     Coverage PHN     Discharge Delays None known at this time                   SW received call from patient's relative (Fletcher) provided SW with [employer] as choice. RICARDO stated he will submit patient's information to [facility] but also asked for patient's relative to choice more SNFs choice in case [facility] is not able to accept patient. Fletcher voiced understanding.

## 2022-11-12 NOTE — SUBJECTIVE & OBJECTIVE
Interval History: No new issues     Review of Systems   Constitutional:  Negative for activity change.   HENT:  Negative for congestion and dental problem.    Eyes:  Negative for discharge.   Respiratory:  Negative for apnea and chest tightness.    Cardiovascular:  Negative for chest pain.   Gastrointestinal:  Negative for abdominal distention and abdominal pain.   Endocrine: Negative for cold intolerance.   Genitourinary:  Negative for difficulty urinating and dysuria.   Musculoskeletal:  Negative for arthralgias.   Allergic/Immunologic: Negative for environmental allergies.   Neurological:  Negative for dizziness and facial asymmetry.   Psychiatric/Behavioral:  Negative for agitation and behavioral problems.    Objective:     Vital Signs (Most Recent):  Temp: 99.9 °F (37.7 °C) (11/12/22 0739)  Pulse: 74 (11/12/22 0739)  Resp: 18 (11/12/22 0739)  BP: (!) 129/57 (11/12/22 0739)  SpO2: 100 % (11/12/22 0739)   Vital Signs (24h Range):  Temp:  [97.6 °F (36.4 °C)-99.9 °F (37.7 °C)] 99.9 °F (37.7 °C)  Pulse:  [60-80] 74  Resp:  [18-20] 18  SpO2:  [95 %-100 %] 100 %  BP: (100-131)/(34-85) 129/57     Weight: 53.8 kg (118 lb 8 oz)  Body mass index is 21.67 kg/m².    Intake/Output Summary (Last 24 hours) at 11/12/2022 1035  Last data filed at 11/11/2022 1404  Gross per 24 hour   Intake 200 ml   Output 1001 ml   Net -801 ml      Physical Exam  Vitals and nursing note reviewed.   Constitutional:       Appearance: Normal appearance.   HENT:      Head: Normocephalic and atraumatic.      Nose: Nose normal.   Eyes:      Conjunctiva/sclera: Conjunctivae normal.   Cardiovascular:      Rate and Rhythm: Normal rate and regular rhythm.   Pulmonary:      Effort: Pulmonary effort is normal. No respiratory distress.   Musculoskeletal:         General: No swelling.   Skin:     General: Skin is dry.   Neurological:      Mental Status: He is alert. Mental status is at baseline.   Psychiatric:         Mood and Affect: Mood normal.          Behavior: Behavior normal.       Significant Labs: All pertinent labs within the past 24 hours have been reviewed.  BMP:   Recent Labs   Lab 11/11/22  0413   GLU 98   *   K 4.6      CO2 22*   BUN 62*   CREATININE 7.0*   CALCIUM 8.5*     CBC: No results for input(s): WBC, HGB, HCT, PLT in the last 48 hours.    Significant Imaging:

## 2022-11-12 NOTE — PT/OT/SLP EVAL
Occupational Therapy   Evaluation    Name: Mandeep Willams  MRN: [id number]  Admitting Diagnosis:  Hypoxia  Recent Surgery: * No surgery found *       utilized: Torres ID# [id number]    Recommendations:     Discharge Recommendations: nursing facility, skilled  Discharge Equipment Recommendations:   (TBD)  Barriers to discharge:   (not at PLOF; high risk of falls, unplanned readmission, and morbidity if d/c home)    Assessment:     Mandeep Willams is a 86 y.o. male with a medical diagnosis of Hypoxia. Performance deficits affecting function: weakness, gait instability, decreased upper extremity function, impaired cardiopulmonary response to activity, decreased ROM, impaired balance, impaired endurance, decreased lower extremity function, decreased safety awareness, impaired self care skills, impaired functional mobility, decreased coordination.      MAX A supine>sit; MOD A for sit<>stand with RW, MIN-MOD A for 3 small sidesteps at EOB using RW on 2L O2. Pt reported increased fatigue following therapy session. Pt had HD this AM.     OT rec SNF at d/c in order to increase safety and independence with ADLs and all aspects of functional mobility    Rehab Prognosis: Good; patient would benefit from acute skilled OT services to address these deficits and reach maximum level of function.       Plan:     Patient to be seen  (5-6x/week) to address the above listed problems via self-care/home management, therapeutic activities, therapeutic exercises  Plan of Care Expires: 11/25/22  Plan of Care Reviewed with: patient    Subjective     Chief Complaint: tired   Patient/Family Comments/goals: agreeable to session; very fatigued after session    Occupational Profile:  Living Environment: pt lives alone in a [facility] with 0 BRAYAN.   Previous level of function: MOD I with rollator.   Roles and Routines: insurance transportation to HD   Equipment Used at Home:  rollator  Assistance upon Discharge: per chart, nephew and grand niece local; pt's 4  adult children live in [place]     Pain/Comfort:  Pain Rating 1: 0/10    Patients cultural, spiritual, Latter day conflicts given the current situation: no    Objective:     Communicated with: nurseJuno, prior to session.  Patient found HOB elevated with bed alarm, telemetry, oxygen, peripheral IV, Condom Catheter upon OT entry to room.    General Precautions: Standard, fall, respiratory (ESRD MWF)   Orthopedic Precautions:N/A   Braces: N/A  Respiratory Status: Nasal cannula, flow 2 L/min    Occupational Performance:    Bed Mobility:    Patient completed Scooting anteriorly with moderate assistance  Patient completed Supine to Sit with maximal assistance, with side rail, and HOB elevated  Patient completed Sit to Supine with minimum assistance    Functional Mobility/Transfers:  Patient completed Sit <> Stand Transfer with moderate assistance  with  rolling walker   Functional Mobility: Gait belt donned prior to transfer for safety during mobility/transfers. MIN-MOD A for 3 small sidesteps at EOB using RW on 2L O2.     Activities of Daily Living:  Lower Body Dressing: total assistance with socks    Cognitive/Visual Perceptual:  Cognitive/Psychosocial Skills:     -       Follows Commands/attention:follows simple commands  -       Communication: pt speaks Mandarin-  able to understand pt  -       Safety awareness/insight to disability: impaired   -       Mood/Affect/Coping skills/emotional control: Appropriate to situation    Physical Exam:  Balance:    -       seated: SBA; standing: MIN-MOD A with RW  Postural examination/scapula alignment:    -       Forward head  Skin integrity: Visible skin intact; dressing intact to pt's backside  Edema:  no BUE edema noted  Upper Extremity Range of Motion:     -       Right Upper Extremity: WFL  -       Left Upper Extremity: WFL  Upper Extremity Strength:    -       R/L Upper Extremity:  pt globally weak   Strength:    -       R/L Upper Extremity: WFL  Gross  motor coordination:   impaired 2* weakness and deconditioning    AMPAC 6 Click ADL:  AMPAC Total Score: 14    Treatment & Education:  Pt educated on OT role/POC.   Importance of OOB activity with staff assistance.  Safety during functional t/f and mobility   Self-care tasks/functional mobility completed- assistance level noted above   All questions/concerns answered within OT scope of practice       Patient left HOB elevated with all lines intact, call button in reach, bed alarm on, nurse, Juno, notified, and all needs met/within reach.    GOALS:   Multidisciplinary Problems       Occupational Therapy Goals          Problem: Occupational Therapy    Goal Priority Disciplines Outcome Interventions   Occupational Therapy Goal     OT, PT/OT Ongoing, Progressing    Description: Goals to be met by: 11/25/22     Patient will increase functional independence with ADLs by performing:    Feeding with Modified [place].  UE Dressing with Modified [place].  LE Dressing with Stand-by Assistance.  Grooming while standing at sink with Stand-by Assistance.  Toileting from bedside commode with Supervision for hygiene and clothing management.   Supine to sit with Supervision.  Step transfer with Stand-by Assistance  Toilet transfer to bedside commode with Stand-by Assistance.  Upper extremity exercise program x15 reps per handout, with independence.                         History:     Past Medical History:   Diagnosis Date    Dependence on renal dialysis     ESRD (end stage renal disease)     HTN (hypertension)     Hyperlipidemia          Past Surgical History:   Procedure Laterality Date    BACK SURGERY  2005    DIALYSIS FISTULA CREATION  4/2012    left arm    FISTULOGRAM Left 3/22/2022    Procedure: Fistulogram, transradial;  Surgeon: Jude Ordaz MD;  Location: [facility];  Service: Vascular;  Laterality: Left;  RN Pre Op 3-17-22.  May need Chinese  day of procedure. [place]    JOINT REPLACEMENT Right      hip       Time Tracking:     OT Date of Treatment: 11/11/22  OT Start Time: 1730  OT Stop Time: 1742  OT Total Time (min): 12 min    Billable Minutes:Evaluation 12 min    11/11/2022

## 2022-11-12 NOTE — PROGRESS NOTES
[facility]  Nephrology  Progress Note    Patient Name: Mandeep Willams  MRN: [id number]  Admission Date: 11/5/2022  Attending Provider: Al Allison MD   Primary Care Physician: Guy Mcclelland Jr, MD  Principal Problem:Hypoxia  Consults Reason for consult: ESRD, dialysis  Date of service 11/11/22  Subjective:     Interval History: hypotensive on dialysis, DBP to 30s, his only c/o is being hungry, no loss of consciousness.  BP improved some w/ albumin bolus and converting HD to UF only session.    Review of patient's allergies indicates:  No Known Allergies  Current Facility-Administered Medications   Medication Frequency    0.9%  NaCl infusion PRN    0.9%  NaCl infusion Once    acetaminophen tablet 650 mg Q6H PRN    albuterol sulfate nebulizer solution 2.5 mg Q4H PRN    amLODIPine tablet 5 mg Daily    calcium acetate(phosphat bind) capsule 667 mg TID WM    furosemide injection 80 mg Q12H    guaiFENesin 100 mg/5 ml syrup 200 mg Q4H PRN    heparin (porcine) injection 5,000 Units Q8H    metoprolol succinate (TOPROL-XL) 24 hr tablet 100 mg Daily    pravastatin tablet 10 mg Daily    sodium chloride 0.9% bolus 250 mL PRN    tamsulosin 24 hr capsule 0.4 mg Daily       Objective:     Vital Signs (Most Recent):  Temp: 99.9 °F (37.7 °C) (11/12/22 0739)  Pulse: 74 (11/12/22 0739)  Resp: 18 (11/12/22 0739)  BP: (!) 129/57 (11/12/22 0739)  SpO2: 100 % (11/12/22 0739)  O2 Device (Oxygen Therapy): nasal cannula w/ humidification (11/11/22 2047)   Vital Signs (24h Range):  Temp:  [97.6 °F (36.4 °C)-99.9 °F (37.7 °C)] 99.9 °F (37.7 °C)  Pulse:  [55-80] 74  Resp:  [18-20] 18  SpO2:  [95 %-100 %] 100 %  BP: (100-131)/(34-85) 129/57     Weight: 53.8 kg (118 lb 8 oz) (11/11/22 1948)  Body mass index is 21.67 kg/m².  Body surface area is 1.53 meters squared.    I/O last 3 completed shifts:  In: 200 [Other:200]  Out: 1251 [Urine:250; Other:1001]    Physical Exam  Constitutional:       General: He is not in acute distress.      Appearance: Normal appearance. He is not toxic-appearing.      Comments: Chronically ill, frail appearing elderly male   HENT:      Head: Normocephalic and atraumatic.      Mouth/Throat:      Mouth: Mucous membranes are moist.   Eyes:      Extraocular Movements: Extraocular movements intact.      Conjunctiva/sclera: Conjunctivae normal.   Cardiovascular:      Rate and Rhythm: Normal rate and regular rhythm.      Heart sounds: Murmur heard.   Pulmonary:      Breath sounds: No wheezing, rhonchi or rales.      Comments: 2L NC O2  Musculoskeletal:         General: No deformity.      Right lower leg: No edema.      Left lower leg: No edema.   Skin:     General: Skin is warm and dry.      Findings: No rash.   Neurological:      General: No focal deficit present.      Mental Status: He is alert and oriented to person, place, and time.   Psychiatric:         Mood and Affect: Mood normal.         Behavior: Behavior normal.       Significant Labs:CBC:   Recent Labs   Lab 11/09/22  0358   WBC 4.51   RBC 3.29*   HGB 10.9*   HCT 32.8*   *   *   MCH 33.1*   MCHC 33.2       CMP:   Recent Labs   Lab 11/11/22  0413   GLU 98   CALCIUM 8.5*   ALBUMIN 2.5*   *   K 4.6   CO2 22*      BUN 62*   CREATININE 7.0*       All labs within the past 24 hours have been reviewed.    Significant Imaging:  X-Ray: Reviewed    Assessment/Plan:     1. ESRD - usual HD w/ Dr Henriquez  - HD today converted to UF only, continue MWF HD schedule while admitted  - renally dose medications for dialysis  Acute hypoxic respiratory failure, hypervolemia, pulm edema, pleural effusion - O2 requirements are decreasing s/p consecutive HD days + thoracentesis.   2. HTN - Intradialytic hypotension  - recommend to hold Norvasc altogether  - hold BP meds prior to HD, albumin PRN  - UF as tolerated on dialysis  3. Anemia of chronic kidney disease - no indication for Epo with HD, continue to monitor CBC  4. MBD/secondary hyperparathyroidism - phos  is down, continue phos-lo, monitor levels  5. Hypoalbuminemia- Nepro supplement    Thank you for your consult. I will follow-up with patient. Please contact us if you have any additional questions.    Lavinia Baugh MD  Nephrology  [facility] - [place] - Telemetry

## 2022-11-13 NOTE — SUBJECTIVE & OBJECTIVE
Interval History: No new issues     Review of Systems   Constitutional:  Negative for activity change.   HENT:  Negative for congestion and dental problem.    Eyes:  Negative for discharge.   Respiratory:  Negative for apnea and chest tightness.    Cardiovascular:  Negative for chest pain.   Gastrointestinal:  Negative for abdominal distention and abdominal pain.   Endocrine: Negative for cold intolerance.   Genitourinary:  Negative for difficulty urinating and dysuria.   Musculoskeletal:  Negative for arthralgias.   Allergic/Immunologic: Negative for environmental allergies.   Neurological:  Negative for dizziness and facial asymmetry.   Psychiatric/Behavioral:  Negative for agitation and behavioral problems.    Objective:     Vital Signs (Most Recent):  Temp: 98.3 °F (36.8 °C) (11/13/22 0815)  Pulse: 72 (11/13/22 0815)  Resp: 20 (11/13/22 0815)  BP: (!) 122/58 (11/13/22 0815)  SpO2: 100 % (11/13/22 0815)   Vital Signs (24h Range):  Temp:  [97.4 °F (36.3 °C)-98.3 °F (36.8 °C)] 98.3 °F (36.8 °C)  Pulse:  [65-74] 72  Resp:  [18-20] 20  SpO2:  [97 %-100 %] 100 %  BP: (112-134)/(54-63) 122/58     Weight: 108.5 kg (239 lb 3.2 oz)  Body mass index is 43.75 kg/m².    Intake/Output Summary (Last 24 hours) at 11/13/2022 1000  Last data filed at 11/13/2022 0446  Gross per 24 hour   Intake 0 ml   Output 150 ml   Net -150 ml      Physical Exam  Vitals and nursing note reviewed.   Constitutional:       Appearance: Normal appearance.   HENT:      Head: Normocephalic and atraumatic.      Nose: Nose normal.   Eyes:      Conjunctiva/sclera: Conjunctivae normal.   Cardiovascular:      Rate and Rhythm: Normal rate and regular rhythm.   Pulmonary:      Effort: Pulmonary effort is normal. No respiratory distress.   Musculoskeletal:         General: No swelling.   Skin:     General: Skin is dry.   Neurological:      Mental Status: He is alert. Mental status is at baseline.   Psychiatric:         Mood and Affect: Mood normal.          Behavior: Behavior normal.       Significant Labs: All pertinent labs within the past 24 hours have been reviewed.  BMP:   Recent Labs   Lab 11/13/22 0416   GLU 80   *   K 4.5   CL 96   CO2 23   BUN 63*   CREATININE 7.6*   CALCIUM 8.9     CBC:   Recent Labs   Lab 11/13/22 0416   WBC 15.04*   HGB 10.2*   HCT 30.8*          Significant Imagin

## 2022-11-13 NOTE — PLAN OF CARE
Pt is AAOx3. Pt weaned down to 1L NC. Tele maintained.   No falls or injuries reported during shift, safety precautions maintained.     Problem: Adult Inpatient Plan of Care  Goal: Plan of Care Review  Outcome: Ongoing, Progressing     Problem: Adult Inpatient Plan of Care  Goal: Optimal Comfort and Wellbeing  Outcome: Ongoing, Progressing

## 2022-11-13 NOTE — ASSESSMENT & PLAN NOTE
No respiratory distress or failure. Patient 90% on RA.  Now 96 on NC. Very comfortable. Cause of hypoxia multi-factorial including mod- sev AS, pulmonary edema, bilateral effusions as well as pulmonary hypertension.  Lasix for now. Will consider thoracentesis.    UF today with dialysis. Pulmonary consulted as well    Not improving despite multiple dialysis sessions.  Will check CTA     Large L pleural effusion.  IR consulted for thora     Much improved after one liter off.    Much improved. On 1L

## 2022-11-13 NOTE — PROGRESS NOTES
[facility] Medicine  Progress Note    Patient Name: Mandeep Willams  MRN: [id number]  Patient Class: IP- Inpatient   Admission Date: 11/5/2022  Length of Stay: 8 days  Attending Physician: Al Allison MD  Primary Care Provider: Guy Mcclelland Jr, MD        Subjective:     Principal Problem:Hypoxia        HPI:  Mr. Willams is a 87 yo M who presents with a CC of SOB for 3 days. EMS was called and he was found to have an 02 sat of 90% on RA. Patient is an ESRD patient with a history of mod-severe AS, pleural effusions as well as pulmonary hypertension.  He lives at home by himself and does not wear 02.  He is very comfortable on NC and has no other complaints. He was in observation here early last month for the same.        Overview/Hospital Course:  Patient admitted to the hospital for hypoxia- likely multifactorial including mod-severe AS, pulmonary hypertension and pleural effusions.  He was started on Lasix. Nephrology consulted for dialysis, underwent HD on 11/7. Pulmonary consulted and agreed that hypoxia likely from edema. Patient went multiple dialysis rounds without much improvement in his hypoxia.  Palliative care was consulted. Patient sent for CTA of chest on 11/9/22.  CTA showed no PE but did show large Left pleural effusion. Patient sent to IR for therapeutic thoracentesis. Palliative care consulted and patient now is DNR.  Patient had one liter of fluid taken off by thoracentesis. PT/OT were consulted and rec: SNF      Interval History: No new issues     Review of Systems   Constitutional:  Negative for activity change.   HENT:  Negative for congestion and dental problem.    Eyes:  Negative for discharge.   Respiratory:  Negative for apnea and chest tightness.    Cardiovascular:  Negative for chest pain.   Gastrointestinal:  Negative for abdominal distention and abdominal pain.   Endocrine: Negative for cold intolerance.   Genitourinary:  Negative for difficulty urinating and dysuria.    Musculoskeletal:  Negative for arthralgias.   Allergic/Immunologic: Negative for environmental allergies.   Neurological:  Negative for dizziness and facial asymmetry.   Psychiatric/Behavioral:  Negative for agitation and behavioral problems.    Objective:     Vital Signs (Most Recent):  Temp: 98.3 °F (36.8 °C) (11/13/22 0815)  Pulse: 72 (11/13/22 0815)  Resp: 20 (11/13/22 0815)  BP: (!) 122/58 (11/13/22 0815)  SpO2: 100 % (11/13/22 0815)   Vital Signs (24h Range):  Temp:  [97.4 °F (36.3 °C)-98.3 °F (36.8 °C)] 98.3 °F (36.8 °C)  Pulse:  [65-74] 72  Resp:  [18-20] 20  SpO2:  [97 %-100 %] 100 %  BP: (112-134)/(54-63) 122/58     Weight: 108.5 kg (239 lb 3.2 oz)  Body mass index is 43.75 kg/m².    Intake/Output Summary (Last 24 hours) at 11/13/2022 1000  Last data filed at 11/13/2022 0446  Gross per 24 hour   Intake 0 ml   Output 150 ml   Net -150 ml      Physical Exam  Vitals and nursing note reviewed.   Constitutional:       Appearance: Normal appearance.   HENT:      Head: Normocephalic and atraumatic.      Nose: Nose normal.   Eyes:      Conjunctiva/sclera: Conjunctivae normal.   Cardiovascular:      Rate and Rhythm: Normal rate and regular rhythm.   Pulmonary:      Effort: Pulmonary effort is normal. No respiratory distress.   Musculoskeletal:         General: No swelling.   Skin:     General: Skin is dry.   Neurological:      Mental Status: He is alert. Mental status is at baseline.   Psychiatric:         Mood and Affect: Mood normal.         Behavior: Behavior normal.       Significant Labs: All pertinent labs within the past 24 hours have been reviewed.  BMP:   Recent Labs   Lab 11/13/22 0416   GLU 80   *   K 4.5   CL 96   CO2 23   BUN 63*   CREATININE 7.6*   CALCIUM 8.9     CBC:   Recent Labs   Lab 11/13/22 0416   WBC 15.04*   HGB 10.2*   HCT 30.8*          Significant Imagin      Assessment/Plan:      * Hypoxia  No respiratory distress or failure. Patient 90% on RA.  Now 96 on NC. Very  comfortable. Cause of hypoxia multi-factorial including mod- sev AS, pulmonary edema, bilateral effusions as well as pulmonary hypertension.  Lasix for now. Will consider thoracentesis.    UF today with dialysis. Pulmonary consulted as well    Not improving despite multiple dialysis sessions.  Will check CTA     Large L pleural effusion.  IR consulted for thora     Much improved after one liter off.    Much improved. On 1L       Pulmonary hypertension  PA pressure of 59        Hypokalemia  Will replace       Severe aortic stenosis  As discussed above.  Recent echo      Elevated troponin  Likely elevated in setting of renal failure.  Trending down      Thrombocytopenia  No acute issues       Debility  May benefit from H/H. Uses some kind of rolling walker at home     PT/OT recs TBD- rec SNF       Anemia of chronic disorder  No acute issues      Tobacco use disorder        Pleural effusion  May consider thora      Hyperlipidemia  Resume statin      ESRD (end stage renal disease)  Completed dialysis yesterday.  Will consult nephrology  - HD today      HTN (hypertension)  Restarted home meds       Leukocytosis- not sure of cause.  Will check CBC in am. Defer Abx for now. No fevers.     VTE Risk Mitigation (From admission, onward)         Ordered     heparin (porcine) injection 5,000 Units  Every 8 hours         11/05/22 1238                Discharge Planning   BONILLA: 11/14/2022     Code Status: DNR   Is the patient medically ready for discharge?:     Reason for patient still in hospital (select all that apply): Patient unstable  Discharge Plan A: Skilled Nursing Facility   Discharge Delays: None known at this time              Al Rosales MD  Department of Hospital Medicine   [facility] [place] [facility]

## 2022-11-14 NOTE — NURSING
Family at bedside continues to feed patient after being redirected several times that the patient is NPO and continues to fail swallow study with SLP.

## 2022-11-14 NOTE — NURSING
Patient in dialysis most of morning, skin check completed, stage 2 breakdown noted to sacrum/gluteal cleft. Area cleaned and mepilex applied. Wound care consult put in place.

## 2022-11-14 NOTE — PLAN OF CARE
Pt is AAOx4. Pt weaned down to 0.5 L NC. Tele maintained.   NPO maintained.   No falls or new injuries reported during shift, safety precautions maintained.     Problem: Adult Inpatient Plan of Care  Goal: Plan of Care Review  Outcome: Ongoing, Progressing     Problem: Adult Inpatient Plan of Care  Goal: Optimal Comfort and Wellbeing  Outcome: Ongoing, Progressing

## 2022-11-14 NOTE — PLAN OF CARE
Recommendations  1) Continue NPO per ST - advance diet per Speech Therapy if medically able   2) If unable to advance diet, recommend enteral nutrition: Initiate Novasource Renal at 20 mL/hr and advance to goal rate of 30 mL/hr   - FWF per MD   - Will provide 1440 kcal, 65 g protein, 516 mL formula free water.  - Will meet 92% kcal, 100% protein needs.   - Hold for residuals > 400 cc.    3) Obtain new wt    Goals: Pt to have source of nutrition ordered by RD follow up  Nutrition Goal Status: new  Communication of RD Recs: other (comment) (POC, sticky note, reviewed with RN)    Assessment and Plan    Nutrition Problem  Inadequate oral intake    Related to (etiology):   Swallowing difficulty    Signs and Symptoms (as evidenced by):   Pt NPO at this time due to swallowing difficulty  Receiving 0% EEN/EPN    Interventions(treatment strategy):  Collaboration with other providers    Nutrition Diagnosis Status:   New

## 2022-11-14 NOTE — PROGRESS NOTES
CM received a call from pt's daughter, Xochitl .[phone] Pt's daughter stated they would like pt to go to [facility] because its closer for pt's other daughter. CM will contact [facility].    4:15 pm  CM left a message for [facility] admissions. CM will follow up.

## 2022-11-14 NOTE — NURSING
Patient returned back to tele from dialysis, no distress noted. Call light in reach. Bed low and locked. Will continue to follow up

## 2022-11-14 NOTE — PROGRESS NOTES
[facility]  Nephrology  Progress Note    Patient Name: Mandeep Willams  MRN: [id number]  Admission Date: 11/5/2022  Attending Provider: Al Allison MD   Primary Care Physician: Guy Mcclelland Jr, MD  Principal Problem:Hypoxia  Consults Reason for consult: ESRD, dialysis    Subjective:     Interval History:  Appears weak and repeatedly states he is very tired. Minimal PO intake    Review of patient's allergies indicates:  No Known Allergies  Current Facility-Administered Medications   Medication Frequency    0.9%  NaCl infusion PRN    0.9%  NaCl infusion Once    acetaminophen tablet 650 mg Q6H PRN    albuterol sulfate nebulizer solution 2.5 mg Q4H PRN    amLODIPine tablet 5 mg Daily    calcium acetate(phosphat bind) capsule 667 mg TID WM    furosemide injection 80 mg Q12H    guaiFENesin 100 mg/5 ml syrup 200 mg Q4H PRN    heparin (porcine) injection 5,000 Units Q8H    metoprolol succinate (TOPROL-XL) 24 hr tablet 100 mg Daily    pravastatin tablet 10 mg Daily    sodium chloride 0.9% bolus 250 mL PRN    tamsulosin 24 hr capsule 0.4 mg Daily       Objective:     Vital Signs (Most Recent):  Temp: 98.3 °F (36.8 °C) (11/13/22 1712)  Pulse: 72 (11/13/22 1712)  Resp: 20 (11/13/22 1712)  BP: 131/60 (11/13/22 1712)  SpO2: 96 % (11/13/22 1712)  O2 Device (Oxygen Therapy): nasal cannula (11/13/22 0500)   Vital Signs (24h Range):  Temp:  [97.4 °F (36.3 °C)-98.3 °F (36.8 °C)] 98.3 °F (36.8 °C)  Pulse:  [65-72] 72  Resp:  [18-20] 20  SpO2:  [96 %-100 %] 96 %  BP: (112-131)/(54-60) 131/60     Weight: 108.5 kg (239 lb 3.2 oz) (11/13/22 0446)  Body mass index is 43.75 kg/m².  Body surface area is 2.18 meters squared.    I/O last 3 completed shifts:  In: 120 [P.O.:120]  Out: 150 [Urine:150]    Physical Exam  Constitutional:       General: He is not in acute distress.     Appearance: Normal appearance. He is not toxic-appearing.      Comments: Chronically ill, frail appearing elderly male   HENT:      Head: Normocephalic and  atraumatic.      Mouth/Throat:      Mouth: Mucous membranes are moist.   Eyes:      Extraocular Movements: Extraocular movements intact.      Conjunctiva/sclera: Conjunctivae normal.   Cardiovascular:      Rate and Rhythm: Normal rate and regular rhythm.      Heart sounds: Murmur heard.   Pulmonary:      Breath sounds: No wheezing, rhonchi or rales.      Comments: 2L NC O2  Musculoskeletal:         General: No deformity.      Right lower leg: No edema.      Left lower leg: No edema.   Skin:     General: Skin is warm and dry.      Findings: No rash.   Neurological:      General: No focal deficit present.      Mental Status: He is alert and oriented to person, place, and time.   Psychiatric:         Mood and Affect: Mood normal.         Behavior: Behavior normal.       Significant Labs:CBC:   Recent Labs   Lab 11/13/22 0416   WBC 15.04*   RBC 3.12*   HGB 10.2*   HCT 30.8*      MCV 99*   MCH 32.7*   MCHC 33.1       CMP:   Recent Labs   Lab 11/13/22 0416   GLU 80   CALCIUM 8.9   ALBUMIN 2.6*   *   K 4.5   CO2 23   CL 96   BUN 63*   CREATININE 7.6*       All labs within the past 24 hours have been reviewed.    Significant Imaging:  X-Ray: Reviewed    Assessment/Plan:     1. ESRD - usual HD w/ Dr Henriquez  - HD tomorrow continue MWF HD schedule while admitted  - minimal UFG while poor PO intake  - renally dose medications for dialysis  Acute hypoxic respiratory failure, hypervolemia, pulm edema, pleural effusion - O2 requirements are stable. Will reduce UFG while PO intake is poor.    2. HTN - Intradialytic hypotension  - recommend to hold Norvasc altogether  - hold BP meds prior to HD, albumin PRN  - UF as tolerated on dialysis  3. Anemia of chronic kidney disease - Hgb trending down, monitor for need to start Epo with HD, continue to monitor CBC  4. MBD/secondary hyperparathyroidism - phos is up - he is not tolerating PO and therefore has not been taking phos lo.   5. Hypoalbuminemia- Nepro supplement.  Encourage PO intake    Thank you for your consult. I will follow-up with patient. Please contact us if you have any additional questions.    MONSE Rodney  DOS: 11/13/2022  Nephrology  [facility] [place] [facility]

## 2022-11-14 NOTE — PROGRESS NOTES
[facility] - Telemetry  Adult Nutrition  Progress Note    SUMMARY     Recommendations  1) Continue NPO per ST - advance diet per Speech Therapy if medically able   2) If unable to advance diet, recommend enteral nutrition: Initiate Novasource Renal at 20 mL/hr and advance to goal rate of 30 mL/hr   - FWF per MD   - Will provide 1440 kcal, 65 g protein, 516 mL formula free water.  - Will meet 92% kcal, 100% protein needs.   - Hold for residuals > 400 cc.    3) Obtain new wt    Goals: Pt to have source of nutrition ordered by RD follow up  Nutrition Goal Status: new  Communication of RD Recs: other (comment) (POC, sticky note, reviewed with RN)    Assessment and Plan    Nutrition Problem  Inadequate oral intake    Related to (etiology):   Swallowing difficulty    Signs and Symptoms (as evidenced by):   Pt NPO at this time due to swallowing difficulty  Receiving 0% EEN/EPN    Interventions(treatment strategy):  Collaboration with other providers    Nutrition Diagnosis Status:   New      Malnutrition Assessment  Energy Intake: severe energy intake    KIRBY NFPE due to remote assessment  New wt needed    Reason for Assessment  Reason For Assessment: length of stay  Diagnosis: other (see comments) (hypoxia)  Relevant Medical History: ESRD patient with a history of mod-severe AS, pleural effusions as well as pulmonary hypertension  Interdisciplinary Rounds: did not attend  General Information Comments: RD remote for coverage. Per RN note yesterday, pt did not tolerate meals, coughed, required suctioning. NPO status, ST following. Plan for discharge to SNF. On HD. Spoke with RN on phone regarding wt error in chart - agreed to correct and take new wt. Pt -125 x 1 year per chart. KIRBY NFPE due to remote assessment. Prior to NPO status, pt intake was 0-50%. Suspect malnutrition.  Nutrition Discharge Planning: pending clinical course    Nutrition Risk Screen  Nutrition Risk Screen: dysphagia or difficulty  "swallowing    Nutrition/Diet History  Spiritual, Cultural Beliefs, Holiness Practices, Values that Affect Care: no  Factors Affecting Nutritional Intake: NPO, difficulty/impaired swallowing    Anthropometrics  Temp: 97.4 °F (36.3 °C)  Height Method: Estimated  Height: 5' 2" (157.5 cm)  Height (inches): 62 in  Weight Method: Bed Scale  Weight: 108.5 kg (239 lb 3.2 oz)  Weight (lb): 239.2 lb  Ideal Body Weight (IBW), Male: 118 lb  % Ideal Body Weight, Male (lb): 97.46 %  BMI (Calculated): 43.7       Lab/Procedures/Meds  Pertinent Labs Reviewed: reviewed  Pertinent Labs Comments: BUN 81, Cr 9.2, GFR 5, Phos 6.3, Alb 2.5  Pertinent Medications Reviewed: reviewed  Pertinent Medications Comments: calcium acetate, furosemide, heaprin, pravastatin    Physical Findings/Assessment  Altered skin to perineum - stage 2 PI per RN       Estimated/Assessed Needs  Weight Used For Calorie Calculations: 52.2 kg (115 lb) (UBW (current wt in chart incorrect))  Energy Calorie Requirements (kcal): 7982-7604 kcal/day based on 30-35 kcal/kg  Energy Need Method: Kcal/kg  Protein Requirements: 63-67 g/day based on 1.2-1.3 g/kg  Weight Used For Protein Calculations: 52.2 kg (115 lb)  Fluid Requirements (mL): 1000 mL + UOP or per MD for ESRD on GEMINI  Estimated Fluid Requirement Method: other (see comments)  RDA Method (mL): 1564       Nutrition Prescription Ordered  Current Diet Order: NPO    Evaluation of Received Nutrient/Fluid Intake    Intake/Output Summary (Last 24 hours) at 11/14/2022 1347  Last data filed at 11/13/2022 2130  Gross per 24 hour   Intake --   Output 100 ml   Net -100 ml       I/O: -7.3 L since admit  Energy Calories Required: not meeting needs  Protein Required: not meeting needs  Fluid Required: not meeting needs  Comments: LBM 11/10  % Intake of Estimated Energy Needs: 0 - 25 %  % Meal Intake: NPO    Nutrition Risk  Level of Risk/Frequency of Follow-up: high (2x weekly)     Monitor and Evaluation  Food and Nutrient Intake: " food and beverage intake, energy intake, enteral nutrition intake  Food and Nutrient Adminstration: enteral and parenteral nutrition administration, diet order  Knowledge/Beliefs/Attitudes: beliefs and attitudes  Physical Activity and Function: nutrition-related ADLs and IADLs  Anthropometric Measurements: weight change, body mass index, weight  Biochemical Data, Medical Tests and Procedures: electrolyte and renal panel, gastrointestinal profile, glucose/endocrine profile, inflammatory profile, lipid profile  Nutrition-Focused Physical Findings: overall appearance, extremities, muscles and bones, skin     Nutrition Follow-Up  RD Follow-up?: Yes

## 2022-11-14 NOTE — NURSING
Per day shift nurse pt did not tolerate meals throughout the day, he required suctioning d/t coughing and choking.    This nurse administered a small sip of water and pt coughed and required suctioning.    NP made aware. Pt placed NPO d/t risk of aspiration.  Pt made aware.   Will place sponges bedside.

## 2022-11-14 NOTE — PHYSICIAN QUERY
PT Name: Mandeep Willams  MR #: [id number]     DOCUMENTATION CLARIFICATION     CDS/: Caryl RODRIGUEZ, RN-BC  Contact information: [email or web address]     This form is a permanent document in the medical record.     Query Date: November 14, 2022    By submitting this query, we are merely seeking further clarification of documentation.  Please utilize your independent clinical judgment when addressing the question(s) below.  The Medical Record contains the following   Indicators Supporting Clinical Findings Location in Medical Record   X Heart Failure documented Differential Diagnosis:   Volume overload, CHF, ACS, aortic stenosis, recurrent pleural effusion  ED Management:  Chest x-ray with evidence of her chronic pleural effusion.  BNP elevated as well as troponin likely stress of volume overload and pleural effusion.  Antibiotics given to cover in case this is parapneumonic    Elevated BNP 2/2 aortic stenosis causing heart failure    Congestive heart failure, unspecified HF chronicity, unspecified heart failure type ED Provider Note 11/5   X BNP 3036   2947 10/4 Labs Prior to arrival  11/5    X EF/Echo The left ventricle is normal in size with normal systolic function.  The estimated ejection fraction is 55%.  Grade II left ventricular diastolic dysfunction.  Normal right ventricular size with normal right ventricular systolic function.  There is moderate-to-severe aortic valve stenosis.  Normal central venous pressure (3 mmHg).  The estimated PA systolic pressure is 59 mmHg.  There is pulmonary hypertension. Echo 10/2022 Prior to arrival   X Radiology findings   Persistent bilateral interstitial and alveolar opacities which could relate to edema versus infectious or inflammatory process.   More dense bibasilar retrocardiac opacities could relate to atelectasis, noting that aspiration or pneumonia not excluded.  Relatively similar bilateral pleural effusions to 11/01/2022.       CXR 11/5   X Subjective/Objective  "Respiratory Conditions Presents for evaluation of shortness of breath and a feeling of "a rock on my chest "located on the left side, constant, ongoing since all night, non-radiating, nothing makes it better or worse.    Rales; Patient requires 3 L nasal cannula to maintain oxygen sats of 94%  Breath sounds decreased on the left    Reports that he feels his SOB is unchanged from yesterday, despite 1.8L off with HD.   Acute hypoxic respiratory failure, hypervolemia, pulmonary edema, pleural effusion - continues to require substantial amounts of O2 despite consecutive HD treatments. Going for CTA today. May benefit from repeat thoracentesis    O2 requirements are decreasing s/p consecutive HD days + thoracentesis      Large L pleural effusion.  IR consulted for thoracentesis   Much improved after one liter off.      ED Provider Note 11/5                Renal 11/9                Renal 11/11        [facility], 11/11    Recent/Current MI      Heart Transplant, LVAD     X Edema, JVD Right lower leg: No edema.   Left lower leg: No edema.    No JVD  [facility], 11/07      Renal 11/5    Ascites     X Diuretics/Meds Lasix 40mg   Lasix 80mg    Lasix for now. Will consider thoracentesis.    11/5-11/9 MAR  11/9-11/14    [facility], 11/06    Other Treatment       Heart failure is a clinical diagnosis which includes symptomatic fluid retention, elevated intracardiac pressures, and/or the inability of the heart to deliver adequate blood flow.    Heart Failure with reduced Ejection Fraction (HFrEF) or Systolic Heart Failure (loses ability to contract normally, EF is <40%)    Heart Failure with preserved Ejection Fraction (HFpEF) or Diastolic Heart Failure (stiff ventricles, does not relax properly, EF is >50%)     Heart Failure with Combined Systolic and Diastolic Failure (stiff ventricles, does not relax properly and EF is <50%)    Mid-range or mildly reduced ejection fraction (HFmrEF) is classified as systolic " heart failure.  Congestive heart failure with a recovered EF is classified as Diastolic Heart Failure.  Common clues to acute exacerbation:  Rapidly progressive symptoms (w/in 2 weeks of presentation), using IV diuretics, using supplemental O2, pulmonary edema on Xray, new or worsening pleural effusion, +JVD or other signs of volume overload, MI w/in 4 weeks, and/or BNP >500  The clinical guidelines noted are only system guidelines, and do not replace the providers clinical judgment.    Provider, please specify the diagnosis associated with the above clinical findings.    [  x ]  Acute Diastolic Heart Failure (HFpEF) - new diagnosis   [   ]  Other (please specify): ___________________________________   [  ]  Clinically Undetermined     Please document in your progress notes daily for the duration of treatment until resolved and include in your discharge summary.    References:  American Heart Association editorial staff. (2017, May). Ejection Fraction Heart Failure Measurement. American Heart Association. https://www.heart.org/en/health-topics/heart-failure/diagnosing-heart-failure/ejection-fraction-heart-failure-measurement#:~:text=Ejection%20fraction%20(EF)%20is%20a,pushed%20out%20with%20each%20heartbeat  TREVOR العلي (2020, December 15). Heart failure with preserved ejection fraction: Clinical manifestations and diagnosis. [employer]. https://www.[employer]/contents/heart-failure-with-preserved-ejection-fraction-clinical-manifestations-and-diagnosis.  ICD-10-CM/PCS Coding Clinic Third Quarter ICD-10, Effective with discharges: September 8, 2020 [facility] § Heart failure with mid-range or mildly reduced ejection fraction (2020).  ICD-10-CM/PCS Coding Clinic Third Quarter ICD-10, Effective with discharges: September 8, 2020 [facility] § Heart failure with recovered ejection fraction (2020).  Form No. [id number]

## 2022-11-14 NOTE — PROGRESS NOTES
Arcelia with [facility] stated pt has been accepted. CM provided pt's relative, Fletcher [phone]. CM will follow up.    11:00 AM  RICARDO completed LOCET, faxed PASSR and waiting [phone].

## 2022-11-14 NOTE — PT/OT/SLP PROGRESS
"Speech Language Pathology Treatment    Patient Name:  Mandeep Willams   MRN:  [id number]  Admitting Diagnosis: Hypoxia    Recommendations:                 General Recommendations:  Modified barium swallow study (order obtained for tomorrow)  Diet recommendations:  NPO (except ice)   Aspiration Precautions:  good oral care    General Precautions: Standard, NPO, respiratory, aspiration  Communication strategies:    needed; Mandarin Chinese    Subjective   St utilized translation service and  "Jessica" to communicate with Pt during session. Pt denies serious issues swallowing and maintains he simply cannot drink cold things.   Patient goals: initiate po    Pain/Comfort:  Pain Rating 1: 0/10  Pain Rating Post-Intervention 1: 0/10    Respiratory Status: Nasal cannula, flow 1 L/min    Objective:     Has the patient been evaluated by SLP for swallowing?   Yes  Keep patient NPO? No     Pt repositioned upright in bed. Decreased vocal intensity noted. Multiple ice chip trials revealed consistent throat clearing and coughing and choking. ST presented ice chip trial revealed immediate prolonged episode of weak coughing. Pt maintaining overt s/s of aspiration only occurred because trails was cold and fed by ST. He was then given room temperature water to self present with identical result, immediate weak prolonged coughing and choking. Single trial of 1/4 of a teaspoon of puree was attempted, oral phase appeared wfl; Pt reported he felt puree "went down" well however delayed weak prolonged coughing and choking was noted post intake. Pt was educated regarding ST's recommendation for modified barium swallow study to be performed tomorrow, order obtained by MD via secure chat. Pt affirms unintentional weight loss.     Assessment:     Mandeep Willams is a 86 y.o. male with dx of hypoxia he presents with oropharyngeal dysphagia of a yet to be determined severity.     Goals:   Multidisciplinary Problems       SLP Goals       "    Problem: SLP    Goal Priority Disciplines Outcome   SLP Goal     SLP Ongoing, Progressing   Description: STGs  1. Pt will complete PO trials for identification of LRD and liquids w/o overt s/s of aspiration.   2. Pt will participate in modified barium swallow study for further assessment of swallow (order obtained for 11/15.)                          Plan:     Patient to be seen:  2 x/week   Plan of Care expires:  11/24/22  Plan of Care reviewed with:  patient   SLP Follow-Up:  Yes       Discharge recommendations:  other (see comments) (TBD)   Barriers to Discharge:  Level of Skilled Assistance Needed .    Time Tracking:     SLP Treatment Date:   11/14/22  Speech Start Time:  1405  Speech Stop Time:  1445     Speech Total Time (min):  40 min    Billable Minutes: Treatment Swallowing Dysfunction 30 and Self Care/Home Management Training 10    11/14/2022

## 2022-11-14 NOTE — PROGRESS NOTES
[facility]  Nephrology  Progress Note    Patient Name: Mandeep Willams  MRN: [id number]  Admission Date: 11/5/2022  Attending Provider: Al Allison MD   Primary Care Physician: Guy Mcclelland Jr, MD  Principal Problem:Hypoxia  Consults Reason for consult: ESRD, dialysis  Date of service 11/14/2022    Subjective:     Interval History:  He tolerated HD well except some hypotension limiting UF, on 1L O2 NC.    Review of patient's allergies indicates:  No Known Allergies  Current Facility-Administered Medications   Medication Frequency    0.9%  NaCl infusion PRN    0.9%  NaCl infusion Once    acetaminophen tablet 650 mg Q6H PRN    albuterol sulfate nebulizer solution 2.5 mg Q4H PRN    amLODIPine tablet 5 mg Daily    calcium acetate(phosphat bind) capsule 667 mg TID WM    furosemide injection 80 mg Q12H    guaiFENesin 100 mg/5 ml syrup 200 mg Q4H PRN    heparin (porcine) injection 5,000 Units Q8H    metoprolol succinate (TOPROL-XL) 24 hr tablet 100 mg Daily    pravastatin tablet 10 mg Daily    sodium chloride 0.9% bolus 250 mL PRN    tamsulosin 24 hr capsule 0.4 mg Daily       Objective:     Vital Signs (Most Recent):  Temp: 98.3 °F (36.8 °C) (11/14/22 1356)  Pulse: 79 (11/14/22 1356)  Resp: 18 (11/14/22 1356)  BP: (!) 131/57 (11/14/22 1356)  SpO2: 99 % (11/14/22 1356)  O2 Device (Oxygen Therapy): room air (11/13/22 1946)   Vital Signs (24h Range):  Temp:  [97.4 °F (36.3 °C)-98.3 °F (36.8 °C)] 98.3 °F (36.8 °C)  Pulse:  [68-88] 79  Resp:  [17-20] 18  SpO2:  [96 %-100 %] 99 %  BP: (106-136)/(46-62) 131/57     Weight: 108.5 kg (239 lb 3.2 oz) (11/13/22 0446)  Body mass index is 43.75 kg/m².  Body surface area is 2.18 meters squared.    I/O last 3 completed shifts:  In: 0   Out: 100 [Urine:100]    Physical Exam  Constitutional:       General: He is not in acute distress.     Appearance: Normal appearance. He is not toxic-appearing.      Comments: Chronically ill, frail appearing elderly male   HENT:      Head:  Normocephalic and atraumatic.      Mouth/Throat:      Mouth: Mucous membranes are moist.   Eyes:      General: No scleral icterus.     Extraocular Movements: Extraocular movements intact.      Conjunctiva/sclera: Conjunctivae normal.   Cardiovascular:      Rate and Rhythm: Normal rate and regular rhythm.      Heart sounds: Murmur heard.   Pulmonary:      Breath sounds: No wheezing, rhonchi or rales.      Comments: 1L NC O2  Musculoskeletal:         General: No deformity.      Right lower leg: No edema.      Left lower leg: No edema.   Skin:     General: Skin is warm and dry.      Findings: No rash.   Neurological:      General: No focal deficit present.      Mental Status: He is alert.   Psychiatric:         Mood and Affect: Mood normal.         Behavior: Behavior normal.       Significant Labs:CBC:   Recent Labs   Lab 11/14/22  0359   WBC 11.87   RBC 3.38*   HGB 11.3*   HCT 32.8*      MCV 97   MCH 33.4*   MCHC 34.5       CMP:   Recent Labs   Lab 11/14/22 0359   GLU 96   CALCIUM 9.2   ALBUMIN 2.5*      K 4.9   CO2 21*   CL 96   BUN 81*   CREATININE 9.2*       All labs within the past 24 hours have been reviewed.    Significant Imaging:  X-Ray: Reviewed    Assessment/Plan:     1. ESRD - usual HD w/ Dr Henriquez  - HD today, continue MWF HD schedule while admitted  - minimal UFG while poor PO intake  - renally dose medications for dialysis  Acute hypoxic respiratory failure, hypervolemia, pulm edema, pleural effusion - O2 requirements are stable. Continue reduced UFG while PO intake is poor.    2. HTN - Intradialytic hypotension  - recommend to hold Norvasc altogether  - hold BP meds prior to HD, albumin PRN  - UF as tolerated on dialysis  3. Anemia of chronic kidney disease - Hgb trending down, monitor for need to start Epo with HD, continue to monitor CBC  4. MBD/secondary hyperparathyroidism - phos is up - increase phoslo, poor PO intake noted.   5. Hypoalbuminemia- Nepro supplement. Encourage PO  intake    Thank you for your consult. I will follow-up with patient. Please contact us if you have any additional questions.    Lavinia Baugh MD  DOS: 11/14/2022  Nephrology  [facility]

## 2022-11-14 NOTE — ASSESSMENT & PLAN NOTE
No respiratory distress or failure. Patient 90% on RA.  Now 96 on NC. Very comfortable. Cause of hypoxia multi-factorial including mod- sev AS, pulmonary edema, bilateral effusions as well as pulmonary hypertension.  Lasix for now. Will consider thoracentesis.    UF today with dialysis. Pulmonary consulted as well    Not improving despite multiple dialysis sessions.  Will check CTA     Large L pleural effusion.  IR consulted for thora     Much improved after one liter off.    Much improved. On 1L     Resolved  On RA

## 2022-11-14 NOTE — NURSING
Report received from off going nurse, CÉSAR Lawson. Patient AAO. No signs of distress noted. Call light in reach. Bed low and locked. Will continue plan of care.

## 2022-11-14 NOTE — PROGRESS NOTES
[facility] Medicine  Progress Note    Patient Name: Mandeep Willams  MRN: [id number]  Patient Class: IP- Inpatient   Admission Date: 11/5/2022  Length of Stay: 9 days  Attending Physician: Al Allison MD  Primary Care Provider: Guy Mcclelland Jr, MD        Subjective:     Principal Problem:Hypoxia        HPI:  Mr. Willams is a 85 yo M who presents with a CC of SOB for 3 days. EMS was called and he was found to have an 02 sat of 90% on RA. Patient is an ESRD patient with a history of mod-severe AS, pleural effusions as well as pulmonary hypertension.  He lives at home by himself and does not wear 02.  He is very comfortable on NC and has no other complaints. He was in observation here early last month for the same.        Overview/Hospital Course:  Patient admitted to the hospital for hypoxia- likely multifactorial including mod-severe AS, pulmonary hypertension and pleural effusions.  He was started on Lasix. Nephrology consulted for dialysis, underwent HD on 11/7. Pulmonary consulted and agreed that hypoxia likely from edema. Patient went multiple dialysis rounds without much improvement in his hypoxia.  Palliative care was consulted. Patient sent for CTA of chest on 11/9/22.  CTA showed no PE but did show large Left pleural effusion. Patient sent to IR for therapeutic thoracentesis. Palliative care consulted and patient now is DNR.  Patient had one liter of fluid taken off by thoracentesis. PT/OT were consulted and rec: SNF. Patient's hypoxia improved and was weaned to RA       Interval History: No new issues. On RA     Review of Systems   Constitutional:  Negative for activity change.   HENT:  Negative for congestion and dental problem.    Eyes:  Negative for discharge.   Respiratory:  Negative for apnea and chest tightness.    Cardiovascular:  Negative for chest pain.   Gastrointestinal:  Negative for abdominal distention and abdominal pain.   Endocrine: Negative for cold intolerance.    Genitourinary:  Negative for difficulty urinating and dysuria.   Musculoskeletal:  Negative for arthralgias.   Allergic/Immunologic: Negative for environmental allergies.   Neurological:  Negative for dizziness and facial asymmetry.   Psychiatric/Behavioral:  Negative for agitation and behavioral problems.    Objective:     Vital Signs (Most Recent):  Temp: 98.1 °F (36.7 °C) (11/14/22 0501)  Pulse: 68 (11/14/22 0501)  Resp: 18 (11/14/22 0501)  BP: 128/60 (11/14/22 0501)  SpO2: 97 % (11/14/22 0501) Vital Signs (24h Range):  Temp:  [97.8 °F (36.6 °C)-98.3 °F (36.8 °C)] 98.1 °F (36.7 °C)  Pulse:  [67-76] 68  Resp:  [17-20] 18  SpO2:  [96 %-100 %] 97 %  BP: (122-136)/(57-62) 128/60     Weight: 108.5 kg (239 lb 3.2 oz)  Body mass index is 43.75 kg/m².    Intake/Output Summary (Last 24 hours) at 11/14/2022 0645  Last data filed at 11/13/2022 2130  Gross per 24 hour   Intake --   Output 100 ml   Net -100 ml      Physical Exam  Vitals and nursing note reviewed.   Constitutional:       Appearance: Normal appearance.   HENT:      Head: Normocephalic and atraumatic.      Nose: Nose normal.   Eyes:      Conjunctiva/sclera: Conjunctivae normal.   Cardiovascular:      Rate and Rhythm: Normal rate and regular rhythm.   Pulmonary:      Effort: Pulmonary effort is normal. No respiratory distress.   Musculoskeletal:         General: No swelling.   Skin:     General: Skin is dry.   Neurological:      Mental Status: He is alert. Mental status is at baseline.   Psychiatric:         Mood and Affect: Mood normal.         Behavior: Behavior normal.       Significant Labs: All pertinent labs within the past 24 hours have been reviewed.  BMP:   Recent Labs   Lab 11/14/22  0359   GLU 96      K 4.9   CL 96   CO2 21*   BUN 81*   CREATININE 9.2*   CALCIUM 9.2     CBC:   Recent Labs   Lab 11/13/22  0416 11/14/22  0359   WBC 15.04* 11.87   HGB 10.2* 11.3*   HCT 30.8* 32.8*    180       Significant Imaging:       Assessment/Plan:       * Hypoxia  No respiratory distress or failure. Patient 90% on RA.  Now 96 on NC. Very comfortable. Cause of hypoxia multi-factorial including mod- sev AS, pulmonary edema, bilateral effusions as well as pulmonary hypertension.  Lasix for now. Will consider thoracentesis.    UF today with dialysis. Pulmonary consulted as well    Not improving despite multiple dialysis sessions.  Will check CTA     Large L pleural effusion.  IR consulted for thora     Much improved after one liter off.    Much improved. On 1L     Resolved  On RA      Pulmonary hypertension  PA pressure of 59        Hypokalemia  Will replace       Severe aortic stenosis  As discussed above.  Recent echo      Elevated troponin  Likely elevated in setting of renal failure.  Trending down      Thrombocytopenia  No acute issues       Debility  May benefit from H/H. Uses some kind of rolling walker at home     PT/OT recs TBD- rec SNF       Anemia of chronic disorder  No acute issues      Tobacco use disorder        Pleural effusion  May consider thora      Hyperlipidemia  Resume statin      ESRD (end stage renal disease)  Completed dialysis yesterday.  Will consult nephrology  - HD today      HTN (hypertension)  Restarted home meds         VTE Risk Mitigation (From admission, onward)         Ordered     heparin (porcine) injection 5,000 Units  Every 8 hours         11/05/22 1238                Discharge Planning   BONILLA: 11/14/2022     Code Status: DNR   Is the patient medically ready for discharge?:     Reason for patient still in hospital (select all that apply):   Discharge Plan A: Skilled Nursing Facility   Discharge Delays: None known at this time      To SNF when arranged.        Al Rosales MD  Department of Hospital Medicine   [facility]

## 2022-11-14 NOTE — PT/OT/SLP PROGRESS
Physical Therapy      Patient Name:  Mandeep Willams   MRN:  [id number]    Patient not seen today secondary to Dialysis. Will follow-up as able.

## 2022-11-14 NOTE — NURSING
Nurse emptied urine from condom cath, urine is green, cloudy, and very odorous.  NP aware, will collect UA    Condom cath urine bag changed. Will collect when pt voids again d/t not sure how long previous urine has been sitting in bag.

## 2022-11-14 NOTE — SUBJECTIVE & OBJECTIVE
Interval History: No new issues. On RA     Review of Systems   Constitutional:  Negative for activity change.   HENT:  Negative for congestion and dental problem.    Eyes:  Negative for discharge.   Respiratory:  Negative for apnea and chest tightness.    Cardiovascular:  Negative for chest pain.   Gastrointestinal:  Negative for abdominal distention and abdominal pain.   Endocrine: Negative for cold intolerance.   Genitourinary:  Negative for difficulty urinating and dysuria.   Musculoskeletal:  Negative for arthralgias.   Allergic/Immunologic: Negative for environmental allergies.   Neurological:  Negative for dizziness and facial asymmetry.   Psychiatric/Behavioral:  Negative for agitation and behavioral problems.    Objective:     Vital Signs (Most Recent):  Temp: 98.1 °F (36.7 °C) (11/14/22 0501)  Pulse: 68 (11/14/22 0501)  Resp: 18 (11/14/22 0501)  BP: 128/60 (11/14/22 0501)  SpO2: 97 % (11/14/22 0501) Vital Signs (24h Range):  Temp:  [97.8 °F (36.6 °C)-98.3 °F (36.8 °C)] 98.1 °F (36.7 °C)  Pulse:  [67-76] 68  Resp:  [17-20] 18  SpO2:  [96 %-100 %] 97 %  BP: (122-136)/(57-62) 128/60     Weight: 108.5 kg (239 lb 3.2 oz)  Body mass index is 43.75 kg/m².    Intake/Output Summary (Last 24 hours) at 11/14/2022 0645  Last data filed at 11/13/2022 2130  Gross per 24 hour   Intake --   Output 100 ml   Net -100 ml      Physical Exam  Vitals and nursing note reviewed.   Constitutional:       Appearance: Normal appearance.   HENT:      Head: Normocephalic and atraumatic.      Nose: Nose normal.   Eyes:      Conjunctiva/sclera: Conjunctivae normal.   Cardiovascular:      Rate and Rhythm: Normal rate and regular rhythm.   Pulmonary:      Effort: Pulmonary effort is normal. No respiratory distress.   Musculoskeletal:         General: No swelling.   Skin:     General: Skin is dry.   Neurological:      Mental Status: He is alert. Mental status is at baseline.   Psychiatric:         Mood and Affect: Mood normal.          Behavior: Behavior normal.       Significant Labs: All pertinent labs within the past 24 hours have been reviewed.  BMP:   Recent Labs   Lab 11/14/22 0359   GLU 96      K 4.9   CL 96   CO2 21*   BUN 81*   CREATININE 9.2*   CALCIUM 9.2     CBC:   Recent Labs   Lab 11/13/22  0416 11/14/22 0359   WBC 15.04* 11.87   HGB 10.2* 11.3*   HCT 30.8* 32.8*    180       Significant Imaging:

## 2022-11-14 NOTE — PT/OT/SLP PROGRESS
Occupational Therapy      Patient Name:  Mandeep Willams   MRN:  [id number]    Patient not seen today secondary to pt SALO to dialysis. Will f/u as able.   11/14/2022

## 2022-11-14 NOTE — NURSING
Pt c/o butt pain; sacral wound stage 2 noted. Wound cleansed, barrier cream, and mepilex placed. WC order placed.

## 2022-11-14 NOTE — PT/OT/SLP PROGRESS
Physical Therapy      Patient Name:  Mandeep Willams   MRN:  [id number]    Patient not seen today secondary to Dialysis. Will follow-up tomorrow.

## 2022-11-15 PROBLEM — R13.10 DYSPHAGIA: Status: ACTIVE | Noted: 2022-01-01

## 2022-11-15 NOTE — PLAN OF CARE
Problem: Occupational Therapy  Goal: Occupational Therapy Goal  Description: Goals to be met by: 11/25/22     Patient will increase functional independence with ADLs by performing:    Feeding with Modified [place].  UE Dressing with Modified [place].  LE Dressing with Stand-by Assistance.  Grooming while standing at sink with Stand-by Assistance.  Toileting from bedside commode with Supervision for hygiene and clothing management.   Supine to sit with Supervision.  Step transfer with Stand-by Assistance  Toilet transfer to bedside commode with Stand-by Assistance.  Upper extremity exercise program x15 reps per handout, with independence.    Outcome: Ongoing, Not Progressing     Pt c/o weakness and fatigue, but agreeable to try to sit up. Total assist supine<>sit. MIN A for sit>stand with RW. Pt took one sidestep then declined to continue, wanting to lie back down.

## 2022-11-15 NOTE — PLAN OF CARE
Problem: Adult Inpatient Plan of Care  Goal: Plan of Care Review  Outcome: Ongoing, Progressing  Goal: Patient-Specific Goal (Individualized)  Outcome: Ongoing, Progressing  Goal: Absence of Hospital-Acquired Illness or Injury  Outcome: Ongoing, Progressing  Intervention: Identify and Manage Fall Risk  Flowsheets (Taken 11/15/2022 0641)  Safety Promotion/Fall Prevention:   assistive device/personal item within reach   side rails raised x 2  Intervention: Prevent Skin Injury  Flowsheets (Taken 11/15/2022 0641)  Skin Protection: incontinence pads utilized  Intervention: Prevent and Manage VTE (Venous Thromboembolism) Risk  Flowsheets (Taken 11/15/2022 0641)  VTE Prevention/Management: ROM (active) performed  Range of Motion: active ROM (range of motion) encouraged  Intervention: Prevent Infection  Flowsheets (Taken 11/15/2022 0641)  Infection Prevention: single patient room provided  Goal: Optimal Comfort and Wellbeing  Outcome: Ongoing, Progressing  Intervention: Monitor Pain and Promote Comfort  Flowsheets (Taken 11/15/2022 0641)  Pain Management Interventions:   relaxation techniques promoted   quiet environment facilitated  Intervention: Provide Person-Centered Care  Flowsheets (Taken 11/15/2022 0641)  Trust Relationship/Rapport: care explained  Goal: Readiness for Transition of Care  Outcome: Ongoing, Progressing     Problem: Skin Injury Risk Increased  Goal: Skin Health and Integrity  Outcome: Ongoing, Progressing  Intervention: Optimize Skin Protection  Flowsheets (Taken 11/15/2022 0641)  Skin Protection: incontinence pads utilized  Head of Bed (HOB) Positioning: HOB elevated     Problem: Fall Injury Risk  Goal: Absence of Fall and Fall-Related Injury  Outcome: Ongoing, Progressing  Intervention: Identify and Manage Contributors  Flowsheets (Taken 11/15/2022 0641)  Self-Care Promotion:   independence encouraged   BADL personal objects within reach   BADL personal routines maintained  Medication  Review/Management: medications reviewed  Intervention: Promote Injury-Free Environment  Flowsheets (Taken 11/15/2022 9833)  Safety Promotion/Fall Prevention:   assistive device/personal item within reach   side rails raised x 2

## 2022-11-15 NOTE — PLAN OF CARE
RICARDO spoke with Vivi at [facility] to follow up on SNF referral. Vivi informed RICARDO that they have accepted patient and will follow up family today.     RICARDO notified Dr. Benoit about SNF placement. Dr. Benoit informed RICARDO that patient failed swallow test and may discharge tomorrow with hospice instead; Waiting to speak with palliative care.

## 2022-11-15 NOTE — CONSULTS
[facility] - Telemetry  Wound Care    Patient Name:  Mandeep Willams   MRN:  [id number]  Date: 11/15/2022  Diagnosis: Hypoxia    History:     Past Medical History:   Diagnosis Date    Dependence on renal dialysis     ESRD (end stage renal disease)     HTN (hypertension)     Hyperlipidemia        Social History     Socioeconomic History    Marital status:    Occupational History     Employer: [employer]   Tobacco Use    Smoking status: Former     Packs/day: 1.00     Years: 10.00     Pack years: 10.00     Types: Cigarettes     Quit date: 1971     Years since quittin.9    Smokeless tobacco: Never   Substance and Sexual Activity    Alcohol use: No    Drug use: No    Sexual activity: Not Currently     Partners: Female     Comment:      Social Determinants of Health     Financial Resource Strain: Low Risk     Difficulty of Paying Living Expenses: Not hard at all   Food Insecurity: No Food Insecurity    Worried About Running Out of Food in the Last Year: Never true    Ran Out of Food in the Last Year: Never true   Transportation Needs: No Transportation Needs    Lack of Transportation (Medical): No    Lack of Transportation (Non-Medical): No   Physical Activity: Inactive    Days of Exercise per Week: 0 days    Minutes of Exercise per Session: 0 min   Stress: No Stress Concern Present    Feeling of Stress : Not at all   Social Connections: Socially Isolated    Frequency of Communication with Friends and Family: More than three times a week    Frequency of Social Gatherings with Friends and Family: More than three times a week    Attends Caodaism Services: Never    Active Member of Clubs or Organizations: No    Attends Club or Organization Meetings: Never    Marital Status:    Housing Stability: Low Risk     Unable to Pay for Housing in the Last Year: No    Number of Places Lived in the Last Year: 1    Unstable Housing in the Last Year: No       Precautions:     Allergies as of  11/05/2022    (No Known Allergies)       [facility] Assessment Details/Treatment   Nursing consult for altered skin integrity sacrum/gluteal cleft  An 86 year old male admitted 11/5/22 from home with hypoxia; pulmonary hypertension; hypokalemia; severe aortic stenosis; elevated troponin; thrombocytopenia; debility; anemia of chronic disorder; tobacco use disorder; pleural effusion; HLD; ESRD on HD; HTN  11/14 WBC 11.87 Hgb 11.3 Hct 32.8 Alb 2.5 Weight 231 lb- according to EMR- but patient probably 105 lb instead of kg.  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 Wound 6 cm(L) 5 cm(w) area with small amount brown drainage. Foul smelling.  Light erythema surrounding necrotic wound.  Treatment:  Local wound care to sacrum with Mepilex foam dressing. Avoid positioning on sacrum. Treatment plan discussed with caregiver at bedside.  Orders placed.     11/15/2022

## 2022-11-15 NOTE — SUBJECTIVE & OBJECTIVE
Interval History:  Had barium swallow study today which he did not do well.  Recommending NPO with alternative means.  His niece Kathi was at bedside and her sister Xochitl on the phone.  I discussed with them about the barium swallow studies and tried to start goals of care conversation.  They are concerned that he is weak and has continued to get weak by not eating very much.  I did discuss my concerns with swallowing and aspirating.  They did note osteophytes and they would like to have this evaluated.  We discussed placing NG tube and starting tube feeds but this is only temporary.  They would like to try to nurse the patient to get him stronger and see if this will help.  I discussed with them to start thinking about goals of care in the event he is not a surgical candidate - which I doubt he is due ot his age/medical comorbidities. NGT will be placed, will ask Palliative Care to continue goals of care discussions.    Review of Systems   Constitutional:  Negative for activity change.   HENT:  Positive for trouble swallowing. Negative for congestion and dental problem.    Eyes:  Negative for discharge.   Respiratory:  Negative for apnea and chest tightness.    Cardiovascular:  Negative for chest pain.   Gastrointestinal:  Negative for abdominal distention and abdominal pain.   Endocrine: Negative for cold intolerance.   Genitourinary:  Negative for difficulty urinating and dysuria.   Musculoskeletal:  Negative for arthralgias.   Allergic/Immunologic: Negative for environmental allergies.   Neurological:  Negative for dizziness and facial asymmetry.   Psychiatric/Behavioral:  Negative for agitation and behavioral problems.    Objective:     Vital Signs (Most Recent):  Temp: 98.5 °F (36.9 °C) (11/15/22 1653)  Pulse: 67 (11/15/22 1653)  Resp: 16 (11/15/22 1653)  BP: (!) 117/55 (11/15/22 1653)  SpO2: 97 % (11/15/22 1653) Vital Signs (24h Range):  Temp:  [97.5 °F (36.4 °C)-98.7 °F (37.1 °C)] 98.5 °F (36.9 °C)  Pulse:   [60-72] 67  Resp:  [16-19] 16  SpO2:  [95 %-100 %] 97 %  BP: (101-130)/(49-58) 117/55     Weight: 105 kg (231 lb 7.7 oz)  Body mass index is 42.34 kg/m².    Intake/Output Summary (Last 24 hours) at 11/15/2022 1719  Last data filed at 11/15/2022 0648  Gross per 24 hour   Intake 0 ml   Output 0 ml   Net 0 ml        Physical Exam  Vitals and nursing note reviewed.   Constitutional:       Appearance: He is cachectic. He is ill-appearing.   HENT:      Head: Normocephalic and atraumatic.      Nose: Nose normal.   Eyes:      Conjunctiva/sclera: Conjunctivae normal.   Cardiovascular:      Rate and Rhythm: Normal rate and regular rhythm.   Pulmonary:      Effort: Pulmonary effort is normal. No respiratory distress.   Musculoskeletal:         General: No swelling.   Skin:     General: Skin is dry.   Neurological:      Mental Status: He is alert. Mental status is at baseline.   Psychiatric:         Mood and Affect: Mood normal.         Behavior: Behavior normal.       Significant Labs: All pertinent labs within the past 24 hours have been reviewed.  BMP:   Recent Labs   Lab 11/14/22 0359   GLU 96      K 4.9   CL 96   CO2 21*   BUN 81*   CREATININE 9.2*   CALCIUM 9.2       CBC:   Recent Labs   Lab 11/14/22 0359   WBC 11.87   HGB 11.3*   HCT 32.8*            Significant Imaging:

## 2022-11-15 NOTE — PROGRESS NOTES
[facility] [place]  Nephrology  Progress Note    Patient Name: Mandeep Willams  MRN: [id number]  Admission Date: 11/5/2022  Attending Provider: Akhil Benoit MD   Primary Care Physician: Guy Mcclelland Jr, MD  Principal Problem:Hypoxia  Consults Reason for consult: ESRD, dialysis  Date of service 11/15/2022    Subjective:     Interval History:  NAOEs. He is still feeling very tired. Working with physical therapy at time of my exam..stood up a few times but frequently wanting to lie back down 2/2 fatigue. Noted palliative care discussions underway    Review of patient's allergies indicates:  No Known Allergies  Current Facility-Administered Medications   Medication Frequency    0.9%  NaCl infusion PRN    0.9%  NaCl infusion Once    acetaminophen tablet 650 mg Q6H PRN    albuterol sulfate nebulizer solution 2.5 mg Q4H PRN    calcium acetate(phosphat bind) capsule 667 mg TID WM    guaiFENesin 100 mg/5 ml syrup 200 mg Q4H PRN    heparin (porcine) injection 5,000 Units Q8H    hydrALAZINE injection 10 mg Q6H PRN    metoprolol succinate (TOPROL-XL) 24 hr tablet 100 mg Daily    sodium chloride 0.9% bolus 250 mL PRN    tamsulosin 24 hr capsule 0.4 mg Daily       Objective:     Vital Signs (Most Recent):  Temp: 98.4 °F (36.9 °C) (11/15/22 1129)  Pulse: 60 (11/15/22 1129)  Resp: 18 (11/15/22 1129)  BP: (!) 114/51 (11/15/22 1129)  SpO2: 99 % (11/15/22 1156)  O2 Device (Oxygen Therapy): nasal cannula w/ humidification (11/15/22 1156)   Vital Signs (24h Range):  Temp:  [97.5 °F (36.4 °C)-98.7 °F (37.1 °C)] 98.4 °F (36.9 °C)  Pulse:  [60-72] 60  Resp:  [18-19] 18  SpO2:  [95 %-100 %] 99 %  BP: (101-130)/(49-59) 114/51     Weight: 105 kg (231 lb 7.7 oz) (11/15/22 0415)  Body mass index is 42.34 kg/m².  Body surface area is 2.14 meters squared.    I/O last 3 completed shifts:  In: 0   Out: 978 [Urine:200; Other:778]    Physical Exam  Constitutional:       General: He is not in acute distress.     Appearance: Normal appearance. He is not  toxic-appearing.      Comments: Chronically ill, frail appearing elderly male   HENT:      Head: Normocephalic and atraumatic.      Mouth/Throat:      Mouth: Mucous membranes are moist.   Eyes:      General: No scleral icterus.     Extraocular Movements: Extraocular movements intact.      Conjunctiva/sclera: Conjunctivae normal.   Cardiovascular:      Rate and Rhythm: Normal rate and regular rhythm.      Heart sounds: Murmur heard.   Pulmonary:      Breath sounds: No wheezing, rhonchi or rales.      Comments: 1L NC O2  Musculoskeletal:         General: No deformity.      Right lower leg: No edema.      Left lower leg: No edema.   Skin:     General: Skin is warm and dry.      Findings: No rash.   Neurological:      General: No focal deficit present.      Mental Status: He is alert.   Psychiatric:         Mood and Affect: Mood normal.         Behavior: Behavior normal.       Significant Labs:CBC:   Recent Labs   Lab 11/14/22  0359   WBC 11.87   RBC 3.38*   HGB 11.3*   HCT 32.8*      MCV 97   MCH 33.4*   MCHC 34.5       CMP:   Recent Labs   Lab 11/14/22 0359   GLU 96   CALCIUM 9.2   ALBUMIN 2.5*      K 4.9   CO2 21*   CL 96   BUN 81*   CREATININE 9.2*       All labs within the past 24 hours have been reviewed.    Significant Imaging:  X-Ray: Reviewed    Assessment/Plan:     1. ESRD - usual HD w/ Dr Henriquez  - HD tomorrow, continue MWF HD schedule while admitted  - increase filter size for improved clearance  - minimal UFG while poor PO intake  - renally dose medications for dialysis  Acute hypoxic respiratory failure, hypervolemia, pulm edema, pleural effusion - O2 requirements are stable. Continue reduced UFG while PO intake is poor.    2. HTN - Intradialytic hypotension  - recommend to hold Norvasc altogether  - hold BP meds prior to HD, albumin PRN  - UF as tolerated on dialysis  3. Anemia of chronic kidney disease - Hgb trending down, monitor for need to start Epo with HD, continue to monitor CBC  4.  MBD/secondary hyperparathyroidism - phos is up - increase phoslo, poor PO intake noted and he is unable to swallow. Increase filter size with HD  5. Hypoalbuminemia- Nepro supplement. Encourage PO intake    Thank you for your consult. I will follow-up with patient. Please contact us if you have any additional questions.    MONSE Rodney  DOS: 11/15/2022  Nephrology  [facility] [place] - Telemetry

## 2022-11-15 NOTE — NURSING
Report received from off going nurse, CÉSAR urban. Patient resting with eyes closed. Rise and fall of chest noted. No signs of distress noted. Call light in reach. Bed low and locked. Will continue plan of care.

## 2022-11-15 NOTE — ASSESSMENT & PLAN NOTE
 Significantly decreased UES opening; multifactorial, one of these factors include large osteophyte at approximate level of UES.   - NPO - placing NGT per family request  - started goals of care discussion but family would like to see how patient improves with nutrition  - also would like evaluation by NSGY for osteophyte - consult placed

## 2022-11-15 NOTE — PLAN OF CARE
Problem: Skin Injury Risk Increased  Goal: Skin Health and Integrity  Intervention: Optimize Skin Protection  Flowsheets (Taken 11/15/2022 1641)  Pressure Reduction Techniques: pressure points protected  Pressure Reduction Devices: foam padding utilized  Skin Protection:   protective footwear used   incontinence pads utilized  Head of Bed (HOB) Positioning: HOB at 45 degrees     Problem: Coping Ineffective  Goal: Effective Coping  Intervention: Support and Enhance Coping Strategies  Flowsheets (Taken 11/15/2022 1641)  Supportive Measures: active listening utilized  Family/Support System Care: support provided  Environmental Support: environmental consistency promoted     Problem: Impaired Wound Healing  Goal: Optimal Wound Healing  Intervention: Promote Wound Healing  Flowsheets (Taken 11/15/2022 1641)  Activity Management: Seated marching - L2

## 2022-11-15 NOTE — PROCEDURES
Modified Barium Swallow    Patient Name:  Mandeep Willams   MRN:  [id number]      Recommendations:     Recommendations:                General Recommendations:  Palliative care consult for consideration of PEG vs hospice  Diet recommendations:  NPO (except ice)   Aspiration Precautions:  good oral care    General Precautions: Standard, NPO, respiratory, aspiration  Communication strategies:  needs Mandarin Chinese ; translotr # [id number] used for study     Referral     Reason for Referral  Patient was referred for a Modified Barium Swallow Study to assess the efficiency of his/her swallow function, rule out aspiration and make recommendations regarding safe dietary consistencies, effective compensatory strategies, and safe eating environment.     Pt with increased confusion and dysarthria as compared to yesterday's session, Pt was able to follow simple commands.     Diagnosis: Hypoxia       History:     Past Medical History:   Diagnosis Date    Dependence on renal dialysis     ESRD (end stage renal disease)     HTN (hypertension)     Hyperlipidemia        Objective:     Current Respiratory Status: 11/14/22 nasal cannula 1L    Alert: yes    Cooperative: yes    Follows Directions: yes    Visualization  Patient was seen in the lateral view    Oral Peripheral Examination  Oral Musculature: general weakness  Dentition:  (poor fitting)  Mucosal Quality: coated tongue  Volitional Cough: Weak  Volitional Swallow: Delayed  Voice Prior to PO Intake: Clear    Consistencies Assessed  Thin 5ml X1 via spoon  Puree 5ml X1 via spoon    Oral Preparation/Oral Phase  Poor bolus formation and control  prolonged A-P transfer  Oral residue present post swallow  Decreased base of tongue mobility  Premature spillage    Pharyngeal Phase   Significantly decreased base of tongue retraction with resulting lack of epiglottal inversion. Decreased laryngeal excursion and vestibular closure, lack of significant pharyngeal constriction. Thin liquid  bolus via spoon was aspirated almost in its entirety during the swallow, no significant amount of thin liquids flowing past UES. Weak extended episode of coughing noted post aspiration. Majority of puree bolus was either silent aspirated during the swallow or remained in valleculae and pyriforms, again very little of the trial was noted to pass through UES. Cued subsequent swallow resulted ongoing episodes of aspiration of puree pharyngeal stasis. Specific results as follows:    THIN LIQUIDS  Rosenbek 8-Point Penetration-Aspiration Scale Score: 7: Material enters the airway, passes below the vocal folds, and is not ejected from the trachea despite effort.     Pharyngeal Residue  Valleculae: no significant  Pyriform: no significant  Posterior pharyngeal wall: mild  *bolus was grossly aspirated during the swallow leaving no significant pharyngeal stasis.   -----    PUREE  Rosenbek 8-Point Penetration-Aspiration Scale Score: 8: Material enters the airway, passes below the vocal folds, and no effort is made to eject.     Pharyngeal Residue  Valleculae: mod-severe  Pyriform:  mod-severe  Posterior pharyngeal Wall: mild  *continued incidents of aspiration upon cued subsequent swallow      Cervical Esophageal Phase  Significantly decreased UES opening; multifactorial, one of these factors include large osteophyte at approximate level of UES.     Assessment:     Impressions   Pr presents with profound oropharyngeal dysphagia c/b aspiration of thin and thick consistencies.     Prognosis: Poor    Barriers:  Fatigue  Poor intake  Influence of cervical protrusions    Plan  Pt and family education; discussion of goal of care to include consideration of long term means of alternative nutrition, trial dysphagia therapy pending establishment of long term goals of care.     Education  Results were discussed with patient. Pt requesting ST discuss findings with his daughter  and review study again later this pm.     Goals:    Multidisciplinary Problems       SLP Goals          Problem: SLP    Goal Priority Disciplines Outcome   SLP Goal     SLP Ongoing, Progressing   Description: STGs  1. Pt will complete PO trials for identification of LRD and liquids w/o overt s/s of aspiration.   2. Pt will participate in modified barium swallow study for further assessment of swallow (order obtained for 11/15.)                          Plan:   Patient to be seen:  Therapy Frequency: 2 x/week   Plan of Care expires:  11/24/22  Plan of Care reviewed with:  patient        Discharge recommendations:  other (see comments) (TBD)   Barriers to Discharge:  Level of Skilled Assistance Needed .    Time Tracking:   SLP Treatment Date:   11/15/22  Speech Start Time:  1033  Speech Stop Time:  1113  Speech Total Time (min):  40 minutes    11/15/2022

## 2022-11-15 NOTE — PT/OT/SLP PROGRESS
Occupational Therapy   Treatment    Name: Mandeep Willams  MRN: [id number]  Admitting Diagnosis:  Hypoxia       Hospital  service used: [facility] #[id number]    Recommendations:     Discharge Recommendations: nursing facility, skilled  Discharge Equipment Recommendations:   (TBD)  Barriers to discharge:   (not at PLOF; high risk of falls, unplanned readmission, and morbidity if d/c home)    Assessment:     Mandeep Willams is a 86 y.o. male with a medical diagnosis of Hypoxia. Performance deficits affecting function are weakness, decreased ROM, impaired cognition, impaired endurance, decreased coordination, decreased upper extremity function, impaired self care skills, impaired functional mobility, decreased lower extremity function, impaired skin, decreased safety awareness, gait instability, impaired balance, impaired fine motor.     Pt c/o weakness and fatigue, but agreeable to try to sit up. Total assist supine<>sit. MIN A for sit>stand with RW. Pt took one sidestep then declined to continue, wanting to lie back down    Rehab Prognosis:   Fair - ; patient would benefit from acute skilled OT services to address these deficits and reach maximum level of function.       Plan:     Patient to be seen 5 x/week to address the above listed problems via self-care/home management, therapeutic activities, therapeutic exercises  Plan of Care Expires: 11/25/22  Plan of Care Reviewed with: patient    Subjective     Chief complaint: tired   Patient/family comments/ goals: required encouragement to sit up then upon sitting up, pt wanting to lie back down;  had difficulty hearing pt with extra time and lip reading required 2* low pitch     Pain/Comfort:  Pain Rating 1: 0/10    Objective:     Communicated with: nurseRosario, prior to session.  Patient found  HOB elevated L sidelying  with bed alarm, telemetry, pulse ox (continuous), peripheral IV, pressure relief boots upon OT entry to room.    General Precautions: Standard,  fall, NPO   Orthopedic Precautions:N/A   Braces: N/A  Respiratory Status: Room air     Occupational Performance:     Bed Mobility:    Patient completed Scooting in supine to HOB with total assistance and 2 persons  Patient completed Supine to Sit with total assistance  Patient completed Sit to Supine with total assistance     Functional Mobility/Transfers:  Patient completed Sit <> Stand Transfer with minimum assistance  with  rolling walker   Functional Mobility: pt took 1 sidestep at EOB with MOD A using RW then sat back down.     Activities of Daily Living:  Grooming: assist for RUE elbow flexion then pt able to wash his face with warm cloth at beginning of session        [facility] 6 Click ADL: 12    Treatment & Education:  Pt re-educated on OT role/POC.   Importance of OOB activity with staff assistance.  Safety during functional t/f and mobility   OT retrieved pressure relief boots and extra pillow for BLE. Edu CNA to keep a pillow in-between B/L LE 2* knees rubbing each other and a pressure point to protect   Self-care tasks/functional mobility completed- assistance level noted above   All questions/concerns answered within OT scope of practice       Patient left  HOB elevated >35* L sidelying with pillow in-between B/L LE and pressure relief boots in place  with all lines intact, call button in reach, bed alarm on, nurse, Rosario, and CNA notified, and all needs met/within reach    GOALS:   Multidisciplinary Problems       Occupational Therapy Goals          Problem: Occupational Therapy    Goal Priority Disciplines Outcome Interventions   Occupational Therapy Goal     OT, PT/OT Ongoing, Not Progressing    Description: Goals to be met by: 11/25/22     Patient will increase functional independence with ADLs by performing:    Feeding with Modified [place].  UE Dressing with Modified [place].  LE Dressing with Stand-by Assistance.  Grooming while standing at sink with Stand-by Assistance.  Toileting from  bedside commode with Supervision for hygiene and clothing management.   Supine to sit with Supervision.  Step transfer with Stand-by Assistance  Toilet transfer to bedside commode with Stand-by Assistance.  Upper extremity exercise program x15 reps per handout, with independence.                         Time Tracking:     OT Date of Treatment: 11/15/22  OT Start Time: 1719  OT Stop Time: 1737  OT Total Time (min): 18 min    Billable Minutes:Therapeutic Activity 18 min    OT/ROSA: OT     ROSA Visit Number: 0    11/15/2022

## 2022-11-15 NOTE — PLAN OF CARE
Problem: Device-Related Complication Risk (Hemodialysis)  Goal: Safe, Effective Therapy Delivery  Intervention: Optimize Device Care and Function  Flowsheets (Taken 11/14/2022 1802)  Medication Review/Management: medications reviewed     Problem: Impaired Wound Healing  Goal: Optimal Wound Healing  Intervention: Promote Wound Healing  Flowsheets (Taken 11/14/2022 1802)  Oral Nutrition Promotion: rest periods promoted  Sleep/Rest Enhancement:   relaxation techniques promoted   regular sleep/rest pattern promoted  Pain Management Interventions: relaxation techniques promoted

## 2022-11-15 NOTE — PROGRESS NOTES
[facility] Medicine  Progress Note    Patient Name: Mandeep Willams  MRN: [id number]  Patient Class: IP- Inpatient   Admission Date: 11/5/2022  Length of Stay: 10 days  Attending Physician: Akhil Benoit MD  Primary Care Provider: Guy Mcclelland Jr, MD        Subjective:     Principal Problem:Hypoxia        HPI:  Mr. Willams is a 87 yo M who presents with a CC of SOB for 3 days. EMS was called and he was found to have an 02 sat of 90% on RA. Patient is an ESRD patient with a history of mod-severe AS, pleural effusions as well as pulmonary hypertension.  He lives at home by himself and does not wear 02.  He is very comfortable on NC and has no other complaints. He was in observation here early last month for the same.        Overview/Hospital Course:  Patient admitted to the hospital for hypoxia- likely multifactorial including mod-severe AS, pulmonary hypertension and pleural effusions.  He was started on Lasix. Nephrology consulted for dialysis, underwent HD on 11/7. Pulmonary consulted and agreed that hypoxia likely from edema. Patient went multiple dialysis rounds without much improvement in his hypoxia.  Palliative care was consulted. Patient sent for CTA of chest on 11/9/22.  CTA showed no PE but did show large Left pleural effusion. Patient sent to IR for therapeutic thoracentesis. Palliative care consulted and patient now is DNR.  Patient had one liter of fluid taken off by thoracentesis. PT/OT were consulted and rec: SNF. Patient's hypoxia improved and was weaned to RA       Interval History:  Had barium swallow study today which he did not do well.  Recommending NPO with alternative means.  His niece Kathi was at bedside and her sister Xochitl on the phone.  I discussed with them about the barium swallow studies and tried to start goals of care conversation.  They are concerned that he is weak and has continued to get weak by not eating very much.  I did discuss my concerns with swallowing and  aspirating.  They did note osteophytes and they would like to have this evaluated.  We discussed placing NG tube and starting tube feeds but this is only temporary.  They would like to try to nurse the patient to get him stronger and see if this will help.  I discussed with them to start thinking about goals of care in the event he is not a surgical candidate - which I doubt he is due ot his age/medical comorbidities. NGT will be placed, will ask Palliative Care to continue goals of care discussions.    Review of Systems   Constitutional:  Negative for activity change.   HENT:  Positive for trouble swallowing. Negative for congestion and dental problem.    Eyes:  Negative for discharge.   Respiratory:  Negative for apnea and chest tightness.    Cardiovascular:  Negative for chest pain.   Gastrointestinal:  Negative for abdominal distention and abdominal pain.   Endocrine: Negative for cold intolerance.   Genitourinary:  Negative for difficulty urinating and dysuria.   Musculoskeletal:  Negative for arthralgias.   Allergic/Immunologic: Negative for environmental allergies.   Neurological:  Negative for dizziness and facial asymmetry.   Psychiatric/Behavioral:  Negative for agitation and behavioral problems.    Objective:     Vital Signs (Most Recent):  Temp: 98.5 °F (36.9 °C) (11/15/22 1653)  Pulse: 67 (11/15/22 1653)  Resp: 16 (11/15/22 1653)  BP: (!) 117/55 (11/15/22 1653)  SpO2: 97 % (11/15/22 1653) Vital Signs (24h Range):  Temp:  [97.5 °F (36.4 °C)-98.7 °F (37.1 °C)] 98.5 °F (36.9 °C)  Pulse:  [60-72] 67  Resp:  [16-19] 16  SpO2:  [95 %-100 %] 97 %  BP: (101-130)/(49-58) 117/55     Weight: 105 kg (231 lb 7.7 oz)  Body mass index is 42.34 kg/m².    Intake/Output Summary (Last 24 hours) at 11/15/2022 5979  Last data filed at 11/15/2022 0648  Gross per 24 hour   Intake 0 ml   Output 0 ml   Net 0 ml        Physical Exam  Vitals and nursing note reviewed.   Constitutional:       Appearance: He is cachectic. He is  ill-appearing.   HENT:      Head: Normocephalic and atraumatic.      Nose: Nose normal.   Eyes:      Conjunctiva/sclera: Conjunctivae normal.   Cardiovascular:      Rate and Rhythm: Normal rate and regular rhythm.   Pulmonary:      Effort: Pulmonary effort is normal. No respiratory distress.   Musculoskeletal:         General: No swelling.   Skin:     General: Skin is dry.   Neurological:      Mental Status: He is alert. Mental status is at baseline.   Psychiatric:         Mood and Affect: Mood normal.         Behavior: Behavior normal.       Significant Labs: All pertinent labs within the past 24 hours have been reviewed.  BMP:   Recent Labs   Lab 11/14/22 0359   GLU 96      K 4.9   CL 96   CO2 21*   BUN 81*   CREATININE 9.2*   CALCIUM 9.2       CBC:   Recent Labs   Lab 11/14/22 0359   WBC 11.87   HGB 11.3*   HCT 32.8*            Significant Imaging:       Assessment/Plan:      * Hypoxia  No respiratory distress or failure. Patient 90% on RA.  Now 96 on NC. Very comfortable. Cause of hypoxia multi-factorial including mod- sev AS, pulmonary edema, bilateral effusions as well as pulmonary hypertension.  Lasix for now. Will consider thoracentesis.    UF today with dialysis. Pulmonary consulted as well    Not improving despite multiple dialysis sessions.  Will check CTA     Large L pleural effusion.  IR consulted for thora     Much improved after one liter off.    Much improved. On 1L     Resolved  On RA      Dysphagia   Significantly decreased UES opening; multifactorial, one of these factors include large osteophyte at approximate level of UES.   - NPO - placing NGT per family request  - started goals of care discussion but family would like to see how patient improves with nutrition  - also would like evaluation by NSGY for osteophyte - consult placed      Pulmonary hypertension  PA pressure of 59        Hypokalemia  Will replace       Severe aortic stenosis  As discussed above.  Recent  echo      Elevated troponin  Likely elevated in setting of renal failure.  Trending down      Thrombocytopenia  No acute issues       Debility  May benefit from H/H. Uses some kind of rolling walker at home     PT/OT recs TBD- rec SNF       Anemia of chronic disorder  No acute issues      Tobacco use disorder        Pleural effusion  S/p thoracentesis with improvement in oxygen requirements      Hyperlipidemia  Resume statin      ESRD (end stage renal disease)  Completed dialysis yesterday.  Will consult nephrology  - HD today      HTN (hypertension)  Restarted home meds         VTE Risk Mitigation (From admission, onward)         Ordered     heparin (porcine) injection 5,000 Units  Every 8 hours         11/05/22 1238                Discharge Planning   BONILLA: 11/14/2022     Code Status: DNR   Is the patient medically ready for discharge?:     Reason for patient still in hospital (select all that apply): Treatment  Discharge Plan A: Skilled Nursing Facility   Discharge Delays: None known at this time              Akhil Benoit MD  Department of Hospital Medicine   [facility]

## 2022-11-16 NOTE — PROGRESS NOTES
[facility] Medicine  Progress Note    Patient Name: Mandeep Willams  MRN: [id number]  Patient Class: IP- Inpatient   Admission Date: 11/5/2022  Length of Stay: 11 days  Attending Physician: Akhil Benoit MD  Primary Care Provider: Guy Mcclelland Jr, MD        Subjective:     Principal Problem:Hypoxia        HPI:  Mr. Willams is a 87 yo M who presents with a CC of SOB for 3 days. EMS was called and he was found to have an 02 sat of 90% on RA. Patient is an ESRD patient with a history of mod-severe AS, pleural effusions as well as pulmonary hypertension.  He lives at home by himself and does not wear 02.  He is very comfortable on NC and has no other complaints. He was in observation here early last month for the same.        Overview/Hospital Course:  Patient admitted to the hospital for hypoxia- likely multifactorial including mod-severe AS, pulmonary hypertension and pleural effusions.  He was started on Lasix. Nephrology consulted for dialysis, underwent HD on 11/7. Pulmonary consulted and agreed that hypoxia likely from edema. Patient went multiple dialysis rounds without much improvement in his hypoxia.  Palliative care was consulted. Patient sent for CTA of chest on 11/9/22.  CTA showed no PE but did show large Left pleural effusion. Patient sent to IR for therapeutic thoracentesis. Palliative care consulted and patient now is DNR.  Patient had one liter of fluid taken off by thoracentesis. PT/OT were consulted and rec: SNF. Patient's hypoxia improved and was weaned to RA       Interval History:   [id number]  Not really able to understand him so used yes/no head nods to respond to questions. He denied any pain, I explained why he has tube in his nose and he nodded yes. He nodded yes when asked if he felt weak. Nodded no when asked if he needed anything right now.    Review of Systems   Unable to perform ROS: Acuity of condition   Neurological:  Positive for weakness.   Objective:     Vital Signs  (Most Recent):  Temp: 97.9 °F (36.6 °C) (11/16/22 0731)  Pulse: 69 (11/16/22 0731)  Resp: 20 (11/16/22 0731)  BP: (!) 121/57 (11/16/22 0731)  SpO2: 97 % (11/16/22 0731) Vital Signs (24h Range):  Temp:  [97.3 °F (36.3 °C)-97.9 °F (36.6 °C)] 97.9 °F (36.6 °C)  Pulse:  [69-75] 69  Resp:  [16-20] 20  SpO2:  [95 %-97 %] 97 %  BP: (114-121)/(56-57) 121/57     Weight: 105 kg (231 lb 7.7 oz)  Body mass index is 42.34 kg/m².    Intake/Output Summary (Last 24 hours) at 11/16/2022 1655  Last data filed at 11/16/2022 0731  Gross per 24 hour   Intake 0 ml   Output --   Net 0 ml        Physical Exam  Vitals and nursing note reviewed.   Constitutional:       Appearance: He is cachectic. He is ill-appearing.      Comments: Significant generalized weakness, thin elderly male   HENT:      Head: Normocephalic and atraumatic.      Nose: Nose normal.   Eyes:      Conjunctiva/sclera: Conjunctivae normal.   Cardiovascular:      Rate and Rhythm: Normal rate and regular rhythm.   Pulmonary:      Effort: Pulmonary effort is normal. No respiratory distress.   Musculoskeletal:         General: No swelling.   Skin:     General: Skin is dry.   Neurological:      Mental Status: He is alert.   Psychiatric:         Mood and Affect: Mood normal.         Behavior: Behavior normal.       Significant Labs: All pertinent labs within the past 24 hours have been reviewed.  BMP:   Recent Labs   Lab 11/16/22  0404   GLU 99      K 4.8   CL 95   CO2 23   BUN 71*   CREATININE 7.1*   CALCIUM 9.2       CBC:   No results for input(s): WBC, HGB, HCT, PLT in the last 48 hours.      Significant Imaging:       Assessment/Plan:      * Hypoxia  No respiratory distress or failure. Patient 90% on RA.  Now 96 on NC. Very comfortable. Cause of hypoxia multi-factorial including mod- sev AS, pulmonary edema, bilateral effusions as well as pulmonary hypertension.  Lasix for now. Will consider thoracentesis.    UF today with dialysis. Pulmonary consulted as well    Not  improving despite multiple dialysis sessions.  Will check CTA     Large L pleural effusion.  IR consulted for thora     Much improved after one liter off.    Much improved. On 1L     Resolved  On RA      Dysphagia   Significantly decreased UES opening; multifactorial, one of these factors include large osteophyte at approximate level of UES.   - NPO - placing NGT per family request  - started goals of care discussion but family would like to see how patient improves with nutrition  - also would like evaluation by NSGY for osteophyte - consult placed      Pulmonary hypertension  PA pressure of 59        Hypokalemia  Will replace       Severe aortic stenosis  As discussed above.  Recent echo      Elevated troponin  Likely elevated in setting of renal failure.  Trending down      Thrombocytopenia  No acute issues       Debility  May benefit from H/H. Uses some kind of rolling walker at home     PT/OT recs TBD- rec SNF       Anemia of chronic disorder  No acute issues      Tobacco use disorder        Pleural effusion  S/p thoracentesis with improvement in oxygen requirements      Hyperlipidemia  Resume statin      ESRD (end stage renal disease)  Completed dialysis yesterday.  Will consult nephrology  - HD today      HTN (hypertension)  Restarted home meds         VTE Risk Mitigation (From admission, onward)         Ordered     heparin (porcine) injection 5,000 Units  Every 8 hours         11/05/22 1238                Discharge Planning   BONILLA: 11/14/2022     Code Status: DNR   Is the patient medically ready for discharge?:     Reason for patient still in hospital (select all that apply): Treatment  Discharge Plan A: Hospice/home   Discharge Delays: (!) Patient and Family Barriers              Akhil Benoit MD  Department of Hospital Medicine   [facility] [place] - Telemetry

## 2022-11-16 NOTE — NURSING
Patient resting comfortably. No signs of distress observed. bed low and locked. Call light in reach. Report given to oncoming nurse, CÉSAR Alvarez. 12hour chart check complete. Will continue plan of care.

## 2022-11-16 NOTE — PT/OT/SLP PROGRESS
Physical Therapy      Patient Name:  Mandeep Willams   MRN:  [id number]    Patient not seen today secondary to Dialysis x 2 attempts.  Will follow-up as able later hour/day .

## 2022-11-16 NOTE — PT/OT/SLP PROGRESS
Occupational Therapy      Patient Name:  Mandeep Willams   MRN:  [id number]    Patient not seen today secondary to Dialysis. Will follow-up later as able.    11/16/2022

## 2022-11-16 NOTE — PLAN OF CARE
Problem: Physical Therapy  Goal: Physical Therapy Goal  Description: Goals to be met by: 22     Patient will increase functional independence with mobility by performin. Pt to be mod I with bed mobility.  2. Pt to transfer with supervision.  3. Pt to ambulate 50' w/RW SBA.  4. Pt to be (I) with HEP.    Outcome: Ongoing, Progressing

## 2022-11-16 NOTE — SUBJECTIVE & OBJECTIVE
Interval History:   [id number]  Not really able to understand him so used yes/no head nods to respond to questions. He denied any pain, I explained why he has tube in his nose and he nodded yes. He nodded yes when asked if he felt weak. Nodded no when asked if he needed anything right now.    Review of Systems   Unable to perform ROS: Acuity of condition   Neurological:  Positive for weakness.   Objective:     Vital Signs (Most Recent):  Temp: 97.9 °F (36.6 °C) (11/16/22 0731)  Pulse: 69 (11/16/22 0731)  Resp: 20 (11/16/22 0731)  BP: (!) 121/57 (11/16/22 0731)  SpO2: 97 % (11/16/22 0731) Vital Signs (24h Range):  Temp:  [97.3 °F (36.3 °C)-97.9 °F (36.6 °C)] 97.9 °F (36.6 °C)  Pulse:  [69-75] 69  Resp:  [16-20] 20  SpO2:  [95 %-97 %] 97 %  BP: (114-121)/(56-57) 121/57     Weight: 105 kg (231 lb 7.7 oz)  Body mass index is 42.34 kg/m².    Intake/Output Summary (Last 24 hours) at 11/16/2022 1655  Last data filed at 11/16/2022 0731  Gross per 24 hour   Intake 0 ml   Output --   Net 0 ml        Physical Exam  Vitals and nursing note reviewed.   Constitutional:       Appearance: He is cachectic. He is ill-appearing.      Comments: Significant generalized weakness, thin elderly male   HENT:      Head: Normocephalic and atraumatic.      Nose: Nose normal.   Eyes:      Conjunctiva/sclera: Conjunctivae normal.   Cardiovascular:      Rate and Rhythm: Normal rate and regular rhythm.   Pulmonary:      Effort: Pulmonary effort is normal. No respiratory distress.   Musculoskeletal:         General: No swelling.   Skin:     General: Skin is dry.   Neurological:      Mental Status: He is alert.   Psychiatric:         Mood and Affect: Mood normal.         Behavior: Behavior normal.       Significant Labs: All pertinent labs within the past 24 hours have been reviewed.  BMP:   Recent Labs   Lab 11/16/22  0404   GLU 99      K 4.8   CL 95   CO2 23   BUN 71*   CREATININE 7.1*   CALCIUM 9.2       CBC:   No results for  input(s): WBC, HGB, HCT, PLT in the last 48 hours.      Significant Imaging:

## 2022-11-16 NOTE — SUBJECTIVE & OBJECTIVE
Interval History/subjective:    - seen in dialysis and talked to using a cantonese  ()  - patient seemed uncomfortable and fatigued  - NGT in nose  - He had a slight smile when he saw me which is a henriquez difference than last week.   - He was minimally communicative with the   - He stated he was not doing well overall and was uncomfortable and tired    - Later called and talked with his grand niece Xochitl (called his other grand niece Kathi but there was no answer)    Past Medical History:   Diagnosis Date    Dependence on renal dialysis     ESRD (end stage renal disease)     HTN (hypertension)     Hyperlipidemia        Past Surgical History:   Procedure Laterality Date    BACK SURGERY      DIALYSIS FISTULA CREATION  2012    left arm    FISTULOGRAM Left 3/22/2022    Procedure: Fistulogram, transradial;  Surgeon: Jude Ordaz MD;  Location: [facility];  Service: Vascular;  Laterality: Left;  RN Pre Op 3-17-22.  May need Chinese  day of procedure. [place]    JOINT REPLACEMENT Right     hip       Review of patient's allergies indicates:  No Known Allergies    Medications:  Continuous Infusions:  Scheduled Meds:   sodium chloride 0.9%   Intravenous Once    calcium acetate(phosphat bind)  667 mg Oral TID WM    heparin (porcine)  5,000 Units Subcutaneous Q8H    metoprolol succinate  100 mg Oral Daily    tamsulosin  0.4 mg Oral Daily     PRN Meds:sodium chloride 0.9%, acetaminophen, albuterol sulfate, guaiFENesin 100 mg/5 ml, hydrALAZINE, sodium chloride 0.9%    Family History       Problem Relation (Age of Onset)    Arthritis Sister    Diabetes Sister    Hypertension Sister          Tobacco Use    Smoking status: Former     Packs/day: 1.00     Years: 10.00     Pack years: 10.00     Types: Cigarettes     Quit date: 1971     Years since quittin.9    Smokeless tobacco: Never   Substance and Sexual Activity    Alcohol use: No    Drug use: No    Sexual activity: Not  Currently     Partners: Female     Comment:      ROS: per HPI/subjective      Objective:     Vital Signs (Most Recent):  Temp: 97.9 °F (36.6 °C) (11/16/22 0731)  Pulse: 69 (11/16/22 0731)  Resp: 20 (11/16/22 0731)  BP: (!) 121/57 (11/16/22 0731)  SpO2: 97 % (11/16/22 0731)   Vital Signs (24h Range):  Temp:  [97.3 °F (36.3 °C)-98.5 °F (36.9 °C)] 97.9 °F (36.6 °C)  Pulse:  [67-75] 69  Resp:  [16-20] 20  SpO2:  [95 %-97 %] 97 %  BP: (114-121)/(55-57) 121/57     Weight: 105 kg (231 lb 7.7 oz)  Body mass index is 42.34 kg/m².    Physical Exam  Constitutional:       Appearance: He is ill-appearing.      Comments: tired   HENT:      Head: Normocephalic and atraumatic.      Nose:      Comments: Ngt in place  Eyes:      Extraocular Movements: Extraocular movements intact.   Cardiovascular:      Rate and Rhythm: Normal rate.   Pulmonary:      Effort: Pulmonary effort is normal.   Abdominal:      General: Abdomen is flat.   Skin:     General: Skin is warm and dry.   Neurological:      Comments: Tired. Minimally communicative. Fatigued           Advance Care Planning   Advance Directives:   LaPOST: Yes      Decision Making:  Family answered questions and Patient answered questions  Goals of Care: Extensive conversation with his grand niece Xochitl. Refer to ACP under assessment/plan       Significant Labs: Reviewed   CBC:   Recent Labs   Lab 11/14/22  0359   WBC 11.87   HGB 11.3*   HCT 32.8*   MCV 97        BMP:  Recent Labs   Lab 11/16/22  0404   GLU 99      K 4.8   CL 95   CO2 23   BUN 71*   CREATININE 7.1*   CALCIUM 9.2     LFT:  Lab Results   Component Value Date    AST 15 11/05/2022    ALKPHOS 72 11/05/2022    BILITOT 1.0 11/05/2022     Albumin:   Albumin   Date Value Ref Range Status   11/16/2022 2.4 (L) 3.5 - 5.2 g/dL Final     Protein:   Total Protein   Date Value Ref Range Status   11/05/2022 7.8 6.0 - 8.4 g/dL Final     Lactic acid:   Lab Results   Component Value Date    LACTATE 1.0 10/22/2018     LACTATE 1.2 10/22/2018       Significant Imaging: Reviewed

## 2022-11-16 NOTE — NURSING
Bedside report given to CÉSAR Warren. Pt has no sign of distress. NGT in place, continuous tube feeding infusing at 10mL/hr. Wound care completed per order. Safety precautions are maintained with bed alarm set, bed in lowest position, bed wheels locked, and call light in reach. Family at bedside. Chart check completed.

## 2022-11-16 NOTE — ASSESSMENT & PLAN NOTE
11/16/2022:  - seen while in dialysis, used a cantonese   - henriquez difference from last week. He seemed more tired and less communicative.   - NGT in place  - when asked how he was doing, he stated not well (interpreted)  - Called Kathi (no answer) and then called Xochitl who did  and stated Kathi was likely sleeping still after her work shift  - Xochitl had great insight into Mr. Willams medical ongoings and acknowledged the convo she had with Dr. Akhil Benoit yesterday regarding the tube feeds via NGT  - I updated Xochitl that Mr. Willams in my opinion is looking worse than last week and unhappier too. I made her aware that I worried things may only continue to get worse given everything he has going on.   - talked with xochitl and made her aware of patients past wishes noted on his LaPOST in 2021 with Dr. Arnold. It had stated DNR, no tube feeds and ok with HD.   - Xochitl was not aware of that laPOST and expressed her difficulty of the guilt with not giving him any food and having him starve to end his life which led to her deciding on the NGT for TFs. I made her aware, he has made such a decision of what he would want known on the LaPOST so no one would have the burden of such a decision. He likely does not feel hunger the same way you and I do. Made her aware of the alternative route if decision is made regarding comfort based approach in which case comfort feeds could be done, knowing the risks. She stated she understood and will discuss with Kathi and make a decision on how to proceed.  - Xochitl understands the severity of the situation and the difficulty with predicting exactly how long he might have left. Made her aware he would be eligible for hospice. Xochitl stated Mr. Willams had not wanted to go to any sort of facility in the past. I made her aware of hospice, where it can be offered and the overall approach of comfort care too so that she had all the options at her disposal so she could discuss with Kathi too.  She was appreciative.   - She understood and stated her biggest wish would be for him to overall be comfortable, painfree and to not suffer. She is going to discuss all of this further with Kathi and then will talk further tomorrow with Dr. Akhil Benoit, who I let Xochitl know, I will pass on the details of this conversation to.   - She also wished to get the letter from case management to start a file/case to get mr. Willams eldest daughter here from [facility] so she can see him  - I talked with anita and provided her with the update and Xochitl email too ([email or web address]) for the letter to be sent to  - I updated Dr. Akhil Benoit regarding the conversation that was had as well and that Xochitl will likely follow up with her tomorrow regarding next steps once she has talked with Kathi.       11/11/2022:  - seen while at dialysis  - in good spirits  - he did not have any questions or concerns  - updated him that I had talked with case management and they were working on getting a letter done that his grand nieces Kathi and Xochitl might be able to use to expedite having his eldest daughter come here from [place].   - He was very appreciative of the update  - I encouraged him to work with PT/OT so we can have the best idea of what level of support he needs for his well being. He stated he understood    11/9/2022:  - talked with Mr. Willams using Language line  video   - He stated [place] has been here in [place]. He has 4 children (2 daughters and 2 sons) who are all back in [place]. He used to be an  in [place]  - He feels his oldest daughter Marcelina Zelaya is the one who is most involved out of them and wants her to come to him in [place]  - He lives alone currently and feels like it is becoming more difficult to manage. Legs more tired and harder to get around  - he also has a nephew (not biological) named Fletcher nearby and grand nieces kathi is nearby too and Xochitl who is a grand niece in [place]  who all help him.   - He is open to getting PT to work with him to get stronger and help him get the equipment he needs to safety. He is also aware that a nursing care facility might be of benefit to him to ensure he gets the support he needs.   - He is aware of his esrd and wants his dialysis because it keeps him feeling better  - I asked him for permission to talk with Fletcher and his local family, he said that is ok.     - I then called and talked with Kathi who doesn't speak great english and said to call Xochitl first and she will loop her in  - Called Xochitl who is in [place] ([phone])  - Xochitl was very insightful and stated that the except the patients oldest daughter Marcelina Zelaya who he adopted at a young age, his others children do not seem to have a desire to be involved in his care or even aware of his medical ongoings.   - Xochitl stated it is her sister Kathi who really is always there anytime Mandeep Willams needs anything.   - I updated them regarding the medical ongoings and his overall medical condition with his severe aortic stenosis, pulmonary htn and ESRD with dialysis. They understood the severity of the situation overall.   - Xochitl wants to be able to help get Mr. Willams's oldest daughter over here and requested a letter to help do so. I made her aware I will discuss with Flavia at case management  - Xochitl also relayed a terrible incident she had at dialysis outpatient where  is rarely used and Mr. Willams is often neglected. She would like his dialysis chair location to be changed. I also made her aware I would pass this on to Flavia.   - Xochitl and Kathi were very grateful for the discussion    - I then called Flavia and updated her regarding the above. She is going to work on getting a letter from her supervisor explaining Mr. Willams ongoing medical condition and provide that to Mr. Willams family to help expedite his oldest daughter coming here from [place] to help see him and care for him.      [] MrCassy Willams will work with PT/OT and get a better idea of his abilities and then will go from there  [] Code: DNR and has LaPOST on file already too

## 2022-11-16 NOTE — ASSESSMENT & PLAN NOTE
- ngt in place currently  - refer to ACP section under a/p for full details of convo  - Xochitl will discuss with Kathi and make a decision on how to proceed with the tube feeds.

## 2022-11-16 NOTE — PLAN OF CARE
Problem: Adult Inpatient Plan of Care  Goal: Plan of Care Review  Outcome: Ongoing, Progressing  Goal: Patient-Specific Goal (Individualized)  Outcome: Ongoing, Progressing  Goal: Absence of Hospital-Acquired Illness or Injury  Outcome: Ongoing, Progressing  Goal: Optimal Comfort and Wellbeing  Outcome: Ongoing, Progressing  Goal: Readiness for Transition of Care  Outcome: Ongoing, Progressing     Problem: Skin Injury Risk Increased  Goal: Skin Health and Integrity  Outcome: Ongoing, Progressing     Problem: Bariatric Environmental Safety  Goal: Safety Maintained with Care  Outcome: Ongoing, Progressing     Problem: Fall Injury Risk  Goal: Absence of Fall and Fall-Related Injury  Outcome: Ongoing, Progressing     Problem: Device-Related Complication Risk (Hemodialysis)  Goal: Safe, Effective Therapy Delivery  Outcome: Ongoing, Progressing     Problem: Hemodynamic Instability (Hemodialysis)  Goal: Effective Tissue Perfusion  Outcome: Ongoing, Progressing     Problem: Infection (Hemodialysis)  Goal: Absence of Infection Signs and Symptoms  Outcome: Ongoing, Progressing     Problem: Coping Ineffective  Goal: Effective Coping  Outcome: Ongoing, Progressing     Problem: Impaired Wound Healing  Goal: Optimal Wound Healing  Outcome: Ongoing, Progressing

## 2022-11-16 NOTE — PROGRESS NOTES
[facility] [place]  [place]rology  Progress Note    Patient Name: Mandeep Willams  MRN: [id number]  Admission Date: 11/5/2022  Attending Provider: Akhil Benoit MD   Primary Care Physician: Guy Mcclelland Jr, MD  Principal Problem:Hypoxia  Consults Reason for consult: ESRD, dialysis  Date of service 11/16/2022    Subjective:     Interval History:  seen and examined during dialysis , some low BP  /43 HR 80 AP -252  192    Family discussing possibility of hospice at this time    Review of patient's allergies indicates:  No Known Allergies  Current Facility-Administered Medications   Medication Frequency    0.9%  NaCl infusion PRN    0.9%  NaCl infusion Once    acetaminophen tablet 650 mg Q6H PRN    albuterol sulfate nebulizer solution 2.5 mg Q4H PRN    calcium acetate(phosphat bind) capsule 667 mg TID WM    guaiFENesin 100 mg/5 ml syrup 200 mg Q4H PRN    heparin (porcine) injection 5,000 Units Q8H    hydrALAZINE injection 10 mg Q6H PRN    metoprolol succinate (TOPROL-XL) 24 hr tablet 100 mg Daily    sodium chloride 0.9% bolus 250 mL PRN    tamsulosin 24 hr capsule 0.4 mg Daily       Objective:     Vital Signs (Most Recent):  Temp: 97.9 °F (36.6 °C) (11/16/22 0731)  Pulse: 69 (11/16/22 0731)  Resp: 20 (11/16/22 0731)  BP: (!) 121/57 (11/16/22 0731)  SpO2: 97 % (11/16/22 0731)  O2 Device (Oxygen Therapy): room air (11/15/22 1653)   Vital Signs (24h Range):  Temp:  [97.3 °F (36.3 °C)-98.5 °F (36.9 °C)] 97.9 °F (36.6 °C)  Pulse:  [67-75] 69  Resp:  [16-20] 20  SpO2:  [95 %-99 %] 97 %  BP: (114-121)/(55-57) 121/57     Weight: 105 kg (231 lb 7.7 oz) (11/15/22 0415)  Body mass index is 42.34 kg/m².  Body surface area is 2.14 meters squared.    No intake/output data recorded.    Physical Exam  Constitutional:       General: He is not in acute distress.     Appearance: Normal appearance. He is not toxic-appearing.      Comments: Chronically ill, frail appearing elderly male   HENT:      Head: Normocephalic and atraumatic.       Mouth/Throat:      Mouth: Mucous membranes are moist.   Eyes:      General: No scleral icterus.     Extraocular Movements: Extraocular movements intact.      Conjunctiva/sclera: Conjunctivae normal.   Cardiovascular:      Rate and Rhythm: Normal rate and regular rhythm.      Heart sounds: Murmur heard.   Pulmonary:      Breath sounds: No wheezing, rhonchi or rales.      Comments: 1L NC O2  Musculoskeletal:         General: No deformity.      Right lower leg: No edema.      Left lower leg: No edema.   Skin:     General: Skin is warm and dry.      Findings: No rash.   Neurological:      General: No focal deficit present.      Mental Status: He is alert.   Psychiatric:         Mood and Affect: Mood normal.         Behavior: Behavior normal.       Significant Labs:CBC:   Recent Labs   Lab 11/14/22  0359   WBC 11.87   RBC 3.38*   HGB 11.3*   HCT 32.8*      MCV 97   MCH 33.4*   MCHC 34.5       CMP:   Recent Labs   Lab 11/16/22  0404   GLU 99   CALCIUM 9.2   ALBUMIN 2.4*      K 4.8   CO2 23   CL 95   BUN 71*   CREATININE 7.1*       All labs within the past 24 hours have been reviewed.    Significant Imaging:  X-Ray: Reviewed    Assessment/Plan:     1. ESRD - usual HD w/ Dr Henriquez  - HD today, continue MWF HD schedule while admitted  - increase filter size for improved clearance  - minimal UFG while poor PO intake  - renally dose medications for dialysis  Acute hypoxic respiratory failure, hypervolemia, pulm edema, pleural effusion - O2 requirements are stable. Continue reduced UFG while PO intake is poor.    2. HTN - Intradialytic hypotension  - recommend to hold Norvasc altogether  - hold BP meds prior to HD, albumin PRN  - UF as tolerated on dialysis  3. Anemia of chronic kidney disease - Hgb trending down, monitor for need to start Epo with HD, continue to monitor CBC  4. MBD/secondary hyperparathyroidism -  He has failed swallow study and is now NPO, unable to take binders. Does not want NGT. Increased  filter size with HD  5. Hypoalbuminemia- Nepro supplement. Encourage PO intake    Thank you for your consult. I will follow-up with patient. Please contact us if you have any additional questions.    MONSE Rodney  DOS: 11/16/2022  Nephrology  [facility] - [place] - Telemetry

## 2022-11-16 NOTE — PLAN OF CARE
[place] - Telemetry  Discharge Reassessment    Primary Care Provider: Guy Mcclelland Jr, MD    Expected Discharge Date: 11/14/2022    CM discussed discharge planning with pt's daughter, Xochitl. Pt's daughter stated she spoke to Dr. Ashley and was told pt's condition has declined and will need hospice. Pt's daughter requested a letter for immigration. CM emailed the letter to [email or web address]. Pt's daughter stated they haven't decided on a hospice. CM will follow up.    Reassessment (most recent)       Discharge Reassessment - 11/16/22 1531          Discharge Reassessment    Assessment Type Discharge Planning Reassessment (P)      Did the patient's condition or plan change since previous assessment? Yes (P)      Discharge Plan discussed with: Adult children (P)      Name(s) and Number(s) Xochitl- daughter [phone] (P)      Discharge Plan A Hospice/home (P)      Discharge Plan B Home with family (P)      DME Needed Upon Discharge  other (see comments) (P)    tbd    Discharge Barriers Identified Other (see comments) (P)    Waiting for daughter to arrive from [place]    Why the patient remains in the hospital Requires continued medical care (P)         Post-Acute Status    Discharge Delays Patient and Family Barriers (P)

## 2022-11-16 NOTE — PLAN OF CARE
Problem: Adult Inpatient Plan of Care  Goal: Plan of Care Review  Outcome: Ongoing, Progressing  Goal: Patient-Specific Goal (Individualized)  Outcome: Ongoing, Progressing  Goal: Absence of Hospital-Acquired Illness or Injury  Outcome: Ongoing, Progressing  Intervention: Identify and Manage Fall Risk  Flowsheets (Taken 11/16/2022 0602)  Safety Promotion/Fall Prevention:   assistive device/personal item within reach   side rails raised x 2  Intervention: Prevent Skin Injury  Flowsheets (Taken 11/16/2022 0602)  Body Position: foot of bed elevated  Skin Protection: incontinence pads utilized  Intervention: Prevent and Manage VTE (Venous Thromboembolism) Risk  Flowsheets (Taken 11/16/2022 0602)  Activity Management: Rolling - L1  VTE Prevention/Management: ROM (active) performed  Range of Motion: active ROM (range of motion) encouraged  Intervention: Prevent Infection  Flowsheets (Taken 11/16/2022 0602)  Infection Prevention: rest/sleep promoted  Goal: Optimal Comfort and Wellbeing  Outcome: Ongoing, Progressing  Intervention: Monitor Pain and Promote Comfort  Flowsheets (Taken 11/16/2022 0602)  Pain Management Interventions:   relaxation techniques promoted   quiet environment facilitated  Intervention: Provide Person-Centered Care  Flowsheets (Taken 11/16/2022 0602)  Trust Relationship/Rapport: care explained  Goal: Readiness for Transition of Care  Outcome: Ongoing, Progressing     Problem: Impaired Wound Healing  Goal: Optimal Wound Healing  Outcome: Ongoing, Progressing  Intervention: Promote Wound Healing  Flowsheets (Taken 11/16/2022 0602)  Sleep/Rest Enhancement: relaxation techniques promoted  Activity Management: Rolling - L1  Pain Management Interventions:   relaxation techniques promoted   quiet environment facilitated

## 2022-11-16 NOTE — PT/OT/SLP PROGRESS
"Physical Therapy Treatment    Patient Name:  Mandeep Willams   MRN:  [id number]    Recommendations:     Discharge Recommendations:  nursing facility, skilled   Discharge Equipment Recommendations: none   Barriers to discharge: Decreased caregiver support and decreased functional mobility, increased fall risk    Assessment:     Mandeep Willams is a 86 y.o. male admitted with a medical diagnosis of Hypoxia.  He presents with the following impairments/functional limitations:  weakness, impaired endurance, impaired self care skills, impaired functional mobility, gait instability, impaired balance, impaired cognition, decreased coordination, decreased upper extremity function, decreased lower extremity function, decreased safety awareness, impaired coordination, impaired fine motor.  Pt needed motivation to participate due to wanting to lie down due to increased fatigue.  used for entire session ([place] [id number].    Rehab Prognosis: Good; patient would benefit from acute skilled PT services to address these deficits and reach maximum level of function.    Recent Surgery: * No surgery found *      Plan:     During this hospitalization, patient to be seen 5 x/week to address the identified rehab impairments via gait training, therapeutic activities, therapeutic exercises and progress toward the following goals:    Plan of Care Expires:  11/18/22    Subjective     Chief Complaint: "tired."  Patient/Family Comments/goals: "I feel ok."  Pain/Comfort:  Pain Rating 1: 0/10  Pain Rating Post-Intervention 1: 0/10      Objective:     Communicated with nurse prior to session.  Patient found right sidelying with peripheral IV, telemetry, bed alarm (green cushion) upon PT entry to room.     General Precautions: Standard, fall   Orthopedic Precautions:Full weight bearing   Braces:    Respiratory Status: Room air     Functional Mobility:  Bed Mobility:     Scooting: minimum assistance  Supine to Sit: moderate assistance  Sit to Supine: " minimum assistance  Transfers:     Sit to Stand:  minimum assistance with rolling walker  Gait: side step x 3-4 steps with rw with MIN A x 2 trials. Vcs for sequence, min A with rw management  Balance: F/F- standing, f sitting      AM-PAC 6 CLICK MOBILITY  Turning over in bed (including adjusting bedclothes, sheets and blankets)?: 3  Sitting down on and standing up from a chair with arms (e.g., wheelchair, bedside commode, etc.): 3  Moving from lying on back to sitting on the side of the bed?: 3  Moving to and from a bed to a chair (including a wheelchair)?: 2  Need to walk in hospital room?: 2  Climbing 3-5 steps with a railing?: 1  Basic Mobility Total Score: 14       Treatment & Education:  Lower Extremity Exercises.   Patient educated on the purpose of therapeutic exercise.    Patient verbalized acceptance/understanding of instructions, expectations, and limitations(for safety).  Patient performed: 10 reps (each) of B LE There Ex: AP, LAQ, Hip abd/add, Hip flexion while sitting up on EOB.       Patient required still requires verbal cues/tactile cues to ensure correct sequence, to maintain proper form, and to allow for self-correction.  Pt reported no complaints or problems with exercise activity.    Sit to stand x 3 trials from EOB with rw with min A. Pt uche short stands due to weakness and poor endurance    Patient left right sidelying with all lines intact, call button in reach, bed alarm on, and nurse notified..    GOALS:   Multidisciplinary Problems       Physical Therapy Goals          Problem: Physical Therapy    Goal Priority Disciplines Outcome Goal Variances Interventions   Physical Therapy Goal     PT, PT/OT Ongoing, Progressing     Description: Goals to be met by: 22     Patient will increase functional independence with mobility by performin. Pt to be mod I with bed mobility.  2. Pt to transfer with supervision.  3. Pt to ambulate 50' w/RW SBA.  4. Pt to be (I) with HEP.                          Time Tracking:     PT Received On: 11/15/22  PT Start Time: 1500     PT Stop Time: 1523  PT Total Time (min): 23 min     Billable Minutes: Therapeutic Activity 10 and Therapeutic Exercise 13    Treatment Type: Treatment  PT/PTA: PTA     PTA Visit Number: 1     11/15/2022

## 2022-11-17 NOTE — PROGRESS NOTES
CM received a call from pt's daughter, Xochitl requesting to redo the letter to include pt's daughter that lives in [place] name and  Sylvain-Nathalie 1956.    CM emailed the revised letter to [email or web address] and [email or web address].

## 2022-11-17 NOTE — NURSING
Pt has family member that stayed with her last night . No complaints of pain or discomfort. Bed down SR up x 2 HOB. Call bell in reach.

## 2022-11-17 NOTE — ASSESSMENT & PLAN NOTE
 Significantly decreased UES opening; multifactorial, one of these factors include large osteophyte at approximate level of UES.   - NPO - placing NGT per family request  - started goals of care discussion but family would like to see how patient improves with nutrition  - NGT has been removed - patient does NOT want NGT and is able to make his own decisions  - Speech is discussing with family today

## 2022-11-17 NOTE — PT/OT/SLP PROGRESS
Occupational Therapy      Patient Name:  Mandeep Willams   MRN:  [id number]    OT to discontinue orders. Plan for hospice at d/c per chart. Pt NPO, but family requesting food/drink knowing the risk of aspiration; pt is DNR. OT d/w SLP then notified MD. Please re-consult if situation changes. Thank you.     11/17/2022

## 2022-11-17 NOTE — PLAN OF CARE
Recommendations  1) Continue NPO per ST - advance diet to renal diet with texture per Speech Therapy if medically able   - If able, liberalize diet to pt wants and desires  2) Continue enteral nutrition: Novasource Renal at 20 mL/hr and advance to goal rate of 30 mL/hr   - FWF per MD   - Will provide 1440 kcal, 65 g protein, 516 mL formula free water.  - Will meet 92% kcal, 100% protein needs.   - Hold for residuals > 400 cc.    3) Daily weights    Goals: Patient to meet > 75% EEN/EPN via PO/ONS intake    Nutrition Goal Status: continues  Communication of RD Recs: other (comment) (POC, sticky note, reviewed with RN)    Deloris Camargo, MPH, RDN, LDN

## 2022-11-17 NOTE — ASSESSMENT & PLAN NOTE
11/17/22   Friend of family Chrissie was present and watched patient aspirate both lemonade and the soup we brought. They put grand niece on phone Xochitl and we all had group conversation. Patient asking to continue to eat. Family somewhat understanding about aspiration and coughing. I explained my concern about aspiration -->respiratory distress --> hypoxia and leading to death - he is DNR/DNI. I also brought up his [place] POST and the note from Dr. Arnold who documented patient's nephew Fletcher was present for that conversation.    Used  Jude ID#[id number] later to talk to patient alone to make sure he understood his plan of care and the risks. Patient understood that he is aspirating - aka it is going into his lungs and that is why he is coughing everytime and having difficulty breathing. I told him this could lead to pneumonia, respiratory distress and death. He said he understood risks but would like to continue to eat/drink by mouth. I also discussed the NGT in his nose - he was adamant saying he did not want that. I told him that it is nutrition for him and that by eating this much he will not get much nutrition - he again stated he did not want the NGT and understood. April his RN was present during this conversation. At the end, I summarized what he expressed to make sure that I understood what he wanted - That he would like to eat by mouth knowing the risk and he did not want the feeding tube. His main complaint at this time is having pain on his buttocks and we adjusted him on his side. He did say he would be ok with having a small dose of pain medicine to help relieve the pain. All questions answered for patient.    I spent a total of 80 minutes face to face with patient and with is family just on goals of care conversation today.

## 2022-11-17 NOTE — PROGRESS NOTES
[facility] - [place] - Telemetry  Adult Nutrition  Progress Note    SUMMARY     Recommendations  1) Continue NPO per ST - advance diet to renal diet with texture per Speech Therapy if medically able   - If able, liberalize diet to pt wants and desires  2) Continue enteral nutrition: Novasource Renal at 20 mL/hr and advance to goal rate of 30 mL/hr   - FWF per MD   - Will provide 1440 kcal, 65 g protein, 516 mL formula free water.  - Will meet 92% kcal, 100% protein needs.   - Hold for residuals > 400 cc.    3) Daily weights    Goals: Patient to meet > 75% EEN/EPN via PO/ONS intake    Nutrition Goal Status: continues  Communication of RD Recs: other (comment) (POC, sticky note, reviewed with RN)    Assessment and Plan    Nutrition Problem  Inadequate oral intake    Related to (etiology):   Swallowing difficulty    Signs and Symptoms (as evidenced by):   Pt NPO at this time due to swallowing difficulty  Receiving 0% EEN/EPN (took out his NG tube)    Interventions(treatment strategy):  Collaboration with other providers    Nutrition Diagnosis Status:   Continues      Malnutrition Assessment  Energy Intake: severe energy intake      Reason for Assessment  Reason For Assessment: RD follow-up  Diagnosis: other (see comments) (hypoxia)  Relevant Medical History: ESRD patient with a history of mod-severe AS, pleural effusions as well as pulmonary hypertension  Interdisciplinary Rounds: did not attend  General Information Comments:   11/17- Pt keeps taking out NG tube, so unable to receive nutrition. Per palliative care note- pt had noted prior to hospital stay he did not want to be tube fed for end of life. Palliative explained to relatives that he likely does not feel hunger the same way we do. Pt with wt stable. On HD. Will likely be d/c to Hospice due to decline in health. No new nutrition recs.   11/14-RD remote for coverage. Per RN note yesterday, pt did not tolerate meals, coughed, required suctioning. NPO status, ST following.  "Plan for discharge to SNF. On HD. Spoke with RN on phone regarding wt error in chart - agreed to correct and take new wt. Pt -125 x 1 year per chart. KIRBY NFPE due to remote assessment. Prior to NPO status, pt intake was 0-50%. Suspect malnutrition.  Nutrition Discharge Planning: pending clinical course    Nutrition Risk Screen  Nutrition Risk Screen: dysphagia or difficulty swallowing    Nutrition/Diet History  Spiritual, Cultural Beliefs, Rastafari Practices, Values that Affect Care: no  Factors Affecting Nutritional Intake: NPO, difficulty/impaired swallowing    Anthropometrics  Temp: 97.4 °F (36.3 °C)  Height Method: Estimated  Height: 5' 2" (157.5 cm)  Height (inches): 62 in  Weight Method: Bed Scale  Weight: 105 kg (231 lb 7.7 oz)  Weight (lb): 231.49 lb  Ideal Body Weight (IBW), Male: 118 lb  % Ideal Body Weight, Male (lb): 97.46 %  BMI (Calculated): 42.3       Lab/Procedures/Meds  Pertinent Labs Reviewed: reviewed  Pertinent Labs Comments: CBC:  No results for input(s): WBC, HGB, HCT, PLT in the last 24 hours.  CMP:  No results for input(s): GLUCOSE, CALCIUM, ALBUMIN, PROT, NA, K, CO2, CL, BUN, CREATININE, ALKPHOS, ALT, AST, BILITOT in the last 24 hours.      Pertinent Medications Reviewed: reviewed  Pertinent Medications Comments: Scheduled Meds:   sodium chloride 0.9%   Intravenous Once    calcium acetate(phosphat bind)  667 mg Oral TID WM    heparin (porcine)  5,000 Units Subcutaneous Q8H    metoprolol succinate  100 mg Oral Daily    tamsulosin  0.4 mg Oral Daily     Continuous Infusions:  PRN Meds:.sodium chloride 0.9%, acetaminophen, albuterol sulfate, guaiFENesin 100 mg/5 ml, hydrALAZINE, sodium chloride 0.9%      Physical Findings/Assessment  Altered skin to perineum - stage 2 PI per RN       Estimated/Assessed Needs  Weight Used For Calorie Calculations: 52.2 kg (115 lb) (UBW (current wt in chart incorrect))  Energy Calorie Requirements (kcal): 8416-1340 kcal/day based on 30-35 kcal/kg  Energy " Need Method: Kcal/kg  Protein Requirements: 63-67 g/day based on 1.2-1.3 g/kg  Weight Used For Protein Calculations: 52.2 kg (115 lb)  Fluid Requirements (mL): 1000 mL + UOP or per MD for ESRD on GEMINI  Estimated Fluid Requirement Method: other (see comments)  RDA Method (mL): 1564       Nutrition Prescription Ordered  Current Diet Order: NPO    Evaluation of Received Nutrient/Fluid Intake    Intake/Output Summary (Last 24 hours) at 11/17/2022 1138  Last data filed at 11/16/2022 1330  Gross per 24 hour   Intake 500 ml   Output 1500 ml   Net -1000 ml       I/O: -2.01L since admit  Energy Calories Required: not meeting needs  Protein Required: not meeting needs  Fluid Required: not meeting needs  Comments: LBM 11/16  % Intake of Estimated Energy Needs: 0 - 25 %  % Meal Intake: NPO    Nutrition Risk  Level of Risk/Frequency of Follow-up: high (2x weekly)     Monitor and Evaluation  Food and Nutrient Intake: food and beverage intake, energy intake, enteral nutrition intake  Food and Nutrient Adminstration: enteral and parenteral nutrition administration, diet order  Knowledge/Beliefs/Attitudes: beliefs and attitudes  Physical Activity and Function: nutrition-related ADLs and IADLs  Anthropometric Measurements: weight change, body mass index, weight  Biochemical Data, Medical Tests and Procedures: electrolyte and renal panel, gastrointestinal profile, glucose/endocrine profile, inflammatory profile, lipid profile  Nutrition-Focused Physical Findings: overall appearance, extremities, muscles and bones, skin     Nutrition Follow-Up  RD Follow-up?: Yes  Deloris Camargo, MPH, RDN, LDN

## 2022-11-17 NOTE — NURSING
Relative of patient said do not put the NG Tube back in . She said let him get some sleep . Also pt refused to keep O2 probe on . Charge Nurse Cami Notified. Pt did let me put the Tele Monitor back on .

## 2022-11-17 NOTE — SUBJECTIVE & OBJECTIVE
Interval History:  Multiple encounters with patient. Friend of family Chrissie was present and watched patient aspirate both lemonade and the soup we brought. They put grand niece on phone Xochitl and we all had group conversation. Patient asking to continue to eat. Family somewhat understanding about aspiration and coughing. I explained my concern about aspiration -->respiratory distress --> hypoxia and leading to death - he is DNR/DNI. I also brought up his [place] POST and the note from Dr. Arnold who documented patient's nephew Fletcher was present for that conversation.    Used  Jude ID#[id number] later to talk to patient alone to make sure he understood his plan of care and the risks. Patient understood that he is aspirating - aka it is going into his lungs and that is why he is coughing everytime and having difficulty breathing. I told him this could lead to pneumonia, respiratory distress and death. He said he understood risks but would like to continue to eat/drink by mouth. I also discussed the NGT in his nose - he was adamant saying he did not want that. I told him that it is nutrition for him and that by eating this much he will not get much nutrition - he again stated he did not want the NGT and understood. April his RN was present during this conversation. At the end, I summarized what he expressed to make sure that I understood what he wanted - That he would like to eat by mouth knowing the risk and he did not want the feeding tube. His main complaint at this time is having pain on his buttocks and we adjusted him on his side. He did say he would be ok with having a small dose of pain medicine to help relieve the pain. All questions answered for patient.    I spent a total of 80 minutes face to face with patient and with is family just on goals of care conversation today.    Speech therapy will show [facility] imaging this evening.    Review of Systems   Unable to perform ROS: Acuity of condition   Neurological:   Positive for weakness.   Objective:     Vital Signs (Most Recent):  Temp: 97.6 °F (36.4 °C) (11/17/22 1139)  Pulse: 81 (11/17/22 1139)  Resp: 17 (11/17/22 1139)  BP: 128/60 (11/17/22 1139)  SpO2: 96 % (11/17/22 1139) Vital Signs (24h Range):  Temp:  [97.4 °F (36.3 °C)-99.2 °F (37.3 °C)] 97.6 °F (36.4 °C)  Pulse:  [73-84] 81  Resp:  [16-21] 17  SpO2:  [93 %-96 %] 96 %  BP: (108-129)/(51-62) 128/60     Weight: 105 kg (231 lb 7.7 oz)  Body mass index is 42.34 kg/m².  No intake or output data in the 24 hours ending 11/17/22 1526     Physical Exam  Vitals and nursing note reviewed.   Constitutional:       Appearance: He is cachectic. He is ill-appearing.      Comments: Significant generalized weakness, thin elderly male   HENT:      Head: Normocephalic and atraumatic.      Nose: Nose normal.   Eyes:      Conjunctiva/sclera: Conjunctivae normal.   Cardiovascular:      Rate and Rhythm: Normal rate and regular rhythm.   Pulmonary:      Effort: Respiratory distress present.   Musculoskeletal:         General: No swelling.   Skin:     General: Skin is dry.   Neurological:      Mental Status: He is alert.   Psychiatric:         Mood and Affect: Mood normal.         Behavior: Behavior normal.       Significant Labs: All pertinent labs within the past 24 hours have been reviewed.  BMP:   Recent Labs   Lab 11/16/22  0404   GLU 99      K 4.8   CL 95   CO2 23   BUN 71*   CREATININE 7.1*   CALCIUM 9.2       CBC:   No results for input(s): WBC, HGB, HCT, PLT in the last 48 hours.      Significant Imaging:

## 2022-11-17 NOTE — PROGRESS NOTES
[facility] Medicine  Progress Note    Patient Name: Mandeep Willams  MRN: [id number]  Patient Class: IP- Inpatient   Admission Date: 11/5/2022  Length of Stay: 12 days  Attending Physician: Akhil Benoit MD  Primary Care Provider: Guy Mcclelland Jr, MD        Subjective:     Principal Problem:Hypoxia        HPI:  Mr. Willams is a 85 yo M who presents with a CC of SOB for 3 days. EMS was called and he was found to have an 02 sat of 90% on RA. Patient is an ESRD patient with a history of mod-severe AS, pleural effusions as well as pulmonary hypertension.  He lives at home by himself and does not wear 02.  He is very comfortable on NC and has no other complaints. He was in observation here early last month for the same.        Overview/Hospital Course:  Patient admitted to the hospital for hypoxia- likely multifactorial including mod-severe AS, pulmonary hypertension and pleural effusions.  He was started on Lasix. Nephrology consulted for dialysis, underwent HD on 11/7. Pulmonary consulted and agreed that hypoxia likely from edema. Patient went multiple dialysis rounds without much improvement in his hypoxia.  Palliative care was consulted. Patient sent for CTA of chest on 11/9/22.  CTA showed no PE but did show large Left pleural effusion. Patient sent to IR for therapeutic thoracentesis. Palliative care consulted and patient now is DNR.  Patient had one liter of fluid taken off by thoracentesis. PT/OT were consulted and rec: SNF. Patient's hypoxia improved and was weaned to RA. SLP was consulted for concerns of aspiration. Patient is unfortunately aspirating all consistencies. MBSS concerning. Patient has LaPOST that reads he does not want artificial nutrition. There was a trial of NGT but patient has been consistently stating he does not want the NGT. Ongoing conversations with family including Xochitl and Kathi - patient's 2 grandnieces. Patient has expressed that he still wants to continue to eat despite  risk of aspiration/respiratory distress/death. Family has been updated.      Interval History:  Multiple encounters with patient. Friend of family Chrissie was present and watched patient aspirate both lemonade and the soup we brought. They put grand niece on phone Xochitl and we all had group conversation. Patient asking to continue to eat. Family somewhat understanding about aspiration and coughing. I explained my concern about aspiration -->respiratory distress --> hypoxia and leading to death - he is DNR/DNI. I also brought up his La POST and the note from Dr. Arnold who documented patient's nephew Fletcher was present for that conversation.    Used  Jude ID#[id number] later to talk to patient alone to make sure he understood his plan of care and the risks. Patient understood that he is aspirating - aka it is going into his lungs and that is why he is coughing everytime and having difficulty breathing. I told him this could lead to pneumonia, respiratory distress and death. He said he understood risks but would like to continue to eat/drink by mouth. I also discussed the NGT in his nose - he was adamant saying he did not want that. I told him that it is nutrition for him and that by eating this much he will not get much nutrition - he again stated he did not want the NGT and understood. April his RN was present during this conversation. At the end, I summarized what he expressed to make sure that I understood what he wanted - That he would like to eat by mouth knowing the risk and he did not want the feeding tube. His main complaint at this time is having pain on his buttocks and we adjusted him on his side. He did say he would be ok with having a small dose of pain medicine to help relieve the pain. All questions answered for patient.    I spent a total of 80 minutes face to face with patient and with is family just on goals of care conversation today.    Speech therapy will show Kathi imaging this  evening.    Review of Systems   Unable to perform ROS: Acuity of condition   Neurological:  Positive for weakness.   Objective:     Vital Signs (Most Recent):  Temp: 97.6 °F (36.4 °C) (11/17/22 1139)  Pulse: 81 (11/17/22 1139)  Resp: 17 (11/17/22 1139)  BP: 128/60 (11/17/22 1139)  SpO2: 96 % (11/17/22 1139) Vital Signs (24h Range):  Temp:  [97.4 °F (36.3 °C)-99.2 °F (37.3 °C)] 97.6 °F (36.4 °C)  Pulse:  [73-84] 81  Resp:  [16-21] 17  SpO2:  [93 %-96 %] 96 %  BP: (108-129)/(51-62) 128/60     Weight: 105 kg (231 lb 7.7 oz)  Body mass index is 42.34 kg/m².  No intake or output data in the 24 hours ending 11/17/22 1526     Physical Exam  Vitals and nursing note reviewed.   Constitutional:       Appearance: He is cachectic. He is ill-appearing.      Comments: Significant generalized weakness, thin elderly male   HENT:      Head: Normocephalic and atraumatic.      Nose: Nose normal.   Eyes:      Conjunctiva/sclera: Conjunctivae normal.   Cardiovascular:      Rate and Rhythm: Normal rate and regular rhythm.   Pulmonary:      Effort: Respiratory distress present.   Musculoskeletal:         General: No swelling.   Skin:     General: Skin is dry.   Neurological:      Mental Status: He is alert.   Psychiatric:         Mood and Affect: Mood normal.         Behavior: Behavior normal.       Significant Labs: All pertinent labs within the past 24 hours have been reviewed.  BMP:   Recent Labs   Lab 11/16/22  0404   GLU 99      K 4.8   CL 95   CO2 23   BUN 71*   CREATININE 7.1*   CALCIUM 9.2       CBC:   No results for input(s): WBC, HGB, HCT, PLT in the last 48 hours.      Significant Imaging:       Assessment/Plan:      * Hypoxia  No respiratory distress or failure. Patient 90% on RA.  Now 96 on NC. Very comfortable. Cause of hypoxia multi-factorial including mod- sev AS, pulmonary edema, bilateral effusions as well as pulmonary hypertension.  Lasix for now. Will consider thoracentesis.    UF today with dialysis. Pulmonary  consulted as well    Not improving despite multiple dialysis sessions.  Will check CTA     Large L pleural effusion.  IR consulted for thora     Much improved after one liter off.    Much improved. On 1L     Resolved  On RA      Dysphagia   Significantly decreased UES opening; multifactorial, one of these factors include large osteophyte at approximate level of UES.   - NPO - placing NGT per family request  - started goals of care discussion but family would like to see how patient improves with nutrition  - NGT has been removed - patient does NOT want NGT and is able to make his own decisions  - Speech is discussing with family today      Advanced care planning/counseling discussion  11/17/22   Friend of family Chrissie was present and watched patient aspirate both lemonade and the soup we brought. They put grand niece on phone Xochitl and we all had group conversation. Patient asking to continue to eat. Family somewhat understanding about aspiration and coughing. I explained my concern about aspiration -->respiratory distress --> hypoxia and leading to death - he is DNR/DNI. I also brought up his La POST and the note from Dr. Arnold who documented patient's nephew Fletcher was present for that conversation.    Used  Jude ID#[id number] later to talk to patient alone to make sure he understood his plan of care and the risks. Patient understood that he is aspirating - aka it is going into his lungs and that is why he is coughing everytime and having difficulty breathing. I told him this could lead to pneumonia, respiratory distress and death. He said he understood risks but would like to continue to eat/drink by mouth. I also discussed the NGT in his nose - he was adamant saying he did not want that. I told him that it is nutrition for him and that by eating this much he will not get much nutrition - he again stated he did not want the NGT and understood. April his RN was present during this conversation. At the end,  I summarized what he expressed to make sure that I understood what he wanted - That he would like to eat by mouth knowing the risk and he did not want the feeding tube. His main complaint at this time is having pain on his buttocks and we adjusted him on his side. He did say he would be ok with having a small dose of pain medicine to help relieve the pain. All questions answered for patient.    I spent a total of 80 minutes face to face with patient and with is family just on goals of care conversation today.    Pulmonary hypertension  PA pressure of 59        Hypokalemia  Will replace       Severe aortic stenosis  As discussed above.  Recent echo      Elevated troponin  Likely elevated in setting of renal failure.  Trending down      Thrombocytopenia  No acute issues       Debility  May benefit from H/H. Uses some kind of rolling walker at home     PT/OT recs TBD- rec SNF       Anemia of chronic disorder  No acute issues      Tobacco use disorder        Pleural effusion  S/p thoracentesis with improvement in oxygen requirements      Hyperlipidemia  Resume statin      ESRD (end stage renal disease)  Completed dialysis yesterday.  Will consult nephrology  - HD today      HTN (hypertension)  Restarted home meds         VTE Risk Mitigation (From admission, onward)    None          Discharge Planning   BONILLA: 11/14/2022     Code Status: DNR   Is the patient medically ready for discharge?:     Reason for patient still in hospital (select all that apply): Patient new problem and Treatment  Discharge Plan A: Hospice/home   Discharge Delays: (!) Patient and Family Barriers              Akhil Benoit MD  Department of Hospital Medicine   [facility] - Telemetry

## 2022-11-17 NOTE — CARE UPDATE
Spoke with patient's grand niece Xochitl, patient has been calling family telling them he is hungry and we will not let him eat or drink - he has been NPO, unfortunatley NGT was out overnight and family did not want it replaced. Patient does not his head yes to me this mrevonne when asked if hungry. I spoke with Xochitl - she has discussed with family and they would like to allow patient to eat and drink knowing the risk of aspiration at this time. He is still DNR at this time. Family to come today to have discussion in person with patient at bedside.    Akhil Benoit MD  Department of Hospital Medicine  [facility]

## 2022-11-17 NOTE — NURSING
Report received from off going Nurse Briana. Pt is A DNR . Tube feeding infusing at 15ml at this time Left nare . Pt has no complaints at this time . Bed down SR up x 2 HOB. Call bell in reach.

## 2022-11-18 PROBLEM — R79.89 ELEVATED TROPONIN: Status: RESOLVED | Noted: 2022-01-01 | Resolved: 2022-01-01

## 2022-11-18 PROBLEM — E43 SEVERE MALNUTRITION: Status: ACTIVE | Noted: 2022-01-01

## 2022-11-18 PROBLEM — E87.6 HYPOKALEMIA: Status: RESOLVED | Noted: 2022-01-01 | Resolved: 2022-01-01

## 2022-11-18 PROBLEM — J96.01 ACUTE HYPOXEMIC RESPIRATORY FAILURE: Status: ACTIVE | Noted: 2022-01-01

## 2022-11-18 NOTE — NURSING
Report received from off going nurse, CÉSAR Alvarez. Patient awake and alert. No signs of distress noted at this time. O2 therapy in place at 0.5L for comfort. Call light in reach. Bed low and locked. Will continue plan of care.

## 2022-11-18 NOTE — PT/OT/SLP PROGRESS
Speech Language Pathology  Discharge    Mandeep Willams  MRN: [id number]    ST to sign off as family education goals have been met. Lucy Frost, AJ-SLP

## 2022-11-18 NOTE — NURSING
Patient returned back from dialysis. During dialysis patient o2 sat dropped in 80's, placed on venti mask and remains on venti mask. Patient also removed IV inadvertently. New IV placed to right ac, 24 gauge.  PRN dilaudid given. MD aware of event. Will continue to monitor

## 2022-11-18 NOTE — PT/OT/SLP PROGRESS
"Speech Language Pathology Treatment    Patient Name:  Mandeep Willams   MRN:  [id number]  Admitting Diagnosis: Hypoxia    Recommendations:                 General Recommendations:   dysphagia education  Diet recommendations: diet per pt preference accepting of aspiration risk  Aspiration Precautions:  good oral care    General Precautions: Standard, respiratory, aspiration  Communication strategies:  .    Subjective   Multiple family members present for session. Including Pt's fluent English speaking nephew. Pt's family member (grand niece) Kathi reporting she feels like her "brother's" advance directives were misunderstood by Pt however Pt was lucid enough to make phone calls and requests from staff via  and indicated to staff earlier today he does not want PEG tube.   Patient goals: to eat    Pain/Comfort:  Pain Rating 1: 0/10    Respiratory Status: Room air    Objective:     Has the patient been evaluated by SLP for swallowing?   Yes  Keep patient NPO? No     MBS video reviewed with family. Pt's performance was compared to video of normal swallow function. Pt's poor prognosis for recovery of swallow was discussed as Pt's dysphagia is profound, multifactorial, and aspiration has likely been chronic in nature.  Subject of PEG tube was discussed and is not recommended as Pt is of advanced age and PEG does not prevent aspiration as aspiration risk of reflux is heightened with PEG. Questions regarding validity of Pt's advance directives were asked, Kathi claiming Pt must have not understood the choices he was making, ST reported those questions are outside of ST's scope of practice will defer to palliative care team and/or . Reiteration of risks of PEG were discussed and ST's recommendation for hospice with pleasure feeds as Pt has made clear his desire to continue to eat and drink despite aspiration across consistencies. It should be noted Pt does not report discomfort during episodes of " aspiration. He does report discomfort during prolonged NPO.    Assessment:     Mandeep Willams is a 86 y.o. male with dx of Hypoxia he presents with profound dysphagia c/b aspiration of thin and thick consistencies. He is clearly requesting pleasure feeds despite aspiration risk.     Goals:   Multidisciplinary Problems       SLP Goals          Problem: SLP    Goal Priority Disciplines Outcome   SLP Goal     SLP Ongoing, Progressing   Description: STGs  1. Pt will complete PO trials for identification of LRD and liquids w/o overt s/s of aspiration.   2. Pt will participate in modified barium swallow study for further assessment of swallow (order obtained for 11/15.)                          Plan:     Patient to be seen:  2 x/week   Plan of Care expires:  11/24/22  Plan of Care reviewed with:  patient   SLP Follow-Up:  Yes       Discharge recommendations: hospice with pleasure feeds   Barriers to Discharge:  None    Time Tracking:     SLP Treatment Date:   11/17/22  Speech Start Time:  1800  Speech Stop Time:  1838     Speech Total Time (min):  38 min    Billable Minutes: Treatment Swallowing Dysfunction 8 and Self Care/Home Management Training 30    11/17/2022

## 2022-11-18 NOTE — ASSESSMENT & PLAN NOTE
11/18/22- Dr Lovett   Palliative discussed with Xochitl this morning. Continuing care while trying to get oldest daughter to [place] from [place]. In the mean time, he has worsening respiratory distress. I called Xochitl but she did not answer. Using PRN dilaudid.     11/17/22- Dr Benoit   Friend of family Chrissie was present and watched patient aspirate both lemonade and the soup we brought. They put grand niece on phone Xochitl and we all had group conversation. Patient asking to continue to eat. Family somewhat understanding about aspiration and coughing. I explained my concern about aspiration -->respiratory distress --> hypoxia and leading to death - he is DNR/DNI. I also brought up his La POST and the note from Dr. Arnold who documented patient's nephew Fletcher was present for that conversation.    Used  Jude ID#[id number] later to talk to patient alone to make sure he understood his plan of care and the risks. Patient understood that he is aspirating - aka it is going into his lungs and that is why he is coughing everytime and having difficulty breathing. I told him this could lead to pneumonia, respiratory distress and death. He said he understood risks but would like to continue to eat/drink by mouth. I also discussed the NGT in his nose - he was adamant saying he did not want that. I told him that it is nutrition for him and that by eating this much he will not get much nutrition - he again stated he did not want the NGT and understood. April his RN was present during this conversation. At the end, I summarized what he expressed to make sure that I understood what he wanted - That he would like to eat by mouth knowing the risk and he did not want the feeding tube. His main complaint at this time is having pain on his buttocks and we adjusted him on his side. He did say he would be ok with having a small dose of pain medicine to help relieve the pain. All questions answered for patient.
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
## 2022-11-18 NOTE — NURSING
Patient off to dialysis, congested noted, suctioning completed. O2 therapy in place at 3L per NC.

## 2022-11-18 NOTE — SUBJECTIVE & OBJECTIVE
Interval History/Subjective:  - Seen at bedside, appears uncomfortable with a weak cough  - Was minimally verbal.   - Attempted to have a video conference chat with  ([id number]) and with Kathi/Xochitl, but Mr. Willams was unable to participate.   - I called and talked with Xochitl separately per Kathi request.     Medications:  Continuous Infusions:  Scheduled Meds:   sodium chloride 0.9%   Intravenous Once     PRN Meds:sodium chloride 0.9%, albuterol sulfate, hydrALAZINE, HYDROmorphone, sodium chloride 0.9%    Objective:     Vital Signs (Most Recent):  Temp: 98.5 °F (36.9 °C) (11/18/22 0747)  Pulse: 88 (11/18/22 0747)  Resp: 20 (11/18/22 0747)  BP: (!) 116/57 (11/18/22 0747)  SpO2: 96 % (11/18/22 0747)   Vital Signs (24h Range):  Temp:  [97.6 °F (36.4 °C)-98.5 °F (36.9 °C)] 98.5 °F (36.9 °C)  Pulse:  [80-88] 88  Resp:  [16-20] 20  SpO2:  [95 %-96 %] 96 %  BP: (116-128)/(56-60) 116/57     Weight: 105 kg (231 lb 7.7 oz)  Body mass index is 42.34 kg/m².    Physical Exam:  General: Uncomfortable  HEENT: NC in place  Resp: Some mild respiratory distress at times. Weak cough.   Abdomen: flat, non tender  Neuro: awake and alert, but falls asleep easily. Difficult to understand what he says at times.     Review of Symptoms      Symptom Assessment (ESAS 0-10 Scale)  Unable to complete assessment due to Acuity of condition         Pain Assessment in Advanced Demential Scale (PAINAD)   Breathing - Independent of vocalization:  1  Negative vocalization:  1  Facial expression:  1  Body language:  1  Consolability:  1  Total:  5    Living Arrangements:  Lives alone    Psychosocial/Cultural: - lives alone  - has a friend named Fletcher who lives nearby and a grandniece named Kathi too. Another grandniece in [place] named Xochitl (she speaks the best english and helps rest of family)        Advance Care Planning   Advance Directives:   LaPOST: Yes    Do Not Resuscitate Status: Yes      Decision Making:  Family answered questions and  Patient unable to communicate due to disease severity/cognitive impairment  Goals of Care: Refer to ACP under A/P       Significant Labs: Reviewed   CBC:   Recent Labs   Lab 11/14/22 0359   WBC 11.87   HGB 11.3*   HCT 32.8*   MCV 97        BMP:  Recent Labs   Lab 11/18/22 0350   GLU 83      K 4.4   CL 95   CO2 26   BUN 71*   CREATININE 7.1*   CALCIUM 9.4     LFT:  Lab Results   Component Value Date    AST 15 11/05/2022    ALKPHOS 72 11/05/2022    BILITOT 1.0 11/05/2022     Albumin:   Albumin   Date Value Ref Range Status   11/18/2022 2.4 (L) 3.5 - 5.2 g/dL Final     Protein:   Total Protein   Date Value Ref Range Status   11/05/2022 7.8 6.0 - 8.4 g/dL Final     Lactic acid:   Lab Results   Component Value Date    LACTATE 1.0 10/22/2018    LACTATE 1.2 10/22/2018       Significant Imaging: Reviewed

## 2022-11-18 NOTE — PLAN OF CARE
Problem: Coping Ineffective  Goal: Effective Coping  Intervention: Support and Enhance Coping Strategies  Flowsheets (Taken 11/18/2022 3176)  Supportive Measures:   decision-making supported   relaxation techniques promoted  Family/Support System Care: support provided  Environmental Support:   calm environment promoted   distractions minimized     Problem: Gas Exchange Impaired  Goal: Optimal Gas Exchange  Intervention: Optimize Oxygenation and Ventilation  Flowsheets (Taken 11/18/2022 5265)  Airway/Ventilation Management: airway patency maintained  Head of Bed (HOB) Positioning: HOB at 60 degrees

## 2022-11-18 NOTE — ASSESSMENT & PLAN NOTE
- on NC  - likely to worsen due to his desire to have PO intake, he and his family knows the risks of that  [] hydromorphone 0.5mg IV q4h PRN available for pain, dyspnea and uncontrollable cough

## 2022-11-18 NOTE — SUBJECTIVE & OBJECTIVE
Interval History: Seen in dialysis, in respiratory distress and agitated, dialysis stopped prematurely. Less tachypneic after receiving dialysis.     Review of Systems   Unable to perform ROS: Acuity of condition   Objective:     Vital Signs (Most Recent):  Temp: 98.5 °F (36.9 °C) (11/18/22 0747)  Pulse: 88 (11/18/22 0747)  Resp: 14 (11/18/22 1449)  BP: (!) 116/57 (11/18/22 0747)  SpO2: (!) 92 % (11/18/22 1440)   Vital Signs (24h Range):  Temp:  [97.8 °F (36.6 °C)-98.5 °F (36.9 °C)] 98.5 °F (36.9 °C)  Pulse:  [80-88] 88  Resp:  [14-20] 14  SpO2:  [92 %-96 %] 92 %  BP: (116-127)/(56-57) 116/57     Weight: 105 kg (231 lb 7.7 oz)  Body mass index is 42.34 kg/m².  No intake or output data in the 24 hours ending 11/18/22 1452   Physical Exam  Vitals and nursing note reviewed.   Constitutional:       General: He is in acute distress.      Appearance: He is ill-appearing and toxic-appearing.      Comments: cachectic   HENT:      Head: Normocephalic and atraumatic.      Comments: Temporal wasting     Mouth/Throat:      Mouth: Mucous membranes are dry.   Cardiovascular:      Rate and Rhythm: Regular rhythm. Tachycardia present.   Pulmonary:      Effort: Respiratory distress present.      Breath sounds: Rhonchi present. No wheezing or rales.      Comments: On venti mask  Abdominal:      General: Bowel sounds are normal. There is no distension.      Palpations: Abdomen is soft.      Tenderness: There is no abdominal tenderness. There is no guarding.   Musculoskeletal:      Right lower leg: No edema.      Left lower leg: No edema.   Skin:     General: Skin is warm and dry.   Neurological:      Mental Status: He is alert.       Significant Labs: All pertinent labs within the past 24 hours have been reviewed.    Significant Imaging: I have reviewed all pertinent imaging results/findings within the past 24 hours.

## 2022-11-18 NOTE — PROGRESS NOTES
[facility] Medicine  Progress Note    Patient Name: Mandeep Willams  MRN: [id number]  Patient Class: IP- Inpatient   Admission Date: 11/5/2022  Length of Stay: 13 days  Attending Physician: Crissy Lovett MD  Primary Care Provider: Guy Mcclelland Jr, MD        Subjective:     Principal Problem:Acute hypoxemic respiratory failure        HPI:  Mr. Willams is a 87 yo M who presents with a CC of SOB for 3 days. EMS was called and he was found to have an 02 sat of 90% on RA. Patient is an ESRD patient with a history of mod-severe AS, pleural effusions as well as pulmonary hypertension.  He lives at home by himself and does not wear 02.  He is very comfortable on NC and has no other complaints. He was in observation here early last month for the same.        Overview/Hospital Course:  Patient admitted to the hospital for hypoxia- likely multifactorial including mod-severe AS, pulmonary hypertension and pleural effusions.  He was started on Lasix. Nephrology consulted for dialysis, underwent HD on 11/7. Pulmonary consulted and agreed that hypoxia likely from pulmonary edema. Patient went multiple dialysis rounds without much improvement in his hypoxia.  Palliative care was consulted. Patient sent for CTA of chest on 11/9/22.  CTA showed no PE but did show large Left pleural effusion. Patient sent to IR for therapeutic thoracentesis. Palliative care consulted and patient now is DNR.  PT/OT were consulted and rec: SNF. Patient's hypoxia improved and was weaned to RA. SLP was consulted for concerns of aspiration. Patient is unfortunately aspirating all consistencies. MBSS concerning. Patient has LaPOST that reads he does not want artificial nutrition. There was a trial of NGT but patient has been consistently stating he does not want the NGT. Ongoing conversations with family including Xochitl and Kathi - patient's 2 grandnieces. Patient has expressed that he still wants to continue to eat despite risk of  aspiration/respiratory distress/death. Palliative has been following along. He has worsening respiratory failure and distress associated.       Interval History: Seen in dialysis, in respiratory distress and agitated, dialysis stopped prematurely. Less tachypneic after receiving dialysis.     Review of Systems   Unable to perform ROS: Acuity of condition   Objective:     Vital Signs (Most Recent):  Temp: 98.5 °F (36.9 °C) (11/18/22 0747)  Pulse: 88 (11/18/22 0747)  Resp: 14 (11/18/22 1449)  BP: (!) 116/57 (11/18/22 0747)  SpO2: (!) 92 % (11/18/22 1440)   Vital Signs (24h Range):  Temp:  [97.8 °F (36.6 °C)-98.5 °F (36.9 °C)] 98.5 °F (36.9 °C)  Pulse:  [80-88] 88  Resp:  [14-20] 14  SpO2:  [92 %-96 %] 92 %  BP: (116-127)/(56-57) 116/57     Weight: 105 kg (231 lb 7.7 oz)  Body mass index is 42.34 kg/m².  No intake or output data in the 24 hours ending 11/18/22 1452   Physical Exam  Vitals and nursing note reviewed.   Constitutional:       General: He is in acute distress.      Appearance: He is ill-appearing and toxic-appearing.      Comments: cachectic   HENT:      Head: Normocephalic and atraumatic.      Comments: Temporal wasting     Mouth/Throat:      Mouth: Mucous membranes are dry.   Cardiovascular:      Rate and Rhythm: Regular rhythm. Tachycardia present.   Pulmonary:      Effort: Respiratory distress present.      Breath sounds: Rhonchi present. No wheezing or rales.      Comments: On venti mask  Abdominal:      General: Bowel sounds are normal. There is no distension.      Palpations: Abdomen is soft.      Tenderness: There is no abdominal tenderness. There is no guarding.   Musculoskeletal:      Right lower leg: No edema.      Left lower leg: No edema.   Skin:     General: Skin is warm and dry.   Neurological:      Mental Status: He is alert.       Significant Labs: All pertinent labs within the past 24 hours have been reviewed.    Significant Imaging: I have reviewed all pertinent imaging results/findings  within the past 24 hours.      Assessment/Plan:      * Acute hypoxemic respiratory failure  He has respiratory failure due to severe AS, pulmonary edema, bilateral pleural effusions, and recurrent aspiration. This improved with thoracentesis but has since worsened.   - now on vent mask  - not tolerating any PO due to aspiration  - PRN dilaudid for respiratory distress  - Palliative following    Severe malnutrition  BMI is not correct- he is  pounds. Will weigh today. He is cachectic with temporal and muscle wasting. He has no access for nutrition and aspirates on everything PO.       Dysphagia  Significantly decreased UES opening; multifactorial, one of these factors include large osteophyte at approximate level of UES.   - NPO   - not able to tolerate PO due to aspiration      Advanced care planning/counseling discussion  11/18/22- Dr Lovett   Palliative discussed with Xochitl this morning. Continuing care while trying to get oldest daughter to [place] from [place]. In the mean time, he has worsening respiratory distress. I called Xochitl but she did not answer. Using PRN dilaudid.     11/17/22- Dr Benoit   Friend of family Chrissie was present and watched patient aspirate both lemonade and the soup we brought. They put grand niece on phone Xochitl and we all had group conversation. Patient asking to continue to eat. Family somewhat understanding about aspiration and coughing. I explained my concern about aspiration -->respiratory distress --> hypoxia and leading to death - he is DNR/DNI. I also brought up his La POST and the note from Dr. Arnold who documented patient's nephew Fletcher was present for that conversation.    Used  Jude ID#[id number] later to talk to patient alone to make sure he understood his plan of care and the risks. Patient understood that he is aspirating - aka it is going into his lungs and that is why he is coughing everytime and having difficulty breathing. I told him this could lead to  pneumonia, respiratory distress and death. He said he understood risks but would like to continue to eat/drink by mouth. I also discussed the NGT in his nose - he was adamant saying he did not want that. I told him that it is nutrition for him and that by eating this much he will not get much nutrition - he again stated he did not want the NGT and understood. April his RN was present during this conversation. At the end, I summarized what he expressed to make sure that I understood what he wanted - That he would like to eat by mouth knowing the risk and he did not want the feeding tube. His main complaint at this time is having pain on his buttocks and we adjusted him on his side. He did say he would be ok with having a small dose of pain medicine to help relieve the pain. All questions answered for patient.    Pulmonary hypertension  PA pressure of 59, likely group 2       Severe aortic stenosis  As discussed above.  Recent echo      Thrombocytopenia  No acute issues, no bleeding, DC monitoring       Debility  He will not thrive at SNF because of his respiratory failure  He is appropriate for hospice  Palliative care following along      Anemia of chronic disorder  No acute issues, no bleeding      Tobacco use disorder  Not able to discuss at this point due to severe respiratory distress      Pleural effusion  S/p thoracentesis with improvement in oxygen requirements but now worsening again      Hyperlipidemia  Unable to safely swallow stain      ESRD (end stage renal disease)  Nephrology consulted for dialysis. Dialysis had to be paused early on 11/18 due to severe respiratory distress.         HTN (hypertension)  BP is controlled  Not able to safely swallow PO medications         VTE Risk Mitigation (From admission, onward)      None            Discharge Planning   BONILLA: 11/14/2022     Code Status: DNR   Is the patient medically ready for discharge?:     Reason for patient still in hospital (select all that  apply): Patient trending condition  Discharge Plan A: Hospice/home   Discharge Delays: (!) Patient and Family Barriers        Updated Fletcher at bedside      Crissy Lovett MD  Department of Hospital Medicine   [facility]

## 2022-11-18 NOTE — ASSESSMENT & PLAN NOTE
He will not thrive at SNF because of his respiratory failure  He is appropriate for hospice  Palliative care following along

## 2022-11-18 NOTE — ASSESSMENT & PLAN NOTE
11/18/22:  - attempted to talk with Mr. Willams using language line ([id number]) but he was unable to participate   - Talked with xochitl on phone  - She updated he on conversations she had yesterday with primary team  - Xochitl is aware time is overall short and things are likely to only keep declining  - Their goal is to hopefully get his eldest daughter here as soon as possible to see him as that was his biggest wish  - They got the letter from case management and its already at the [facility] for [place] and his eldest daughter is working on getting here  - Xochitl is aware he is uncomfortable and wants to make sure he has the right medication available for his pain, cough and shortness of breath, which I made her aware we will make sure he has at his disposal.   - She wants things to remain status quo over the weekend and hopefully have more information by Monday regarding timeline for his eldest daughter. She will keep us posted.   [] Our team will follow up on Monday  [] hydromorphone 0.5mg IV q4h PRN available for pain, dyspnea and uncontrollable cough    11/16/2022:  - seen while in dialysis, used a cantonese   - henriquez difference from last week. He seemed more tired and less communicative.   - NGT in place  - when asked how he was doing, he stated not well (interpreted)  - Called Kathi (no answer) and then called Xochitl who did  and stated Kathi was likely sleeping still after her work shift  - Xochitl had great insight into Mr. Willams medical ongoings and acknowledged the convo she had with Dr. Akhil Benoit yesterday regarding the tube feeds via NGT  - I updated Xochitl that Mr. Willams in my opinion is looking worse than last week and unhappier too. I made her aware that I worried things may only continue to get worse given everything he has going on.   - talked with xochitl and made her aware of patients past wishes noted on his LaPOST in 2021 with Dr. Arnold. It had stated DNR, no tube feeds and ok with HD.   - Xochitl  was not aware of that laPOST and expressed her difficulty of the guilt with not giving him any food and having him starve to end his life which led to her deciding on the NGT for TFs. I made her aware, he has made such a decision of what he would want known on the LaPOST so no one would have the burden of such a decision. He likely does not feel hunger the same way you and I do. Made her aware of the alternative route if decision is made regarding comfort based approach in which case comfort feeds could be done, knowing the risks. She stated she understood and will discuss with Kathi and make a decision on how to proceed.  - Xochitl understands the severity of the situation and the difficulty with predicting exactly how long he might have left. Made her aware he would be eligible for hospice. Xochitl stated Mr. Willams had not wanted to go to any sort of facility in the past. I made her aware of hospice, where it can be offered and the overall approach of comfort care too so that she had all the options at her disposal so she could discuss with Kathi too. She was appreciative.   - She understood and stated her biggest wish would be for him to overall be comfortable, painfree and to not suffer. She is going to discuss all of this further with Kathi and then will talk further tomorrow with Dr. Akhil Benoit, who I let Xochitl know, I will pass on the details of this conversation to.   - She also wished to get the letter from case management to start a file/case to get mr. Willams eldest daughter here from [facility] so she can see him  - I talked with anita and provided her with the update and Xochitl email too ([email or web address]) for the letter to be sent to  - I updated Dr. Akhil Benoit regarding the conversation that was had as well and that Xochitl will likely follow up with her tomorrow regarding next steps once she has talked with Kathi.       11/11/2022:  - seen while at dialysis  - in good spirits  - he did not have any  questions or concerns  - updated him that I had talked with case management and they were working on getting a letter done that his grand nieces Kathi and Xochitl might be able to use to expedite having his eldest daughter come here from [place].   - He was very appreciative of the update  - I encouraged him to work with PT/OT so we can have the best idea of what level of support he needs for his well being. He stated he understood    11/9/2022:  - talked with Mr. Willams using Language line  video   - He stated [place]. He has 4 children (2 daughters and 2 sons) who are all back in [place]. He used to be an  in [place]  - He feels his oldest daughter Marcelina Zelaya is the one who is most involved out of them and wants her to come to him in [place]  - He lives alone currently and feels like it is becoming more difficult to manage. Legs more tired and harder to get around  - he also has a nephew (not biological) named Fletcher nearby and grand nieces kathi is nearby too and Xochitl who is a grand niece in [place] who all help him.   - He is open to getting PT to work with him to get stronger and help him get the equipment he needs to safety. He is also aware that a nursing care facility might be of benefit to him to ensure he gets the support he needs.   - He is aware of his esrd and wants his dialysis because it keeps him feeling better  - I asked him for permission to talk with Fletcher and his local family, he said that is ok.     - I then called and talked with Kathi who doesn't speak great english and said to call Xochitl first and she will loop her in  - Called Xochitl who is in [place] ([phone])  - Xochitl was very insightful and stated that the except the patients oldest daughter Marcelina Zelaya who he adopted at a young age, his others children do not seem to have a desire to be involved in his care or even aware of his medical ongoings.   - Xochitl stated it is her sister  Kathi who really is always there anytime Mandeep Willams needs anything.   - I updated them regarding the medical ongoings and his overall medical condition with his severe aortic stenosis, pulmonary htn and ESRD with dialysis. They understood the severity of the situation overall.   - Xochitl wants to be able to help get Mr. Willams's oldest daughter over here and requested a letter to help do so. I made her aware I will discuss with Flavia at case management  - Xochitl also relayed a terrible incident she had at dialysis outpatient where  is rarely used and Mr. Willams is often neglected. She would like his dialysis chair location to be changed. I also made her aware I would pass this on to Flavia.   - Xochitl and Kathi were very grateful for the discussion    - I then called Flavia and updated her regarding the above. She is going to work on getting a letter from her supervisor explaining Mr. Willams ongoing medical condition and provide that to Mr. Willams family to help expedite his oldest daughter coming here from [place] to help see him and care for him.     [] Mr. Willams will work with PT/OT and get a better idea of his abilities and then will go from there  [] Code: DNR and has LaPOST on file already too

## 2022-11-18 NOTE — ASSESSMENT & PLAN NOTE
 Significantly decreased UES opening; multifactorial, one of these factors include large osteophyte at approximate level of UES.   - NPO   - not able to tolerate PO due to aspiration

## 2022-11-18 NOTE — ASSESSMENT & PLAN NOTE
Nephrology consulted for dialysis. Dialysis had to be paused early on 11/18 due to severe respiratory distress.

## 2022-11-18 NOTE — ASSESSMENT & PLAN NOTE
He has respiratory failure due to severe AS, pulmonary edema, bilateral pleural effusions, and recurrent aspiration. This improved with thoracentesis but has since worsened.   - now on vent mask  - not tolerating any PO due to aspiration  - PRN dilaudid for respiratory distress  - Palliative following

## 2022-11-18 NOTE — NURSING
Bedside report given to CÉSAR Ponce. Pt has no sign of distress. Safety precautions are maintained with bed alarm set, bed in lowest position, bed wheels locked, and call light in reach. Wound care completed per order. Family at bedside. Chart check completed.

## 2022-11-18 NOTE — PLAN OF CARE
[facility] - Telemetry  Discharge Reassessment    Primary Care Provider: Guy Mcclelland Jr, MD    Expected Discharge Date: 11/14/2022    CM with pt's relative, Kathi to follow up on dc needs. Pt's family waiting on pt's daughter Saida to arrive from [place]. Pt's relative not sure if pt's daughter will get here in time. CM will continue to follow up.    Reassessment (most recent)       Discharge Reassessment - 11/18/22 1712          Discharge Reassessment    Assessment Type Discharge Planning Reassessment     Did the patient's condition or plan change since previous assessment? Yes     Discharge Plan discussed with: Caregiver     Name(s) and Number(s) Kathi To- relative [phone] (P)      Discharge Plan A Hospice/home (P)      Discharge Plan B Home with family (P)      DME Needed Upon Discharge  other (see comments) (P)    tbd    Discharge Barriers Identified Other (see comments) (P)    Waiting for pt's daughter to make final decisions. Coming from [place]    Why the patient remains in the hospital Requires continued medical care (P)         Post-Acute Status    Coverage PHN (P)      Discharge Delays Patient and Family Barriers (P)

## 2022-11-18 NOTE — ASSESSMENT & PLAN NOTE
BMI is not correct- he is  pounds. Will weigh today. He is cachectic with temporal and muscle wasting. He has no access for nutrition and aspirates on everything PO.

## 2022-11-18 NOTE — PLAN OF CARE
Problem: Adult Inpatient Plan of Care  Goal: Plan of Care Review  Outcome: Ongoing, Progressing  Goal: Patient-Specific Goal (Individualized)  Outcome: Ongoing, Progressing  Goal: Absence of Hospital-Acquired Illness or Injury  Outcome: Ongoing, Progressing  Intervention: Identify and Manage Fall Risk  Flowsheets (Taken 11/18/2022 0622)  Safety Promotion/Fall Prevention:   assistive device/personal item within reach   side rails raised x 2  Intervention: Prevent Skin Injury  Flowsheets (Taken 11/18/2022 0622)  Skin Protection:   incontinence pads utilized   silicone foam dressing in place  Intervention: Prevent and Manage VTE (Venous Thromboembolism) Risk  Flowsheets (Taken 11/18/2022 0622)  Activity Management: Rolling - L1  VTE Prevention/Management: ROM (active) performed  Range of Motion: active ROM (range of motion) encouraged  Intervention: Prevent Infection  Flowsheets (Taken 11/18/2022 0622)  Infection Prevention:   rest/sleep promoted   single patient room provided  Goal: Optimal Comfort and Wellbeing  Outcome: Ongoing, Progressing  Intervention: Monitor Pain and Promote Comfort  Flowsheets (Taken 11/18/2022 0622)  Pain Management Interventions:   relaxation techniques promoted   quiet environment facilitated  Intervention: Provide Person-Centered Care  Flowsheets (Taken 11/18/2022 0622)  Trust Relationship/Rapport:   care explained   choices provided   empathic listening provided   emotional support provided  Goal: Readiness for Transition of Care  Outcome: Ongoing, Progressing     Problem: Skin Injury Risk Increased  Goal: Skin Health and Integrity  Outcome: Ongoing, Progressing  Intervention: Optimize Skin Protection  Flowsheets (Taken 11/18/2022 0622)  Pressure Reduction Techniques: frequent weight shift encouraged  Pressure Reduction Devices: heel offloading device utilized  Skin Protection:   incontinence pads utilized   silicone foam dressing in place  Head of Bed (HOB) Positioning: HOB  elevated  Intervention: Promote and Optimize Oral Intake  Flowsheets (Taken 11/18/2022 0622)  Oral Nutrition Promotion: rest periods promoted     Problem: Fall Injury Risk  Goal: Absence of Fall and Fall-Related Injury  Outcome: Ongoing, Progressing  Intervention: Identify and Manage Contributors  Flowsheets (Taken 11/18/2022 0622)  Self-Care Promotion: independence encouraged  Medication Review/Management: medications reviewed  Intervention: Promote Injury-Free Environment  Flowsheets (Taken 11/18/2022 0622)  Safety Promotion/Fall Prevention:   assistive device/personal item within reach   side rails raised x 2     Problem: Impaired Wound Healing  Goal: Optimal Wound Healing  Outcome: Ongoing, Progressing  Intervention: Promote Wound Healing  Flowsheets (Taken 11/18/2022 0622)  Oral Nutrition Promotion: rest periods promoted  Sleep/Rest Enhancement: relaxation techniques promoted  Activity Management: Rolling - L1  Pain Management Interventions:   relaxation techniques promoted   quiet environment facilitated

## 2022-11-18 NOTE — NURSING
[facility] MEWS TRIGGER FOLLOW UP       MEWS Monitoring, Score is: 0  Indication for review: O2 Device    Bedside Nurse, Rosario nash MD aware/ following, instructed to call [phone] for further concerns or assistance. Bedside round completed. Upon arrival to bedside pt on ventimask 15L 50% O2 sats 90%. No respiratory distress noted pt has been given PRN Dilaudid. Lungs sound clear but distant. PT DNR. Will continue to follow.

## 2022-11-18 NOTE — ASSESSMENT & PLAN NOTE
- ngt no longer in place, caused him a lot of discomfort  - per his LaPOST, did not want artifical nutrition  - getting comfort feeds and per notes and conversation Dr. Benoit had with family and him, he is aware of risk with that

## 2022-11-19 PROBLEM — T17.908A ASPIRATION INTO AIRWAY: Status: ACTIVE | Noted: 2022-11-19

## 2022-11-19 NOTE — NURSING
Patient HR elevated to 150's unexpectedly. Comfort measures remains in place. Dilaudid PRN given, periods of apnea noted, respirations shallow. Venti mask remains in place at 50% o2 15L. Will continue to monitor    Report given to oncoming nurse CÉSAR Alvarez to assume care.

## 2022-11-19 NOTE — PLAN OF CARE
Problem: Physical Therapy  Goal: Physical Therapy Goal  Description: Goals to be met by: 22     Patient will increase functional independence with mobility by performin. Pt to be mod I with bed mobility.  2. Pt to transfer with supervision.  3. Pt to ambulate 50' w/RW SBA.  4. Pt to be (I) with HEP.    2022 1830 by Nasrin Vail, PTA  Outcome: Ongoing, Not Progressing

## 2022-11-19 NOTE — PT/OT/SLP PROGRESS
Physical Therapy Treatment    Patient Name:  Mandeep Willams   MRN:  [id number]    Recommendations:     Discharge Recommendations:  nursing facility, skilled (hospice per chart?)  Discharge Equipment Recommendations: none   Barriers to discharge: nutrition (pt rec NPO per SLP)     Assessment:     Mandeep Willams is a 86 y.o. male admitted with a medical diagnosis of Acute hypoxemic respiratory failure.  He presents with the following impairments/functional limitations:  weakness, impaired endurance, impaired self care skills, gait instability, impaired functional mobility, impaired balance, decreased lower extremity function, decreased ROM, decreased upper extremity function, decreased coordination, pain, decreased safety awareness, impaired cardiopulmonary response to activity .  [facility] : # [id number]   Prior to treatment , this writer asked if patient wants PT to keep coming to assist him sit EOB / get out of bed, pt stated yes, he requested to sit EOB . Pt required more assistance with bed mobility, only tolerated sitting EOB x 5 min then wanted to lay down , assisted pt back in bed and repositioned pt in bed with max A x 2 persons. Pt sounds gurgling, pt able to self-suction with nurse oneal and MD aware. SpO2: 91%-95% on 3L O2NC , HR:  bpm.    Rehab Prognosis: fair-; patient would benefit from acute skilled PT services to address these deficits and reach maximum level of function.    Recent Surgery: * No surgery found *      Plan:     During this hospitalization, patient to be seen 5 x/week to address the identified rehab impairments via gait training, therapeutic activities, therapeutic exercises and progress toward the following goals:    Plan of Care Expires:  11/18/22    Subjective     Chief Complaint: back discomfort   Patient/Family Comments/goals: to sit up   Pain/Comfort:  Pain Rating 1: 0/10  Pain Rating Post-Intervention 1: 0/10      Objective:     Communicated with nurse Ponce and MD  prior to  session.  Patient found HOB elevated with bed alarm, telemetry, peripheral IV, oxygen upon PT entry to room.     General Precautions: Standard, fall   Orthopedic Precautions:N/A   Braces: N/A  Respiratory Status: Nasal cannula, flow 3 L/min     Functional Mobility:  Bed Mobility:     Rolling Left:  maximal assistance  Rolling Right: maximal assistance  Scooting: maximal assistance x 2   Supine to Sit: maximal assistance,HOB elevated   Sit to Supine: maximal assistance with BLE   Balance: fair in sitting      AM-PAC 6 CLICK MOBILITY  Turning over in bed (including adjusting bedclothes, sheets and blankets)?: 2  Sitting down on and standing up from a chair with arms (e.g., wheelchair, bedside commode, etc.): 1  Moving from lying on back to sitting on the side of the bed?: 2  Moving to and from a bed to a chair (including a wheelchair)?: 2  Need to walk in hospital room?: 1  Climbing 3-5 steps with a railing?: 1  Basic Mobility Total Score: 9       Treatment & Education:  Performed rolling/turning in bed to changed bed pad and to reposition to prevent pressure sores.     Patient left left sidelying ,HOB elevated, foam wedge to offload R side, pressure relief boots to BLE, towel roll between knees with all lines intact, call button in reach, bed alarm on, and nurse Rosario  notified..    GOALS:   Multidisciplinary Problems       Physical Therapy Goals          Problem: Physical Therapy    Goal Priority Disciplines Outcome Goal Variances Interventions   Physical Therapy Goal     PT, PT/OT Ongoing, Not Progressing     Description: Goals to be met by: 22     Patient will increase functional independence with mobility by performin. Pt to be mod I with bed mobility.  2. Pt to transfer with supervision.  3. Pt to ambulate 50' w/RW SBA.  4. Pt to be (I) with HEP.                         Time Tracking:     PT Received On: 22  PT Start Time: 1020     PT Stop Time: 1043  PT Total Time (min): 23 min     Billable  Minutes: Therapeutic Activity 23    Treatment Type: Treatment  PT/PTA: PTA     PTA Visit Number: 2     11/18/2022

## 2022-11-19 NOTE — PROGRESS NOTES
[facility] - [place], [place] [facility]  Nephrology  Progress Note    Patient Name: Mandeep Willams  MRN: [id number]  Admission Date: 11/5/2022  Attending Provider: Crissy Lovett MD   Primary Care Physician: Guy Mcclelland Jr, MD  Principal Problem:Acute hypoxemic respiratory failure  Consults Reason for consult: ESRD, dialysis  Date of service 11/18/2022    Subjective:     Interval History:  noted plans for pursuit of more comfort measures once family arrives, otherwise continue w/ current care plan. Mr Willams had decline in respiratory status w/ hypotension on dialysis necessitating early discontinuation of treatment.  He is poorly responsive on my exam, on VM, not communicative    Review of patient's allergies indicates:  No Known Allergies  Current Facility-Administered Medications   Medication Frequency    0.9%  NaCl infusion PRN    0.9%  NaCl infusion Once    albuterol sulfate nebulizer solution 2.5 mg Q4H PRN    hydrALAZINE injection 10 mg Q6H PRN    HYDROmorphone injection 0.5 mg Q1H PRN    sodium chloride 0.9% bolus 250 mL PRN       Objective:     Vital Signs (Most Recent):  Temp: 98.2 °F (36.8 °C) (11/18/22 1634)  Pulse: 94 (11/18/22 1634)  Resp: 16 (11/18/22 1702)  BP: (!) 108/53 (11/18/22 1634)  SpO2: (!) 92 % (11/18/22 1634)  O2 Device (Oxygen Therapy): venti mask (11/18/22 1524)   Vital Signs (24h Range):  Temp:  [97.8 °F (36.6 °C)-98.5 °F (36.9 °C)] 98.2 °F (36.8 °C)  Pulse:  [80-96] 94  Resp:  [14-20] 16  SpO2:  [92 %-97 %] 92 %  BP: ()/(40-57) 108/53     Weight: 105 kg (231 lb 7.7 oz) (11/15/22 0415)  Body mass index is 42.34 kg/m².  Body surface area is 2.14 meters squared.    No intake/output data recorded.    Physical Exam  Constitutional:       General: He is not in acute distress.     Appearance: He is not toxic-appearing.      Comments: Chronically ill, frail appearing elderly male, not communicative   HENT:      Head: Normocephalic and atraumatic.      Mouth/Throat:      Mouth: Mucous membranes are moist.   Eyes:       General: No scleral icterus.     Extraocular Movements: Extraocular movements intact.      Conjunctiva/sclera: Conjunctivae normal.   Cardiovascular:      Rate and Rhythm: Normal rate and regular rhythm.      Heart sounds: Murmur heard.   Pulmonary:      Effort: Pulmonary effort is normal.      Breath sounds: No wheezing, rhonchi or rales.      Comments: On VM, diminished BS  Musculoskeletal:         General: No deformity.      Right lower leg: No edema.      Left lower leg: No edema.   Skin:     General: Skin is warm and dry.      Findings: No rash.   Neurological:      General: No focal deficit present.      Mental Status: He is alert.      Comments: Poorly communicative   Psychiatric:      Comments: Not communicative       Significant Labs:CBC:   Recent Labs   Lab 11/14/22  0359   WBC 11.87   RBC 3.38*   HGB 11.3*   HCT 32.8*      MCV 97   MCH 33.4*   MCHC 34.5       CMP:   Recent Labs   Lab 11/18/22  0350   GLU 83   CALCIUM 9.4   ALBUMIN 2.4*      K 4.4   CO2 26   CL 95   BUN 71*   CREATININE 7.1*       All labs within the past 24 hours have been reviewed.    Significant Imaging:  X-Ray: Reviewed    Assessment/Plan:     1. ESRD - usual HD w/ Dr Henriquez  - HD today aborted prematurely due to decompensation, will attempt HD in AM if clinically stable  - increase filter size for improved clearance  - renally dose medications for dialysis  Acute hypoxic respiratory failure, hypervolemia, pulm edema, pleural effusion - O2 requirements worsening  - UF as tolerated  2. HTN - Intradialytic hypotension    - hold BP meds prior to HD, albumin PRN  - UF as tolerated on dialysis  3. Anemia of chronic kidney disease - Hgb is up, monitor for need to start Epo with HD, continue to monitor CBC  4. MBD/secondary hyperparathyroidism -  He has failed swallow study and is now NPO, unable to take binders. Increased filter size with HD, monitor labs  5. Hypoalbuminemia- Nepro supplement    Thank you for your  consult. I will follow-up with patient. Please contact us if you have any additional questions.    Lavinia Baugh MD  Nephrology  [facility] - [place] - [facility]

## 2022-11-19 NOTE — NURSING
[facility] MEWS TRIGGER FOLLOW UP       MEWS Monitoring, Score is: 8, pt is comfort measures only/DNR  Indication for review: BP, HR, Temp    Bedside Nurse, saad Alvarez MD aware/ following, instructed to call [phone] for further concerns or assistance.    PRN dilaudid available. Suggested switch to morphine if pt experiencing air hunger and adding PRN ativan if anxiety/agitation present.     : Informed by primary RN pt has .

## 2022-11-19 NOTE — DISCHARGE SUMMARY
[facility] Medicine  Discharge Summary      Patient Name: Mandeep Willams  MRN: [id number]  [facility]: [id number]  Patient Class: IP- Inpatient  Admission Date: 11/5/2022  Hospital Length of Stay: 13 days  Discharge Date and Time: 11/14/2022 8:07PM    Attending Physician: No att. providers found   Discharging Provider: Crissy Lovett MD  Primary Care Provider: Guy Mcclelland Jr, MD    Primary Care Team: Networked reference to record PCT     HPI:   Mr. Willams is a 85 yo M who presents with a CC of SOB for 3 days. EMS was called and he was found to have an 02 sat of 90% on RA. Patient is an ESRD patient with a history of mod-severe AS, pleural effusions as well as pulmonary hypertension.  He lives at home by himself and does not wear 02.  He is very comfortable on NC and has no other complaints. He was in observation here early last month for the same.        * No surgery found *      Hospital Course:   Patient admitted to the hospital for hypoxia- likely multifactorial including mod-severe AS, pulmonary hypertension and pleural effusions.  He was started on Lasix. Nephrology consulted for dialysis, underwent HD on 11/7. Pulmonary consulted and agreed that hypoxia likely from pulmonary edema. Patient went multiple dialysis rounds without much improvement in his hypoxia.  Palliative care was consulted. Patient sent for CTA of chest on 11/9/22.  CTA showed no PE but did show large Left pleural effusion. Patient sent to IR for therapeutic thoracentesis. Palliative care consulted and patient now is DNR.  PT/OT were consulted and rec: SNF. Patient's hypoxia improved and was weaned to RA. SLP was consulted for concerns of aspiration. Patient is unfortunately aspirating all consistencies. MBSS concerning. Patient has LaPOST that reads he does not want artificial nutrition. There was a trial of NGT but patient has been consistently stating he does not want the NGT. Ongoing conversations with family including Xochitl and  Kathi - patient's 2 grandnieces. Patient has expressed that he still wants to continue to eat despite risk of aspiration/respiratory distress/death. Palliative has been following along. He has worsening respiratory failure and distress associated. Dialysis needed to be stopped early due to worsening respiratory failure and agitation. This is most likely due to ongoing aspiration. Used dilaudid to assist with air hunger symptoms. Family aware. Developed fever, worsening hypoxia, and hypotension. He was pronounced  on 22 at 8:07PM. Cause of death= aspiration into airway with coinciding conditions ESRD and aortic stenosis.       Goals of Care Treatment Preferences:  Code Status: DNR       LaPOST: Yes  What is most important right now is to focus on improvement in condition but with limits to invasive therapies.  Accordingly, we have decided that the best plan to meet the patient's goals includes continuing with treatment.      Consults:   Consults (From admission, onward)        Status Ordering Provider     Inpatient consult to Social Work  Once        Provider:  (Not yet assigned)    Completed PABLO DRAPER     Inpatient consult to Social Work  Once        Provider:  (Not yet assigned)    Completed PABLO DRAPER     Inpatient consult to Interventional Radiology  Once        Provider:  (Not yet assigned)    Completed PABLO DRAPER     Inpatient consult to Palliative Care  Once        Provider:  Shanika García NP    Completed PETRA CASTELAN     Inpatient consult to Nephrology  Once        Provider:  Angelia Baugh MD    Completed LEXY MORENO     Inpatient consult to Pulmonology  Once        Provider:  Petra Castelan MD    Completed PABLO DRAPER          No new Assessment & Plan notes have been filed under this hospital service since the last note was generated.  Service: Hospital Medicine    Final Active Diagnoses:    Diagnosis Date Noted POA    PRINCIPAL PROBLEM:   Acute hypoxemic respiratory failure [J96.01] 2022 Yes    Aspiration into airway [T17.908A] 2022 No    Severe malnutrition [E43] 2022 Yes    Dysphagia [R13.10] 11/15/2022 Yes    Advanced care planning/counseling discussion [Z71.89] 2022 Not Applicable    Pulmonary hypertension [I27.20] 2022 Yes    Severe aortic stenosis [I35.0] 10/04/2022 Yes    Thrombocytopenia [D69.6] 2022 Yes    Debility [R53.81] 2022 Yes    Anemia of chronic disorder [D63.8] 2021 Yes     Chronic    Tobacco use disorder [F17.200] 2021 Yes    Pleural effusion [J90] 2018 Yes    ESRD (end stage renal disease) [N18.6]  Yes    HTN (hypertension) [I10]  Yes    Hyperlipidemia [E78.5]  Yes      Problems Resolved During this Admission:    Diagnosis Date Noted Date Resolved POA    Hypokalemia [E87.6] 2022 Yes    Elevated troponin [R77.8] 10/04/2022 2022 Yes       Discharged Condition:     Disposition:     Follow Up:     Patient Instructions:   No discharge procedures on file.-     Significant Diagnostic Studies: Labs: All labs within the past 24 hours have been reviewed    Pending Diagnostic Studies:     None         Medications:  Reconciled Home Medications:      Medication List      STOP taking these medications    albuterol 90 mcg/actuation inhaler  Commonly known as: PROAIR HFA     albuterol-ipratropium 2.5 mg-0.5 mg/3 mL nebulizer solution  Commonly known as: DUO-NEB     amLODIPine 5 MG tablet  Commonly known as: NORVASC     calcium acetate(phosphat bind) 667 mg (169 mg calcium)/5 mL Soln     furosemide 20 MG tablet  Commonly known as: LASIX     metoprolol succinate 100 MG 24 hr tablet  Commonly known as: TOPROL-XL     multivitamin with minerals tablet     omega-3 acid ethyl esters 1 gram capsule  Commonly known as: LOVAZA     PFIZER COVID-19 CRISS VACCN(PF) 30 mcg/0.3 mL injection  Generic drug: COVID-19 vac,  ariella(Pfizer)(PF)     pravastatin 10 MG tablet  Commonly known as: PRAVACHOL     ELIE-KAUSHIK RX 1- mg-mg-mcg Tab  Generic drug: vit b cmplx 3-fa-vit c-biotin 1- mg-mg-mcg     RENAL CAPS 1 mg Cap  Generic drug: vitamin renal formula (B-complex-vitamin c-folic acid)     tamsulosin 0.4 mg Cap  Commonly known as: FLOMAX            Indwelling Lines/Drains at time of discharge:   Lines/Drains/Airways     Drain  Duration                Hemodialysis AV Fistula Left upper arm -- days                Time spent on the discharge of patient: 35 minutes         Crissy Lovett MD  Department of Hospital Medicine  [facility] [place]

## 2022-11-19 NOTE — NURSING

## 2022-11-19 NOTE — CARE UPDATE
Plan of care gone over with patient  Patient verbalized understanding of need to lay flat for 4 hours  Worsening hypotension/fever/tachycardia. Patient is DNR with comfort care measures. Called kade Leung and informed her of critical condition of the patient and that there is a chance he might not make it through the night. She expressed understanding.

## 2022-11-19 NOTE — NURSING
Pt HR:122 T:102.9 axillary BP:67/41 93% venti mask at 50%. Comfort measures in place. Dr. Vazquez made aware.

## 2022-11-19 NOTE — SIGNIFICANT EVENT
I was called by patient's nurse and asked to evaluate the patient.     No spontaneous movements were present. There was not response to verbal, tactile stimuli or sternal rubs. Pupils were mid-dilated and fixed, no pupillary light reflex noted. No breath sounds were appreciated over either lung field. No carotid pulses palpable, no heart sounds audible over precordium. Patient pronounced dead at 8:07 PM on 11/18/2022. Family notified.
